# Patient Record
Sex: FEMALE | Race: BLACK OR AFRICAN AMERICAN | NOT HISPANIC OR LATINO | Employment: FULL TIME | URBAN - METROPOLITAN AREA
[De-identification: names, ages, dates, MRNs, and addresses within clinical notes are randomized per-mention and may not be internally consistent; named-entity substitution may affect disease eponyms.]

---

## 2022-10-17 ENCOUNTER — OFFICE VISIT (OUTPATIENT)
Dept: URGENT CARE | Facility: CLINIC | Age: 22
End: 2022-10-17

## 2022-10-17 VITALS — OXYGEN SATURATION: 100 % | RESPIRATION RATE: 14 BRPM | HEART RATE: 79 BPM | TEMPERATURE: 98.1 F

## 2022-10-17 DIAGNOSIS — M25.50 ARTHRALGIA, UNSPECIFIED JOINT: Primary | ICD-10-CM

## 2022-10-17 DIAGNOSIS — J02.9 SORE THROAT: ICD-10-CM

## 2022-10-17 LAB
S PYO AG THROAT QL: NEGATIVE
SARS-COV-2 AG UPPER RESP QL IA: NEGATIVE
VALID CONTROL: NORMAL

## 2022-10-17 RX ORDER — SILDENAFIL 25 MG/1
25 TABLET, FILM COATED ORAL DAILY PRN
COMMUNITY

## 2022-10-17 RX ORDER — AZATHIOPRINE 50 MG/1
50 TABLET ORAL DAILY
COMMUNITY

## 2022-10-17 RX ORDER — FERROUS SULFATE 325(65) MG
325 TABLET ORAL
COMMUNITY

## 2022-10-17 RX ORDER — HYDROXYCHLOROQUINE SULFATE 200 MG/1
200 TABLET, FILM COATED ORAL 2 TIMES DAILY WITH MEALS
COMMUNITY

## 2022-10-17 RX ORDER — LISINOPRIL 2.5 MG/1
2.5 TABLET ORAL DAILY
COMMUNITY

## 2022-10-17 RX ORDER — METHYLPREDNISOLONE 4 MG/1
TABLET ORAL
Qty: 21 TABLET | Refills: 0 | Status: SHIPPED | OUTPATIENT
Start: 2022-10-17

## 2022-10-17 NOTE — LETTER
October 17, 2022     Patient: Mena Guzman   YOB: 2000   Date of Visit: 10/17/2022       To Whom it May Concern:    Mena Guzman was seen in my clinic on 10/17/2022  She may return to work on 10/20/2022  If you have any questions or concerns, please don't hesitate to call           Sincerely,          Jonathan Guy PA-C        CC: No Recipients

## 2022-10-17 NOTE — PROGRESS NOTES
[facility]        NAME: Adrian Mccabe is a 20 y o  female  : 2000    MRN: [id number]  DATE: 2022  TIME: 10:44 AM    Assessment and Plan   Arthralgia, unspecified joint [M25 50]  1  Arthralgia, unspecified joint  methylPREDNISolone 4 MG tablet therapy pack    Poct Covid 19 Rapid Antigen Test   2  Sore throat  POCT rapid strepA    Poct Covid 19 Rapid Antigen Test     Discussed strict return to care precautions as well as red flag symptoms which should prompt immediate ED referral  Pt verbalized understanding and is in agreement with plan  Please follow up with your primary care provider within the next week  Please remember that your visit today was with an urgent care provider and should not replace follow up with your primary care provider for chronic medical issues or annual physicals  Patient Instructions       Follow up with PCP in 3-5 days  Proceed to  ER if symptoms worsen  Chief Complaint     Chief Complaint   Patient presents with   • Sore Throat     Pt presents with sore throat for two days, joint pain          History of Present Illness       Adrian Mccabe is a(n) 20 y o  female presenting with URI symptoms x 2 days  Past medical history: lupus, kidney disease, crohns disease  Has not been able to follow with rheumatologist since getting  because is awaiting referral  States she has previously been given steroid burst for flare ups  Congestion: no  Sore throat: yes  Cough: no  Sputum production: no  Fever: no  Body aches: yes moderate to severe joint pain and swelling  Loss of smell/taste: no  GI symptoms: no  Known sick contacts: no  OTC meds tried: none        Review of Systems   Review of Systems   Constitutional: Positive for diaphoresis (last night only)  Negative for chills, fatigue and fever  HENT: Positive for sore throat  Negative for congestion, ear pain, postnasal drip, rhinorrhea, sinus pain, sneezing and trouble swallowing      Eyes: Negative for pain and redness  Respiratory: Negative for cough, chest tightness, shortness of breath and wheezing  Cardiovascular: Negative for chest pain and leg swelling  Gastrointestinal: Negative for diarrhea, nausea and vomiting  Musculoskeletal: Positive for arthralgias and myalgias  Neurological: Negative for dizziness, weakness and headaches  Current Medications       Current Outpatient Medications:   •  azaTHIOprine (IMURAN) 50 mg tablet, Take 50 mg by mouth daily, Disp: , Rfl:   •  ferrous sulfate 325 (65 Fe) mg tablet, Take 325 mg by mouth daily with breakfast, Disp: , Rfl:   •  hydroxychloroquine (PLAQUENIL) 200 mg tablet, Take 200 mg by mouth 2 (two) times a day with meals, Disp: , Rfl:   •  lisinopril (ZESTRIL) 2 5 mg tablet, Take 2 5 mg by mouth daily, Disp: , Rfl:   •  methylPREDNISolone 4 MG tablet therapy pack, Use as directed on package, Disp: 21 tablet, Rfl: 0  •  sildenafil (VIAGRA) 25 MG tablet, Take 25 mg by mouth daily as needed for erectile dysfunction, Disp: , Rfl:     Current Allergies     Allergies as of 10/17/2022   • (No Known Allergies)            The following portions of the patient's history were reviewed and updated as appropriate: allergies, current medications, past family history, past medical history, past social history, past surgical history and problem list      Past Medical History:   Diagnosis Date   • Crohn's disease ([facility] [address] )    • Kidney disease, chronic, stage I (GFR over 89 ml/min)    • Lupus ([facility] [address] )    • Raynaud disease        Past Surgical History:   Procedure Laterality Date   •  SECTION N/A        History reviewed  No pertinent family history  Medications have been verified  Objective   Pulse 79   Temp 98 1 °F (36 7 °C)   Resp 14   LMP 2022   SpO2 100%        Physical Exam     Physical Exam  Vitals and nursing note reviewed  Constitutional:       General: She is not in acute distress  Appearance: Normal appearance  She is not toxic-appearing  HENT:      Head: Normocephalic and atraumatic  Right Ear: Tympanic membrane, ear canal and external ear normal       Left Ear: Tympanic membrane, ear canal and external ear normal       Nose: No congestion  Mouth/Throat:      Mouth: Mucous membranes are moist       Pharynx: Oropharynx is clear  No oropharyngeal exudate or posterior oropharyngeal erythema  Eyes:      Conjunctiva/sclera: Conjunctivae normal       Pupils: Pupils are equal, round, and reactive to light  Cardiovascular:      Rate and Rhythm: Normal rate and regular rhythm  Heart sounds: Normal heart sounds  Pulmonary:      Effort: Pulmonary effort is normal  No respiratory distress  Breath sounds: Normal breath sounds  No wheezing, rhonchi or rales  Abdominal:      General: Abdomen is flat  Palpations: Abdomen is soft  Musculoskeletal:      Right hand: Swelling present  Left hand: Swelling present  Cervical back: Normal range of motion and neck supple  Comments: Diffuse swelling of DIPs, PIPs bilaterally   Skin:     General: Skin is warm and dry  Capillary Refill: Capillary refill takes less than 2 seconds  Neurological:      Mental Status: She is alert and oriented to person, place, and time     Psychiatric:         Behavior: Behavior normal

## 2022-10-19 ENCOUNTER — TELEPHONE (OUTPATIENT)
Dept: OBGYN CLINIC | Facility: HOSPITAL | Age: 22
End: 2022-10-19

## 2022-10-19 NOTE — TELEPHONE ENCOUNTER
Caller: Delta Jacobs    Doctor: n/a    Reason for call: Patient asked a question about scheduling an appt      Call back#: [phone]

## 2022-10-20 ENCOUNTER — OFFICE VISIT (OUTPATIENT)
Dept: URGENT CARE | Facility: CLINIC | Age: 22
End: 2022-10-20
Payer: OTHER GOVERNMENT

## 2022-10-20 VITALS — BODY MASS INDEX: 25.21 KG/M2 | TEMPERATURE: 98 F | WEIGHT: 137 LBS | HEIGHT: 62 IN | RESPIRATION RATE: 18 BRPM

## 2022-10-20 DIAGNOSIS — S60.429A BLISTER OF FINGER, INITIAL ENCOUNTER: ICD-10-CM

## 2022-10-20 DIAGNOSIS — M25.50 ARTHRALGIA, UNSPECIFIED JOINT: Primary | ICD-10-CM

## 2022-10-20 PROCEDURE — 99213 OFFICE O/P EST LOW 20 MIN: CPT | Performed by: PHYSICIAN ASSISTANT

## 2022-10-20 NOTE — PROGRESS NOTES
[facility]        NAME: Pema Liz is a 20 y o  female  : 2000    MRN: [id number]  DATE: 2022  TIME: 10:54 AM    Assessment and Plan   Arthralgia, unspecified joint [M25 50]  1  Arthralgia, unspecified joint     2  Blister of finger, initial encounter         Patient Instructions     Letter provided to patient and encouraged follow up with rheumatology as soon as possible  Proceed to  ER if symptoms worsen  Chief Complaint     Chief Complaint   Patient presents with   • Joint Pain     Joint pain and finger tip ulcerations from Lupus          History of Present Illness       Patient is a 21 yo female with PMH significant for SLE, Crohns, kidney disease, and Raynauds disease presenting to the urgent care office complaining of ulcers on finger tips and joint swelling X4 days  Patient believes this is likely a flare up of her lupus as previously seen during cold seasons  She states she is unable to drive due to joint swelling  She also reports the ulcers feel like "pins and needles"  She denies any numbness, tingling, additional rashes, itching,fevers, chest pain, or SOB  Has  insurance through [employer] and states the wait times are very long to see a rheumatologist, so asking for note to indicate that she should be seen asap  Review of Systems   Review of Systems   Respiratory: Negative for apnea, cough and shortness of breath  Cardiovascular: Negative for chest pain  Skin: Positive for rash  Neurological: Negative for numbness           Current Medications       Current Outpatient Medications:   •  azaTHIOprine (IMURAN) 50 mg tablet, Take 50 mg by mouth daily, Disp: , Rfl:   •  ferrous sulfate 325 (65 Fe) mg tablet, Take 325 mg by mouth daily with breakfast, Disp: , Rfl:   •  hydroxychloroquine (PLAQUENIL) 200 mg tablet, Take 200 mg by mouth 2 (two) times a day with meals, Disp: , Rfl:   •  lisinopril (ZESTRIL) 2 5 mg tablet, Take 2 5 mg by mouth daily, Disp: , Rfl:   •  methylPREDNISolone 4 MG tablet therapy pack, Use as directed on package, Disp: 21 tablet, Rfl: 0  •  sildenafil (VIAGRA) 25 MG tablet, Take 25 mg by mouth daily as needed for erectile dysfunction, Disp: , Rfl:     Current Allergies     Allergies as of 10/20/2022   • (No Known Allergies)            The following portions of the patient's history were reviewed and updated as appropriate: allergies, current medications, past family history, past medical history, past social history, past surgical history and problem list      Past Medical History:   Diagnosis Date   • Crohn's disease ([facility] [address] )    • Kidney disease, chronic, stage I (GFR over 89 ml/min)    • Lupus ([facility] [address] )    • Raynaud disease        Past Surgical History:   Procedure Laterality Date   •  SECTION N/A        History reviewed  No pertinent family history  Medications have been verified  Objective   Temp 98 °F (36 7 °C)   Resp 18   Ht 5' 2" (1 575 m)   Wt 62 1 kg (137 lb)   LMP 2022   BMI 25 06 kg/m²        Physical Exam     Physical Exam  Constitutional:       General: She is not in acute distress  Appearance: Normal appearance  She is normal weight  She is not ill-appearing  Cardiovascular:      Rate and Rhythm: Normal rate and regular rhythm  Pulses: Normal pulses  Heart sounds: Normal heart sounds  No murmur heard  No friction rub  No gallop  Pulmonary:      Effort: Pulmonary effort is normal       Breath sounds: Normal breath sounds  No wheezing, rhonchi or rales  Musculoskeletal:         General: Swelling present  Skin:     General: Skin is warm  Findings: Lesion present  Comments: Small blisters present on the finger tips   Neurological:      Mental Status: She is alert

## 2022-10-20 NOTE — LETTER
October 20, 2022     Patient: Alexa Mckeon   YOB: 2000   Date of Visit: 10/20/2022       To Whom it May Concern:    Alexa Mckeon was seen in my clinic on 10/20/2022  She is having a flare up of her rheumatologic conditions which is making it extremely difficult for her to perform her normal daily functions  She needs to be seen by a specialist as soon as possible  If you have any questions or concerns, please don't hesitate to call           Sincerely,          CARE NOW PROVIDER        CC: No Recipients

## 2022-10-20 NOTE — LETTER
October 20, 2022     Patient: Ge Nguyen   YOB: 2000   Date of Visit: 10/20/2022       To Whom it May Concern:    Ge Nguyen was seen in my clinic on 10/20/2022  She may return to work on 10/22/2022  If you have any questions or concerns, please don't hesitate to call           Sincerely,          CARE NOW PROVIDER        CC: No Recipients

## 2022-10-28 ENCOUNTER — HOSPITAL ENCOUNTER (OUTPATIENT)
Dept: RADIOLOGY | Facility: HOSPITAL | Age: 22
Discharge: HOME | End: 2022-10-28
Payer: OTHER GOVERNMENT

## 2022-10-28 DIAGNOSIS — N18.1 STAGE 1 CHRONIC KIDNEY DISEASE: ICD-10-CM

## 2022-10-28 PROCEDURE — 76775 US EXAM ABDO BACK WALL LIM: CPT

## 2022-12-09 ENCOUNTER — APPOINTMENT (OUTPATIENT)
Dept: LAB | Facility: CLINIC | Age: 22
End: 2022-12-09

## 2022-12-09 ENCOUNTER — HOSPITAL ENCOUNTER (OUTPATIENT)
Dept: ULTRASOUND IMAGING | Facility: HOSPITAL | Age: 22
Discharge: HOME | End: 2022-12-09

## 2022-12-09 DIAGNOSIS — I10 ESSENTIAL HYPERTENSION, MALIGNANT: ICD-10-CM

## 2022-12-09 DIAGNOSIS — E88.81 METABOLIC SYNDROME: ICD-10-CM

## 2022-12-09 DIAGNOSIS — E78.49 FAMILIAL COMBINED HYPERLIPIDEMIA: ICD-10-CM

## 2022-12-09 DIAGNOSIS — E78.5 HYPERLIPIDEMIA, UNSPECIFIED HYPERLIPIDEMIA TYPE: ICD-10-CM

## 2022-12-09 LAB
ALBUMIN SERPL BCP-MCNC: 4.1 G/DL (ref 3.5–5)
ALP SERPL-CCNC: 73 U/L (ref 34–104)
ALT SERPL W P-5'-P-CCNC: 70 U/L (ref 7–52)
ANION GAP SERPL CALCULATED.3IONS-SCNC: 5 MMOL/L (ref 4–13)
AST SERPL W P-5'-P-CCNC: 32 U/L (ref 13–39)
BILIRUB SERPL-MCNC: 0.36 MG/DL (ref 0.2–1)
BUN SERPL-MCNC: 13 MG/DL (ref 5–25)
CALCIUM SERPL-MCNC: 9.6 MG/DL (ref 8.4–10.2)
CHLORIDE SERPL-SCNC: 102 MMOL/L (ref 96–108)
CHOLEST SERPL-MCNC: 176 MG/DL
CO2 SERPL-SCNC: 30 MMOL/L (ref 21–32)
CREAT SERPL-MCNC: 0.72 MG/DL (ref 0.6–1.3)
GFR SERPL CREATININE-BSD FRML MDRD: 118 ML/MIN/1.73SQ M
GLUCOSE P FAST SERPL-MCNC: 87 MG/DL (ref 65–99)
HDLC SERPL-MCNC: 54 MG/DL
LDLC SERPL CALC-MCNC: 98 MG/DL (ref 0–100)
NONHDLC SERPL-MCNC: 122 MG/DL
POTASSIUM SERPL-SCNC: 3.9 MMOL/L (ref 3.5–5.3)
PROT SERPL-MCNC: 7.5 G/DL (ref 6.4–8.4)
SODIUM SERPL-SCNC: 137 MMOL/L (ref 135–147)
TRIGL SERPL-MCNC: 122 MG/DL

## 2023-01-02 ENCOUNTER — APPOINTMENT (EMERGENCY)
Dept: RADIOLOGY | Facility: HOSPITAL | Age: 23
End: 2023-01-02

## 2023-01-02 ENCOUNTER — HOSPITAL ENCOUNTER (EMERGENCY)
Facility: HOSPITAL | Age: 23
Discharge: HOME | End: 2023-01-02
Attending: EMERGENCY MEDICINE

## 2023-01-02 VITALS
HEART RATE: 100 BPM | OXYGEN SATURATION: 98 % | DIASTOLIC BLOOD PRESSURE: 56 MMHG | BODY MASS INDEX: 26.98 KG/M2 | WEIGHT: 147.49 LBS | SYSTOLIC BLOOD PRESSURE: 111 MMHG | TEMPERATURE: 98.5 F | RESPIRATION RATE: 20 BRPM

## 2023-01-02 DIAGNOSIS — R10.12 LEFT UPPER QUADRANT ABDOMINAL PAIN: Primary | ICD-10-CM

## 2023-01-02 LAB
ALBUMIN SERPL BCP-MCNC: 3.3 G/DL (ref 3.5–5)
ALP SERPL-CCNC: 90 U/L (ref 46–116)
ALT SERPL W P-5'-P-CCNC: 60 U/L (ref 12–78)
ANION GAP SERPL CALCULATED.3IONS-SCNC: 4 MMOL/L (ref 4–13)
AST SERPL W P-5'-P-CCNC: 35 U/L (ref 5–45)
BASOPHILS # BLD AUTO: 0.02 THOUSANDS/ÂΜL (ref 0–0.1)
BASOPHILS NFR BLD AUTO: 0 % (ref 0–1)
BILIRUB SERPL-MCNC: 0.33 MG/DL (ref 0.2–1)
BILIRUB UR QL STRIP: NEGATIVE
BUN SERPL-MCNC: 17 MG/DL (ref 5–25)
CALCIUM ALBUM COR SERPL-MCNC: 9.5 MG/DL (ref 8.3–10.1)
CALCIUM SERPL-MCNC: 8.9 MG/DL (ref 8.3–10.1)
CHLORIDE SERPL-SCNC: 104 MMOL/L (ref 96–108)
CLARITY UR: CLEAR
CO2 SERPL-SCNC: 30 MMOL/L (ref 21–32)
COLOR UR: NORMAL
CREAT SERPL-MCNC: 0.69 MG/DL (ref 0.6–1.3)
EOSINOPHIL # BLD AUTO: 0.05 THOUSAND/ÂΜL (ref 0–0.61)
EOSINOPHIL NFR BLD AUTO: 1 % (ref 0–6)
ERYTHROCYTE [DISTWIDTH] IN BLOOD BY AUTOMATED COUNT: 13.2 % (ref 11.6–15.1)
EXT PREGNANCY TEST URINE: NEGATIVE
EXT. CONTROL: NORMAL
FLUAV RNA RESP QL NAA+PROBE: NEGATIVE
FLUBV RNA RESP QL NAA+PROBE: NEGATIVE
GFR SERPL CREATININE-BSD FRML MDRD: 123 ML/MIN/1.73SQ M
GLUCOSE SERPL-MCNC: 111 MG/DL (ref 65–140)
GLUCOSE UR STRIP-MCNC: NEGATIVE MG/DL
HCT VFR BLD AUTO: 38.2 % (ref 34.8–46.1)
HGB BLD-MCNC: 11.9 G/DL (ref 11.5–15.4)
HGB UR QL STRIP.AUTO: NEGATIVE
IMM GRANULOCYTES # BLD AUTO: 0.01 THOUSAND/UL (ref 0–0.2)
IMM GRANULOCYTES NFR BLD AUTO: 0 % (ref 0–2)
KETONES UR STRIP-MCNC: NEGATIVE MG/DL
LEUKOCYTE ESTERASE UR QL STRIP: NEGATIVE
LIPASE SERPL-CCNC: 138 U/L (ref 73–393)
LYMPHOCYTES # BLD AUTO: 1.61 THOUSANDS/ÂΜL (ref 0.6–4.47)
LYMPHOCYTES NFR BLD AUTO: 25 % (ref 14–44)
MCH RBC QN AUTO: 27.8 PG (ref 26.8–34.3)
MCHC RBC AUTO-ENTMCNC: 31.2 G/DL (ref 31.4–37.4)
MCV RBC AUTO: 89 FL (ref 82–98)
MONOCYTES # BLD AUTO: 0.62 THOUSAND/ÂΜL (ref 0.17–1.22)
MONOCYTES NFR BLD AUTO: 10 % (ref 4–12)
NEUTROPHILS # BLD AUTO: 4.11 THOUSANDS/ÂΜL (ref 1.85–7.62)
NEUTS SEG NFR BLD AUTO: 64 % (ref 43–75)
NITRITE UR QL STRIP: NEGATIVE
NRBC BLD AUTO-RTO: 0 /100 WBCS
PH UR STRIP.AUTO: 6 [PH]
PLATELET # BLD AUTO: 270 THOUSANDS/UL (ref 149–390)
PMV BLD AUTO: 10.8 FL (ref 8.9–12.7)
POTASSIUM SERPL-SCNC: 4.1 MMOL/L (ref 3.5–5.3)
PROT SERPL-MCNC: 7 G/DL (ref 6.4–8.4)
PROT UR STRIP-MCNC: NEGATIVE MG/DL
RBC # BLD AUTO: 4.28 MILLION/UL (ref 3.81–5.12)
RSV RNA RESP QL NAA+PROBE: NEGATIVE
SARS-COV-2 RNA RESP QL NAA+PROBE: NEGATIVE
SODIUM SERPL-SCNC: 138 MMOL/L (ref 135–147)
SP GR UR STRIP.AUTO: >=1.03 (ref 1–1.03)
UROBILINOGEN UR QL STRIP.AUTO: 0.2 E.U./DL
WBC # BLD AUTO: 6.42 THOUSAND/UL (ref 4.31–10.16)

## 2023-01-02 RX ORDER — PANTOPRAZOLE SODIUM 20 MG/1
20 TABLET, DELAYED RELEASE ORAL DAILY
Qty: 30 TABLET | Refills: 0 | Status: SHIPPED | OUTPATIENT
Start: 2023-01-02

## 2023-01-02 RX ORDER — PANTOPRAZOLE SODIUM 40 MG/1
40 TABLET, DELAYED RELEASE ORAL ONCE
Status: COMPLETED | OUTPATIENT
Start: 2023-01-02 | End: 2023-01-02

## 2023-01-02 RX ORDER — OLMESARTAN MEDOXOMIL 20 MG/1
20 TABLET ORAL DAILY
COMMUNITY

## 2023-01-02 RX ADMIN — IOHEXOL 100 ML: 350 INJECTION, SOLUTION INTRAVENOUS at 19:04

## 2023-01-02 RX ADMIN — PANTOPRAZOLE SODIUM 40 MG: 40 TABLET, DELAYED RELEASE ORAL at 19:51

## 2023-01-02 RX ADMIN — SODIUM CHLORIDE 500 ML: 0.9 INJECTION, SOLUTION INTRAVENOUS at 17:02

## 2023-01-02 NOTE — ED PROVIDER NOTES
History  Chief Complaint   Patient presents with   • Abdominal Pain     L sided abd pain for about a week, abd getting increasingly large and bloated  No vomiting diarrhea or constipation no urinary issue per pt     19 yo female with history of Lupus and Crohn's disease and partial SBO presents c/o left sided abdominal pain x one week  Says pain is constant   + nausea and decreased appetite  No vomiting, diarrhea or constipation  Last BM yesterday  No fever  + cough and congestion  She is s/p cholecystetomy and ileocectomy for the SBO  History provided by:  Patient   used: No    Abdominal Pain  Associated symptoms: cough    Associated symptoms: no chest pain, no constipation, no diarrhea, no dysuria, no fever, no nausea, no shortness of breath and no vomiting        Prior to Admission Medications   Prescriptions Last Dose Informant Patient Reported? Taking?    Calcifediol (RAYALDEE PO)   Yes Yes   Sig: Take by mouth in the morning   Cyanocobalamin (VITAMIN B 12 PO)   Yes Yes   Sig: Take by mouth 2 (two) times a day   azaTHIOprine (IMURAN) 50 mg tablet Not Taking  Yes No   Sig: Take 50 mg by mouth daily   Patient not taking: Reported on 1/2/2023   ferrous sulfate 325 (65 Fe) mg tablet   Yes No   Sig: Take 325 mg by mouth daily with breakfast   hydroxychloroquine (PLAQUENIL) 200 mg tablet   Yes No   Sig: Take 200 mg by mouth 2 (two) times a day with meals   lisinopril (ZESTRIL) 2 5 mg tablet Not Taking  Yes No   Sig: Take 2 5 mg by mouth daily   Patient not taking: Reported on 1/2/2023   methylPREDNISolone 4 MG tablet therapy pack Not Taking  No No   Sig: Use as directed on package   Patient not taking: Reported on 1/2/2023   olmesartan (BENICAR) 20 mg tablet   Yes Yes   Sig: Take 20 mg by mouth daily   sildenafil (VIAGRA) 25 MG tablet Not Taking  Yes No   Sig: Take 25 mg by mouth daily as needed for erectile dysfunction   Patient not taking: Reported on 1/2/2023 Facility-Administered Medications: None       Past Medical History:   Diagnosis Date   • Crohn's disease ([facility] [address] )    • Hypertension    • Kidney disease, chronic, stage I (GFR over 89 ml/min)    • [facility]    • Raynaud disease        Past Surgical History:   Procedure Laterality Date   •  SECTION N/A    • COLON SURGERY         History reviewed  No pertinent family history  I have reviewed and agree with the history as documented  E-Cigarette/Vaping   • E-Cigarette Use Never User      E-Cigarette/Vaping Substances   • Nicotine Yes      Social History     Tobacco Use   • Smoking status: Never   • Smokeless tobacco: Never   Vaping Use   • Vaping Use: Never used   Substance Use Topics   • Alcohol use: Yes     Comment: socially   • Drug use: Never       Review of Systems   Constitutional: Negative  Negative for fever  HENT: Positive for congestion  Eyes: Negative  Respiratory: Positive for cough  Negative for shortness of breath  Cardiovascular: Negative  Negative for chest pain  Gastrointestinal: Positive for abdominal pain  Negative for constipation, diarrhea, nausea and vomiting  Genitourinary: Negative  Negative for dysuria and flank pain  Musculoskeletal: Negative  Negative for back pain and myalgias  Skin: Negative  Negative for rash  Neurological: Negative  Negative for dizziness and headaches  Hematological: Does not bruise/bleed easily  Psychiatric/Behavioral: Negative  All other systems reviewed and are negative  Physical Exam  Physical Exam  Vitals and nursing note reviewed  Constitutional:       General: She is not in acute distress  Appearance: She is well-developed  She is not ill-appearing or diaphoretic  HENT:      Head: Normocephalic and atraumatic  Right Ear: External ear normal       Left Ear: External ear normal    Eyes:      General: No scleral icterus       Conjunctiva/sclera: Conjunctivae normal    Cardiovascular:      Rate and Rhythm: Normal rate and regular rhythm  Heart sounds: Normal heart sounds  No murmur heard  Pulmonary:      Effort: Pulmonary effort is normal  No respiratory distress  Breath sounds: Normal breath sounds  Abdominal:      General: Bowel sounds are normal  There is no distension  Palpations: Abdomen is soft  Tenderness: There is abdominal tenderness in the epigastric area and left upper quadrant  There is no right CVA tenderness or left CVA tenderness  Musculoskeletal:         General: No deformity  Normal range of motion  Cervical back: Normal range of motion and neck supple  Right lower leg: No edema  Left lower leg: No edema  Skin:     General: Skin is warm and dry  Coloration: Skin is not pale  Findings: No rash  Neurological:      General: No focal deficit present  Mental Status: She is alert and oriented to person, place, and time  Motor: No weakness     Psychiatric:         Mood and Affect: Mood normal          Behavior: Behavior normal          Vital Signs  ED Triage Vitals [01/02/23 1540]   Temperature Pulse Respirations Blood Pressure SpO2   98 5 °F (36 9 °C) (!) 110 20 111/56 100 %      Temp Source Heart Rate Source Patient Position - Orthostatic VS BP Location FiO2 (%)   Tympanic Monitor Sitting Right arm --      Pain Score       7           Vitals:    01/02/23 1540   BP: 111/56   Pulse: (!) 110   Patient Position - Orthostatic VS: Sitting         Visual Acuity      ED Medications  Medications   iohexol (OMNIPAQUE) 240 MG/ML solution 50 mL (has no administration in time range)   pantoprazole (PROTONIX) EC tablet 40 mg (has no administration in time range)   sodium chloride 0 9 % bolus 500 mL (0 mL Intravenous Stopped 1/2/23 1802)   iohexol (OMNIPAQUE) 350 MG/ML injection (SINGLE-DOSE) 100 mL (100 mL Intravenous Given 1/2/23 1904)       Diagnostic Studies  Results Reviewed     Procedure Component Value Units Date/Time    UA (URINE) with reflex to Scope [[id number]] Collected: 01/02/23 1838    Lab Status: Final result Specimen: Urine, Other Updated: 01/02/23 1849     Color, UA Light Yellow     Clarity, UA Clear     Specific Gravity, UA >=1 030     pH, UA 6 0     Leukocytes, UA Negative     Nitrite, UA Negative     Protein, UA Negative mg/dl      Glucose, UA Negative mg/dl      Ketones, UA Negative mg/dl      Urobilinogen, UA 0 2 E U /dl      Bilirubin, UA Negative     Occult Blood, UA Negative    POCT pregnancy, urine [[id number]]  (Normal) Resulted: 01/02/23 1844    Lab Status: Final result Updated: 01/02/23 1844     EXT Preg Test, Ur Negative     Control Valid    FLU/RSV/COVID - if FLU/RSV clinically relevant [[id number]]  (Normal) Collected: 01/02/23 1710    Lab Status: Final result Specimen: Nares from Nose Updated: 01/02/23 1757     SARS-CoV-2 Negative     INFLUENZA A PCR Negative     INFLUENZA B PCR Negative     RSV PCR Negative    Narrative:      FOR PEDIATRIC PATIENTS - copy/paste COVID Guidelines URL to browser: [email or web address]  [email or web address]    SARS-CoV-2 assay is a Nucleic Acid Amplification assay intended for the  qualitative detection of nucleic acid from SARS-CoV-2 in nasopharyngeal  swabs  Results are for the presumptive identification of SARS-CoV-2 RNA  Positive results are indicative of infection with SARS-CoV-2, the virus  causing COVID-19, but do not rule out bacterial infection or co-infection  with other viruses  Laboratories within the [place] and its  territories are required to report all positive results to the appropriate  public health authorities  Negative results do not preclude SARS-CoV-2  infection and should not be used as the sole basis for treatment or other  patient management decisions  Negative results must be combined with  clinical observations, patient history, and epidemiological information  This test has not been FDA cleared or approved      This test has been authorized by FDA under an Emergency Use Authorization  (EUA)  This test is only authorized for the duration of time the  declaration that circumstances exist justifying the authorization of the  emergency use of an in vitro diagnostic tests for detection of SARS-CoV-2  virus and/or diagnosis of COVID-19 infection under section 564(b)(1) of  the Act, 21 U  S C  757MKA-7(N)(5), unless the authorization is terminated  or revoked sooner  The test has been validated but independent review by FDA  and CLIA is pending  Test performed using [email or web address]: This RT-PCR assay targets N2,  a region unique to SARS-CoV-2  A conserved region in the E-gene was chosen  for pan-Sarbecovirus detection which includes SARS-CoV-2  According to CMS-2020-01-R, this platform meets the definition of high-throughput technology      Lipase [[id number]]  (Normal) Collected: 01/02/23 1656    Lab Status: Final result Specimen: Blood from Arm, Right Updated: 01/02/23 1720     Lipase 138 u/L     Comprehensive metabolic panel [[id number]]  (Abnormal) Collected: 01/02/23 1656    Lab Status: Final result Specimen: Blood from Arm, Right Updated: 01/02/23 1720     Sodium 138 mmol/L      Potassium 4 1 mmol/L      Chloride 104 mmol/L      CO2 30 mmol/L      ANION GAP 4 mmol/L      BUN 17 mg/dL      Creatinine 0 69 mg/dL      Glucose 111 mg/dL      Calcium 8 9 mg/dL      Corrected Calcium 9 5 mg/dL      AST 35 U/L      ALT 60 U/L      Alkaline Phosphatase 90 U/L      Total Protein 7 0 g/dL      Albumin 3 3 g/dL      Total Bilirubin 0 33 mg/dL      eGFR 123 ml/min/1 73sq m     Narrative:      [place] guidelines for Chronic Kidney Disease (CKD):   •  Stage 1 with normal or high GFR (GFR > 90 mL/min/1 73 square meters)  •  Stage 2 Mild CKD (GFR = 60-89 mL/min/1 73 square meters)  •  Stage 3A Moderate CKD (GFR = 45-59 mL/min/1 73 square meters)  •  Stage 3B Moderate CKD (GFR = 30-44 mL/min/1 73 square meters)  •  Stage 4 Severe CKD (GFR = 15-29 mL/min/1 73 square meters)  •  Stage 5 End Stage CKD (GFR <15 mL/min/1 73 square meters)  Note: GFR calculation is accurate only with a steady state creatinine    CBC and differential [[id number]]  (Abnormal) Collected: 01/02/23 1656    Lab Status: Final result Specimen: Blood from Arm, Right Updated: 01/02/23 1704     WBC 6 42 Thousand/uL      RBC 4 28 Million/uL      Hemoglobin 11 9 g/dL      Hematocrit 38 2 %      MCV 89 fL      MCH 27 8 pg      MCHC 31 2 g/dL      RDW 13 2 %      MPV 10 8 fL      Platelets 261 Thousands/uL      nRBC 0 /100 WBCs      Neutrophils Relative 64 %      Immat GRANS % 0 %      Lymphocytes Relative 25 %      Monocytes Relative 10 %      Eosinophils Relative 1 %      Basophils Relative 0 %      Neutrophils Absolute 4 11 Thousands/µL      Immature Grans Absolute 0 01 Thousand/uL      Lymphocytes Absolute 1 61 Thousands/µL      Monocytes Absolute 0 62 Thousand/µL      Eosinophils Absolute 0 05 Thousand/µL      Basophils Absolute 0 02 Thousands/µL                  CT abdomen pelvis with contrast   Final Result by Sylvie Anthony MD (01/02 1926)      No acute abdominopelvic pathology identified  Workstation performed: [id number]                    Procedures  Procedures         ED Course  ED Course as of 01/02/23 1950 Mon Jan 02, 2023   1839 CT tech went to bring pt  To CT scan and found her sitting up in stretcher eating [employer] bagel with cream cheese  Pt  Asked to please stop eating until we get her CT scan done and resulted  Medical Decision Making  Evaluation negative  Will try course of protonix and advised follow up outpt  Left upper quadrant abdominal pain: acute illness or injury  Amount and/or Complexity of Data Reviewed  External Data Reviewed: labs, radiology and notes  Labs: ordered  Radiology: ordered            Disposition  Final diagnoses:   Left upper quadrant abdominal pain     Time reflects when diagnosis was documented in both MDM as applicable and the Disposition within this note     Time User Action Codes Description Comment    4/4/1858  7:35 PM Murray RECINOS Add [R43 44] Left upper quadrant abdominal pain       ED Disposition     ED Disposition   Discharge    Condition   Stable    Date/Time   Mon Jan 2, 2023  7:46 PM    [address] discharge to home/self care  Follow-up Information     Follow up With Specialties Details Why Contact Info    your doctor  Schedule an appointment as soon as possible for a visit             Patient's Medications   Discharge Prescriptions    PANTOPRAZOLE (PROTONIX) 20 MG TABLET    Take 1 tablet (20 mg total) by mouth daily       Start Date: 1/2/2023  End Date: --       Order Dose: 20 mg       Quantity: 30 tablet    Refills: 0       No discharge procedures on file      PDMP Review     None          ED Provider  Electronically Signed by           Jayesh Cisneros MD  08/41/93 9660

## 2023-01-03 NOTE — DISCHARGE INSTRUCTIONS
Your CT scan and blood work are normal   Take the protonix daily as prescribed and you can use tylenol/maalox/heating pad as needed for discomfort  Follow up with your doctor for further evaluation

## 2023-03-10 ENCOUNTER — HOSPITAL ENCOUNTER (EMERGENCY)
Facility: HOSPITAL | Age: 23
Discharge: HOME | End: 2023-03-10
Attending: EMERGENCY MEDICINE

## 2023-03-10 VITALS
SYSTOLIC BLOOD PRESSURE: 140 MMHG | HEART RATE: 107 BPM | RESPIRATION RATE: 20 BRPM | OXYGEN SATURATION: 98 % | TEMPERATURE: 98 F | DIASTOLIC BLOOD PRESSURE: 73 MMHG

## 2023-03-10 DIAGNOSIS — N89.8 VAGINAL DISCHARGE: Primary | ICD-10-CM

## 2023-03-10 RX ORDER — AZITHROMYCIN 250 MG/1
1000 TABLET, FILM COATED ORAL ONCE
Status: COMPLETED | OUTPATIENT
Start: 2023-03-10 | End: 2023-03-10

## 2023-03-10 RX ADMIN — AZITHROMYCIN MONOHYDRATE 1000 MG: 250 TABLET ORAL at 16:31

## 2023-03-10 RX ADMIN — LIDOCAINE HYDROCHLORIDE 500 MG: 10 INJECTION, SOLUTION EPIDURAL; INFILTRATION; INTRACAUDAL; PERINEURAL at 16:32

## 2023-03-10 NOTE — DISCHARGE INSTRUCTIONS
We treated you empirically for gonorrhea/chlamydia  We will call you regarding your results in a few days  Avoid intercourse until both partners have been treated completely and <1 week

## 2023-03-11 LAB
C TRACH DNA SPEC QL NAA+PROBE: NEGATIVE
N GONORRHOEA DNA SPEC QL NAA+PROBE: NEGATIVE
TREPONEMA PALLIDUM IGG+IGM AB [PRESENCE] IN SERUM OR PLASMA BY IMMUNOASSAY: NORMAL

## 2023-03-12 LAB
HIV 1+2 AB+HIV1 P24 AG SERPL QL IA: NORMAL
HIV 2 AB SERPL QL IA: NORMAL
HIV1 AB SERPL QL IA: NORMAL
HIV1 P24 AG SERPL QL IA: NORMAL

## 2023-03-12 NOTE — ED PROVIDER NOTES
History  Chief Complaint   Patient presents with   • Vaginal Discharge     C/o odd vaginal odor in past couple of days  Normal discharge  Admits to new sexual partner recently so wants to be checked     19yoF c/o whitish vag discharge and concern for STI  New sexual partner, uncertain if partner has sxs  No abd pain fever/chills  Asking to avoid speculum exam            Prior to Admission Medications   Prescriptions Last Dose Informant Patient Reported? Taking? Calcifediol (RAYALDEE PO)   Yes No   Sig: Take by mouth in the morning   Cyanocobalamin (VITAMIN B 12 PO)   Yes No   Sig: Take by mouth 2 (two) times a day   azaTHIOprine (IMURAN) 50 mg tablet   Yes No   Sig: Take 50 mg by mouth daily   Patient not taking: Reported on 2023   ferrous sulfate 325 (65 Fe) mg tablet   Yes No   Sig: Take 325 mg by mouth daily with breakfast   hydroxychloroquine (PLAQUENIL) 200 mg tablet   Yes No   Sig: Take 200 mg by mouth 2 (two) times a day with meals   lisinopril (ZESTRIL) 2 5 mg tablet   Yes No   Sig: Take 2 5 mg by mouth daily   Patient not taking: Reported on 2023   methylPREDNISolone 4 MG tablet therapy pack   No No   Sig: Use as directed on package   Patient not taking: Reported on 2023   olmesartan (BENICAR) 20 mg tablet   Yes No   Sig: Take 20 mg by mouth daily   pantoprazole (PROTONIX) 20 mg tablet   No No   Sig: Take 1 tablet (20 mg total) by mouth daily   sildenafil (VIAGRA) 25 MG tablet   Yes No   Sig: Take 25 mg by mouth daily as needed for erectile dysfunction   Patient not taking: Reported on 2023      Facility-Administered Medications: None       Past Medical History:   Diagnosis Date   • Crohn's disease ([address] )    • Hypertension    • Kidney disease, chronic, stage I (GFR over 89 ml/min)    • Lupus (HCC)    • Raynaud disease        Past Surgical History:   Procedure Laterality Date   •  SECTION N/A    • COLON SURGERY         History reviewed  No pertinent family history    I have reviewed and agree with the history as documented  E-Cigarette/Vaping   • E-Cigarette Use Never User      E-Cigarette/Vaping Substances   • Nicotine Yes      Social History     Tobacco Use   • Smoking status: Never   • Smokeless tobacco: Never   Vaping Use   • Vaping Use: Never used   Substance Use Topics   • Alcohol use: Yes     Comment: socially   • Drug use: Never       Review of Systems   Constitutional: Negative for fever  Physical Exam  Physical Exam  Vitals reviewed  Constitutional:       Appearance: She is well-developed  HENT:      Head: Normocephalic and atraumatic  Right Ear: External ear normal       Left Ear: External ear normal       Nose: Nose normal  No rhinorrhea  Mouth/Throat:      Mouth: Mucous membranes are moist    Eyes:      Conjunctiva/sclera: Conjunctivae normal    Cardiovascular:      Rate and Rhythm: Normal rate and regular rhythm  Pulmonary:      Effort: Pulmonary effort is normal       Breath sounds: Normal breath sounds  No wheezing or rales  Abdominal:      Palpations: Abdomen is soft  Tenderness: There is no abdominal tenderness  Genitourinary:     Comments: Pt declines  Musculoskeletal:      Cervical back: Neck supple  Right lower leg: No edema  Left lower leg: No edema  Skin:     General: Skin is warm and dry  Neurological:      Mental Status: She is alert and oriented to person, place, and time     Psychiatric:         Mood and Affect: Mood normal          Vital Signs  ED Triage Vitals [03/10/23 1520]   Temperature Pulse Respirations Blood Pressure SpO2   98 °F (36 7 °C) (!) 107 20 140/73 98 %      Temp Source Heart Rate Source Patient Position - Orthostatic VS BP Location FiO2 (%)   Tympanic Monitor Sitting Right arm --      Pain Score       No Pain           Vitals:    03/10/23 1520   BP: 140/73   Pulse: (!) 107   Patient Position - Orthostatic VS: Sitting         Visual Acuity      ED Medications  Medications   cefTRIAXone (ROCEPHIN) 500 mg in lidocaine (PF) (XYLOCAINE-MPF) 1 % IM only syringe (500 mg Intramuscular Given 3/10/23 1632)   azithromycin (ZITHROMAX) tablet 1,000 mg (1,000 mg Oral Given 3/10/23 1631)       Diagnostic Studies  Results Reviewed     Procedure Component Value Units Date/Time    RPR-Syphilis Screening (Total Syphilis IGG/IGM) [[id number]]  (Normal) Collected: 03/10/23 1618    Lab Status: Final result Specimen: Blood from Arm, Right Updated: 03/11/23 0505     SYPHILIS TOTAL ANTIBODY Non-reactive    Narrative:      Test performed on the [employer] system using multiplex flow immunoassay methodology  [address] [place] [address] amplified DNA by PCR [[id number]]  (Normal) Collected: 03/10/23 1618    Lab Status: Final result Specimen: Urine, Other Updated: 03/11/23 0423     N gonorrhoeae, DNA Probe Negative     Chlamydia trachomatis, DNA Probe Negative    Narrative: This test was performed using the FDA-approved [place] 6800 CT/NG assay ([employer])  This test uses real-time PCR to detect Chlamydia trachomatis (CT) and Neisseria gonorrhoeae (NG)  This instrument and assay have been validated by the  and performing laboratory and verified by the performing laboratory  This test is intended as an aid in the diagnosis of chlamydial and gonococcal disease  This test has not been evaluated in patients younger than 15years of age and is not recommended for evaluation of suspected sexual abuse  This assay is not intended to replace other exams or tests for diagnosis of urogenital infection by causative factors other than Chalmydia trachomatis (CT) and Neisseria gonorrhoeae (NG)  Additional testing is recommended when the results do not correlate with clinical signs and symptoms     Procedural Limitations  This assay has only been validated for use with male and female urine, clinician-instructed self-collected vaginal swab specimens, clinician-collected vaginal swab specimens, endocervical swab specimens collected in antonio® PCR Media and cervical specimens collected in PreservCyt® Solution  Assay performance has not been validated for use with other collection media and/or specimen types  Detection of C  trachomatis and Read Jordan is dependent on the number of organisms present in the specimen  Detection may be affected by specimen collection methods, patient factors, stage of infection, infecting strains, and presence of polymerase/PCR inhibitors  When CT is present at very high concentrations, the detection of NG present at concentrations near the limit of detection may be impacted  The presence of mucus in endocervical specimens may lead to false negative results  The presence of whole blood in urine and cervical specimens collected in PreservCyt Solution may lead to false negative and/or invalid test results  Urine testing is recommended to be performed on first catch urine samples  The effects of other collection variables have not been evaluated at this time  The effects of vaginal discharge, tampon use, douching, and other collection variables have not been evaluated at this time  This assay has not been evaluated with patients currently being treated with antimicrobial agents active against CT or NG, or patients with a history of hysterectomy  HIV 1/2 AG/AB w Reflex [facility] for 2 yr old and above [[id number]] Collected: 03/10/23 1618    Lab Status: In process Specimen: Blood from Arm, Right Updated: 03/10/23 1623                 No orders to display              Procedures  Procedures         ED Course                                             Medical Decision Making  Pt accepts empiric gonorrhea tx  Sent urine GC/chlamydia, serum HIV / syphillis  Pt counseled re: possibility of need for reflex testing in case of positive HIV Ab  Amount and/or Complexity of Data Reviewed  Labs: ordered  Risk  Prescription drug management            Disposition  Final diagnoses:   Vaginal discharge     Time reflects when diagnosis was documented in both MDM as applicable and the Disposition within this note     Time User Action Codes Description Comment    3/10/2023  4:21 PM Alba Victor Robb [N89 8] Vaginal discharge       ED Disposition     ED Disposition   Discharge    Condition   Stable    Date/Time   Fri Mar 10, 2023  4:21 PM    [address] discharge to home/self care  Follow-up Information    None         Discharge Medication List as of 3/10/2023  4:22 PM      CONTINUE these medications which have NOT CHANGED    Details   azaTHIOprine (IMURAN) 50 mg tablet Take 50 mg by mouth daily, Historical Med      Calcifediol (RAYALDEE PO) Take by mouth in the morning, Historical Med      Cyanocobalamin (VITAMIN B 12 PO) Take by mouth 2 (two) times a day, Historical Med      ferrous sulfate 325 (65 Fe) mg tablet Take 325 mg by mouth daily with breakfast, Historical Med      hydroxychloroquine (PLAQUENIL) 200 mg tablet Take 200 mg by mouth 2 (two) times a day with meals, Historical Med      lisinopril (ZESTRIL) 2 5 mg tablet Take 2 5 mg by mouth daily, Historical Med      methylPREDNISolone 4 MG tablet therapy pack Use as directed on package, Normal      olmesartan (BENICAR) 20 mg tablet Take 20 mg by mouth daily, Historical Med      pantoprazole (PROTONIX) 20 mg tablet Take 1 tablet (20 mg total) by mouth daily, Starting Mon 1/2/2023, Normal      sildenafil (VIAGRA) 25 MG tablet Take 25 mg by mouth daily as needed for erectile dysfunction, Historical Med             No discharge procedures on file      PDMP Review     None          ED Provider  Electronically Signed by           Kenyetta Vitale DO  03/12/23 1286

## 2023-04-13 PROBLEM — M32.9 SYSTEMIC LUPUS ERYTHEMATOSUS (HCC): Status: ACTIVE | Noted: 2023-01-11

## 2023-05-03 ENCOUNTER — TELEPHONE (OUTPATIENT)
Dept: OTHER | Facility: OTHER | Age: 23
End: 2023-05-03

## 2023-05-03 ENCOUNTER — CONSULT (OUTPATIENT)
Dept: HEMATOLOGY ONCOLOGY | Facility: CLINIC | Age: 23
End: 2023-05-03

## 2023-05-03 VITALS
HEIGHT: 62 IN | TEMPERATURE: 97.8 F | SYSTOLIC BLOOD PRESSURE: 102 MMHG | DIASTOLIC BLOOD PRESSURE: 68 MMHG | HEART RATE: 69 BPM | OXYGEN SATURATION: 91 % | WEIGHT: 154 LBS | BODY MASS INDEX: 28.34 KG/M2

## 2023-05-03 DIAGNOSIS — M32.9 SYSTEMIC LUPUS ERYTHEMATOSUS, UNSPECIFIED SLE TYPE, UNSPECIFIED ORGAN INVOLVEMENT STATUS (HCC): ICD-10-CM

## 2023-05-03 NOTE — TELEPHONE ENCOUNTER
Patient called and stated she is running behind for her 0800 appointment today  As of 0803, she stated she is 12 minutes away  Please call patient with any questions or concerns

## 2023-05-03 NOTE — PROGRESS NOTES
Oncology Outpatient Consult Note  Adelina Allen 20 y o  female MRN: @ Encounter: [id number]        Date:  5/3/2023        CC:  Systemic lupus erythematosus      HPI:  Adelina Allen is seen for initial consultation 5/3/2023 regarding contraception options with SLE  Patient has a history of HTN, follows with Nephrology  She is currently seeing a Rheumatologist outside of our network  No records available for review during time of visit  She is on Plaquenil and was just started on Benlysta  Patient states her symptoms are well controlled with the medications  She is not on birth control at this time  Blanca Rose has no personal history of blood clots  She had one miscarriage at 6 weeks  She has one child, no complications during or after pregnancy  Patient's mother recently got diagnosed with SLE  Her mother had two miscarriages  No family history of blood clots, blood disorders or cancer  Blanca Rose is a [employer]  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  She does not smoke and drinks socially  No illicit drug use  ROS: As stated in history of present illness otherwise her 14 point review of systems today was negative  ECOG PS: 0      Test Results:    Imaging: No results found      Labs:   Lab Results   Component Value Date    WBC 6 42 01/02/2023    HGB 11 9 01/02/2023    HCT 38 2 01/02/2023    MCV 89 01/02/2023     01/02/2023     Lab Results   Component Value Date    K 4 1 01/02/2023     01/02/2023    CO2 30 01/02/2023    BUN 17 01/02/2023    CREATININE 0 69 01/02/2023    GLUF 87 12/09/2022    CALCIUM 8 9 01/02/2023    CORRECTEDCA 9 5 01/02/2023    AST 35 01/02/2023    ALT 60 01/02/2023    ALKPHOS 90 01/02/2023    EGFR 123 01/02/2023       Active Problems:   Patient Active Problem List   Diagnosis    Systemic lupus erythematosus ([facility] )       Past Medical History:   Past Medical History:   Diagnosis Date    [facility]     Hypertension     Kidney disease, chronic, stage I (GFR over 89 ml/min)     Lupus (HCC)     Raynaud disease        Surgical History:   Past Surgical History:   Procedure Laterality Date     SECTION N/A     COLON SURGERY         Family History:  No family history on file  Cancer-related family history is not on file      Social History:   Social History     Socioeconomic History    Marital status: Legally      Spouse name: Not on file    Number of children: Not on file    Years of education: Not on file    Highest education level: Not on file   Occupational History    Not on file   Tobacco Use    Smoking status: Never    Smokeless tobacco: Never   Vaping Use    Vaping Use: Never used   Substance and Sexual Activity    Alcohol use: Yes     Comment: socially    Drug use: Never    Sexual activity: Not on file   Other Topics Concern    Not on file   Social History Narrative    Not on file     Social Determinants of Health     Financial Resource Strain: Not on file   Food Insecurity: Not on file   Transportation Needs: Not on file   Physical Activity: Not on file   Stress: Not on file   Social Connections: Not on file   Intimate Partner Violence: Not on file   Housing Stability: Not on file       Current Medications:   Current Outpatient Medications   Medication Sig Dispense Refill    Calcifediol (RAYALDEE PO) Take by mouth in the morning      Cyanocobalamin (VITAMIN B 12 PO) Take by mouth 2 (two) times a day      ferrous sulfate 325 (65 Fe) mg tablet Take 325 mg by mouth daily with breakfast      hydroxychloroquine (PLAQUENIL) 200 mg tablet Take 200 mg by mouth 2 (two) times a day with meals      olmesartan (BENICAR) 20 mg tablet Take 20 mg by mouth daily      pantoprazole (PROTONIX) 20 mg tablet Take 1 tablet (20 mg total) by mouth daily 30 tablet 0    azaTHIOprine (IMURAN) 50 mg tablet Take 50 mg by mouth daily (Patient not taking: Reported on 2023)      lisinopril (ZESTRIL) 2 5 mg tablet Take 2 5 mg by mouth daily (Patient not taking: Reported on 1/2/2023)      methylPREDNISolone 4 MG tablet therapy pack Use as directed on package (Patient not taking: Reported on 1/2/2023) 21 tablet 0    sildenafil (VIAGRA) 25 MG tablet Take 25 mg by mouth daily as needed for erectile dysfunction (Patient not taking: Reported on 1/2/2023)       No current facility-administered medications for this visit  Allergies: No Known Allergies      Physical Exam:    Body surface area is 1 71 meters squared  Wt Readings from Last 3 Encounters:   05/03/23 69 9 kg (154 lb)   04/13/23 70 3 kg (155 lb)   01/02/23 66 9 kg (147 lb 7 8 oz)        Temp Readings from Last 3 Encounters:   05/03/23 97 8 °F (36 6 °C) (Temporal)   03/10/23 98 °F (36 7 °C) (Tympanic)   01/02/23 98 5 °F (36 9 °C) (Tympanic)        BP Readings from Last 3 Encounters:   05/03/23 102/68   04/13/23 116/64   03/10/23 140/73         Pulse Readings from Last 3 Encounters:   05/03/23 69   03/10/23 (!) 107   01/02/23 100     @LASTSAO2(3)@    Physical Exam     Constitutional   General appearance: No acute distress, well appearing and well nourished  Eyes   Conjunctiva and lids: No swelling, erythema or discharge  Pupils and irises: Equal, round and reactive to light  Ears, Nose, Mouth, and Throat   External inspection of ears and nose: Normal     Nasal mucosa, septum, and turbinates: Normal without edema or erythema  Oropharynx: Normal with no erythema, edema, exudate or lesions  Pulmonary   Respiratory effort: No increased work of breathing or signs of respiratory distress  Auscultation of lungs: Clear to auscultation  Cardiovascular   Palpation of heart: Normal PMI, no thrills  Auscultation of heart: Normal rate and rhythm, normal S1 and S2, without murmurs  Examination of extremities for edema and/or varicosities: Normal     Carotid pulses: Normal     Abdomen   Abdomen: Non-tender, no masses      Liver and spleen: No hepatomegaly or splenomegaly  Lymphatic   Palpation of lymph nodes in neck: No lymphadenopathy  Musculoskeletal   Gait and station: Normal     Digits and nails: Normal without clubbing or cyanosis  Inspection/palpation of joints, bones, and muscles: Normal     Skin   Skin and subcutaneous tissue: Normal without rashes or lesions  Neurologic   Cranial nerves: Cranial nerves 2-12 intact  Sensation: No sensory loss  Psychiatric   Orientation to person, place, and time: Normal     Mood and affect: Normal           Assessment/ Plan:  Discussed that we have to carefully assess her thromboembolic risk before using hormonal contraceptive methods  With SLE, there are a number of comorbidities associated with increased risk of thromboembolism such as presence of antiphospholipid antibodies, uncontrolled HTN, cardiovascular disease  Recommend we check for antiphospholipid antibodies  Once we have the results, we will be able to provide further recommendations  Patient agreeable, we will see her back in 3 weeks to review results  I spent 50 minutes in chart review, face to face counseling, coordination of care, and documentation

## 2023-05-11 ENCOUNTER — ANNUAL EXAM (OUTPATIENT)
Dept: OBGYN CLINIC | Facility: CLINIC | Age: 23
End: 2023-05-11

## 2023-05-11 ENCOUNTER — APPOINTMENT (OUTPATIENT)
Dept: LAB | Facility: CLINIC | Age: 23
End: 2023-05-11

## 2023-05-11 VITALS
DIASTOLIC BLOOD PRESSURE: 82 MMHG | WEIGHT: 156.8 LBS | HEIGHT: 62 IN | BODY MASS INDEX: 28.85 KG/M2 | SYSTOLIC BLOOD PRESSURE: 120 MMHG

## 2023-05-11 DIAGNOSIS — M32.9 SYSTEMIC LUPUS ERYTHEMATOSUS, UNSPECIFIED SLE TYPE, UNSPECIFIED ORGAN INVOLVEMENT STATUS (HCC): ICD-10-CM

## 2023-05-11 DIAGNOSIS — Z01.419 ENCOUNTER FOR GYNECOLOGICAL EXAMINATION WITHOUT ABNORMAL FINDING: Primary | ICD-10-CM

## 2023-05-11 DIAGNOSIS — Z11.3 SCREENING EXAMINATION FOR STD (SEXUALLY TRANSMITTED DISEASE): ICD-10-CM

## 2023-05-11 RX ORDER — AMLODIPINE BESYLATE 10 MG/1
TABLET ORAL EVERY 24 HOURS
COMMUNITY
Start: 2022-12-29

## 2023-05-11 RX ORDER — ASPIRIN 81 MG/1
TABLET, CHEWABLE ORAL
COMMUNITY
Start: 2023-01-09 | End: 2023-05-20

## 2023-05-11 NOTE — PROGRESS NOTES
Assessment/Plan:    No problem-specific Assessment & Plan notes found for this encounter  Diagnoses and all orders for this visit:    Encounter for gynecological examination without abnormal finding  -     Liquid-based pap, screening    Screening examination for STD (sexually transmitted disease)  -     Chlamydia/GC amplified DNA by PCR    Other orders  -     aspirin 81 mg chewable tablet; 1 tablet Orally every two days for 60 days  -     amLODIPine (NORVASC) 10 mg tablet; every 24 hours  -     patient supplied medication; Benlista injection        Pap and GC/chlamydia screening done  We will call with STD testing results  F/u with hematology as planned; can discuss birth control options after testing  Stressed consistent condom use for pregnancy prevention  If no problems, patient to return in 1 year for routine gyn care  Subjective:      Patient ID: Shirley Lanza is a 20 y o  female  Patient is here for yearly gyn exam   States she is doing well overall  Periods are regular every 26 days, and bleeding lasts for 4 days  She denies heavy bleeding, severe cramping, HA, and mood symptoms  Saw hematology for SLE evaluation on 5/3/23; has blood work ordered  F/u scheduled for 5/26/23  Will discuss birth control options at that time; patient interested in [address] over IUD  She is sexually active and using condoms  Requests STD screening  She denies bowel/bladder changes, pelvic pain, bloating, abdominal pain, n/v, change in appetite, and thyroid disease  Patient is not performing self-breast exam   Denies new masses, skin changes, nipple discharge, and pain/tenderness        The following portions of the patient's history were reviewed and updated as appropriate: allergies, current medications, past family history, past medical history, past social history, past surgical history and problem list     Review of Systems   Constitutional: Negative for appetite change and unexpected weight "change  Cardiovascular:        No masses, skin changes, nipple discharge, and pain/tenderness  Gastrointestinal: Negative for abdominal distention, abdominal pain, constipation, diarrhea, nausea and vomiting  Genitourinary: Negative for difficulty urinating, dysuria, frequency, genital sores, hematuria, menstrual problem, pelvic pain, urgency, vaginal bleeding, vaginal discharge and vaginal pain  Objective:      /82 (BP Location: Left arm, Patient Position: Sitting, Cuff Size: Large)   Ht 5' 2\" (1 575 m)   Wt 71 1 kg (156 lb 12 8 oz)   LMP 04/27/2023   Breastfeeding No   BMI 28 68 kg/m²          Physical Exam  Vitals reviewed  Exam conducted with a chaperone present  Constitutional:       Appearance: Normal appearance  She is well-developed  Neck:      Thyroid: No thyromegaly  Pulmonary:      Effort: Pulmonary effort is normal    Chest:   Breasts:     Breasts are symmetrical       Right: Normal  No swelling, bleeding, inverted nipple, mass, nipple discharge, skin change or tenderness  Left: Normal  No swelling, bleeding, inverted nipple, mass, nipple discharge, skin change or tenderness  Abdominal:      General: Abdomen is flat  There is no distension  Palpations: Abdomen is soft  Tenderness: There is no abdominal tenderness  Genitourinary:     General: Normal vulva  Pubic Area: No rash  Labia:         Right: No rash, tenderness, lesion or injury  Left: No rash, tenderness, lesion or injury  Vagina: Normal  No vaginal discharge, erythema, tenderness or bleeding  Cervix: Normal       Uterus: Normal        Adnexa: Right adnexa normal and left adnexa normal         Right: No mass, tenderness or fullness  Left: No mass, tenderness or fullness  Musculoskeletal:      Cervical back: Neck supple  Lymphadenopathy:      Cervical: No cervical adenopathy        Upper Body:      Right upper body: No supraclavicular or axillary " adenopathy  Left upper body: No supraclavicular or axillary adenopathy  Lower Body: No right inguinal adenopathy  No left inguinal adenopathy  Skin:     General: Skin is warm and dry  Neurological:      Mental Status: She is alert and oriented to person, place, and time  Psychiatric:         Mood and Affect: Mood normal          Behavior: Behavior normal  Behavior is cooperative  Thought Content:  Thought content normal          Judgment: Judgment normal

## 2023-05-12 LAB
B2 GLYCOPROT1 IGA SERPL IA-ACNC: 2.1
B2 GLYCOPROT1 IGG SERPL IA-ACNC: 1.6
B2 GLYCOPROT1 IGM SERPL IA-ACNC: <2.4
CARDIOLIPIN IGA SER IA-ACNC: 2.6
CARDIOLIPIN IGG SER IA-ACNC: 2.3
CARDIOLIPIN IGM SER IA-ACNC: 2

## 2023-05-15 LAB
C TRACH DNA SPEC QL NAA+PROBE: NEGATIVE
N GONORRHOEA DNA SPEC QL NAA+PROBE: NEGATIVE

## 2023-05-18 LAB
LAB AP GYN PRIMARY INTERPRETATION: NORMAL
Lab: NORMAL
PATH INTERP SPEC-IMP: NORMAL

## 2023-05-19 ENCOUNTER — TELEPHONE (OUTPATIENT)
Dept: OBGYN CLINIC | Facility: CLINIC | Age: 23
End: 2023-05-19

## 2023-05-19 DIAGNOSIS — N76.0 BV (BACTERIAL VAGINOSIS): Primary | ICD-10-CM

## 2023-05-19 DIAGNOSIS — B96.89 BV (BACTERIAL VAGINOSIS): Primary | ICD-10-CM

## 2023-05-19 RX ORDER — METRONIDAZOLE 500 MG/1
500 TABLET ORAL EVERY 12 HOURS SCHEDULED
Qty: 14 TABLET | Refills: 0 | Status: SHIPPED | OUTPATIENT
Start: 2023-05-19 | End: 2023-05-26

## 2023-05-19 NOTE — TELEPHONE ENCOUNTER
Spoke with patient regarding Pap results - cytology negative but showed presence of BV  Rx for metronidazole 500 mg BID x 7 days sent to pharmacy  Instructed patient to avoid alcohol while on abx  Call with problems

## 2023-05-26 ENCOUNTER — TELEPHONE (OUTPATIENT)
Dept: GYNECOLOGIC ONCOLOGY | Facility: CLINIC | Age: 23
End: 2023-05-26

## 2023-05-26 NOTE — TELEPHONE ENCOUNTER
Called patient for missed follow up appointment with Luz Santiago  Left hopeline number to call back and reschedule appointment

## 2023-06-03 ENCOUNTER — OFFICE VISIT (OUTPATIENT)
Dept: URGENT CARE | Facility: CLINIC | Age: 23
End: 2023-06-03

## 2023-06-03 VITALS
OXYGEN SATURATION: 98 % | WEIGHT: 155 LBS | HEIGHT: 62 IN | HEART RATE: 82 BPM | BODY MASS INDEX: 28.52 KG/M2 | TEMPERATURE: 98 F | RESPIRATION RATE: 20 BRPM

## 2023-06-03 DIAGNOSIS — N76.0 ACUTE VAGINITIS: Primary | ICD-10-CM

## 2023-06-03 RX ORDER — FLUCONAZOLE 150 MG/1
150 TABLET ORAL ONCE
Qty: 1 TABLET | Refills: 0 | Status: SHIPPED | OUTPATIENT
Start: 2023-06-03 | End: 2023-06-03

## 2023-06-03 NOTE — PROGRESS NOTES
[facility]        NAME: Paul Urrutia is a 24 y o  female  : 2000    MRN: [id number]  DATE: Rosi 3, 2023  TIME: 1:34 PM    Assessment and Plan   Acute vaginitis [N76 0]  1  Acute vaginitis  fluconazole (DIFLUCAN) 150 mg tablet            Patient Instructions   Acute Vaginitis:   -The patients hx and sx are most consistent with an acute vaginitis likely secondary to candidiasis  Will treat with fluconazole 150mg taken as prescribed  Take with food  -Stay very well hydrated   -Warm compress on your lower abdomen for comfort   -Keep the area clean and dry    -Wear cotton underwear clothing that is breathable, avoid tight clothing   -If your sx worsen or persist see your PCP or OBGYN immediately     Follow up with PCP in 3-5 days  Proceed to  ER if symptoms worsen  Chief Complaint     Chief Complaint   Patient presents with   • Vaginal Itching     Was treated for BV 2 weeks ago but did not take the meds correctly still having symptoms of burning  when urinating and itching PRN  History of Present Illness       The patient presents today for possible recurrent vaginitis  She states that two weeks ago she was treated by BV by her Gynecologist and the patient was non-compliant and missed two days of her antibiotic  She states that her sx initially cleared until two days ago  She states that she has burning of the labia and itching with creamy white discharge  She states that this is different than the BV sx and she is concerned for yeast infection  She has no fever, chills, body aches  No nausea, vomiting, diarrhea  No urgency, frequency, dysuria  No flank pain, abdominal pain, pelvic pain  No hematuria  No abnormal vaginal bleeding  No OTC measures attempted  LMP was:  23  Review of Systems   Review of Systems   Constitutional: Negative for activity change, appetite change, chills, fatigue and fever     Respiratory: Negative for cough, chest tightness, shortness of breath and wheezing  Cardiovascular: Negative for chest pain and palpitations  Gastrointestinal: Negative for abdominal distention, abdominal pain, blood in stool, constipation, diarrhea, nausea and vomiting  Genitourinary: Positive for vaginal discharge  Negative for decreased urine volume, difficulty urinating, dysuria, flank pain, frequency, hematuria, menstrual problem, pelvic pain, urgency, vaginal bleeding and vaginal pain  Musculoskeletal: Negative for arthralgias and myalgias  Skin: Negative for rash  Hematological: Negative for adenopathy  Does not bruise/bleed easily           Current Medications       Current Outpatient Medications:   •  amLODIPine (NORVASC) 10 mg tablet, every 24 hours, Disp: , Rfl:   •  Calcifediol (RAYALDEE PO), Take by mouth in the morning, Disp: , Rfl:   •  ferrous sulfate 325 (65 Fe) mg tablet, Take 325 mg by mouth daily with breakfast, Disp: , Rfl:   •  fluconazole (DIFLUCAN) 150 mg tablet, Take 1 tablet (150 mg total) by mouth once for 1 dose, Disp: 1 tablet, Rfl: 0  •  hydroxychloroquine (PLAQUENIL) 200 mg tablet, Take 200 mg by mouth 2 (two) times a day with meals, Disp: , Rfl:   •  olmesartan (BENICAR) 20 mg tablet, Take 20 mg by mouth daily, Disp: , Rfl:   •  pantoprazole (PROTONIX) 20 mg tablet, Take 1 tablet (20 mg total) by mouth daily, Disp: 30 tablet, Rfl: 0  •  patient supplied medication, Benlista injection, Disp: , Rfl:   •  sildenafil (VIAGRA) 25 MG tablet, Take 25 mg by mouth daily as needed for erectile dysfunction, Disp: , Rfl:   •  aspirin 81 mg chewable tablet, 1 tablet Orally every two days for 60 days, Disp: , Rfl:   •  azaTHIOprine (IMURAN) 50 mg tablet, Take 50 mg by mouth daily (Patient not taking: Reported on 1/2/2023), Disp: , Rfl:   •  Cyanocobalamin (VITAMIN B 12 PO), Take by mouth 2 (two) times a day (Patient not taking: Reported on 5/11/2023), Disp: , Rfl:   •  lisinopril (ZESTRIL) 2 5 mg tablet, Take 2 5 mg by mouth daily (Patient not "taking: Reported on 2023), Disp: , Rfl:   •  methylPREDNISolone 4 MG tablet therapy pack, Use as directed on package (Patient not taking: Reported on 2023), Disp: 21 tablet, Rfl: 0    Current Allergies     Allergies as of 2023   • (No Known Allergies)            The following portions of the patient's history were reviewed and updated as appropriate: allergies, current medications, past family history, past medical history, past social history, past surgical history and problem list      Past Medical History:   Diagnosis Date   • Crohn's disease ([facility] [address] )    • Hypertension    • Kidney disease, chronic, stage I (GFR over 89 ml/min)    • Lupus ([facility] [address] )    • Raynaud disease        Past Surgical History:   Procedure Laterality Date   •  SECTION N/A    • COLON SURGERY     • WISDOM TOOTH EXTRACTION         Family History   Problem Relation Age of Onset   • Lupus Mother    • Diabetes Father    • Breast cancer Other          Medications have been verified  Objective   Pulse 82   Temp 98 °F (36 7 °C)   Resp 20   Ht 5' 2\" (1 575 m)   Wt 70 3 kg (155 lb)   LMP 2023   SpO2 98%   BMI 28 35 kg/m²   Patient's last menstrual period was 2023  Physical Exam     Physical Exam  Vitals and nursing note reviewed  Constitutional:       General: She is not in acute distress  Appearance: Normal appearance  She is well-developed  She is not ill-appearing, toxic-appearing or diaphoretic  Cardiovascular:      Rate and Rhythm: Normal rate and regular rhythm  Heart sounds: Normal heart sounds  Pulmonary:      Effort: Pulmonary effort is normal       Breath sounds: Normal breath sounds  Abdominal:      General: Abdomen is flat  Bowel sounds are normal  There is no distension  Palpations: Abdomen is soft  Abdomen is not rigid  Tenderness: There is no abdominal tenderness  There is no right CVA tenderness, left CVA tenderness, guarding or rebound   Negative signs include " Walsh's sign and McBurney's sign  Hernia: No hernia is present  Skin:     General: Skin is warm and dry  Capillary Refill: Capillary refill takes less than 2 seconds     Psychiatric:         Behavior: Behavior normal

## 2023-06-05 ENCOUNTER — OFFICE VISIT (OUTPATIENT)
Dept: FAMILY MEDICINE CLINIC | Facility: CLINIC | Age: 23
End: 2023-06-05
Payer: OTHER GOVERNMENT

## 2023-06-05 ENCOUNTER — TELEPHONE (OUTPATIENT)
Dept: HEMATOLOGY ONCOLOGY | Facility: CLINIC | Age: 23
End: 2023-06-05

## 2023-06-05 VITALS
BODY MASS INDEX: 28.07 KG/M2 | DIASTOLIC BLOOD PRESSURE: 64 MMHG | HEIGHT: 63 IN | TEMPERATURE: 98.4 F | HEART RATE: 83 BPM | OXYGEN SATURATION: 98 % | SYSTOLIC BLOOD PRESSURE: 100 MMHG | WEIGHT: 158.4 LBS

## 2023-06-05 DIAGNOSIS — I73.00 RAYNAUD'S DISEASE WITHOUT GANGRENE: ICD-10-CM

## 2023-06-05 DIAGNOSIS — M32.9 SYSTEMIC LUPUS ERYTHEMATOSUS, UNSPECIFIED SLE TYPE, UNSPECIFIED ORGAN INVOLVEMENT STATUS (HCC): Primary | ICD-10-CM

## 2023-06-05 DIAGNOSIS — K52.9 IBD (INFLAMMATORY BOWEL DISEASE): ICD-10-CM

## 2023-06-05 PROCEDURE — 99213 OFFICE O/P EST LOW 20 MIN: CPT | Performed by: CHIROPRACTOR

## 2023-06-05 RX ORDER — FLUCONAZOLE 150 MG/1
TABLET ORAL
COMMUNITY
Start: 2023-06-03

## 2023-06-05 RX ORDER — EVOLOCUMAB 140 MG/ML
INJECTION, SOLUTION SUBCUTANEOUS
COMMUNITY
Start: 2023-06-02

## 2023-06-05 RX ORDER — OLMESARTAN MEDOXOMIL 5 MG/1
10 TABLET ORAL DAILY
COMMUNITY
Start: 2023-05-22

## 2023-06-05 NOTE — PROGRESS NOTES
Family Medicine Follow-Up Office Visit  Bennie Arevalo 24 y o  female   MRN: [id number] : 2000  ENCOUNTER: 2023 3:34 PM    Assessment and Plan   Systemic lupus erythematosus ([facility] [address] )   Currently, with the exception of the fatigue patient remains relatively asymptomatic and well-controlled on her regimen  Current medication regimen as directed by rheumatology which the patient wishes to continue  Referral placed for local rheumatology  Patient will establish care ASAP and follow-up here as needed  Raynaud's disease   Patient currently medicates with sildenafil  States minimal symptomatology at this time  Continue sildenafil 25 daily as needed, follow-up with rheumatology    IBD (inflammatory bowel disease)   Patient currently has no symptoms of nausea, vomiting, diarrhea or constipation  Denies abdominal pain  Patient states she has previously diagnosed with IBD/Crohn's  Wishes to establish care with GI in the [place] area  Referral for GI placed, patient may course follow-up here as needed as well  Chief Complaint     Chief Complaint   Patient presents with   • Establish Care       History of Present Illness   Bnenie Arevalo is a 24y o -year-old female who presents today to establish care  The patient has an extensive medical history including lupus, inflammatory bowel disease believed to be Crohn's as well as hypertension  The patient states all her current medical issues began following eclampsia with emergency delivery approximately 2 years ago when she delivered her child  She states she has been following with rheumatology,  Nephrology, GI etc  but is moving to [place] and would like to have all her physicians within the [place] area  The patient's only other medical concerns at this time are fatigue, which she associates with her medications    Patient states she currently has no medication refill needs however is requesting referrals for follow-up in all "specialties she has been cared for in  Patient has no other questions or concerns at this time  Review of Systems   Review of Systems   Constitutional: Positive for fatigue  Negative for chills and fever  HENT: Negative for hearing loss  Eyes: Negative for visual disturbance  Respiratory: Negative for shortness of breath  Cardiovascular: Negative for chest pain  Gastrointestinal: Negative for constipation, diarrhea and nausea  Endocrine: Negative for polydipsia  Genitourinary: Negative for decreased urine volume, difficulty urinating, vaginal discharge and vaginal pain  Musculoskeletal: Negative for arthralgias and back pain  Skin: Negative for rash  Neurological: Negative for headaches  Active Problem List     Patient Active Problem List   Diagnosis   • Systemic lupus erythematosus ([facility] [address] )   • Raynaud's disease   • IBD (inflammatory bowel disease)       Past Medical History, Past Surgical History, Family History, and Social History were reviewed and updated today as appropriate  Objective   /64 (BP Location: Right arm, Patient Position: Sitting, Cuff Size: Standard)   Pulse 83   Temp 98 4 °F (36 9 °C) (Tympanic)   Ht 5' 3\" (1 6 m)   Wt 71 8 kg (158 lb 6 4 oz)   LMP 04/27/2023   SpO2 98%   BMI 28 06 kg/m²     Physical Exam  Constitutional:       General: She is not in acute distress  Appearance: Normal appearance  She is not ill-appearing  HENT:      Head: Normocephalic  Nose: No rhinorrhea  Eyes:      General: No scleral icterus  Extraocular Movements: Extraocular movements intact  Cardiovascular:      Rate and Rhythm: Normal rate and regular rhythm  Heart sounds: Normal heart sounds  No murmur heard  Pulmonary:      Effort: Pulmonary effort is normal       Breath sounds: No wheezing or rales  Abdominal:      General: Abdomen is flat  There is no distension  Palpations: Abdomen is soft  Tenderness: There is no abdominal tenderness   " "There is no guarding  Musculoskeletal:      Right lower leg: No edema  Left lower leg: No edema  Skin:     General: Skin is warm and dry  Neurological:      General: No focal deficit present  Mental Status: She is alert  Motor: No weakness        Gait: Gait normal          Pertinent Laboratory/Diagnostic Studies:  Lab Results   Component Value Date    BUN 17 01/02/2023    CALCIUM 8 9 01/02/2023     01/02/2023    CO2 30 01/02/2023    CREATININE 0 69 01/02/2023    K 4 1 01/02/2023     Lab Results   Component Value Date    ALKPHOS 90 01/02/2023    ALT 60 01/02/2023    AST 35 01/02/2023       Lab Results   Component Value Date    HCT 38 2 01/02/2023    HGB 11 9 01/02/2023    MCV 89 01/02/2023     01/02/2023    WBC 6 42 01/02/2023       No results found for: \"TSH\"    No results found for: \"CHOL\"  Lab Results   Component Value Date    TRIG 122 12/09/2022     Lab Results   Component Value Date    HDL 54 12/09/2022     Lab Results   Component Value Date    LDLCALC 98 12/09/2022     No results found for: \"HGBA1C\"    Results for orders placed or performed in visit on 05/11/23   Cardiolipin antibody   Result Value Ref Range    ANTICARDIOLIPIN IGG ANTIBODY 2 3 See comment    ANTICARDIOLIPIN IGA ANTIBODY 2 6 See comment    ANTICARDIOLIPIN IGM ANTIBODY 2 0 See comment   Beta-2 glycoprotein antibodies   Result Value Ref Range    BETA 2 GLYCO 1 IGG 1 6 See comment    BETA 2 GLYCO 1 IGA 2 1 See comment    BETA 2 GLYCO 1 IGM <2 4 See comment       Orders Placed This Encounter   Procedures   • Ambulatory Referral to Rheumatology   • Ambulatory Referral to Nephrology   • Ambulatory Referral to Gastroenterology   • Ambulatory Referral to Ophthalmology         Current Medications     Current Outpatient Medications   Medication Sig Dispense Refill   • amLODIPine (NORVASC) 10 mg tablet every 24 hours     • Calcifediol (RAYALDEE PO) Take by mouth in the morning     • ferrous sulfate 325 (65 Fe) mg tablet " Take 325 mg by mouth daily with breakfast     • hydroxychloroquine (PLAQUENIL) 200 mg tablet Take 200 mg by mouth 2 (two) times a day with meals     • olmesartan (BENICAR) 20 mg tablet Take 20 mg by mouth daily     • olmesartan (BENICAR) 5 mg tablet Take 10 mg by mouth daily     • pantoprazole (PROTONIX) 20 mg tablet Take 1 tablet (20 mg total) by mouth daily 30 tablet 0   • patient supplied medication Benlista injection     • Repatha 140 MG/ML SOSY      • sildenafil (VIAGRA) 25 MG tablet Take 25 mg by mouth daily as needed for erectile dysfunction     • aspirin 81 mg chewable tablet 1 tablet Orally every two days for 60 days     • azaTHIOprine (IMURAN) 50 mg tablet Take 50 mg by mouth daily (Patient not taking: Reported on 6/5/2023)     • Cyanocobalamin (VITAMIN B 12 PO) Take by mouth 2 (two) times a day (Patient not taking: Reported on 6/5/2023)     • fluconazole (DIFLUCAN) 150 mg tablet  (Patient not taking: Reported on 6/5/2023)     • lisinopril (ZESTRIL) 2 5 mg tablet Take 2 5 mg by mouth daily (Patient not taking: Reported on 1/2/2023)     • methylPREDNISolone 4 MG tablet therapy pack Use as directed on package (Patient not taking: Reported on 1/2/2023) 21 tablet 0     No current facility-administered medications for this visit         ALLERGIES:  No Known Allergies    Health Maintenance     Health Maintenance   Topic Date Due   • Hepatitis C Screening  Never done   • Pneumococcal Vaccine: Pediatrics (0 to 5 Years) and At-Risk Patients (6 to 59 Years) (1 - PCV) Never done   • BMI: Followup Plan  Never done   • HPV Vaccine (2 - 3-dose series) 05/03/2021   • COVID-19 Vaccine (3 - Pfizer risk series) 10/22/2021   • Annual Physical  05/11/2024   • Chlamydia Screening  05/11/2024   • Depression Screening  06/05/2024   • BMI: Adult  06/05/2024   • Cervical Cancer Screening  05/11/2026   • DTaP,Tdap,and Td Vaccines (3 - Td or Tdap) 02/23/2031   • HIV Screening  Completed   • Influenza Vaccine  Completed   • HIB Vaccine  Aged Out   • IPV Vaccine  Aged Out   • Hepatitis A Vaccine  Aged Out   • Meningococcal ACWY Vaccine  Aged Out     Immunization History   Administered Date(s) Administered   • Adenovirus types 4 and 7 07/13/2018   • COVID-19 PFIZER VACCINE 0 3 ML IM 09/02/2021, 09/24/2021   • HPV Bivalent 04/05/2021   • INFLUENZA 10/23/2020, 10/25/2021, 11/14/2022   • IPV 08/15/2018   • MMR 04/05/2021   • Meningococcal MCV4P 07/13/2018   • Tdap 07/13/2018, 02/23/2021   • Tuberculin Skin Test 02/08/2023   • Typhoid, ViCPs 08/15/2018   • Varicella 04/05/2021   • influenza, injectable, quadrivalent 12/10/2018, 10/24/2019         Stacy Lopez MD   [address]  6/5/2023  3:34 PM    Parts of this note were dictated using [employer] dictation software and may have sounds-like errors due to variation in pronunciation

## 2023-06-05 NOTE — ASSESSMENT & PLAN NOTE
Currently, with the exception of the fatigue patient remains relatively asymptomatic and well-controlled on her regimen  Current medication regimen as directed by rheumatology which the patient wishes to continue  Referral placed for local rheumatology  Patient will establish care ASAP and follow-up here as needed

## 2023-06-05 NOTE — ASSESSMENT & PLAN NOTE
Patient currently has no symptoms of nausea, vomiting, diarrhea or constipation  Denies abdominal pain  Patient states she has previously diagnosed with IBD/Crohn's  Wishes to establish care with GI in the [place] area  Referral for GI placed, patient may course follow-up here as needed as well

## 2023-06-05 NOTE — ASSESSMENT & PLAN NOTE
Patient currently medicates with sildenafil  States minimal symptomatology at this time        Continue sildenafil 25 daily as needed, follow-up with rheumatology

## 2023-06-05 NOTE — TELEPHONE ENCOUNTER
Appointment Change  Cancel, Reschedule, Change to Virtual      Who are you speaking with? Patient   If it is not the patient, are they listed on an active communication consent form? N/A   Which provider is the appointment scheduled with? CELINA Kelley   When is the appointment scheduled? Please list date and time  5/26/23 @ 4pm   At which location is the appointment scheduled to take place? [facility]   Was the appointment rescheduled or changed from an in person visit to a virtual visit? If so, please list the details of the change  Yes 6/6/23 @ 2pm   What is the reason for the appointment change? Patient was unable to keep appointment    Was [facility] transport scheduled for this visit? no   Does [facility] transport need to be scheduled for the new visit (if applicable) no   Does the patient need an infusion appointment rescheduled? no   Does the patient have an infusion appointment scheduled? If so, when? na   Is the patient undergoing chemotherapy? no   Was the no-show policy reviewed for appointments being changed with less then 24 hours of notice?  yes

## 2023-06-06 ENCOUNTER — TELEPHONE (OUTPATIENT)
Dept: NEPHROLOGY | Facility: CLINIC | Age: 23
End: 2023-06-06

## 2023-06-06 ENCOUNTER — OFFICE VISIT (OUTPATIENT)
Dept: HEMATOLOGY ONCOLOGY | Facility: CLINIC | Age: 23
End: 2023-06-06

## 2023-06-06 VITALS
HEIGHT: 63 IN | RESPIRATION RATE: 16 BRPM | DIASTOLIC BLOOD PRESSURE: 60 MMHG | SYSTOLIC BLOOD PRESSURE: 100 MMHG | BODY MASS INDEX: 29.41 KG/M2 | TEMPERATURE: 97.4 F | HEART RATE: 77 BPM | WEIGHT: 166 LBS

## 2023-06-06 DIAGNOSIS — M32.9 SYSTEMIC LUPUS ERYTHEMATOSUS, UNSPECIFIED SLE TYPE, UNSPECIFIED ORGAN INVOLVEMENT STATUS (HCC): Primary | ICD-10-CM

## 2023-06-06 NOTE — PROGRESS NOTES
HEMATOLOGY / [address] FOLLOW UP NOTE    Primary Care Provider: Brigido Boxer, MD  Referring Provider:    MRN: [id number]  : 2000    Reason for Encounter: Systemic lupus erythematosus         Interval History:  Patient presents in the office for a follow up to review labs  Antiphospholipid antibodies were negative and beta-2 glycoprotein antibodies were negative as well  Patient doing well overall  Patient is not on any birth control medication at this time  HPI: Shavon Rivera was seen for initial consultation 5/3/2023 regarding contraception options with SLE  Patient has a history of HTN, follows with Nephrology  She is currently seeing a Rheumatologist outside of our network  No records available for review during time of visit  She is on Plaquenil and was just started on Benlysta  Patient states her symptoms are well controlled with the medications  She is not on birth control at this time  Puma Valente has no personal history of blood clots  She had one miscarriage at 6 weeks  She has one child, no complications during or after pregnancy  Patient's mother recently got diagnosed with SLE  Her mother had two miscarriages  No family history of blood clots, blood disorders or cancer  REVIEW OF SYSTEMS:  Please note that a 14-point review of systems was performed to include Constitutional, HEENT, Respiratory, CVS, GI, , Musculoskeletal, Integumentary, Neurologic, Rheumatologic, Endocrinologic, Psychiatric, Lymphatic, and Hematologic/Oncologic systems were reviewed and are negative unless otherwise stated in HPI  Positive and negative findings pertinent to this evaluation are incorporated into the history of present illness        ECOG PS: 0    PROBLEM LIST:  Patient Active Problem List   Diagnosis   • Systemic lupus erythematosus ([facility] [address] )   • Raynaud's disease   • IBD (inflammatory bowel disease)       Assessment / Plan:  Discussed that with SLE the choice of contraception should be discussed with the OB/GYN  One should consider progesterone only contraception to decrease the risk of thrombosis as well as flare up  Patient hesitant of IUD and wants an implant  Encouraged to discuss further with her OB/GYN, progesterone only options should be considered  Patient aware to contact the office should she have any additional questions/concerns  We will see her on an as needed basis  I spent 30 minutes on chart review, face to face counseling time, coordination of care and documentation  Past Medical History:   has a past medical history of Crohn's disease ([facility] [address] ), Hypertension, Kidney disease, chronic, stage I (GFR over 89 ml/min), Lupus ([facility] [address] ), and Raynaud disease  PAST SURGICAL HISTORY:   has a past surgical history that includes  section (N/A); Colon surgery; and [place] tooth extraction      CURRENT MEDICATIONS  Current Outpatient Medications   Medication Sig Dispense Refill   • amLODIPine (NORVASC) 10 mg tablet every 24 hours     • Calcifediol (RAYALDEE PO) Take by mouth in the morning     • ferrous sulfate 325 (65 Fe) mg tablet Take 325 mg by mouth daily with breakfast     • hydroxychloroquine (PLAQUENIL) 200 mg tablet Take 200 mg by mouth 2 (two) times a day with meals     • olmesartan (BENICAR) 5 mg tablet Take 10 mg by mouth daily     • pantoprazole (PROTONIX) 20 mg tablet Take 1 tablet (20 mg total) by mouth daily 30 tablet 0   • patient supplied medication Benlista injection     • Repatha 140 MG/ML SOSY      • sildenafil (VIAGRA) 25 MG tablet Take 25 mg by mouth daily as needed for erectile dysfunction     • aspirin 81 mg chewable tablet 1 tablet Orally every two days for 60 days (Patient not taking: Reported on 2023)     • azaTHIOprine (IMURAN) 50 mg tablet Take 50 mg by mouth daily (Patient not taking: Reported on 2023)     • Cyanocobalamin (VITAMIN B 12 PO) Take by mouth 2 (two) times a day (Patient not taking: Reported on 2023)     • fluconazole "(DIFLUCAN) 150 mg tablet  (Patient not taking: Reported on 6/5/2023)     • lisinopril (ZESTRIL) 2 5 mg tablet Take 2 5 mg by mouth daily (Patient not taking: Reported on 1/2/2023)     • methylPREDNISolone 4 MG tablet therapy pack Use as directed on package (Patient not taking: Reported on 1/2/2023) 21 tablet 0   • olmesartan (BENICAR) 20 mg tablet Take 20 mg by mouth daily (Patient not taking: Reported on 6/6/2023)       No current facility-administered medications for this visit  [unfilled]    SOCIAL HISTORY:   reports that she has never smoked  She has never used smokeless tobacco  She reports current alcohol use  She reports that she does not use drugs  FAMILY HISTORY:  family history includes Breast cancer in her other; Diabetes in her father; Lupus in her mother  ALLERGIES:  has No Known Allergies  Physical Exam:  Vital Signs:   Visit Vitals  /60 (BP Location: Left arm, Patient Position: Sitting, Cuff Size: Standard)   Pulse 77   Temp (!) 97 4 °F (36 3 °C) (Temporal)   Resp 16   Ht 5' 3\" (1 6 m)   Wt 75 3 kg (166 lb)   LMP 04/27/2023   BMI 29 41 kg/m²   OB Status Unknown   Smoking Status Never   BSA 1 79 m²     Body mass index is 29 41 kg/m²  Body surface area is 1 79 meters squared  GEN: Alert, awake oriented x3, in no acute distress  HEENT- No pallor, icterus, cyanosis, no oral mucosal lesions,   LAD - no palpable cervical, clavicle, axillary, inguinal LAD  Heart- normal S1 S2, regular rate and rhythm, No murmur, rubs     Lungs- clear breathing sound bilateral    Abdomen- soft, Non tender, bowel sounds present  Extremities- No cyanosis, clubbing, edema  Neuro- No focal neurological deficit    Labs:  Lab Results   Component Value Date    HCT 38 2 01/02/2023    HGB 11 9 01/02/2023    MCV 89 01/02/2023     01/02/2023    WBC 6 42 01/02/2023     Lab Results   Component Value Date    AGAP 4 01/02/2023    ALKPHOS 90 01/02/2023    ALT 60 01/02/2023    AST 35 01/02/2023    BUN 17 " 01/02/2023    CALCIUM 8 9 01/02/2023     01/02/2023    CO2 30 01/02/2023    CREATININE 0 69 01/02/2023    EGFR 123 01/02/2023    GLUC 111 01/02/2023    GLUF 87 12/09/2022    K 4 1 01/02/2023    SODIUM 138 01/02/2023    TBILI 0 33 01/02/2023    TP 7 0 01/02/2023

## 2023-06-09 ENCOUNTER — TELEPHONE (OUTPATIENT)
Dept: FAMILY MEDICINE CLINIC | Facility: CLINIC | Age: 23
End: 2023-06-09

## 2023-06-09 ENCOUNTER — OFFICE VISIT (OUTPATIENT)
Dept: OBGYN CLINIC | Facility: CLINIC | Age: 23
End: 2023-06-09
Payer: OTHER GOVERNMENT

## 2023-06-09 VITALS
DIASTOLIC BLOOD PRESSURE: 70 MMHG | WEIGHT: 156 LBS | BODY MASS INDEX: 27.64 KG/M2 | SYSTOLIC BLOOD PRESSURE: 122 MMHG | HEIGHT: 63 IN

## 2023-06-09 DIAGNOSIS — M32.9 SYSTEMIC LUPUS ERYTHEMATOSUS, UNSPECIFIED SLE TYPE, UNSPECIFIED ORGAN INVOLVEMENT STATUS (HCC): ICD-10-CM

## 2023-06-09 DIAGNOSIS — Z30.017 ENCOUNTER FOR INITIAL PRESCRIPTION OF IMPLANTABLE SUBDERMAL CONTRACEPTIVE: Primary | ICD-10-CM

## 2023-06-09 PROCEDURE — 99213 OFFICE O/P EST LOW 20 MIN: CPT | Performed by: PHYSICIAN ASSISTANT

## 2023-06-09 RX ORDER — BELIMUMAB 200 MG/ML
SOLUTION SUBCUTANEOUS
COMMUNITY
Start: 2023-06-05

## 2023-06-09 NOTE — PROGRESS NOTES
Assessment/Plan:    No problem-specific Assessment & Plan notes found for this encounter  Diagnoses and all orders for this visit:    Encounter for initial prescription of implantable subdermal contraceptive    Systemic lupus erythematosus, unspecified SLE type, unspecified organ involvement status ([facility] [address] )    Other orders  -     Benlysta 200 MG/ML SOAJ        Discussed IUD and Nexplanon with patient  She would like the implant  Implant is effective for up to 3 years  May get a regular period, no period, or irregular bleeding  Can take 6-8 mos for bleeding pattern to regulate  While an effective form of contraception, there is an increased risk for ectopic pregnancy if a patient should conceive with Nexplanon in place  Side effects can include HA, mood swings, acne, etc   Implant carries the risk of migration, infection, and allergic reaction  Stressed consistent condom use for STD prevention  If patient does well with implant, can have new one placed when old one expires; no limit to how many a patient can have  She wishes to proceed  Patient will confirm coverage with insurance and schedule insertion during first 5 days of next period  Call with questions in the interim  Subjective:      Patient ID: Tayler Rey is a 24 y o  female  Patient is here to discuss Nexplanon  Has a history of SLE  Had consult with hematology on 6/6/23  Patient was negative for antiphospholipid antibodies as well as beta-2 glycoprotein antibodies  She was cleared to use progesterone only contraception  Patient is most interested in [address]  Due for period on 6/19/23  No other changes since her last visit  The following portions of the patient's history were reviewed and updated as appropriate: allergies, current medications, past family history, past medical history, past social history, past surgical history and problem list     Review of Systems   Genitourinary: Negative            Objective:      BP "122/70 (BP Location: Left arm, Patient Position: Sitting, Cuff Size: Adult)   Ht 5' 3\" (1 6 m)   Wt 70 8 kg (156 lb)   LMP 05/24/2023 (Exact Date)   BMI 27 63 kg/m²          Physical Exam  Vitals reviewed  Constitutional:       Appearance: Normal appearance  She is well-developed  Neurological:      Mental Status: She is alert and oriented to person, place, and time  Psychiatric:         Mood and Affect: Mood normal          Behavior: Behavior normal  Behavior is cooperative  Thought Content:  Thought content normal          Judgment: Judgment normal          "

## 2023-06-09 NOTE — TELEPHONE ENCOUNTER
Sorry there was a misunderstanding Dr wu will put the referral in Patient was going to another location for service Thank you

## 2023-06-10 ENCOUNTER — HOSPITAL ENCOUNTER (EMERGENCY)
Facility: HOSPITAL | Age: 23
Discharge: HOME | End: 2023-06-10
Attending: EMERGENCY MEDICINE
Payer: OTHER GOVERNMENT

## 2023-06-10 VITALS
SYSTOLIC BLOOD PRESSURE: 122 MMHG | TEMPERATURE: 98.3 F | DIASTOLIC BLOOD PRESSURE: 70 MMHG | RESPIRATION RATE: 16 BRPM | OXYGEN SATURATION: 98 % | HEART RATE: 81 BPM

## 2023-06-10 DIAGNOSIS — A49.3 BACTERIAL INFECTION DUE TO MYCOPLASMA: ICD-10-CM

## 2023-06-10 DIAGNOSIS — N89.8 VAGINAL DISCHARGE: Primary | ICD-10-CM

## 2023-06-10 LAB
BILIRUB UR QL STRIP: NEGATIVE
CLARITY UR: CLEAR
COLOR UR: YELLOW
EXT PREGNANCY TEST URINE: NEGATIVE
EXT. CONTROL: NORMAL
GLUCOSE UR STRIP-MCNC: NEGATIVE MG/DL
HGB UR QL STRIP.AUTO: NEGATIVE
KETONES UR STRIP-MCNC: NEGATIVE MG/DL
LEUKOCYTE ESTERASE UR QL STRIP: NEGATIVE
NITRITE UR QL STRIP: NEGATIVE
PH UR STRIP.AUTO: 6 [PH]
PROT UR STRIP-MCNC: NEGATIVE MG/DL
SP GR UR STRIP.AUTO: >=1.03 (ref 1–1.03)
UROBILINOGEN UR QL STRIP.AUTO: 0.2 E.U./DL

## 2023-06-10 PROCEDURE — 87491 CHLMYD TRACH DNA AMP PROBE: CPT | Performed by: EMERGENCY MEDICINE

## 2023-06-10 PROCEDURE — 87661 TRICHOMONAS VAGINALIS AMPLIF: CPT | Performed by: EMERGENCY MEDICINE

## 2023-06-10 PROCEDURE — 81003 URINALYSIS AUTO W/O SCOPE: CPT | Performed by: EMERGENCY MEDICINE

## 2023-06-10 PROCEDURE — 87070 CULTURE OTHR SPECIMN AEROBIC: CPT | Performed by: EMERGENCY MEDICINE

## 2023-06-10 PROCEDURE — 87591 N.GONORRHOEAE DNA AMP PROB: CPT | Performed by: EMERGENCY MEDICINE

## 2023-06-10 PROCEDURE — 87563 M. GENITALIUM AMP PROBE: CPT | Performed by: EMERGENCY MEDICINE

## 2023-06-10 PROCEDURE — 99283 EMERGENCY DEPT VISIT LOW MDM: CPT

## 2023-06-10 PROCEDURE — 81025 URINE PREGNANCY TEST: CPT | Performed by: EMERGENCY MEDICINE

## 2023-06-11 NOTE — ED PROVIDER NOTES
History  Chief Complaint   Patient presents with   • Vaginal Discharge     States recently dx with bacterial infection of vagina  Finnished abx , still had symptoms  Went to urgent care given antifungal pill  Still has symptoms  Also wants to be checked for std     Patient presents for evaluation of vaginal discharge  Patient initially went to her OB/GYN and was diagnosed with bacterial vaginosis and started on Flagyl for 7 days  However when this had no improvement in symptoms  Went to urgent care and they gave her a dose of Diflucan for possible yeast infection  This was 3 days ago and she has not had any improvement in symptoms  Says she is come here and get checked for STD  History provided by:  Patient   used: No    Vaginal Discharge  Associated symptoms: no abdominal pain, no dysuria, no fever and no vomiting        Prior to Admission Medications   Prescriptions Last Dose Informant Patient Reported? Taking?    Benlysta 200 MG/ML SOAJ   Yes No   Calcifediol (RAYALDEE PO)  Self Yes No   Sig: Take by mouth in the morning   Cyanocobalamin (VITAMIN B 12 PO)  Self Yes No   Sig: Take by mouth 2 (two) times a day   Patient not taking: Reported on 2023   Repatha 140 MG/ML SOSY  Self Yes No   amLODIPine (NORVASC) 10 mg tablet  Self Yes No   Sig: every 24 hours   aspirin 81 mg chewable tablet   Yes No   Si tablet Orally every two days for 60 days   Patient not taking: Reported on 2023   azaTHIOprine (IMURAN) 50 mg tablet  Self Yes No   Sig: Take 50 mg by mouth daily   Patient not taking: Reported on 2023   ferrous sulfate 325 (65 Fe) mg tablet  Self Yes No   Sig: Take 325 mg by mouth daily with breakfast   fluconazole (DIFLUCAN) 150 mg tablet  Self Yes No   Patient not taking: Reported on 2023   hydroxychloroquine (PLAQUENIL) 200 mg tablet  Self Yes No   Sig: Take 200 mg by mouth 2 (two) times a day with meals   lisinopril (ZESTRIL) 2 5 mg tablet  Self Yes No   Sig: Take 2 5 mg by mouth daily   Patient not taking: Reported on 2023   methylPREDNISolone 4 MG tablet therapy pack  Self No No   Sig: Use as directed on package   Patient not taking: Reported on 2023   olmesartan (BENICAR) 20 mg tablet  Self Yes No   Sig: Take 20 mg by mouth daily   Patient not taking: Reported on 2023   olmesartan (BENICAR) 5 mg tablet  Self Yes No   Sig: Take 10 mg by mouth daily   pantoprazole (PROTONIX) 20 mg tablet  Self No No   Sig: Take 1 tablet (20 mg total) by mouth daily   patient supplied medication  Self Yes No   Sig: Benlista injection   Patient not taking: Reported on 2023   sildenafil (VIAGRA) 25 MG tablet  Self Yes No   Sig: Take 25 mg by mouth daily as needed for erectile dysfunction      Facility-Administered Medications: None       Past Medical History:   Diagnosis Date   • Crohn's disease ([facility] [address] )    • Hypertension    • Kidney disease, chronic, stage I (GFR over 89 ml/min)    • Lupus (HCC)    • Raynaud disease        Past Surgical History:   Procedure Laterality Date   •  SECTION N/A    • COLON SURGERY     • WISDOM TOOTH EXTRACTION         Family History   Problem Relation Age of Onset   • Lupus Mother    • Diabetes Father    • Breast cancer Other      I have reviewed and agree with the history as documented  E-Cigarette/Vaping   • E-Cigarette Use Never User      E-Cigarette/Vaping Substances   • Nicotine No    • THC No    • CBD No      Social History     Tobacco Use   • Smoking status: Never   • Smokeless tobacco: Never   Vaping Use   • Vaping Use: Never used   Substance Use Topics   • Alcohol use: Yes     Comment: socially   • Drug use: Never       Review of Systems   Constitutional: Negative for chills and fever  HENT: Negative for ear pain and sore throat  Eyes: Negative for pain and visual disturbance  Respiratory: Negative for cough and shortness of breath  Cardiovascular: Negative for chest pain and palpitations     Gastrointestinal: Negative for abdominal pain and vomiting  Genitourinary: Positive for vaginal discharge  Negative for dysuria and hematuria  Musculoskeletal: Negative for arthralgias and back pain  Skin: Negative for color change and rash  Neurological: Negative for seizures and syncope  All other systems reviewed and are negative  Physical Exam  Physical Exam  Vitals and nursing note reviewed  Exam conducted with a chaperone present Francesco Garrett)  Constitutional:       General: She is not in acute distress  Cardiovascular:      Rate and Rhythm: Normal rate and regular rhythm  Pulmonary:      Effort: Pulmonary effort is normal  No respiratory distress  Abdominal:      General: Abdomen is flat  Bowel sounds are normal  There is no distension  Palpations: Abdomen is soft  Tenderness: There is no abdominal tenderness  There is no guarding or rebound  Genitourinary:     General: Normal vulva  Neurological:      General: No focal deficit present  Mental Status: She is alert and oriented to person, place, and time  Vital Signs  ED Triage Vitals [06/10/23 1815]   Temperature Pulse Respirations Blood Pressure SpO2   98 3 °F (36 8 °C) 81 16 122/70 98 %      Temp Source Heart Rate Source Patient Position - Orthostatic VS BP Location FiO2 (%)   Tympanic Monitor Sitting Right arm --      Pain Score       No Pain           Vitals:    06/10/23 1815   BP: 122/70   Pulse: 81   Patient Position - Orthostatic VS: Sitting         Visual Acuity      ED Medications  Medications - No data to display    Diagnostic Studies  Results Reviewed     Procedure Component Value Units Date/Time    Trichomonas vaginalis/Mycoplasma genitalium PCR [[id number]] Collected: 06/10/23 1954    Lab Status: In process Specimen: Urine, Clean Catch Updated: 06/11/23 0623    Genital Comprehensive Culture [[id number]] Collected: 06/10/23 2000    Lab Status:  In process Specimen: Genital from Cervix Updated: 06/10/23 2004    [address] [place] [address] amplified DNA by PCR [[id number]] Collected: 06/10/23 1935    Lab Status: In process Specimen: Urine, Other Updated: 06/10/23 2000    POCT pregnancy, urine [[id number]]  (Normal) Resulted: 06/10/23 1959    Lab Status: Final result Updated: 06/10/23 1959     EXT Preg Test, Ur Negative     Control Valid    UA (URINE) with reflex to Scope [[id number]] Collected: 06/10/23 1935    Lab Status: Final result Specimen: Urine, Clean Catch Updated: 06/10/23 1957     Color, UA Yellow     Clarity, UA Clear     Specific Gravity, UA >=1 030     pH, UA 6 0     Leukocytes, UA Negative     Nitrite, UA Negative     Protein, UA Negative mg/dl      Glucose, UA Negative mg/dl      Ketones, UA Negative mg/dl      Urobilinogen, UA 0 2 E U /dl      Bilirubin, UA Negative     Occult Blood, UA Negative                 No orders to display              Procedures  Procedures         ED Course                                             Medical Decision Making  Pulse ox 98% on room air indicating adequate oxygenation  STD testing sent and general culture sent as well  Advised we should wait for the results as she has had treatment for both the most common with no improvement so that we can direct therapy correctly  Follow-up with OB/GYN  Vaginal discharge: acute illness or injury  Amount and/or Complexity of Data Reviewed  Labs: ordered  Disposition  Final diagnoses:   Vaginal discharge     Time reflects when diagnosis was documented in both MDM as applicable and the Disposition within this note     Time User Action Codes Description Comment    6/10/2023  7:58 PM Onnie Heading Add [N89 8] Vaginal discharge       ED Disposition     ED Disposition   Discharge    Condition   Stable    Date/Time   Sat José 10, 2023  7:58 PM    [address] discharge to home/self care                 Follow-up Information     Follow up With Specialties Details Why Hannah Matthews MD Family Medicine, Obstetrics and Gynecology, Obstetrics, Gynecology In 1 week or your OB/GYN [address]  [phone]            Discharge Medication List as of 6/10/2023  7:58 PM      CONTINUE these medications which have NOT CHANGED    Details   amLODIPine (NORVASC) 10 mg tablet every 24 hours, Starting Thu 12/29/2022, Historical Med      aspirin 81 mg chewable tablet 1 tablet Orally every two days for 60 days, Historical Med      azaTHIOprine (IMURAN) 50 mg tablet Take 50 mg by mouth daily, Historical Med      Benlysta 200 MG/ML SOAJ Starting Mon 6/5/2023, Historical Med      Calcifediol (RAYALDEE PO) Take by mouth in the morning, Historical Med      Cyanocobalamin (VITAMIN B 12 PO) Take by mouth 2 (two) times a day, Historical Med      ferrous sulfate 325 (65 Fe) mg tablet Take 325 mg by mouth daily with breakfast, Historical Med      fluconazole (DIFLUCAN) 150 mg tablet Starting Sat 6/3/2023, Historical Med      hydroxychloroquine (PLAQUENIL) 200 mg tablet Take 200 mg by mouth 2 (two) times a day with meals, Historical Med      lisinopril (ZESTRIL) 2 5 mg tablet Take 2 5 mg by mouth daily, Historical Med      methylPREDNISolone 4 MG tablet therapy pack Use as directed on package, Normal      !! olmesartan (BENICAR) 20 mg tablet Take 20 mg by mouth daily, Historical Med      !! olmesartan (BENICAR) 5 mg tablet Take 10 mg by mouth daily, Starting Mon 5/22/2023, Historical Med      pantoprazole (PROTONIX) 20 mg tablet Take 1 tablet (20 mg total) by mouth daily, Starting Mon 1/2/2023, Normal      patient supplied medication Benlista injection, Historical Med      Repatha 140 MG/ML SOSY Starting Fri 6/2/2023, Historical Med      sildenafil (VIAGRA) 25 MG tablet Take 25 mg by mouth daily as needed for erectile dysfunction, Historical Med       !! - Potential duplicate medications found  Please discuss with provider  No discharge procedures on file      PDMP Review     None          ED Provider  Electronically Signed by           Yash Lawson DO  06/11/23 3140

## 2023-06-12 ENCOUNTER — TELEPHONE (OUTPATIENT)
Dept: NEPHROLOGY | Facility: CLINIC | Age: 23
End: 2023-06-12

## 2023-06-12 LAB
C TRACH DNA SPEC QL NAA+PROBE: NEGATIVE
N GONORRHOEA DNA SPEC QL NAA+PROBE: NEGATIVE

## 2023-06-14 LAB
BACTERIA GENITAL AEROBE CULT: NORMAL
M GENITALIUM DNA SPEC QL NAA+PROBE: POSITIVE
T VAGINALIS DNA SPEC QL NAA+PROBE: NEGATIVE

## 2023-06-14 RX ORDER — AZITHROMYCIN 250 MG/1
TABLET, FILM COATED ORAL
Qty: 1 TABLET | Refills: 0 | Status: SHIPPED | OUTPATIENT
Start: 2023-06-25 | End: 2023-06-29

## 2023-06-14 RX ORDER — DOXYCYCLINE HYCLATE 100 MG/1
100 CAPSULE ORAL 2 TIMES DAILY
Qty: 14 CAPSULE | Refills: 0 | Status: SHIPPED | OUTPATIENT
Start: 2023-06-14 | End: 2023-06-21

## 2023-06-19 ENCOUNTER — TELEPHONE (OUTPATIENT)
Dept: OBGYN CLINIC | Facility: CLINIC | Age: 23
End: 2023-06-19

## 2023-06-21 ENCOUNTER — PROCEDURE VISIT (OUTPATIENT)
Dept: OBGYN CLINIC | Facility: CLINIC | Age: 23
End: 2023-06-21
Payer: OTHER GOVERNMENT

## 2023-06-21 VITALS
HEIGHT: 63 IN | DIASTOLIC BLOOD PRESSURE: 60 MMHG | BODY MASS INDEX: 28 KG/M2 | SYSTOLIC BLOOD PRESSURE: 152 MMHG | WEIGHT: 158 LBS

## 2023-06-21 DIAGNOSIS — Z30.017 NEXPLANON INSERTION: Primary | ICD-10-CM

## 2023-06-21 LAB — SL AMB POCT URINE HCG: NEGATIVE

## 2023-06-21 PROCEDURE — 11981 INSERTION DRUG DLVR IMPLANT: CPT | Performed by: PHYSICIAN ASSISTANT

## 2023-06-21 PROCEDURE — 81025 URINE PREGNANCY TEST: CPT | Performed by: PHYSICIAN ASSISTANT

## 2023-06-21 NOTE — PROGRESS NOTES
Universal Protocol:  Consent: Verbal consent obtained  Written consent obtained  Risks and benefits: risks, benefits and alternatives were discussed  Consent given by: patient  Patient understanding: patient states understanding of the procedure being performed  Patient consent: the patient's understanding of the procedure matches consent given  Procedure consent: procedure consent matches procedure scheduled  Relevant documents: relevant documents present and verified  Test results: test results available and properly labeled  Site marked: the operative site was marked  Required items: required blood products, implants, devices, and special equipment available  Patient identity confirmed: verbally with patient    Remove and insert drug implant    Date/Time: 6/21/2023 4:00 PM    Performed by: Nikunj Martin PA-C  Authorized by: Nikunj Martin PA-C    Indication:     Indication: Insertion of non-biodegradable drug delivery implant    Pre-procedure:     Prepped with: povidone-iodine      Local anesthetic:  Lidocaine without epinephrine    The site was cleaned and prepped in a sterile fashion: yes    Procedure:     Procedure: Insertion    Left/right:  Left    Preloaded contraceptive capsule trocar was placed subdermally: yes      Visualization of implant was obtained: yes      Contraceptive capsule was inserted and trocar removed: yes      Visualization of notch in stylet and palpation of device: yes      Palpation confirms placement by provider and patient: yes      Site was closed with steri-strips and pressure bandage applied: yes    Comments:      Patient is here for Nexplanon insertion  LMP was 6/17/23  UPT in office today negative  Patient counseled on possible bleeding patterns, risks vs benefits, and potential side effects  All patient questions answered  She wishes to proceed  Consent form signed      Patient placed in supine position on exam table with L arm extended and externally rotated, L hand behind her head  Site for insertion measured with a ruler and marked with a pen  Site cleaned with alcohol swab, and 1 mL of lidocaine without epinephrine injected along insertion path  Site cleaned again with betadine swab  Implant confirmed in trocar  Trocar placed subdermally and advanced until fully inserted  Trigger pulled and Nexplanon placed  Trocar removed from skin and pressure applied  Good hemostasis achieved  Implant confirmed in place by patient and provider  Insertion site covered with a band-aid, and pressure dressing applied  Patient to keep pressure dressing in place for 24 hours and insertion site covered for 3-5 days  Can use ice and/or ibuprofen for pain  Call immediately if signs of infection develop  Use back-up protection for 10-14 days  Stressed consistent condom use for STD prevention  Patient tolerated procedure well; no immediate complications  F/u in 3 months for birth control check  Call with problems in the interim

## 2023-06-21 NOTE — PATIENT INSTRUCTIONS
Keep pressure dressing in place for 24 hours, and insertion site covered for 3-5 days  Can use ice and/or ibuprofen as needed for pain  Call immediately if signs of infection develop  Use back-up protection for 10-14 days  Practice consistent condom use for STD prevention  Call with problems

## 2023-06-26 ENCOUNTER — OFFICE VISIT (OUTPATIENT)
Dept: FAMILY MEDICINE CLINIC | Facility: CLINIC | Age: 23
End: 2023-06-26
Payer: OTHER GOVERNMENT

## 2023-06-26 ENCOUNTER — TELEPHONE (OUTPATIENT)
Dept: PSYCHIATRY | Facility: CLINIC | Age: 23
End: 2023-06-26

## 2023-06-26 VITALS
WEIGHT: 158 LBS | BODY MASS INDEX: 28 KG/M2 | HEART RATE: 81 BPM | OXYGEN SATURATION: 100 % | SYSTOLIC BLOOD PRESSURE: 108 MMHG | DIASTOLIC BLOOD PRESSURE: 70 MMHG | HEIGHT: 63 IN | TEMPERATURE: 98.6 F

## 2023-06-26 DIAGNOSIS — F32.A DEPRESSION, UNSPECIFIED DEPRESSION TYPE: Primary | ICD-10-CM

## 2023-06-26 DIAGNOSIS — Z30.017 ENCOUNTER FOR INITIAL PRESCRIPTION OF IMPLANTABLE SUBDERMAL CONTRACEPTIVE: ICD-10-CM

## 2023-06-26 PROCEDURE — 99213 OFFICE O/P EST LOW 20 MIN: CPT | Performed by: CHIROPRACTOR

## 2023-06-26 NOTE — ASSESSMENT & PLAN NOTE
Patient states she has been treated at the [place] for some time for depression and anxiety which developed during and following her pregnancy  Psychiatric referral placed for transition of care management  Letter for emotional support animal usage also provided  Follow-up at next OV

## 2023-06-26 NOTE — LETTER
RE [address]       To whom it may concern,        This patient is under my care   Based upon a reasonable degree of certainty her depression may be helped by having an emotional support animal         Trinidad Valdez MD

## 2023-06-26 NOTE — PROGRESS NOTES
Family Medicine Follow-Up Office Visit  Dane Henson 24 y o  female   MRN: [id number] : 2000  ENCOUNTER: 2023 8:35 AM    Assessment and Plan   Depression  Patient states she has been treated at the [facility] for some time for depression and anxiety which developed during and following her pregnancy  Psychiatric referral placed for transition of care management  Letter for emotional support animal usage also provided  Follow-up at next OV  Chief Complaint     Chief Complaint   Patient presents with   • emotional support animal        History of Present Illness   Dane Henson is a 24y o -year-old female who presents today for a follow-up office visit  She states overall she is feeling well however would like to establish her psychiatric care for depression anxiety in this area and requests a referral   She also asks if an emotional support animal may be helpful for her  Given her history I do not believe there is any contraindication whatsoever  Lastly, patient needs referral to OB/GYN in order to have her Nexplanon covered  Order was still placed, patient also has pending appointments with GI and rheumatology  She has no other questions or concerns at this time  Review of Systems   Review of Systems   Constitutional: Positive for fatigue  Negative for chills and fever  HENT: Negative for hearing loss  Eyes: Negative for visual disturbance  Respiratory: Negative for shortness of breath  Cardiovascular: Negative for chest pain  Gastrointestinal: Negative for constipation, diarrhea and nausea  Endocrine: Negative for polydipsia  Genitourinary: Negative for decreased urine volume, difficulty urinating, vaginal discharge and vaginal pain  Musculoskeletal: Negative for arthralgias and back pain  Skin: Negative for rash  Neurological: Negative for headaches         Active Problem List     Patient Active Problem List   Diagnosis   • Systemic lupus erythematosus ([facility] [address] ) "  • Raynaud's disease   • IBD (inflammatory bowel disease)   • Depression       Past Medical History, Past Surgical History, Family History, and Social History were reviewed and updated today as appropriate  Objective   /70 (BP Location: Left arm, Patient Position: Sitting, Cuff Size: Standard)   Pulse 81   Temp 98 6 °F (37 °C) (Tympanic)   Ht 5' 3\" (1 6 m)   Wt 71 7 kg (158 lb)   LMP 06/17/2023 (Exact Date)   SpO2 100%   BMI 27 99 kg/m²     Physical Exam  Constitutional:       General: She is not in acute distress  Appearance: Normal appearance  She is not ill-appearing  HENT:      Head: Normocephalic  Nose: No rhinorrhea  Eyes:      General: No scleral icterus  Extraocular Movements: Extraocular movements intact  Cardiovascular:      Rate and Rhythm: Normal rate and regular rhythm  Heart sounds: Normal heart sounds  No murmur heard  Pulmonary:      Effort: Pulmonary effort is normal       Breath sounds: No wheezing or rales  Abdominal:      General: There is no distension  Musculoskeletal:      Right lower leg: No edema  Left lower leg: No edema  Skin:     General: Skin is warm and dry  Neurological:      General: No focal deficit present  Mental Status: She is alert  Motor: No weakness        Gait: Gait normal          Pertinent Laboratory/Diagnostic Studies:  Lab Results   Component Value Date    BUN 17 01/02/2023    CREATININE 0 69 01/02/2023    CALCIUM 8 9 01/02/2023    K 4 1 01/02/2023    CO2 30 01/02/2023     01/02/2023     Lab Results   Component Value Date    ALT 60 01/02/2023    AST 35 01/02/2023    ALKPHOS 90 01/02/2023       Lab Results   Component Value Date    WBC 6 42 01/02/2023    HGB 11 9 01/02/2023    HCT 38 2 01/02/2023    MCV 89 01/02/2023     01/02/2023       No results found for: \"TSH\"    No results found for: \"CHOL\"  Lab Results   Component Value Date    TRIG 122 12/09/2022     Lab Results   Component Value Date    " "HDL 54 12/09/2022     Lab Results   Component Value Date    LDLCALC 98 12/09/2022     No results found for: \"HGBA1C\"    Results for orders placed or performed in visit on 06/21/23   POCT urine HCG   Result Value Ref Range    URINE HCG Negative        Orders Placed This Encounter   Procedures   • Ambulatory Referral to Psychiatry   • Ambulatory Referral to Obstetrics / Gynecology         Current Medications     Current Outpatient Medications   Medication Sig Dispense Refill   • amLODIPine (NORVASC) 10 mg tablet every 24 hours     • azithromycin (Zithromax Z-Lalito) 250 mg tablet Take 2 tablets today then 1 tablet daily x 4 days Do not start before June 25, 2023  1 tablet 0   • Benlysta 200 MG/ML SOAJ      • Calcifediol (RAYALDEE PO) Take by mouth in the morning     • ferrous sulfate 325 (65 Fe) mg tablet Take 325 mg by mouth daily with breakfast     • hydroxychloroquine (PLAQUENIL) 200 mg tablet Take 200 mg by mouth 2 (two) times a day with meals     • sildenafil (VIAGRA) 25 MG tablet Take 25 mg by mouth daily as needed for erectile dysfunction     • aspirin 81 mg chewable tablet 1 tablet Orally every two days for 60 days (Patient not taking: Reported on 6/6/2023)     • azaTHIOprine (IMURAN) 50 mg tablet Take 50 mg by mouth daily (Patient not taking: Reported on 6/5/2023)     • Cyanocobalamin (VITAMIN B 12 PO) Take by mouth 2 (two) times a day (Patient not taking: Reported on 6/5/2023)     • fluconazole (DIFLUCAN) 150 mg tablet  (Patient not taking: Reported on 6/5/2023)     • lisinopril (ZESTRIL) 2 5 mg tablet Take 2 5 mg by mouth daily (Patient not taking: Reported on 1/2/2023)     • methylPREDNISolone 4 MG tablet therapy pack Use as directed on package (Patient not taking: Reported on 1/2/2023) 21 tablet 0   • olmesartan (BENICAR) 20 mg tablet Take 20 mg by mouth daily (Patient not taking: Reported on 6/6/2023)     • olmesartan (BENICAR) 5 mg tablet Take 10 mg by mouth daily (Patient not taking: Reported on " 6/26/2023)     • pantoprazole (PROTONIX) 20 mg tablet Take 1 tablet (20 mg total) by mouth daily (Patient not taking: Reported on 6/26/2023) 30 tablet 0   • patient supplied medication Benlista injection (Patient not taking: Reported on 6/9/2023)     • Repatha 140 MG/ML SOSY        No current facility-administered medications for this visit  ALLERGIES:  No Known Allergies    Health Maintenance     Health Maintenance   Topic Date Due   • Hepatitis C Screening  Never done   • Pneumococcal Vaccine: Pediatrics (0 to 5 Years) and At-Risk Patients (6 to 59 Years) (1 - PCV) Never done   • BMI: Followup Plan  Never done   • HPV Vaccine (2 - 3-dose series) 05/03/2021   • COVID-19 Vaccine (3 - Pfizer risk series) 10/22/2021   • Annual Physical  05/11/2024   • Depression Screening  06/05/2024   • Chlamydia Screening  06/10/2024   • BMI: Adult  06/21/2024   • Cervical Cancer Screening  05/11/2026   • DTaP,Tdap,and Td Vaccines (3 - Td or Tdap) 02/23/2031   • HIV Screening  Completed   • Influenza Vaccine  Completed   • HIB Vaccine  Aged Out   • IPV Vaccine  Aged Out   • Hepatitis A Vaccine  Aged Out   • Meningococcal ACWY Vaccine  Aged Out     Immunization History   Administered Date(s) Administered   • Adenovirus types 4 and 7 07/13/2018   • COVID-19 PFIZER VACCINE 0 3 ML IM 09/02/2021, 09/24/2021   • HPV Bivalent 04/05/2021   • INFLUENZA 10/23/2020, 10/25/2021, 11/14/2022   • IPV 08/15/2018   • MMR 04/05/2021   • Meningococcal MCV4P 07/13/2018   • Tdap 07/13/2018, 02/23/2021   • Tuberculin Skin Test 02/08/2023   • Typhoid, ViCPs 08/15/2018   • Varicella 04/05/2021   • influenza, injectable, quadrivalent 12/10/2018, 10/24/2019         Alka Thapa MD   [address]  6/26/2023  8:35 AM    Parts of this note were dictated using [employer] dictation software and may have sounds-like errors due to variation in pronunciation

## 2023-06-26 NOTE — TELEPHONE ENCOUNTER
Referral letter sent via [facility] [address] [place] [address]  If no response received within 15 days, referral will be closed

## 2023-07-17 ENCOUNTER — LAB (OUTPATIENT)
Dept: LAB | Facility: CLINIC | Age: 23
End: 2023-07-17
Payer: OTHER GOVERNMENT

## 2023-07-17 ENCOUNTER — OFFICE VISIT (OUTPATIENT)
Dept: FAMILY MEDICINE CLINIC | Facility: CLINIC | Age: 23
End: 2023-07-17
Payer: OTHER GOVERNMENT

## 2023-07-17 VITALS
RESPIRATION RATE: 18 BRPM | WEIGHT: 157.4 LBS | BODY MASS INDEX: 27.89 KG/M2 | SYSTOLIC BLOOD PRESSURE: 110 MMHG | TEMPERATURE: 98.2 F | DIASTOLIC BLOOD PRESSURE: 60 MMHG | HEART RATE: 86 BPM | HEIGHT: 63 IN | OXYGEN SATURATION: 99 %

## 2023-07-17 DIAGNOSIS — Z11.3 SCREENING FOR STDS (SEXUALLY TRANSMITTED DISEASES): Primary | ICD-10-CM

## 2023-07-17 DIAGNOSIS — E78.5 DYSLIPIDEMIA: ICD-10-CM

## 2023-07-17 DIAGNOSIS — Z11.3 SCREENING FOR STDS (SEXUALLY TRANSMITTED DISEASES): ICD-10-CM

## 2023-07-17 DIAGNOSIS — I10 ESSENTIAL HYPERTENSION, MALIGNANT: ICD-10-CM

## 2023-07-17 LAB
ALBUMIN SERPL BCP-MCNC: 4.5 G/DL (ref 3.5–5)
ALP SERPL-CCNC: 100 U/L (ref 34–104)
ALT SERPL W P-5'-P-CCNC: 21 U/L (ref 7–52)
ANION GAP SERPL CALCULATED.3IONS-SCNC: 6 MMOL/L
AST SERPL W P-5'-P-CCNC: 21 U/L (ref 13–39)
BILIRUB SERPL-MCNC: 0.28 MG/DL (ref 0.2–1)
BUN SERPL-MCNC: 16 MG/DL (ref 5–25)
CALCIUM SERPL-MCNC: 9.4 MG/DL (ref 8.4–10.2)
CHLORIDE SERPL-SCNC: 106 MMOL/L (ref 96–108)
CO2 SERPL-SCNC: 27 MMOL/L (ref 21–32)
CREAT SERPL-MCNC: 0.84 MG/DL (ref 0.6–1.3)
GFR SERPL CREATININE-BSD FRML MDRD: 97 ML/MIN/1.73SQ M
GLUCOSE P FAST SERPL-MCNC: 96 MG/DL (ref 65–99)
POTASSIUM SERPL-SCNC: 3.9 MMOL/L (ref 3.5–5.3)
PROT SERPL-MCNC: 7.8 G/DL (ref 6.4–8.4)
SODIUM SERPL-SCNC: 139 MMOL/L (ref 135–147)

## 2023-07-17 PROCEDURE — 87591 N.GONORRHOEAE DNA AMP PROB: CPT

## 2023-07-17 PROCEDURE — 86780 TREPONEMA PALLIDUM: CPT

## 2023-07-17 PROCEDURE — 86706 HEP B SURFACE ANTIBODY: CPT

## 2023-07-17 PROCEDURE — 87389 HIV-1 AG W/HIV-1&-2 AB AG IA: CPT

## 2023-07-17 PROCEDURE — 80053 COMPREHEN METABOLIC PANEL: CPT

## 2023-07-17 PROCEDURE — 99213 OFFICE O/P EST LOW 20 MIN: CPT

## 2023-07-17 PROCEDURE — 87491 CHLMYD TRACH DNA AMP PROBE: CPT

## 2023-07-17 PROCEDURE — 86803 HEPATITIS C AB TEST: CPT

## 2023-07-17 PROCEDURE — 36415 COLL VENOUS BLD VENIPUNCTURE: CPT

## 2023-07-17 NOTE — PROGRESS NOTES
Name: Corrine Osborne      : 2000      MRN: [id number]  Encounter Provider: Ashleigh Stephens DO  Encounter Date: 2023   Encounter department: [facility]    Assessment & Plan     1. Screening for STDs (sexually transmitted diseases)  Assessment & Plan:  Patient has previous history of STDs and would like to get tested as she has a new partner. Orders:  -     : HIV 1/2 AB/AG w Reflex [facility] for 2 yr old and above; Future  -     Hepatitis B surface antibody; Future  -     Hepatitis C antibody; Future  -     RPR-Syphilis Screening (Total Syphilis IGG/IGM); Future  -     Chlamydia/GC amplified DNA by PCR; Future  -     HIV 1/2 AG/AB W REFLEX [facility] and [facility] only; Future         Subjective      Corrine Osborne is a 24 y.o. female with a PMH of SLE and depression that presents to the office today for STD testing. She reports she has a new partner and would like to be safe. She denies vaginal discharge, vaginal bleeding, genital sores, pain with urination, urinary frequency, bleeding with urination, and pelvic pain. Her LMP was on . She reports she began spotting with Nexplanon over the past day, but she was aware of the side effects and is not concerned. She uses a panty liner, and typically uses 1 or 2 a day. Review of Systems   Genitourinary: Negative for decreased urine volume, difficulty urinating, dyspareunia, dysuria, enuresis, flank pain, frequency, genital sores, hematuria, menstrual problem, pelvic pain, urgency, vaginal bleeding, vaginal discharge and vaginal pain.        Current Outpatient Medications on File Prior to Visit   Medication Sig   • amLODIPine (NORVASC) 10 mg tablet every 24 hours   • Benlysta 200 MG/ML SOAJ    • Calcifediol (RAYALDEE PO) Take by mouth in the morning   • ferrous sulfate 325 (65 Fe) mg tablet Take 325 mg by mouth daily with breakfast   • hydroxychloroquine (PLAQUENIL) 200 mg tablet Take 200 mg by mouth 2 (two) times a day with meals   • olmesartan (BENICAR) 5 mg tablet Take 10 mg by mouth daily   • sildenafil (VIAGRA) 25 MG tablet Take 25 mg by mouth daily as needed for erectile dysfunction   • aspirin 81 mg chewable tablet 1 tablet Orally every two days for 60 days (Patient not taking: Reported on 6/6/2023)   • azaTHIOprine (IMURAN) 50 mg tablet Take 50 mg by mouth daily (Patient not taking: Reported on 6/5/2023)   • Cyanocobalamin (VITAMIN B 12 PO) Take by mouth 2 (two) times a day (Patient not taking: Reported on 6/5/2023)   • fluconazole (DIFLUCAN) 150 mg tablet  (Patient not taking: Reported on 6/5/2023)   • lisinopril (ZESTRIL) 2.5 mg tablet Take 2.5 mg by mouth daily (Patient not taking: Reported on 1/2/2023)   • methylPREDNISolone 4 MG tablet therapy pack Use as directed on package (Patient not taking: Reported on 1/2/2023)   • olmesartan (BENICAR) 20 mg tablet Take 20 mg by mouth daily (Patient not taking: Reported on 6/6/2023)   • pantoprazole (PROTONIX) 20 mg tablet Take 1 tablet (20 mg total) by mouth daily (Patient not taking: Reported on 6/26/2023)   • patient supplied medication Benlista injection (Patient not taking: Reported on 6/9/2023)   • Repatha 140 MG/ML SOSY        Objective     /60 (BP Location: Right arm, Patient Position: Sitting, Cuff Size: Standard)   Pulse 86   Temp 98.2 °F (36.8 °C) (Tympanic)   Resp 18   Ht 5' 3" (1.6 m)   Wt 71.4 kg (157 lb 6.4 oz)   LMP 06/17/2023 (Exact Date)   SpO2 99%   BMI 27.88 kg/m²     Physical Exam  Constitutional:       Appearance: Normal appearance. HENT:      Head: Normocephalic and atraumatic. Eyes:      Extraocular Movements: Extraocular movements intact. Conjunctiva/sclera: Conjunctivae normal.      Pupils: Pupils are equal, round, and reactive to light. Cardiovascular:      Rate and Rhythm: Normal rate and regular rhythm. Heart sounds: Normal heart sounds.    Pulmonary:      Effort: Pulmonary effort is normal.      Breath sounds: Normal breath sounds. Skin:     General: Skin is warm and dry. Neurological:      General: No focal deficit present. Mental Status: She is alert. Psychiatric:         Mood and Affect: Mood normal.         Behavior: Behavior normal.         Thought Content:  Thought content normal.         Judgment: Judgment normal.     Catina Rousseau, MS3  [facility], DO

## 2023-07-18 LAB
C TRACH DNA SPEC QL NAA+PROBE: NEGATIVE
HBV SURFACE AB SER-ACNC: 24.5 MIU/ML
HCV AB SER QL: NORMAL
HIV 1+2 AB+HIV1 P24 AG SERPL QL IA: NON REACTIVE
HIV 1+2 AB+HIV1 P24 AG SERPL QL IA: NORMAL
HIV 2 AB SERPL QL IA: NORMAL
HIV1 AB SERPL QL IA: NORMAL
HIV1 P24 AG SERPL QL IA: NORMAL
N GONORRHOEA DNA SPEC QL NAA+PROBE: NEGATIVE
TREPONEMA PALLIDUM IGG+IGM AB [PRESENCE] IN SERUM OR PLASMA BY IMMUNOASSAY: NORMAL

## 2023-07-23 ENCOUNTER — OFFICE VISIT (OUTPATIENT)
Dept: URGENT CARE | Facility: CLINIC | Age: 23
End: 2023-07-23

## 2023-07-23 VITALS
DIASTOLIC BLOOD PRESSURE: 67 MMHG | HEART RATE: 106 BPM | RESPIRATION RATE: 20 BRPM | TEMPERATURE: 98.9 F | BODY MASS INDEX: 29.26 KG/M2 | HEIGHT: 62 IN | OXYGEN SATURATION: 99 % | SYSTOLIC BLOOD PRESSURE: 128 MMHG | WEIGHT: 159 LBS

## 2023-07-23 DIAGNOSIS — B37.31 VULVOVAGINAL CANDIDIASIS: Primary | ICD-10-CM

## 2023-07-23 DIAGNOSIS — R39.9 UTI SYMPTOMS: ICD-10-CM

## 2023-07-23 LAB
SL AMB  POCT GLUCOSE, UA: NEGATIVE
SL AMB LEUKOCYTE ESTERASE,UA: ABNORMAL
SL AMB POCT BILIRUBIN,UA: NEGATIVE
SL AMB POCT BLOOD,UA: NEGATIVE
SL AMB POCT CLARITY,UA: ABNORMAL
SL AMB POCT COLOR,UA: YELLOW
SL AMB POCT KETONES,UA: NEGATIVE
SL AMB POCT NITRITE,UA: NEGATIVE
SL AMB POCT PH,UA: 5
SL AMB POCT SPECIFIC GRAVITY,UA: 1.02
SL AMB POCT URINE PROTEIN: 30
SL AMB POCT UROBILINOGEN: 0.2

## 2023-07-23 PROCEDURE — 87086 URINE CULTURE/COLONY COUNT: CPT | Performed by: PHYSICIAN ASSISTANT

## 2023-07-23 RX ORDER — FLUCONAZOLE 150 MG/1
TABLET ORAL
Qty: 2 TABLET | Refills: 0 | Status: SHIPPED | OUTPATIENT
Start: 2023-07-23 | End: 2023-07-26

## 2023-07-23 NOTE — PROGRESS NOTES
[facility]        NAME: Stacia Oro is a 24 y.o. female  : 2000    MRN: [id number]  DATE: 2023  TIME: 12:13 PM    Assessment and Plan   Vulvovaginal candidiasis [B37.31]  1. Vulvovaginal candidiasis  fluconazole (DIFLUCAN) 150 mg tablet      2. UTI symptoms  POCT urine dip    Urine culture        Patient Instructions   Yeast infection  rx diflucan as directed sent via EMR  UA dip +leuks only, will send out culture and call if abnormal  Loose fitting underwear  desitin externally  Cotton underwear    Follow up with PCP in 3-5 days. Proceed to  ER if symptoms worsen. Chief Complaint     Chief Complaint   Patient presents with   • Possible UTI     C/o of possible yeast infection and UTI s/s. History of Present Illness       Ever Donahue is a 78-year-old female who presents to clinic complaining of vaginal discharge x3 days. She describes the discharge as white and cheesy. She also notes vaginal itching. She also has some urinary tract infection symptoms of increased urinary frequency and dysuria. She denies any recent antibiotic use, fever, chills, abdominal pain, back pain, flank pain, or urinary urgency. Review of Systems   Review of Systems   Constitutional: Negative for chills and fever. Gastrointestinal: Negative for abdominal pain. Genitourinary: Positive for dysuria, frequency and vaginal discharge. Negative for flank pain, hematuria, urgency, vaginal bleeding and vaginal pain. Musculoskeletal: Negative for back pain.          Current Medications       Current Outpatient Medications:   •  fluconazole (DIFLUCAN) 150 mg tablet, 1 tab PO x 1 dose, repeat dose in 72 hours, Disp: 2 tablet, Rfl: 0  •  amLODIPine (NORVASC) 10 mg tablet, every 24 hours, Disp: , Rfl:   •  aspirin 81 mg chewable tablet, 1 tablet Orally every two days for 60 days (Patient not taking: Reported on 2023), Disp: , Rfl:   •  azaTHIOprine (IMURAN) 50 mg tablet, Take 50 mg by mouth daily (Patient not taking: Reported on 6/5/2023), Disp: , Rfl:   •  Benlysta 200 MG/ML SOAJ, , Disp: , Rfl:   •  Calcifediol (RAYALDEE PO), Take by mouth in the morning, Disp: , Rfl:   •  Cyanocobalamin (VITAMIN B 12 PO), Take by mouth 2 (two) times a day (Patient not taking: Reported on 6/5/2023), Disp: , Rfl:   •  ferrous sulfate 325 (65 Fe) mg tablet, Take 325 mg by mouth daily with breakfast, Disp: , Rfl:   •  fluconazole (DIFLUCAN) 150 mg tablet, , Disp: , Rfl:   •  hydroxychloroquine (PLAQUENIL) 200 mg tablet, Take 200 mg by mouth 2 (two) times a day with meals, Disp: , Rfl:   •  lisinopril (ZESTRIL) 2.5 mg tablet, Take 2.5 mg by mouth daily (Patient not taking: Reported on 1/2/2023), Disp: , Rfl:   •  methylPREDNISolone 4 MG tablet therapy pack, Use as directed on package (Patient not taking: Reported on 1/2/2023), Disp: 21 tablet, Rfl: 0  •  olmesartan (BENICAR) 20 mg tablet, Take 20 mg by mouth daily (Patient not taking: Reported on 6/6/2023), Disp: , Rfl:   •  olmesartan (BENICAR) 5 mg tablet, Take 10 mg by mouth daily, Disp: , Rfl:   •  pantoprazole (PROTONIX) 20 mg tablet, Take 1 tablet (20 mg total) by mouth daily (Patient not taking: Reported on 6/26/2023), Disp: 30 tablet, Rfl: 0  •  patient supplied medication, Benlista injection (Patient not taking: Reported on 6/9/2023), Disp: , Rfl:   •  Repatha 140 MG/ML SOSY, , Disp: , Rfl:   •  sildenafil (VIAGRA) 25 MG tablet, Take 25 mg by mouth daily as needed for erectile dysfunction, Disp: , Rfl:     Current Allergies     Allergies as of 07/23/2023   • (No Known Allergies)            The following portions of the patient's history were reviewed and updated as appropriate: allergies, current medications, past family history, past medical history, past social history, past surgical history and problem list.     Past Medical History:   Diagnosis Date   • Crohn's disease ([address])    • Hypertension    • Kidney disease, chronic, stage I (GFR over 89 ml/min)    • Lupus ([address])    • Raynaud disease        Past Surgical History:   Procedure Laterality Date   •  SECTION N/A    • COLON SURGERY     • WISDOM TOOTH EXTRACTION         Family History   Problem Relation Age of Onset   • Lupus Mother    • Diabetes Father    • Breast cancer Other          Medications have been verified. Objective   /67   Pulse (!) 106   Temp 98.9 °F (37.2 °C)   Resp 20   Ht 5' 2" (1.575 m)   Wt 72.1 kg (159 lb)   SpO2 99%   BMI 29.08 kg/m²   No LMP recorded. Physical Exam     Physical Exam  Vitals and nursing note reviewed. Constitutional:       General: She is not in acute distress. Appearance: Normal appearance. She is not ill-appearing. Cardiovascular:      Rate and Rhythm: Normal rate and regular rhythm. Heart sounds: Normal heart sounds. Pulmonary:      Effort: Pulmonary effort is normal.      Breath sounds: Normal breath sounds. Abdominal:      General: Bowel sounds are normal. There is no distension. Palpations: Abdomen is soft. Tenderness: There is no abdominal tenderness. There is no right CVA tenderness, left CVA tenderness, guarding or rebound. Neurological:      Mental Status: She is alert and oriented to person, place, and time.    Psychiatric:         Mood and Affect: Mood normal.         Behavior: Behavior normal.

## 2023-07-25 LAB — BACTERIA UR CULT: NORMAL

## 2023-08-07 ENCOUNTER — HOSPITAL ENCOUNTER (EMERGENCY)
Facility: HOSPITAL | Age: 23
Discharge: HOME | End: 2023-08-07
Attending: EMERGENCY MEDICINE
Payer: OTHER GOVERNMENT

## 2023-08-07 VITALS
BODY MASS INDEX: 29.26 KG/M2 | TEMPERATURE: 96.5 F | OXYGEN SATURATION: 97 % | WEIGHT: 160 LBS | SYSTOLIC BLOOD PRESSURE: 124 MMHG | HEART RATE: 77 BPM | DIASTOLIC BLOOD PRESSURE: 68 MMHG | RESPIRATION RATE: 20 BRPM

## 2023-08-07 DIAGNOSIS — R19.7 DIARRHEA, UNSPECIFIED TYPE: Primary | ICD-10-CM

## 2023-08-07 DIAGNOSIS — M79.89 SWELLING OF BOTH HANDS: ICD-10-CM

## 2023-08-07 LAB
ALBUMIN SERPL BCP-MCNC: 4.2 G/DL (ref 3.5–5)
ALP SERPL-CCNC: 79 U/L (ref 34–104)
ALT SERPL W P-5'-P-CCNC: 17 U/L (ref 7–52)
ANION GAP SERPL CALCULATED.3IONS-SCNC: 6 MMOL/L
AST SERPL W P-5'-P-CCNC: 19 U/L (ref 13–39)
BACTERIA UR QL AUTO: ABNORMAL /HPF
BASOPHILS # BLD AUTO: 0.01 THOUSANDS/ÂΜL (ref 0–0.1)
BASOPHILS NFR BLD AUTO: 0 % (ref 0–1)
BILIRUB SERPL-MCNC: 0.39 MG/DL (ref 0.2–1)
BILIRUB UR QL STRIP: NEGATIVE
BUN SERPL-MCNC: 11 MG/DL (ref 5–25)
CALCIUM SERPL-MCNC: 9.7 MG/DL (ref 8.4–10.2)
CAOX CRY URNS QL MICRO: ABNORMAL /HPF
CHLORIDE SERPL-SCNC: 106 MMOL/L (ref 96–108)
CLARITY UR: CLEAR
CO2 SERPL-SCNC: 26 MMOL/L (ref 21–32)
COLOR UR: ABNORMAL
CREAT SERPL-MCNC: 0.7 MG/DL (ref 0.6–1.3)
EOSINOPHIL # BLD AUTO: 0.03 THOUSAND/ÂΜL (ref 0–0.61)
EOSINOPHIL NFR BLD AUTO: 1 % (ref 0–6)
ERYTHROCYTE [DISTWIDTH] IN BLOOD BY AUTOMATED COUNT: 12.8 % (ref 11.6–15.1)
EXT PREGNANCY TEST URINE: NEGATIVE
EXT. CONTROL: NORMAL
GFR SERPL CREATININE-BSD FRML MDRD: 121 ML/MIN/1.73SQ M
GLUCOSE SERPL-MCNC: 81 MG/DL (ref 65–140)
GLUCOSE UR STRIP-MCNC: NEGATIVE MG/DL
HCT VFR BLD AUTO: 39.7 % (ref 34.8–46.1)
HGB BLD-MCNC: 12.9 G/DL (ref 11.5–15.4)
HGB UR QL STRIP.AUTO: ABNORMAL
IMM GRANULOCYTES # BLD AUTO: 0.01 THOUSAND/UL (ref 0–0.2)
IMM GRANULOCYTES NFR BLD AUTO: 0 % (ref 0–2)
KETONES UR STRIP-MCNC: NEGATIVE MG/DL
LEUKOCYTE ESTERASE UR QL STRIP: NEGATIVE
LYMPHOCYTES # BLD AUTO: 1.47 THOUSANDS/ÂΜL (ref 0.6–4.47)
LYMPHOCYTES NFR BLD AUTO: 40 % (ref 14–44)
MCH RBC QN AUTO: 27.3 PG (ref 26.8–34.3)
MCHC RBC AUTO-ENTMCNC: 32.5 G/DL (ref 31.4–37.4)
MCV RBC AUTO: 84 FL (ref 82–98)
MONOCYTES # BLD AUTO: 0.25 THOUSAND/ÂΜL (ref 0.17–1.22)
MONOCYTES NFR BLD AUTO: 7 % (ref 4–12)
NEUTROPHILS # BLD AUTO: 1.94 THOUSANDS/ÂΜL (ref 1.85–7.62)
NEUTS SEG NFR BLD AUTO: 52 % (ref 43–75)
NITRITE UR QL STRIP: NEGATIVE
NON-SQ EPI CELLS URNS QL MICRO: ABNORMAL /HPF
NRBC BLD AUTO-RTO: 0 /100 WBCS
PH UR STRIP.AUTO: 7 [PH]
PLATELET # BLD AUTO: 264 THOUSANDS/UL (ref 149–390)
PMV BLD AUTO: 11.2 FL (ref 8.9–12.7)
POTASSIUM SERPL-SCNC: 3.6 MMOL/L (ref 3.5–5.3)
PROT SERPL-MCNC: 7.4 G/DL (ref 6.4–8.4)
PROT UR STRIP-MCNC: NEGATIVE MG/DL
RBC # BLD AUTO: 4.72 MILLION/UL (ref 3.81–5.12)
RBC #/AREA URNS AUTO: ABNORMAL /HPF
SODIUM SERPL-SCNC: 138 MMOL/L (ref 135–147)
SP GR UR STRIP.AUTO: >=1.03 (ref 1–1.03)
UROBILINOGEN UR QL STRIP.AUTO: 0.2 E.U./DL
WBC # BLD AUTO: 3.71 THOUSAND/UL (ref 4.31–10.16)
WBC #/AREA URNS AUTO: ABNORMAL /HPF

## 2023-08-07 PROCEDURE — 85025 COMPLETE CBC W/AUTO DIFF WBC: CPT | Performed by: STUDENT IN AN ORGANIZED HEALTH CARE EDUCATION/TRAINING PROGRAM

## 2023-08-07 PROCEDURE — 81001 URINALYSIS AUTO W/SCOPE: CPT | Performed by: STUDENT IN AN ORGANIZED HEALTH CARE EDUCATION/TRAINING PROGRAM

## 2023-08-07 PROCEDURE — 99284 EMERGENCY DEPT VISIT MOD MDM: CPT | Performed by: PHYSICIAN ASSISTANT

## 2023-08-07 PROCEDURE — 36415 COLL VENOUS BLD VENIPUNCTURE: CPT | Performed by: STUDENT IN AN ORGANIZED HEALTH CARE EDUCATION/TRAINING PROGRAM

## 2023-08-07 PROCEDURE — 99284 EMERGENCY DEPT VISIT MOD MDM: CPT

## 2023-08-07 PROCEDURE — 80053 COMPREHEN METABOLIC PANEL: CPT | Performed by: STUDENT IN AN ORGANIZED HEALTH CARE EDUCATION/TRAINING PROGRAM

## 2023-08-07 PROCEDURE — 96365 THER/PROPH/DIAG IV INF INIT: CPT

## 2023-08-07 PROCEDURE — 81025 URINE PREGNANCY TEST: CPT | Performed by: STUDENT IN AN ORGANIZED HEALTH CARE EDUCATION/TRAINING PROGRAM

## 2023-08-07 RX ORDER — METHYLPREDNISOLONE 4 MG/1
TABLET ORAL
Qty: 21 TABLET | Refills: 0 | Status: SHIPPED | OUTPATIENT
Start: 2023-08-07

## 2023-08-07 RX ORDER — ONDANSETRON 2 MG/ML
4 INJECTION INTRAMUSCULAR; INTRAVENOUS EVERY 6 HOURS PRN
Status: DISCONTINUED | OUTPATIENT
Start: 2023-08-07 | End: 2023-08-07 | Stop reason: HOSPADM

## 2023-08-07 RX ADMIN — SODIUM CHLORIDE, SODIUM LACTATE, POTASSIUM CHLORIDE, AND CALCIUM CHLORIDE 500 ML: .6; .31; .03; .02 INJECTION, SOLUTION INTRAVENOUS at 12:34

## 2023-08-07 NOTE — ED PROVIDER NOTES
History  Chief Complaint   Patient presents with   • Hand Swelling     States she is having a " flare up of my Lupus or my Crohn's , I cant tell which ". States started 2 days ago with hand swelling, bloating, loose stools, night sweats, hot cold flashes and headache . • Diarrhea     Pt is a 23 yo F with PMH of lupus and Crohns dz, previous SBO, Previous Cdiff infection, , who presents for evaluation of bilateral hand swelling, bloating, loose stools, chills, headache and night sweats. She states this is consistent with her lupus and Crohns dz flares, but she is unsure which autoimmune dz is reacting. She denies pain at this time. She descrives her stools as loose with increased frequency. She denies abdominal pain, recent antibiotic use, sick contacts, suspect food intake. She reports associated  bilateral hand swelling. She has not reached out to her rheumatologist or her GI specialist.      Diarrhea  Associated symptoms: no abdominal pain, no arthralgias, no chills, no fever and no vomiting        Prior to Admission Medications   Prescriptions Last Dose Informant Patient Reported? Taking?    Benlysta 200 MG/ML SOAJ   Yes No   Calcifediol (RAYALDEE PO)  Self Yes No   Sig: Take by mouth in the morning   Cyanocobalamin (VITAMIN B 12 PO)  Self Yes No   Sig: Take by mouth 2 (two) times a day   Patient not taking: Reported on 2023   Repatha 140 MG/ML SOSY  Self Yes No   amLODIPine (NORVASC) 10 mg tablet  Self Yes No   Sig: every 24 hours   aspirin 81 mg chewable tablet   Yes No   Si tablet Orally every two days for 60 days   Patient not taking: Reported on 2023   azaTHIOprine (IMURAN) 50 mg tablet  Self Yes No   Sig: Take 50 mg by mouth daily   Patient not taking: Reported on 2023   ferrous sulfate 325 (65 Fe) mg tablet  Self Yes No   Sig: Take 325 mg by mouth daily with breakfast   fluconazole (DIFLUCAN) 150 mg tablet  Self Yes No   Patient not taking: Reported on 2023   hydroxychloroquine (PLAQUENIL) 200 mg tablet  Self Yes No   Sig: Take 200 mg by mouth 2 (two) times a day with meals   lisinopril (ZESTRIL) 2.5 mg tablet  Self Yes No   Sig: Take 2.5 mg by mouth daily   Patient not taking: Reported on 2023   methylPREDNISolone 4 MG tablet therapy pack  Self No No   Sig: Use as directed on package   Patient not taking: Reported on 2023   olmesartan (BENICAR) 20 mg tablet  Self Yes No   Sig: Take 20 mg by mouth daily   Patient not taking: Reported on 2023   olmesartan (BENICAR) 5 mg tablet  Self Yes No   Sig: Take 10 mg by mouth daily   pantoprazole (PROTONIX) 20 mg tablet  Self No No   Sig: Take 1 tablet (20 mg total) by mouth daily   Patient not taking: Reported on 2023   patient supplied medication  Self Yes No   Sig: Benlista injection   Patient not taking: Reported on 2023   sildenafil (VIAGRA) 25 MG tablet  Self Yes No   Sig: Take 25 mg by mouth daily as needed for erectile dysfunction      Facility-Administered Medications: None       Past Medical History:   Diagnosis Date   • Bowel obstruction (HCC)    • Clostridium difficile infection        • Crohn's disease ([address])    • Hypertension    • Kidney disease, chronic, stage I (GFR over 89 ml/min)    • Lupus ([address])    • Raynaud disease        Past Surgical History:   Procedure Laterality Date   • ABDOMINAL SURGERY      bowel obstruction   •  SECTION N/A    • COLON SURGERY     • WISDOM TOOTH EXTRACTION         Family History   Problem Relation Age of Onset   • Lupus Mother    • Diabetes Father    • Breast cancer Other      I have reviewed and agree with the history as documented.     E-Cigarette/Vaping   • E-Cigarette Use Never User      E-Cigarette/Vaping Substances   • Nicotine No    • THC No    • CBD No      Social History     Tobacco Use   • Smoking status: Never   • Smokeless tobacco: Never   Vaping Use   • Vaping Use: Never used   Substance Use Topics   • Alcohol use: Not Currently     Comment: socially   • Drug use: Never       Review of Systems   Constitutional: Negative for chills and fever. HENT: Negative. Negative for ear pain and sore throat. Eyes: Negative. Negative for pain and visual disturbance. Respiratory: Negative. Negative for cough and shortness of breath. Cardiovascular: Negative for chest pain and palpitations. Gastrointestinal: Positive for diarrhea. Negative for abdominal pain and vomiting. Endocrine: Negative. Genitourinary: Negative for dysuria and hematuria. Musculoskeletal: Negative. Negative for arthralgias and back pain. Bilateral hand swelling   Skin: Negative for color change and rash. Allergic/Immunologic: Negative. Neurological: Negative for seizures and syncope. Psychiatric/Behavioral: Negative. All other systems reviewed and are negative. Physical Exam  Physical Exam  Vitals and nursing note reviewed. Constitutional:       General: She is not in acute distress. Appearance: She is well-developed. She is not ill-appearing, toxic-appearing or diaphoretic. HENT:      Head: Normocephalic and atraumatic. Nose: Nose normal.      Mouth/Throat:      Mouth: Mucous membranes are moist.   Eyes:      General: No scleral icterus. Conjunctiva/sclera: Conjunctivae normal.      Pupils: Pupils are equal, round, and reactive to light. Cardiovascular:      Rate and Rhythm: Normal rate and regular rhythm. Pulses: Normal pulses. Heart sounds: Normal heart sounds. No murmur heard. Pulmonary:      Effort: Pulmonary effort is normal. No respiratory distress. Breath sounds: Normal breath sounds. Abdominal:      General: Abdomen is flat. Bowel sounds are normal.      Palpations: Abdomen is soft. Tenderness: There is no abdominal tenderness. There is no right CVA tenderness or left CVA tenderness. Musculoskeletal:         General: No swelling. Normal range of motion. Cervical back: Normal range of motion and neck supple. Right lower leg: No edema. Left lower leg: No edema. Skin:     General: Skin is warm and dry. Capillary Refill: Capillary refill takes less than 2 seconds. Neurological:      General: No focal deficit present. Mental Status: She is alert and oriented to person, place, and time. Cranial Nerves: No cranial nerve deficit. Sensory: No sensory deficit.    Psychiatric:         Mood and Affect: Mood normal.         Behavior: Behavior normal.         Vital Signs  ED Triage Vitals [08/07/23 1106]   Temperature Pulse Respirations Blood Pressure SpO2   (!) 96.5 °F (35.8 °C) 77 20 124/68 97 %      Temp Source Heart Rate Source Patient Position - Orthostatic VS BP Location FiO2 (%)   Tympanic Monitor Sitting Right arm --      Pain Score       6           Vitals:    08/07/23 1106   BP: 124/68   Pulse: 77   Patient Position - Orthostatic VS: Sitting         Visual Acuity      ED Medications  Medications   lactated ringers bolus 500 mL (0 mL Intravenous Stopped 8/7/23 1316)       Diagnostic Studies  Results Reviewed     Procedure Component Value Units Date/Time    Urine Microscopic [[id number]]  (Abnormal) Collected: 08/07/23 1143    Lab Status: Final result Specimen: Urine, Clean Catch Updated: 08/07/23 1308     RBC, UA None Seen /hpf      WBC, UA None Seen /hpf      Epithelial Cells Occasional /hpf      Bacteria, UA Occasional /hpf      Ca Oxalate Anu, UA Occasional /hpf     Comprehensive metabolic panel [[id number]] Collected: 08/07/23 1158    Lab Status: Final result Specimen: Blood from Arm, Left Updated: 08/07/23 1247     Sodium 138 mmol/L      Potassium 3.6 mmol/L      Chloride 106 mmol/L      CO2 26 mmol/L      ANION GAP 6 mmol/L      BUN 11 mg/dL      Creatinine 0.70 mg/dL      Glucose 81 mg/dL      Calcium 9.7 mg/dL      AST 19 U/L      ALT 17 U/L      Alkaline Phosphatase 79 U/L      Total Protein 7.4 g/dL      Albumin 4.2 g/dL      Total Bilirubin 0.39 mg/dL      eGFR 121 ml/min/1.73sq m Narrative:      National Kidney Disease Foundation guidelines for Chronic Kidney Disease (CKD):   •  Stage 1 with normal or high GFR (GFR > 90 mL/min/1.73 square meters)  •  Stage 2 Mild CKD (GFR = 60-89 mL/min/1.73 square meters)  •  Stage 3A Moderate CKD (GFR = 45-59 mL/min/1.73 square meters)  •  Stage 3B Moderate CKD (GFR = 30-44 mL/min/1.73 square meters)  •  Stage 4 Severe CKD (GFR = 15-29 mL/min/1.73 square meters)  •  Stage 5 End Stage CKD (GFR <15 mL/min/1.73 square meters)  Note: GFR calculation is accurate only with a steady state creatinine    UA w Reflex to Microscopic w Reflex to Culture [[id number]]  (Abnormal) Collected: 08/07/23 1143    Lab Status: Final result Specimen: Urine, Clean Catch Updated: 08/07/23 1228     Color, UA Light Yellow     Clarity, UA Clear     Specific Gravity, UA >=1.030     pH, UA 7.0     Leukocytes, UA Negative     Nitrite, UA Negative     Protein, UA Negative mg/dl      Glucose, UA Negative mg/dl      Ketones, UA Negative mg/dl      Urobilinogen, UA 0.2 E.U./dl      Bilirubin, UA Negative     Occult Blood, UA Trace-Intact    CBC and differential [[id number]]  (Abnormal) Collected: 08/07/23 1158    Lab Status: Final result Specimen: Blood from Arm, Left Updated: 08/07/23 1218     WBC 3.71 Thousand/uL      RBC 4.72 Million/uL      Hemoglobin 12.9 g/dL      Hematocrit 39.7 %      MCV 84 fL      MCH 27.3 pg      MCHC 32.5 g/dL      RDW 12.8 %      MPV 11.2 fL      Platelets 755 Thousands/uL      nRBC 0 /100 WBCs      Neutrophils Relative 52 %      Immat GRANS % 0 %      Lymphocytes Relative 40 %      Monocytes Relative 7 %      Eosinophils Relative 1 %      Basophils Relative 0 %      Neutrophils Absolute 1.94 Thousands/µL      Immature Grans Absolute 0.01 Thousand/uL      Lymphocytes Absolute 1.47 Thousands/µL      Monocytes Absolute 0.25 Thousand/µL      Eosinophils Absolute 0.03 Thousand/µL      Basophils Absolute 0.01 Thousands/µL     POCT pregnancy, urine [[id number]] (Normal) Resulted: 08/07/23 1156    Lab Status: Final result Updated: 08/07/23 1156     EXT Preg Test, Ur Negative     Control Valid                 No orders to display              Procedures  Procedures         ED Course  ED Course as of 08/11/23 0549   [facility] Aug 07, 2023   1157 PREGNANCY TEST URINE: Negative                               SBIRT 20yo+    Flowsheet Row Most Recent Value   Initial Alcohol Screen: US AUDIT-C     1. How often do you have a drink containing alcohol? 0 Filed at: 08/07/2023 1111   Audit-C Score 0 Filed at: 08/07/2023 1111   ARNOL: How many times in the past year have you. .. Used an illegal drug or used a prescription medication for non-medical reasons? Never Filed at: 08/07/2023 1111                    Medical Decision Making  Pt with chronic autoimmune disease  Laboratory evaluation unremarkable  Physical exam unremarkable  Pt will f/u with GI and rheum for definitive treatment   D/c with medrol dose owen and f/u with GI    Diarrhea, unspecified type: acute illness or injury  Swelling of both hands: acute illness or injury  Amount and/or Complexity of Data Reviewed  External Data Reviewed: notes. Labs: ordered. Decision-making details documented in ED Course. Risk  Prescription drug management. Disposition  Final diagnoses:   Diarrhea, unspecified type   Swelling of both hands     Time reflects when diagnosis was documented in both MDM as applicable and the Disposition within this note     Time User Action Codes Description Comment    8/7/2023 12:48 PM Jordin Easleyer Add [R19.7] Diarrhea, unspecified type     8/7/2023 12:48 PM Jordin Easleyer Add [M79.89] Swelling of both hands       ED Disposition     ED Disposition   Discharge    Condition   Stable    Date/Time   Mon Aug 7, 2023 12:47 PM    [address] [place] discharge to home/self care.                Follow-up Information     Follow up With Specialties Details Why Contact Info Additional Information [address] Emergency Department Emergency Medicine Go to  If symptoms worsen [address] [address]  [facility] Emergency Department, [address], [place], [address]    Gastroenterology and Rheumatology  Schedule an appointment as soon as possible for a visit              Discharge Medication List as of 8/7/2023  1:11 PM      START taking these medications    Details   !! methylPREDNISolone 4 MG tablet therapy pack Use as directed on package, Normal       !! - Potential duplicate medications found. Please discuss with provider.       CONTINUE these medications which have NOT CHANGED    Details   amLODIPine (NORVASC) 10 mg tablet every 24 hours, Starting Thu 12/29/2022, Historical Med      aspirin 81 mg chewable tablet 1 tablet Orally every two days for 60 days, Historical Med      azaTHIOprine (IMURAN) 50 mg tablet Take 50 mg by mouth daily, Historical Med      Benlysta 200 MG/ML SOAJ Starting Mon 6/5/2023, Historical Med      Calcifediol (RAYALDEE PO) Take by mouth in the morning, Historical Med      Cyanocobalamin (VITAMIN B 12 PO) Take by mouth 2 (two) times a day, Historical Med      ferrous sulfate 325 (65 Fe) mg tablet Take 325 mg by mouth daily with breakfast, Historical Med      fluconazole (DIFLUCAN) 150 mg tablet Starting Sat 6/3/2023, Historical Med      hydroxychloroquine (PLAQUENIL) 200 mg tablet Take 200 mg by mouth 2 (two) times a day with meals, Historical Med      lisinopril (ZESTRIL) 2.5 mg tablet Take 2.5 mg by mouth daily, Historical Med      !! methylPREDNISolone 4 MG tablet therapy pack Use as directed on package, Normal      !! olmesartan (BENICAR) 20 mg tablet Take 20 mg by mouth daily, Historical Med      !! olmesartan (BENICAR) 5 mg tablet Take 10 mg by mouth daily, Starting Mon 5/22/2023, Historical Med      pantoprazole (PROTONIX) 20 mg tablet Take 1 tablet (20 mg total) by mouth daily, Starting Mon 1/2/2023, Normal      patient supplied medication Benlista injection, Historical Med      Repatha 140 MG/ML SOSY Starting Fri 6/2/2023, Historical Med      sildenafil (VIAGRA) 25 MG tablet Take 25 mg by mouth daily as needed for erectile dysfunction, Historical Med       !! - Potential duplicate medications found. Please discuss with provider. No discharge procedures on file.     PDMP Review     None          ED Provider  Electronically Signed by           Radha Reilly PA-C  08/11/23 0622

## 2023-08-24 ENCOUNTER — CONSULT (OUTPATIENT)
Dept: GASTROENTEROLOGY | Facility: CLINIC | Age: 23
End: 2023-08-24
Payer: OTHER GOVERNMENT

## 2023-08-24 VITALS
SYSTOLIC BLOOD PRESSURE: 122 MMHG | HEART RATE: 95 BPM | DIASTOLIC BLOOD PRESSURE: 76 MMHG | WEIGHT: 160.4 LBS | HEIGHT: 62 IN | BODY MASS INDEX: 29.52 KG/M2

## 2023-08-24 DIAGNOSIS — K52.9 IBD (INFLAMMATORY BOWEL DISEASE): ICD-10-CM

## 2023-08-24 DIAGNOSIS — K50.90 CROHN'S DISEASE WITHOUT COMPLICATION, UNSPECIFIED GASTROINTESTINAL TRACT LOCATION (HCC): ICD-10-CM

## 2023-08-24 DIAGNOSIS — R14.0 ABDOMINAL BLOATING: Primary | ICD-10-CM

## 2023-08-24 DIAGNOSIS — M32.9 SYSTEMIC LUPUS ERYTHEMATOSUS, UNSPECIFIED SLE TYPE, UNSPECIFIED ORGAN INVOLVEMENT STATUS (HCC): ICD-10-CM

## 2023-08-24 DIAGNOSIS — Z90.49 HISTORY OF BOWEL RESECTION: ICD-10-CM

## 2023-08-24 DIAGNOSIS — K21.9 GASTROESOPHAGEAL REFLUX DISEASE WITHOUT ESOPHAGITIS: ICD-10-CM

## 2023-08-24 PROCEDURE — 99204 OFFICE O/P NEW MOD 45 MIN: CPT | Performed by: INTERNAL MEDICINE

## 2023-08-24 RX ORDER — OMEPRAZOLE 20 MG/1
20 CAPSULE, DELAYED RELEASE ORAL DAILY
Qty: 30 CAPSULE | Refills: 3 | Status: SHIPPED | OUTPATIENT
Start: 2023-08-24

## 2023-08-24 RX ORDER — ALIROCUMAB 75 MG/ML
INJECTION, SOLUTION SUBCUTANEOUS
COMMUNITY
Start: 2023-08-01

## 2023-08-24 RX ORDER — SILDENAFIL CITRATE 20 MG/1
20 TABLET ORAL DAILY
COMMUNITY
Start: 2023-07-25

## 2023-08-24 NOTE — PROGRESS NOTES
[facility] - Outpatient Consultation  Nghia Lovell 24 y.o. female MRN: [id number]  Encounter: [id number]          ASSESSMENT AND PLAN:      1. Abdominal bloating  -     omeprazole (PriLOSEC) 20 mg delayed release capsule; Take 1 capsule (20 mg total) by mouth daily    2. Crohn's disease without complication, unspecified gastrointestinal tract location ([address])    3. IBD (inflammatory bowel disease)  -     Ambulatory Referral to Gastroenterology    4. Systemic lupus erythematosus, unspecified SLE type, unspecified organ involvement status ([address])    5. History of bowel resection    6. Gastroesophageal reflux disease without esophagitis  -     omeprazole (PriLOSEC) 20 mg delayed release capsule; Take 1 capsule (20 mg total) by mouth daily    Patient with history of bowel obstruction ileocecal resection pathology suggestive of Crohn's, no Crohn's symptoms since. Colonoscopy last year reportedly unremarkable, blood we will try to get those records. Recent CBC CMP unremarkable. Clinically no evidence of acute abdomen is obstruction. Does have some GERD, bloating, nonspecific finding, diet discussed, trial of PPI. Obtain above record in the meanwhile to determine any evidence of active disease at this time and then go from there. Given patient's multiple comorbid conditions and medication, will hold off any other treatment at this time. Complications of Crohn's reviewed. Colonoscopy was was done on 12/29/2022, at [address] [place] [address] in [address] [place] [address], impressions were read as previous surgery in the cecum normal mucosa in the cecum and terminal ileum biopsies were taken reports noted, shows no significant changes in the terminal ileum and colon.       22 sbo path bx [facility]   22 colon [facility] cd remission, Dr. Xavi Romero  ileocecectomy:   Ileocecum resection with ileum demonstrating focal mild transmural   lymphoplasmacytic inflammation, rare cryptitis, submucosal   fibromuscularization, lymphoid aggregates, neuronal hyperplasia, and   increasingly rare epithelioid giant cells; see comment. Appendix with no diagnostic abnormalities. Negative for dysplasia and malignancy. Cbc cmp ok 8-7  ______________________________________________________________________    HPI:     Patient came in for evaluation of her abdominal bloating, some indigestion, appetite is fair weight stable, bowels are regular once or twice a day. Denies any blood in stools excessive diarrhea constipation pain abdominal distention denies any blood in stools melena hematochezia chest pain shortness of breath. Patient developed bowel obstruction requiring surgery in 2022 in [address] [facility], had ileocecal resection done pathology showed focal mild transmural lymphoid plasmacytic inflammation and rare cryptitis submucosal fibromuscular ideation appendix was normal, she was told that she has Crohn's. Subsequently she moved to this area and follows up with Dr. Joann Perry in [facility], had a colonoscopy done and was told she is in remission, those records not available. Going to [employer] school, was in [employer] now , disabled retired lives with parents and has a 3year-old child. Gives history of lupus, kidney issues. REVIEW OF SYSTEMS:    CONSTITUTIONAL: Denies any fever, chills, rigors, and weight loss. HEENT: No earache or tinnitus. CARDIOVASCULAR: No chest pain or palpitations. RESPIRATORY: Denies any cough, hemoptysis, shortness of breath or dyspnea on exertion. GASTROINTESTINAL: As noted in the History of Present Illness. GENITOURINARY: Denies any hematuria or dysuria. NEUROLOGIC: No dizziness or vertigo. MUSCULOSKELETAL: Denies any joint swellings. SKIN: Denies skin rashes or itching. ENDOCRINE: Denies excessive thirst. Denies intolerance to heat or cold. PSYCHOSOCIAL: Denies depression or anxiety. Denies any recent memory loss.        Historical Information   Past Medical History:   Diagnosis Date   • Bowel obstruction (HCC)    • Clostridium difficile infection        • Crohn's disease ([address])    • Hypertension    • Kidney disease, chronic, stage I (GFR over 89 ml/min)    • Lupus ([address])    • Raynaud disease      Past Surgical History:   Procedure Laterality Date   • ABDOMINAL SURGERY      bowel obstruction   •  SECTION N/A    • COLON SURGERY     • WISDOM TOOTH EXTRACTION       Social History   Social History     Substance and Sexual Activity   Alcohol Use Not Currently    Comment: socially     Social History     Substance and Sexual Activity   Drug Use Never     Social History     Tobacco Use   Smoking Status Never   Smokeless Tobacco Never     Family History   Problem Relation Age of Onset   • Lupus Mother    • Diabetes Father    • Breast cancer Other        Meds/Allergies       Current Outpatient Medications:   •  amLODIPine (NORVASC) 10 mg tablet  •  Benlysta 200 MG/ML SOAJ  •  ferrous sulfate 325 (65 Fe) mg tablet  •  hydroxychloroquine (PLAQUENIL) 200 mg tablet  •  olmesartan (BENICAR) 5 mg tablet  •  omeprazole (PriLOSEC) 20 mg delayed release capsule  •  patient supplied medication  •  Praluent 75 MG/ML SOAJ  •  sildenafil (REVATIO) 20 mg tablet  •  sildenafil (VIAGRA) 25 MG tablet  •  aspirin 81 mg chewable tablet  •  azaTHIOprine (IMURAN) 50 mg tablet  •  Calcifediol (RAYALDEE PO)  •  Cyanocobalamin (VITAMIN B 12 PO)  •  fluconazole (DIFLUCAN) 150 mg tablet  •  lisinopril (ZESTRIL) 2.5 mg tablet  •  methylPREDNISolone 4 MG tablet therapy pack  •  methylPREDNISolone 4 MG tablet therapy pack  •  olmesartan (BENICAR) 20 mg tablet  •  pantoprazole (PROTONIX) 20 mg tablet  •  Repatha 140 MG/ML SOSY    No Known Allergies        Objective     Blood pressure 122/76, pulse 95, height 5' 2" (1.575 m), weight 72.8 kg (160 lb 6.4 oz), not currently breastfeeding. Body mass index is 29.34 kg/m².         PHYSICAL EXAM:      General Appearance:   Alert, cooperative, no distress   HEENT:   Normocephalic, atraumatic, anicteric. Neck:  Supple, symmetrical, trachea midline   Lungs:   Clear to auscultation bilaterally; no rales, rhonchi or wheezing; respirations unlabored    Heart[de-identified]   Regular rate and rhythm; no murmur. Abdomen:   Soft, non-tender, non-distended; normal bowel sounds; no masses, no organomegaly    Genitalia:   Deferred    Rectal:   Deferred    Extremities:  No cyanosis, clubbing or edema    Skin:  No jaundice, rashes, or lesions    Lymph nodes:  No palpable cervical lymphadenopathy        Lab Results:   No visits with results within 1 Day(s) from this visit.    Latest known visit with results is:   Admission on 08/07/2023, Discharged on 08/07/2023   Component Date Value   • WBC 08/07/2023 3.71 (L)    • RBC 08/07/2023 4.72    • Hemoglobin 08/07/2023 12.9    • Hematocrit 08/07/2023 39.7    • MCV 08/07/2023 84    • MCH 08/07/2023 27.3    • MCHC 08/07/2023 32.5    • RDW 08/07/2023 12.8    • MPV 08/07/2023 11.2    • Platelets 13/64/6349 264    • nRBC 08/07/2023 0    • Neutrophils Relative 08/07/2023 52    • Immat GRANS % 08/07/2023 0    • Lymphocytes Relative 08/07/2023 40    • Monocytes Relative 08/07/2023 7    • Eosinophils Relative 08/07/2023 1    • Basophils Relative 08/07/2023 0    • Neutrophils Absolute 08/07/2023 1.94    • Immature Grans Absolute 08/07/2023 0.01    • Lymphocytes Absolute 08/07/2023 1.47    • Monocytes Absolute 08/07/2023 0.25    • Eosinophils Absolute 08/07/2023 0.03    • Basophils Absolute 08/07/2023 0.01    • EXT Preg Test, Ur 08/07/2023 Negative    • Control 08/07/2023 Valid    • Color, UA 08/07/2023 Light Yellow    • Clarity, UA 08/07/2023 Clear    • Specific Gravity, UA 08/07/2023 >=1.030    • pH, UA 08/07/2023 7.0    • Leukocytes, UA 08/07/2023 Negative    • Nitrite, UA 08/07/2023 Negative    • Protein, UA 08/07/2023 Negative    • Glucose, UA 08/07/2023 Negative    • Ketones, UA 08/07/2023 Negative    • Urobilinogen, UA 08/07/2023 0.2    • Bilirubin, UA 08/07/2023 Negative    • Occult Blood, UA 08/07/2023 Trace-Intact (A)    • Sodium 08/07/2023 138    • Potassium 08/07/2023 3.6    • Chloride 08/07/2023 106    • CO2 08/07/2023 26    • ANION GAP 08/07/2023 6    • BUN 08/07/2023 11    • Creatinine 08/07/2023 0.70    • Glucose 08/07/2023 81    • Calcium 08/07/2023 9.7    • AST 08/07/2023 19    • ALT 08/07/2023 17    • Alkaline Phosphatase 08/07/2023 79    • Total Protein 08/07/2023 7.4    • Albumin 08/07/2023 4.2    • Total Bilirubin 08/07/2023 0.39    • eGFR 08/07/2023 121    • RBC, UA 08/07/2023 None Seen    • WBC, UA 08/07/2023 None Seen    • Epithelial Cells 08/07/2023 Occasional    • Bacteria, UA 08/07/2023 Occasional    • Ca Oxalate Anu, UA 08/07/2023 Occasional (A)          Radiology Results:   No results found.

## 2023-09-06 ENCOUNTER — TELEPHONE (OUTPATIENT)
Dept: FAMILY MEDICINE CLINIC | Facility: CLINIC | Age: 23
End: 2023-09-06

## 2023-09-06 DIAGNOSIS — Z79.899 LONG-TERM USE OF PLAQUENIL: Primary | ICD-10-CM

## 2023-09-06 NOTE — TELEPHONE ENCOUNTER
Received a call from [facility] requesting a referral for systemic lupus Patient had an appointment today and it was cancelled due to lack of referral Thank you

## 2023-09-07 ENCOUNTER — TELEPHONE (OUTPATIENT)
Dept: NEPHROLOGY | Facility: CLINIC | Age: 23
End: 2023-09-07

## 2023-09-15 PROBLEM — Z11.3 SCREENING FOR STDS (SEXUALLY TRANSMITTED DISEASES): Status: RESOLVED | Noted: 2023-07-17 | Resolved: 2023-09-15

## 2023-09-17 ENCOUNTER — OFFICE VISIT (OUTPATIENT)
Dept: URGENT CARE | Facility: CLINIC | Age: 23
End: 2023-09-17
Payer: OTHER GOVERNMENT

## 2023-09-17 VITALS
OXYGEN SATURATION: 100 % | TEMPERATURE: 98.1 F | RESPIRATION RATE: 18 BRPM | HEART RATE: 85 BPM | HEIGHT: 62 IN | WEIGHT: 162 LBS | BODY MASS INDEX: 29.81 KG/M2

## 2023-09-17 DIAGNOSIS — N39.0 URINARY TRACT INFECTION WITHOUT HEMATURIA, SITE UNSPECIFIED: Primary | ICD-10-CM

## 2023-09-17 PROCEDURE — 99213 OFFICE O/P EST LOW 20 MIN: CPT | Performed by: PHYSICIAN ASSISTANT

## 2023-09-17 PROCEDURE — 87086 URINE CULTURE/COLONY COUNT: CPT | Performed by: PHYSICIAN ASSISTANT

## 2023-09-17 RX ORDER — NITROFURANTOIN 25; 75 MG/1; MG/1
100 CAPSULE ORAL 2 TIMES DAILY
Qty: 10 CAPSULE | Refills: 0 | Status: SHIPPED | OUTPATIENT
Start: 2023-09-17 | End: 2023-09-22

## 2023-09-17 NOTE — PROGRESS NOTES
[facility]        NAME: Cecilia Hurtado is a 24 y.o. female  : 2000    MRN: [id number]  DATE: 2023  TIME: 11:45 AM    Assessment and Plan   Urinary tract infection without hematuria, site unspecified [N39.0]  1. Urinary tract infection without hematuria, site unspecified  nitrofurantoin (MACROBID) 100 mg capsule        Patient Instructions   Urinary tract infection  UA dip-negative, urine culture and sensitivity sent out and will call if we need to change antibiotic  rx macrobid twice daily x 5 days  Increase fluid intake  Tylenol/ibuprofen as needed for pain  azostat as needed for pain    Follow up with PCP in 3-5 days. Proceed to  ER if symptoms worsen. Chief Complaint     Chief Complaint   Patient presents with   • Possible UTI     UTI frequency, dysuria, back pain  x 3 days         History of Present Illness       Rachel Cr is a 21-year-old female who presents to clinic complaining of dysuria x3 days. She also is complaining of low back pain bilaterally, increased frequency of urination, and urinary urgency. She denies any fever, chills, hematuria, abdominal pain, flank pain, or vaginal symptoms. Review of Systems   Review of Systems   Constitutional: Negative for chills and fever. Gastrointestinal: Negative for abdominal pain. Genitourinary: Positive for dysuria, frequency and urgency. Negative for flank pain, hematuria, vaginal bleeding, vaginal discharge and vaginal pain. Musculoskeletal: Positive for back pain.          Current Medications       Current Outpatient Medications:   •  amLODIPine (NORVASC) 10 mg tablet, every 24 hours, Disp: , Rfl:   •  Benlysta 200 MG/ML SOAJ, , Disp: , Rfl:   •  ferrous sulfate 325 (65 Fe) mg tablet, Take 325 mg by mouth daily with breakfast, Disp: , Rfl:   •  hydroxychloroquine (PLAQUENIL) 200 mg tablet, Take 200 mg by mouth 2 (two) times a day with meals, Disp: , Rfl:   •  nitrofurantoin (MACROBID) 100 mg capsule, Take 1 capsule (100 mg total) by mouth 2 (two) times a day for 5 days, Disp: 10 capsule, Rfl: 0  •  olmesartan (BENICAR) 5 mg tablet, Take 10 mg by mouth daily, Disp: , Rfl:   •  omeprazole (PriLOSEC) 20 mg delayed release capsule, Take 1 capsule (20 mg total) by mouth daily, Disp: 30 capsule, Rfl: 3  •  Praluent 75 MG/ML SOAJ, INJECT 1 ML SUBCUTANEOUS EVERY 2 WEEKS FOR 30 DAYS, Disp: , Rfl:   •  sildenafil (REVATIO) 20 mg tablet, Take 20 mg by mouth daily, Disp: , Rfl:   •  sildenafil (VIAGRA) 25 MG tablet, Take 25 mg by mouth daily as needed for erectile dysfunction, Disp: , Rfl:   •  aspirin 81 mg chewable tablet, 1 tablet Orally every two days for 60 days (Patient not taking: Reported on 6/6/2023), Disp: , Rfl:   •  azaTHIOprine (IMURAN) 50 mg tablet, Take 50 mg by mouth daily (Patient not taking: Reported on 8/24/2023), Disp: , Rfl:   •  Calcifediol (RAYALDEE PO), Take by mouth in the morning (Patient not taking: Reported on 8/24/2023), Disp: , Rfl:   •  Cyanocobalamin (VITAMIN B 12 PO), Take by mouth 2 (two) times a day (Patient not taking: Reported on 6/5/2023), Disp: , Rfl:   •  fluconazole (DIFLUCAN) 150 mg tablet, , Disp: , Rfl:   •  lisinopril (ZESTRIL) 2.5 mg tablet, Take 2.5 mg by mouth daily (Patient not taking: Reported on 1/2/2023), Disp: , Rfl:   •  methylPREDNISolone 4 MG tablet therapy pack, Use as directed on package (Patient not taking: Reported on 1/2/2023), Disp: 21 tablet, Rfl: 0  •  methylPREDNISolone 4 MG tablet therapy pack, Use as directed on package (Patient not taking: Reported on 8/24/2023), Disp: 21 tablet, Rfl: 0  •  olmesartan (BENICAR) 20 mg tablet, Take 20 mg by mouth daily (Patient not taking: Reported on 6/6/2023), Disp: , Rfl:   •  pantoprazole (PROTONIX) 20 mg tablet, Take 1 tablet (20 mg total) by mouth daily (Patient not taking: Reported on 6/26/2023), Disp: 30 tablet, Rfl: 0  •  patient supplied medication, Benlista injection, Disp: , Rfl:   •  Repatha 140 MG/ML SU, , Disp: , Rfl:     Current Allergies     Allergies as of 2023   • (No Known Allergies)            The following portions of the patient's history were reviewed and updated as appropriate: allergies, current medications, past family history, past medical history, past social history, past surgical history and problem list.     Past Medical History:   Diagnosis Date   • Bowel obstruction ([address])    • Clostridium difficile infection        • Crohn's disease ([address])    • Hypertension    • Kidney disease, chronic, stage I (GFR over 89 ml/min)    • Lupus ([address])    • Raynaud disease        Past Surgical History:   Procedure Laterality Date   • ABDOMINAL SURGERY      bowel obstruction   •  SECTION N/A    • COLON SURGERY     • WISDOM TOOTH EXTRACTION         Family History   Problem Relation Age of Onset   • Lupus Mother    • Diabetes Father    • Breast cancer Other          Medications have been verified. Objective   Pulse 85   Temp 98.1 °F (36.7 °C)   Resp 18   Ht 5' 2" (1.575 m)   Wt 73.5 kg (162 lb)   SpO2 100%   BMI 29.63 kg/m²   No LMP recorded. Patient has had an implant. Physical Exam     Physical Exam  Vitals and nursing note reviewed. Constitutional:       General: She is not in acute distress. Appearance: Normal appearance. She is not ill-appearing. Pulmonary:      Effort: Pulmonary effort is normal.   Abdominal:      General: Bowel sounds are normal. There is no distension. Palpations: Abdomen is soft. Tenderness: There is no abdominal tenderness. There is no right CVA tenderness, left CVA tenderness, guarding or rebound. Neurological:      Mental Status: She is alert and oriented to person, place, and time.    Psychiatric:         Mood and Affect: Mood normal.         Behavior: Behavior normal.

## 2023-09-18 LAB — BACTERIA UR CULT: NORMAL

## 2023-10-03 ENCOUNTER — OFFICE VISIT (OUTPATIENT)
Dept: FAMILY MEDICINE CLINIC | Facility: CLINIC | Age: 23
End: 2023-10-03
Payer: OTHER GOVERNMENT

## 2023-10-03 VITALS
TEMPERATURE: 98.5 F | SYSTOLIC BLOOD PRESSURE: 100 MMHG | BODY MASS INDEX: 29.37 KG/M2 | HEART RATE: 77 BPM | HEIGHT: 62 IN | DIASTOLIC BLOOD PRESSURE: 60 MMHG | OXYGEN SATURATION: 98 % | WEIGHT: 159.6 LBS

## 2023-10-03 DIAGNOSIS — M32.9 SYSTEMIC LUPUS ERYTHEMATOSUS, UNSPECIFIED SLE TYPE, UNSPECIFIED ORGAN INVOLVEMENT STATUS (HCC): Primary | ICD-10-CM

## 2023-10-03 PROCEDURE — 99213 OFFICE O/P EST LOW 20 MIN: CPT

## 2023-10-03 RX ORDER — BROMOCRIPTINE MESYLATE 2.5 MG/1
TABLET ORAL
COMMUNITY
Start: 2023-09-13

## 2023-10-03 RX ORDER — LANOLIN ALCOHOL/MO/W.PET/CERES
2000 CREAM (GRAM) TOPICAL DAILY
COMMUNITY
Start: 2023-09-13

## 2023-10-03 NOTE — PROGRESS NOTES
Name: Evens Bone      : 2000      MRN: [id number]  Encounter Provider: Dagmar Eller DO  Encounter Date: 10/3/2023   Encounter department: [facility]    Assessment & Plan     1. Systemic lupus erythematosus, unspecified SLE type, unspecified organ involvement status ([address] )  Assessment & Plan:  History of Lupus on Hydroxychloroquine; requesting new referral for Rheumatology   Asymptomatic at this time  Follow up with new referral for Rheumatology   Reassess at next office visit. Orders:  -     Ambulatory Referral to Rheumatology; Future         BMI Counseling: Body mass index is 29.19 kg/m². The BMI is above normal. Nutrition recommendations include decreasing portion sizes, encouraging healthy choices of fruits and vegetables, decreasing fast food intake, consuming healthier snacks, limiting drinks that contain sugar, moderation in carbohydrate intake, increasing intake of lean protein, reducing intake of saturated and trans fat and reducing intake of cholesterol. Exercise recommendations include moderate physical activity 150 minutes/week and exercising 3-5 times per week. Rationale for BMI follow-up plan is due to patient being overweight or obese. Subjective      68-year-old female presents to the office for a referral to Rheumatology for lupus management. She reports currently taking Hydroxychloroquine and was previously on Benlysta. She is seeking a referral to initiate IV infusion therapy for her lupus. Denies any recent exacerbations. Denies any other symptoms today. Review of Systems   Constitutional:  Negative for activity change and fatigue. HENT:  Negative for congestion. Eyes:  Negative for photophobia, pain, discharge, redness, itching and visual disturbance. Respiratory:  Negative for cough, chest tightness, shortness of breath and wheezing. Cardiovascular:  Negative for chest pain, palpitations and leg swelling. Gastrointestinal:  Negative for abdominal pain, constipation, diarrhea, nausea and vomiting. Endocrine: Negative for polydipsia, polyphagia and polyuria. Genitourinary:  Negative for dysuria. Musculoskeletal:  Negative for back pain and neck pain. Skin:  Negative for rash. Allergic/Immunologic: Negative for immunocompromised state. Neurological:  Negative for headaches. Hematological:  Negative for adenopathy. Does not bruise/bleed easily. Psychiatric/Behavioral:  Negative for dysphoric mood.         Current Outpatient Medications on File Prior to Visit   Medication Sig    amLODIPine (NORVASC) 10 mg tablet every 24 hours    Benlysta 200 MG/ML SOAJ  (Patient not taking: Reported on 10/17/2023)    bromocriptine (PARLODEL) 2.5 mg tablet TAKE 1/2 TABLET IN THE MORNING WITH FOOD WITHIN 2 HOURS OF WAKING ORALLY ONCE A DAY FOR 60 DAYS (Patient not taking: Reported on 10/17/2023)    olmesartan (BENICAR) 5 mg tablet Take 10 mg by mouth daily    omeprazole (PriLOSEC) 20 mg delayed release capsule Take 1 capsule (20 mg total) by mouth daily    patient supplied medication Benlista injection    Praluent 75 MG/ML SOAJ INJECT 1 ML SUBCUTANEOUS EVERY 2 WEEKS FOR 30 DAYS    sildenafil (REVATIO) 20 mg tablet Take 20 mg by mouth daily (Patient not taking: Reported on 10/17/2023)    sildenafil (VIAGRA) 25 MG tablet Take 25 mg by mouth daily as needed for erectile dysfunction    vitamin B-12 (VITAMIN B-12) 1,000 mcg tablet Take 2,000 mcg by mouth daily    aspirin 81 mg chewable tablet 1 tablet Orally every two days for 60 days (Patient not taking: Reported on 6/6/2023)    azaTHIOprine (IMURAN) 50 mg tablet Take 50 mg by mouth daily (Patient not taking: Reported on 8/24/2023)    Calcifediol (RAYALDEE PO) Take by mouth in the morning (Patient not taking: Reported on 8/24/2023)    Cyanocobalamin (VITAMIN B 12 PO) Take by mouth 2 (two) times a day (Patient not taking: Reported on 6/5/2023)    ferrous sulfate 325 (65 Fe) mg tablet Take 325 mg by mouth daily with breakfast (Patient not taking: Reported on 10/3/2023)    fluconazole (DIFLUCAN) 150 mg tablet  (Patient not taking: Reported on 6/5/2023)    hydroxychloroquine (PLAQUENIL) 200 mg tablet Take 200 mg by mouth 2 (two) times a day with meals (Patient not taking: Reported on 10/3/2023)    lisinopril (ZESTRIL) 2.5 mg tablet Take 2.5 mg by mouth daily (Patient not taking: Reported on 1/2/2023)    methylPREDNISolone 4 MG tablet therapy pack Use as directed on package (Patient not taking: Reported on 1/2/2023)    methylPREDNISolone 4 MG tablet therapy pack Use as directed on package (Patient not taking: Reported on 8/24/2023)    olmesartan (BENICAR) 20 mg tablet Take 20 mg by mouth daily (Patient not taking: Reported on 6/6/2023)    pantoprazole (PROTONIX) 20 mg tablet Take 1 tablet (20 mg total) by mouth daily (Patient not taking: Reported on 6/26/2023)    Repatha 140 MG/ML SOSY        Objective     /60 (BP Location: Right arm, Patient Position: Sitting, Cuff Size: Standard)   Pulse 77   Temp 98.5 °F (36.9 °C) (Tympanic)   Ht 5' 2" (1.575 m)   Wt 72.4 kg (159 lb 9.6 oz)   SpO2 98%   BMI 29.19 kg/m²     Physical Exam  Constitutional:       Appearance: Normal appearance. She is not ill-appearing. HENT:      Head: Normocephalic and atraumatic. Right Ear: External ear normal.      Left Ear: External ear normal.      Nose: Nose normal.      Mouth/Throat:      Mouth: Mucous membranes are moist.      Pharynx: Oropharynx is clear. Eyes:      Extraocular Movements: Extraocular movements intact. Conjunctiva/sclera: Conjunctivae normal.      Pupils: Pupils are equal, round, and reactive to light. Cardiovascular:      Rate and Rhythm: Normal rate and regular rhythm. Pulses: Normal pulses. Heart sounds: Normal heart sounds. No murmur heard. Pulmonary:      Effort: Pulmonary effort is normal.      Breath sounds: Normal breath sounds.    Abdominal:      General: Abdomen is flat. Bowel sounds are normal.      Palpations: Abdomen is soft. Tenderness: There is no abdominal tenderness. Musculoskeletal:      Cervical back: Normal range of motion and neck supple. No rigidity or tenderness. Lymphadenopathy:      Cervical: No cervical adenopathy. Skin:     General: Skin is warm and dry. Capillary Refill: Capillary refill takes less than 2 seconds. Findings: No rash. Neurological:      General: No focal deficit present. Mental Status: She is alert. Psychiatric:         Mood and Affect: Mood normal.         Behavior: Behavior normal.         Thought Content:  Thought content normal.         Judgment: Judgment normal.       Yasir Boswell DO

## 2023-10-17 ENCOUNTER — OFFICE VISIT (OUTPATIENT)
Dept: URGENT CARE | Facility: CLINIC | Age: 23
End: 2023-10-17

## 2023-10-17 VITALS
SYSTOLIC BLOOD PRESSURE: 110 MMHG | TEMPERATURE: 97.6 F | WEIGHT: 158 LBS | RESPIRATION RATE: 16 BRPM | HEIGHT: 62 IN | OXYGEN SATURATION: 98 % | BODY MASS INDEX: 29.08 KG/M2 | DIASTOLIC BLOOD PRESSURE: 75 MMHG

## 2023-10-17 DIAGNOSIS — B37.9 YEAST INFECTION: Primary | ICD-10-CM

## 2023-10-17 RX ORDER — FLUCONAZOLE 150 MG/1
150 TABLET ORAL ONCE
Qty: 2 TABLET | Refills: 1 | Status: SHIPPED | OUTPATIENT
Start: 2023-10-17 | End: 2023-10-17

## 2023-10-17 NOTE — PROGRESS NOTES
[facility]        NAME: Evens Bone is a 24 y.o. female  : 2000    MRN: [id number]  DATE: 2023  TIME: 3:41 PM    Assessment and Plan   Yeast infection [B37.9]  1. Yeast infection  fluconazole (DIFLUCAN) 150 mg tablet        Diflucan given     Patient Instructions   There are no Patient Instructions on file for this visit. Follow up with PCP in 3-5 days. Proceed to  ER if symptoms worsen. Chief Complaint     Chief Complaint   Patient presents with    Vaginal Itching     Vaginal itching with "deodorant powder like" discharges on her vagina which started 4-5 days ago. Used monistat OTC, itchiness was relieved but the discharges is still going on. Had a history of using an old box of panty liner prior to the complaint and wasn't able to checked the expiry date. She's on her menstruation today         History of Present Illness       The patient is a 21-year-old female presenting today for a yeast infection. Reports frequent yeast infections. Denies any fevers, chills, abdominal or back pain. Denies any urinary symptoms. Admits to vaginal itching and a clumpy white discharge for the last 4 to 5 days. Tried Monistat over-the-counter which did not help. Review of Systems   Review of Systems   Constitutional:  Negative for activity change, appetite change, chills, fatigue and fever. HENT:  Negative for congestion, ear pain, rhinorrhea, sinus pressure, sinus pain and sore throat. Eyes:  Negative for pain and visual disturbance. Respiratory:  Negative for cough, chest tightness and shortness of breath. Cardiovascular:  Negative for chest pain and palpitations. Gastrointestinal:  Negative for abdominal pain, diarrhea, nausea and vomiting. Genitourinary:  Positive for vaginal discharge (vaginal itching). Negative for dysuria and hematuria. Musculoskeletal:  Negative for arthralgias, back pain and myalgias.    Skin:  Negative for color change, pallor and rash.   Neurological:  Negative for seizures, syncope and headaches. All other systems reviewed and are negative.         Current Medications       Current Outpatient Medications:     amLODIPine (NORVASC) 10 mg tablet, every 24 hours, Disp: , Rfl:     fluconazole (DIFLUCAN) 150 mg tablet, Take 1 tablet (150 mg total) by mouth once for 1 dose Take 1 on day 1 and 1 on day 4, Disp: 2 tablet, Rfl: 1    olmesartan (BENICAR) 5 mg tablet, Take 10 mg by mouth daily, Disp: , Rfl:     omeprazole (PriLOSEC) 20 mg delayed release capsule, Take 1 capsule (20 mg total) by mouth daily, Disp: 30 capsule, Rfl: 3    patient supplied medication, Benlista injection, Disp: , Rfl:     Praluent 75 MG/ML SOAJ, INJECT 1 ML SUBCUTANEOUS EVERY 2 WEEKS FOR 30 DAYS, Disp: , Rfl:     sildenafil (VIAGRA) 25 MG tablet, Take 25 mg by mouth daily as needed for erectile dysfunction, Disp: , Rfl:     vitamin B-12 (VITAMIN B-12) 1,000 mcg tablet, Take 2,000 mcg by mouth daily, Disp: , Rfl:     aspirin 81 mg chewable tablet, 1 tablet Orally every two days for 60 days (Patient not taking: Reported on 6/6/2023), Disp: , Rfl:     azaTHIOprine (IMURAN) 50 mg tablet, Take 50 mg by mouth daily (Patient not taking: Reported on 8/24/2023), Disp: , Rfl:     Benlysta 200 MG/ML SOAJ, , Disp: , Rfl:     bromocriptine (PARLODEL) 2.5 mg tablet, TAKE 1/2 TABLET IN THE MORNING WITH FOOD WITHIN 2 HOURS OF WAKING ORALLY ONCE A DAY FOR 60 DAYS (Patient not taking: Reported on 10/17/2023), Disp: , Rfl:     Calcifediol (RAYALDEE PO), Take by mouth in the morning (Patient not taking: Reported on 8/24/2023), Disp: , Rfl:     Cyanocobalamin (VITAMIN B 12 PO), Take by mouth 2 (two) times a day (Patient not taking: Reported on 6/5/2023), Disp: , Rfl:     ferrous sulfate 325 (65 Fe) mg tablet, Take 325 mg by mouth daily with breakfast (Patient not taking: Reported on 10/3/2023), Disp: , Rfl:     fluconazole (DIFLUCAN) 150 mg tablet, , Disp: , Rfl:     hydroxychloroquine (PLAQUENIL) 200 mg tablet, Take 200 mg by mouth 2 (two) times a day with meals (Patient not taking: Reported on 10/3/2023), Disp: , Rfl:     lisinopril (ZESTRIL) 2.5 mg tablet, Take 2.5 mg by mouth daily (Patient not taking: Reported on 2023), Disp: , Rfl:     methylPREDNISolone 4 MG tablet therapy pack, Use as directed on package (Patient not taking: Reported on 2023), Disp: 21 tablet, Rfl: 0    methylPREDNISolone 4 MG tablet therapy pack, Use as directed on package (Patient not taking: Reported on 2023), Disp: 21 tablet, Rfl: 0    olmesartan (BENICAR) 20 mg tablet, Take 20 mg by mouth daily (Patient not taking: Reported on 2023), Disp: , Rfl:     pantoprazole (PROTONIX) 20 mg tablet, Take 1 tablet (20 mg total) by mouth daily (Patient not taking: Reported on 2023), Disp: 30 tablet, Rfl: 0    Repatha 140 MG/ML SOSY, , Disp: , Rfl:     sildenafil (REVATIO) 20 mg tablet, Take 20 mg by mouth daily (Patient not taking: Reported on 10/17/2023), Disp: , Rfl:     Current Allergies     Allergies as of 10/17/2023    (No Known Allergies)            The following portions of the patient's history were reviewed and updated as appropriate: allergies, current medications, past family history, past medical history, past social history, past surgical history and problem list.     Past Medical History:   Diagnosis Date    Bowel obstruction ([address] )     Clostridium difficile infection         Crohn's disease ([address])     Hypertension     Kidney disease, chronic, stage I (GFR over 89 ml/min)     Lupus (HCC)     Raynaud disease        Past Surgical History:   Procedure Laterality Date    ABDOMINAL SURGERY      bowel obstruction     SECTION N/A     COLON SURGERY      WISDOM TOOTH EXTRACTION         Family History   Problem Relation Age of Onset    Lupus Mother     Diabetes Father     Breast cancer Other          Medications have been verified.         Objective   /75   Temp 97.6 °F (36.4 °C)   Resp 16 Ht 5' 2" (1.575 m)   Wt 71.7 kg (158 lb)   SpO2 98%   BMI 28.90 kg/m²        Physical Exam     Physical Exam  Vitals and nursing note reviewed. Constitutional:       General: She is not in acute distress. Appearance: Normal appearance. She is normal weight. She is not ill-appearing, toxic-appearing or diaphoretic. HENT:      Head: Normocephalic and atraumatic. Cardiovascular:      Rate and Rhythm: Normal rate and regular rhythm. Heart sounds: Normal heart sounds. No murmur heard. No friction rub. No gallop. Pulmonary:      Effort: Pulmonary effort is normal. No respiratory distress. Breath sounds: Normal breath sounds. No stridor. No wheezing, rhonchi or rales. Chest:      Chest wall: No tenderness. Skin:     General: Skin is warm and dry. Capillary Refill: Capillary refill takes less than 2 seconds. Neurological:      Mental Status: She is alert.

## 2023-10-24 ENCOUNTER — APPOINTMENT (OUTPATIENT)
Dept: LAB | Facility: CLINIC | Age: 23
End: 2023-10-24
Payer: OTHER GOVERNMENT

## 2023-11-06 ENCOUNTER — TELEPHONE (OUTPATIENT)
Dept: FAMILY MEDICINE CLINIC | Facility: CLINIC | Age: 23
End: 2023-11-06

## 2023-11-06 NOTE — TELEPHONE ENCOUNTER
Encounter for forms      Patient requires a form to be completed.  Patient is aware of 7-10 day turn around time.    Please refer to the following information:       Type of Form: [employer]      Date of Visit (if applicable):     Doctor: Dr. Alvarado     Expected date:     How patient would like to receive form:faxed     Fax number: [phone]    Patient phone number:       Copy scanned to encounter. Copy provided to patient. Original in (X) team folder to be completed.

## 2023-11-20 NOTE — ASSESSMENT & PLAN NOTE
History of Lupus on Hydroxychloroquine; requesting new referral for Rheumatology   Asymptomatic at this time  Follow up with new referral for Rheumatology   Reassess at next office visit.

## 2023-11-28 ENCOUNTER — TELEPHONE (OUTPATIENT)
Dept: FAMILY MEDICINE CLINIC | Facility: CLINIC | Age: 23
End: 2023-11-28

## 2023-11-28 NOTE — TELEPHONE ENCOUNTER
Caller: Patient      Doctor: Ginger Yoon      Reason for call: Patient call stating that Rheumatology needs a insurance referral for [facility].       Number: [phone]

## 2023-11-29 NOTE — TELEPHONE ENCOUNTER
Yes, the Patient call stating that the Rheumatology states he needs a insurance referral for the medication Saphnelo. I am going to ask Artice Doing about this too.

## 2023-11-29 NOTE — TELEPHONE ENCOUNTER
Hi, I see Dr. Pushpa Covington and Dr. Joselin Simeon placed an order for a Rheumatology referral. Insurance referral paperwork filled out for Rheumatology referral on 11/07 scanned into media. Does she need another form filled out? Thanks.

## 2023-11-30 ENCOUNTER — OFFICE VISIT (OUTPATIENT)
Dept: URGENT CARE | Age: 23
End: 2023-11-30
Payer: OTHER GOVERNMENT

## 2023-11-30 VITALS
HEART RATE: 105 BPM | TEMPERATURE: 97.7 F | RESPIRATION RATE: 18 BRPM | OXYGEN SATURATION: 95 % | SYSTOLIC BLOOD PRESSURE: 127 MMHG | DIASTOLIC BLOOD PRESSURE: 77 MMHG

## 2023-11-30 DIAGNOSIS — J06.9 UPPER RESPIRATORY TRACT INFECTION, UNSPECIFIED TYPE: Primary | ICD-10-CM

## 2023-11-30 DIAGNOSIS — J02.9 SORE THROAT: ICD-10-CM

## 2023-11-30 DIAGNOSIS — R05.1 ACUTE COUGH: ICD-10-CM

## 2023-11-30 LAB
S PYO AG THROAT QL: NEGATIVE
SARS-COV-2 AG UPPER RESP QL IA: NEGATIVE
VALID CONTROL: NORMAL

## 2023-11-30 PROCEDURE — G0463 HOSPITAL OUTPT CLINIC VISIT: HCPCS

## 2023-11-30 PROCEDURE — 99213 OFFICE O/P EST LOW 20 MIN: CPT

## 2023-11-30 PROCEDURE — 87811 SARS-COV-2 COVID19 W/OPTIC: CPT

## 2023-11-30 PROCEDURE — 87880 STREP A ASSAY W/OPTIC: CPT

## 2023-11-30 NOTE — PROGRESS NOTES
[facility]        NAME: Rian Villalta is a 24 y.o. female  : 2000    MRN: [id number]  DATE: 2023  TIME: 10:44 AM    Assessment and Plan   Upper respiratory tract infection, unspecified type [J06.9]  1. Upper respiratory tract infection, unspecified type        2. Acute cough  Poct Covid 19 Rapid Antigen Test      3. Sore throat  POCT rapid strepA        Rapid strep and rapid COVID-negative. Discussed with patient that symptoms are most likely viral in etiology. Discussed symptomatic care with over-the-counter medications. She is to follow-up with her PCP if symptoms persist.    Patient Instructions       Trial over-the-counter cough and cold medication. Ensure adequate fluid intake. May take Tylenol as needed for fever and pain. Follow-up with primary care provider if symptoms persist.  Proceed to  ER if symptoms worsen. Chief Complaint     Chief Complaint   Patient presents with    Cold Like Symptoms    Abdominal Pain    Sore Throat    Generalized Body Aches    Fatigue    Headache     Symptoms started this morning. History of Present Illness       25-year-old female with a history of lupus presenting for evaluation of upper respiratory symptoms. Patient states that this morning she woke up with fatigue, congestion, sore throat, abdominal pain and headaches. She denies any known sick exposures and denies any current treatment. She denies any measured fevers, chills, chest pain or shortness of breath. Abdominal Pain  Associated symptoms include headaches. Pertinent negatives include no diarrhea, fever, nausea or vomiting. Sore Throat   Associated symptoms include abdominal pain, congestion and headaches. Pertinent negatives include no coughing, diarrhea, shortness of breath or vomiting. Generalized Body Aches  Associated symptoms include congestion, headaches, a sore throat, fatigue and abdominal pain.  Pertinent negatives include no fever, chest pain, coughing, shortness of breath, diarrhea, nausea or vomiting. Fatigue  Associated symptoms include abdominal pain, congestion, fatigue, headaches and a sore throat. Pertinent negatives include no chest pain, chills, coughing, fever, nausea or vomiting. Headache      Review of Systems   Review of Systems   Constitutional:  Positive for fatigue. Negative for chills and fever. HENT:  Positive for congestion and sore throat. Respiratory:  Negative for cough and shortness of breath. Cardiovascular:  Negative for chest pain. Gastrointestinal:  Positive for abdominal pain. Negative for diarrhea, nausea and vomiting. Neurological:  Positive for headaches. All other systems reviewed and are negative.         Current Medications       Current Outpatient Medications:     amLODIPine (NORVASC) 10 mg tablet, every 24 hours, Disp: , Rfl:     olmesartan (BENICAR) 5 mg tablet, Take 10 mg by mouth daily, Disp: , Rfl:     omeprazole (PriLOSEC) 20 mg delayed release capsule, Take 1 capsule (20 mg total) by mouth daily, Disp: 30 capsule, Rfl: 3    Praluent 75 MG/ML SOAJ, INJECT 1 ML SUBCUTANEOUS EVERY 2 WEEKS FOR 30 DAYS, Disp: , Rfl:     sildenafil (VIAGRA) 25 MG tablet, Take 25 mg by mouth daily as needed for erectile dysfunction, Disp: , Rfl:     vitamin B-12 (VITAMIN B-12) 1,000 mcg tablet, Take 2,000 mcg by mouth daily, Disp: , Rfl:     aspirin 81 mg chewable tablet, 1 tablet Orally every two days for 60 days (Patient not taking: Reported on 6/6/2023), Disp: , Rfl:     azaTHIOprine (IMURAN) 50 mg tablet, Take 50 mg by mouth daily (Patient not taking: Reported on 8/24/2023), Disp: , Rfl:     Benlysta 200 MG/ML SOAJ, , Disp: , Rfl:     bromocriptine (PARLODEL) 2.5 mg tablet, TAKE 1/2 TABLET IN THE MORNING WITH FOOD WITHIN 2 HOURS OF WAKING ORALLY ONCE A DAY FOR 60 DAYS (Patient not taking: Reported on 10/17/2023), Disp: , Rfl:     Calcifediol (RAYALDEE PO), Take by mouth in the morning (Patient not taking: Reported on 8/24/2023), Disp: , Rfl:     Cyanocobalamin (VITAMIN B 12 PO), Take by mouth 2 (two) times a day (Patient not taking: Reported on 6/5/2023), Disp: , Rfl:     ferrous sulfate 325 (65 Fe) mg tablet, Take 325 mg by mouth daily with breakfast (Patient not taking: Reported on 10/3/2023), Disp: , Rfl:     fluconazole (DIFLUCAN) 150 mg tablet, , Disp: , Rfl:     hydroxychloroquine (PLAQUENIL) 200 mg tablet, Take 200 mg by mouth 2 (two) times a day with meals (Patient not taking: Reported on 10/3/2023), Disp: , Rfl:     lisinopril (ZESTRIL) 2.5 mg tablet, Take 2.5 mg by mouth daily (Patient not taking: Reported on 1/2/2023), Disp: , Rfl:     methylPREDNISolone 4 MG tablet therapy pack, Use as directed on package (Patient not taking: Reported on 1/2/2023), Disp: 21 tablet, Rfl: 0    methylPREDNISolone 4 MG tablet therapy pack, Use as directed on package (Patient not taking: Reported on 8/24/2023), Disp: 21 tablet, Rfl: 0    olmesartan (BENICAR) 20 mg tablet, Take 20 mg by mouth daily (Patient not taking: Reported on 6/6/2023), Disp: , Rfl:     pantoprazole (PROTONIX) 20 mg tablet, Take 1 tablet (20 mg total) by mouth daily (Patient not taking: Reported on 6/26/2023), Disp: 30 tablet, Rfl: 0    patient supplied medication, Benlista injection (Patient not taking: Reported on 11/30/2023), Disp: , Rfl:     Repatha 140 MG/ML SOSY, , Disp: , Rfl:     sildenafil (REVATIO) 20 mg tablet, Take 20 mg by mouth daily (Patient not taking: Reported on 10/17/2023), Disp: , Rfl:     Current Allergies     Allergies as of 11/30/2023    (No Known Allergies)            The following portions of the patient's history were reviewed and updated as appropriate: allergies, current medications, past family history, past medical history, past social history, past surgical history and problem list.     Past Medical History:   Diagnosis Date    Bowel obstruction ([address])     Clostridium difficile infection     2021    Crohn's disease ([address])     Hypertension Kidney disease, chronic, stage I (GFR over 89 ml/min)     Lupus (HCC)     Raynaud disease        Past Surgical History:   Procedure Laterality Date    ABDOMINAL SURGERY      bowel obstruction     SECTION N/A     COLON SURGERY      WISDOM TOOTH EXTRACTION         Family History   Problem Relation Age of Onset    Lupus Mother     Diabetes Father     Breast cancer Other          Medications have been verified. Objective   /77   Pulse 105   Temp 97.7 °F (36.5 °C)   Resp 18   SpO2 95%        Physical Exam     Physical Exam  Vitals and nursing note reviewed. Constitutional:       General: She is not in acute distress. Appearance: Normal appearance. She is normal weight. She is not ill-appearing, toxic-appearing or diaphoretic. HENT:      Head: Normocephalic and atraumatic. Right Ear: Tympanic membrane normal.      Left Ear: Tympanic membrane normal.      Nose: Congestion present. No rhinorrhea. Mouth/Throat:      Mouth: Mucous membranes are moist.      Pharynx: Oropharynx is clear. Posterior oropharyngeal erythema present. No oropharyngeal exudate. Eyes:      General:         Right eye: No discharge. Left eye: No discharge. Cardiovascular:      Rate and Rhythm: Normal rate and regular rhythm. Pulses: Normal pulses. Heart sounds: Normal heart sounds. No murmur heard. No friction rub. No gallop. Pulmonary:      Effort: Pulmonary effort is normal. No respiratory distress. Breath sounds: Normal breath sounds. No stridor. No wheezing, rhonchi or rales. Chest:      Chest wall: No tenderness. Abdominal:      General: Bowel sounds are normal.      Palpations: Abdomen is soft. Tenderness: There is no abdominal tenderness. Skin:     General: Skin is warm and dry. Neurological:      Mental Status: She is alert.    Psychiatric:         Mood and Affect: Mood normal.         Behavior: Behavior normal.

## 2023-11-30 NOTE — PATIENT INSTRUCTIONS
Trial over-the-counter cough and cold medication. Ensure adequate fluid intake. May take Tylenol as needed for fever and pain.   Follow-up with primary care provider if symptoms persist.

## 2023-11-30 NOTE — LETTER
November 30, 2023     Patient: Kiko Biggs   YOB: 2000   Date of Visit: 11/30/2023       To Whom it May Concern:    Kiko Biggs was seen in my clinic on 11/30/2023. She may return to work on 12/1/2023 as long as she is fever free for 24 hours without fever reducing medication . If you have any questions or concerns, please don't hesitate to call.          Sincerely,          CELINA Ford        CC: No Recipients

## 2023-12-05 ENCOUNTER — TRANSCRIBE ORDERS (OUTPATIENT)
Dept: LAB | Facility: CLINIC | Age: 23
End: 2023-12-05

## 2023-12-05 ENCOUNTER — APPOINTMENT (OUTPATIENT)
Dept: LAB | Facility: CLINIC | Age: 23
End: 2023-12-05
Payer: OTHER GOVERNMENT

## 2023-12-05 DIAGNOSIS — E78.5 DYSLIPIDEMIA: ICD-10-CM

## 2023-12-05 DIAGNOSIS — E53.8 VITAMIN B12 DEFICIENCY: ICD-10-CM

## 2023-12-05 DIAGNOSIS — E78.49 LPL-RELATED FAMILIAL COMBINED HYPERLIPIDEMIA: ICD-10-CM

## 2023-12-05 DIAGNOSIS — E55.9 VITAMIN D DEFICIENCY: ICD-10-CM

## 2023-12-05 DIAGNOSIS — I15.9 SECONDARY HYPERTENSION: ICD-10-CM

## 2023-12-05 DIAGNOSIS — Z13.29 ENCOUNTER FOR SCREENING FOR OTHER SUSPECTED ENDOCRINE DISORDER: ICD-10-CM

## 2023-12-05 DIAGNOSIS — I15.9 SECONDARY HYPERTENSION: Primary | ICD-10-CM

## 2023-12-05 LAB
25(OH)D3 SERPL-MCNC: 16.1 NG/ML (ref 30–100)
ALBUMIN SERPL BCP-MCNC: 4.2 G/DL (ref 3.5–5)
ALP SERPL-CCNC: 87 U/L (ref 34–104)
ALT SERPL W P-5'-P-CCNC: 21 U/L (ref 7–52)
ANION GAP SERPL CALCULATED.3IONS-SCNC: 8 MMOL/L
AST SERPL W P-5'-P-CCNC: 21 U/L (ref 13–39)
BACTERIA UR QL AUTO: ABNORMAL /HPF
BASOPHILS # BLD AUTO: 0.01 THOUSANDS/ÂΜL (ref 0–0.1)
BASOPHILS NFR BLD AUTO: 0 % (ref 0–1)
BILIRUB SERPL-MCNC: 0.24 MG/DL (ref 0.2–1)
BILIRUB UR QL STRIP: NEGATIVE
BUN SERPL-MCNC: 16 MG/DL (ref 5–25)
CALCIUM SERPL-MCNC: 9.3 MG/DL (ref 8.4–10.2)
CHLORIDE SERPL-SCNC: 108 MMOL/L (ref 96–108)
CHOLEST SERPL-MCNC: 158 MG/DL
CLARITY UR: CLEAR
CO2 SERPL-SCNC: 27 MMOL/L (ref 21–32)
COLOR UR: YELLOW
CREAT SERPL-MCNC: 0.69 MG/DL (ref 0.6–1.3)
CREAT UR-MCNC: 217.2 MG/DL
EOSINOPHIL # BLD AUTO: 0.07 THOUSAND/ÂΜL (ref 0–0.61)
EOSINOPHIL NFR BLD AUTO: 1 % (ref 0–6)
ERYTHROCYTE [DISTWIDTH] IN BLOOD BY AUTOMATED COUNT: 14.2 % (ref 11.6–15.1)
GFR SERPL CREATININE-BSD FRML MDRD: 122 ML/MIN/1.73SQ M
GLUCOSE SERPL-MCNC: 84 MG/DL (ref 65–140)
GLUCOSE UR STRIP-MCNC: NEGATIVE MG/DL
HCT VFR BLD AUTO: 36.9 % (ref 34.8–46.1)
HDLC SERPL-MCNC: 46 MG/DL
HGB BLD-MCNC: 11.8 G/DL (ref 11.5–15.4)
HGB UR QL STRIP.AUTO: NEGATIVE
IMM GRANULOCYTES # BLD AUTO: 0.01 THOUSAND/UL (ref 0–0.2)
IMM GRANULOCYTES NFR BLD AUTO: 0 % (ref 0–2)
KETONES UR STRIP-MCNC: NEGATIVE MG/DL
LDLC SERPL CALC-MCNC: 64 MG/DL (ref 0–100)
LEUKOCYTE ESTERASE UR QL STRIP: ABNORMAL
LYMPHOCYTES # BLD AUTO: 1.59 THOUSANDS/ÂΜL (ref 0.6–4.47)
LYMPHOCYTES NFR BLD AUTO: 32 % (ref 14–44)
MCH RBC QN AUTO: 27.8 PG (ref 26.8–34.3)
MCHC RBC AUTO-ENTMCNC: 32 G/DL (ref 31.4–37.4)
MCV RBC AUTO: 87 FL (ref 82–98)
MICROALBUMIN UR-MCNC: 12.7 MG/L
MICROALBUMIN/CREAT 24H UR: 6 MG/G CREATININE (ref 0–30)
MONOCYTES # BLD AUTO: 0.39 THOUSAND/ÂΜL (ref 0.17–1.22)
MONOCYTES NFR BLD AUTO: 8 % (ref 4–12)
MUCOUS THREADS UR QL AUTO: ABNORMAL
NEUTROPHILS # BLD AUTO: 2.92 THOUSANDS/ÂΜL (ref 1.85–7.62)
NEUTS SEG NFR BLD AUTO: 59 % (ref 43–75)
NITRITE UR QL STRIP: NEGATIVE
NON-SQ EPI CELLS URNS QL MICRO: ABNORMAL /HPF
NONHDLC SERPL-MCNC: 112 MG/DL
NRBC BLD AUTO-RTO: 0 /100 WBCS
PH UR STRIP.AUTO: 6.5 [PH]
PLATELET # BLD AUTO: 278 THOUSANDS/UL (ref 149–390)
PMV BLD AUTO: 11.9 FL (ref 8.9–12.7)
POTASSIUM SERPL-SCNC: 3.7 MMOL/L (ref 3.5–5.3)
PROT SERPL-MCNC: 7 G/DL (ref 6.4–8.4)
PROT UR STRIP-MCNC: ABNORMAL MG/DL
RBC # BLD AUTO: 4.25 MILLION/UL (ref 3.81–5.12)
RBC #/AREA URNS AUTO: ABNORMAL /HPF
SODIUM SERPL-SCNC: 143 MMOL/L (ref 135–147)
SP GR UR STRIP.AUTO: 1.02 (ref 1–1.03)
T4 FREE SERPL-MCNC: 0.5 NG/DL (ref 0.61–1.12)
TRIGL SERPL-MCNC: 240 MG/DL
TSH SERPL DL<=0.05 MIU/L-ACNC: 0.85 UIU/ML (ref 0.45–4.5)
UROBILINOGEN UR STRIP-ACNC: <2 MG/DL
VIT B12 SERPL-MCNC: 523 PG/ML (ref 180–914)
WBC # BLD AUTO: 4.99 THOUSAND/UL (ref 4.31–10.16)
WBC #/AREA URNS AUTO: ABNORMAL /HPF

## 2023-12-05 PROCEDURE — 84439 ASSAY OF FREE THYROXINE: CPT

## 2023-12-05 PROCEDURE — 80053 COMPREHEN METABOLIC PANEL: CPT

## 2023-12-05 PROCEDURE — 82570 ASSAY OF URINE CREATININE: CPT

## 2023-12-05 PROCEDURE — 36415 COLL VENOUS BLD VENIPUNCTURE: CPT

## 2023-12-05 PROCEDURE — 83695 ASSAY OF LIPOPROTEIN(A): CPT

## 2023-12-05 PROCEDURE — 82306 VITAMIN D 25 HYDROXY: CPT

## 2023-12-05 PROCEDURE — 85025 COMPLETE CBC W/AUTO DIFF WBC: CPT

## 2023-12-05 PROCEDURE — 82043 UR ALBUMIN QUANTITATIVE: CPT

## 2023-12-05 PROCEDURE — 84443 ASSAY THYROID STIM HORMONE: CPT

## 2023-12-05 PROCEDURE — 81001 URINALYSIS AUTO W/SCOPE: CPT

## 2023-12-05 PROCEDURE — 80061 LIPID PANEL: CPT

## 2023-12-05 PROCEDURE — 82607 VITAMIN B-12: CPT

## 2023-12-06 ENCOUNTER — OFFICE VISIT (OUTPATIENT)
Dept: FAMILY MEDICINE CLINIC | Facility: CLINIC | Age: 23
End: 2023-12-06
Payer: OTHER GOVERNMENT

## 2023-12-06 ENCOUNTER — APPOINTMENT (OUTPATIENT)
Dept: LAB | Facility: HOSPITAL | Age: 23
End: 2023-12-06
Payer: OTHER GOVERNMENT

## 2023-12-06 VITALS
HEIGHT: 62 IN | HEART RATE: 92 BPM | SYSTOLIC BLOOD PRESSURE: 119 MMHG | BODY MASS INDEX: 29.55 KG/M2 | WEIGHT: 160.6 LBS | OXYGEN SATURATION: 100 % | RESPIRATION RATE: 18 BRPM | DIASTOLIC BLOOD PRESSURE: 70 MMHG | TEMPERATURE: 98.1 F

## 2023-12-06 DIAGNOSIS — Z72.51 UNPROTECTED SEXUAL INTERCOURSE: ICD-10-CM

## 2023-12-06 DIAGNOSIS — R30.0 DYSURIA: Primary | ICD-10-CM

## 2023-12-06 LAB
HBV SURFACE AG SER QL: NORMAL
HIV 1+2 AB+HIV1 P24 AG SERPL QL IA: NORMAL
HIV 2 AB SERPL QL IA: NORMAL
HIV1 AB SERPL QL IA: NORMAL
HIV1 P24 AG SERPL QL IA: NORMAL
LPA SERPL-SCNC: 68.2 NMOL/L
TREPONEMA PALLIDUM IGG+IGM AB [PRESENCE] IN SERUM OR PLASMA BY IMMUNOASSAY: NORMAL

## 2023-12-06 PROCEDURE — 87340 HEPATITIS B SURFACE AG IA: CPT

## 2023-12-06 PROCEDURE — 86780 TREPONEMA PALLIDUM: CPT

## 2023-12-06 PROCEDURE — 99214 OFFICE O/P EST MOD 30 MIN: CPT

## 2023-12-06 PROCEDURE — 87563 M. GENITALIUM AMP PROBE: CPT

## 2023-12-06 PROCEDURE — 87798 DETECT AGENT NOS DNA AMP: CPT

## 2023-12-06 PROCEDURE — 87491 CHLMYD TRACH DNA AMP PROBE: CPT

## 2023-12-06 PROCEDURE — 87389 HIV-1 AG W/HIV-1&-2 AB AG IA: CPT

## 2023-12-06 PROCEDURE — 81001 URINALYSIS AUTO W/SCOPE: CPT

## 2023-12-06 PROCEDURE — 87591 N.GONORRHOEAE DNA AMP PROB: CPT

## 2023-12-06 PROCEDURE — 36415 COLL VENOUS BLD VENIPUNCTURE: CPT

## 2023-12-06 RX ORDER — HYDROXYZINE HYDROCHLORIDE 10 MG/1
10 TABLET, FILM COATED ORAL 2 TIMES DAILY
COMMUNITY
Start: 2023-11-14

## 2023-12-06 RX ORDER — TRAZODONE HYDROCHLORIDE 50 MG/1
1 TABLET ORAL
COMMUNITY
Start: 2023-08-29

## 2023-12-06 RX ORDER — SERTRALINE HYDROCHLORIDE 100 MG/1
50 TABLET, FILM COATED ORAL DAILY
COMMUNITY
Start: 2023-11-13

## 2023-12-06 NOTE — PROGRESS NOTES
Name: Marisol Morgan      : 2000      MRN: [id number]  Encounter Provider: Kira Hamilton MD  Encounter Date: 2023   Encounter department: [facility]     1. Dysuria  Assessment & Plan:  History of 1 week of untreated dysuria and reports discomfort similar as past UTIs. History of recurrent UTIs and yeast infections. No suprapubic or CVA tenderness. Urine cultures in the past year have not shown any growth. Patient is up to date in her Pap smears and GYN visits. Denied itchiness, discharge, lesions, fever, chills. No strong indication to start antibiotics empirically. - UA with reflex; will order antibiotics if necessary  - STI panel was also ordered  - advised patient to call office if symptoms worsen or changes    Orders:  -     UA (URINE) with reflex to Scope    2. Unprotected sexual intercourse  Assessment & Plan:  -Encouraged and discussed safe sex practices  -STI panel ordered    Orders:  -     RPR-Syphilis Screening (Total Syphilis IGG/IGM); Future  -     HIV 1/2 AB/AG w Reflex [facility] for 2 yr old and above; Future  -     Hepatitis B surface antigen; Future  -     Chlamydia/Gonococcus/Genital Mycoplasma Profile, ROSALBA, Urine           Subjective      24year old female with PMH of SLE presents to the office with complaints of burning sensation with urination and feeling of incomplete bladder emptying for the past week. She states that she has a history of recurrent UTI and yeast infections. Reports not trying any OTC medications for pain or discomfort relief. Denies fever, chills, back pain, suprapubic pain, vaginal pruritus or changes in vaginal discharge. She has been abstinent but reports having unprotected sex in the summer and would like an STD panel done. No other concerns or complaints. Review of Systems   Constitutional:  Negative for chills, fatigue and fever. Respiratory:  Negative for shortness of breath. Cardiovascular:  Negative for chest pain. Gastrointestinal:  Negative for abdominal pain, constipation, diarrhea, nausea and vomiting. Genitourinary:  Positive for difficulty urinating and dysuria. Negative for flank pain, frequency, pelvic pain and vaginal pain. Neurological:  Negative for dizziness.        Current Outpatient Medications on File Prior to Visit   Medication Sig    amLODIPine (NORVASC) 10 mg tablet every 24 hours    hydrOXYzine HCL (ATARAX) 10 mg tablet Take 10 mg by mouth 2 (two) times a day    olmesartan (BENICAR) 5 mg tablet Take 10 mg by mouth daily    omeprazole (PriLOSEC) 20 mg delayed release capsule Take 1 capsule (20 mg total) by mouth daily    Praluent 75 MG/ML SOAJ INJECT 1 ML SUBCUTANEOUS EVERY 2 WEEKS FOR 30 DAYS    sertraline (ZOLOFT) 100 mg tablet Take 50 mg by mouth daily    traZODone (DESYREL) 50 mg tablet Take 1 tablet by mouth daily at bedtime    aspirin 81 mg chewable tablet 1 tablet Orally every two days for 60 days (Patient not taking: Reported on 6/6/2023)    azaTHIOprine (IMURAN) 50 mg tablet Take 50 mg by mouth daily (Patient not taking: Reported on 8/24/2023)    Benlysta 200 MG/ML SOAJ  (Patient not taking: Reported on 10/17/2023)    bromocriptine (PARLODEL) 2.5 mg tablet TAKE 1/2 TABLET IN THE MORNING WITH FOOD WITHIN 2 HOURS OF WAKING ORALLY ONCE A DAY FOR 60 DAYS (Patient not taking: Reported on 10/17/2023)    Calcifediol (RAYALDEE PO) Take by mouth in the morning (Patient not taking: Reported on 8/24/2023)    Cyanocobalamin (VITAMIN B 12 PO) Take by mouth 2 (two) times a day (Patient not taking: Reported on 6/5/2023)    ferrous sulfate 325 (65 Fe) mg tablet Take 325 mg by mouth daily with breakfast (Patient not taking: Reported on 10/3/2023)    fluconazole (DIFLUCAN) 150 mg tablet  (Patient not taking: Reported on 6/5/2023)    hydroxychloroquine (PLAQUENIL) 200 mg tablet Take 200 mg by mouth 2 (two) times a day with meals (Patient not taking: Reported on 10/3/2023)    lisinopril (ZESTRIL) 2.5 mg tablet Take 2.5 mg by mouth daily (Patient not taking: Reported on 1/2/2023)    methylPREDNISolone 4 MG tablet therapy pack Use as directed on package (Patient not taking: Reported on 1/2/2023)    methylPREDNISolone 4 MG tablet therapy pack Use as directed on package (Patient not taking: Reported on 8/24/2023)    olmesartan (BENICAR) 20 mg tablet Take 20 mg by mouth daily (Patient not taking: Reported on 6/6/2023)    pantoprazole (PROTONIX) 20 mg tablet Take 1 tablet (20 mg total) by mouth daily (Patient not taking: Reported on 6/26/2023)    patient supplied medication Benlista injection (Patient not taking: Reported on 11/30/2023)    Repatha 140 MG/ML SOSY     sildenafil (REVATIO) 20 mg tablet Take 20 mg by mouth daily (Patient not taking: Reported on 10/17/2023)    sildenafil (VIAGRA) 25 MG tablet Take 25 mg by mouth daily as needed for erectile dysfunction    vitamin B-12 (VITAMIN B-12) 1,000 mcg tablet Take 2,000 mcg by mouth daily (Patient not taking: Reported on 12/6/2023)       Objective     /70 (BP Location: Right arm, Patient Position: Sitting, Cuff Size: Large)   Pulse 92   Temp 98.1 °F (36.7 °C) (Tympanic)   Resp 18   Ht 5' 2" (1.575 m)   Wt 72.8 kg (160 lb 9.6 oz)   SpO2 100%   BMI 29.37 kg/m²     Physical Exam  Constitutional:       General: She is not in acute distress. Appearance: Normal appearance. She is not ill-appearing. HENT:      Head: Normocephalic and atraumatic. Cardiovascular:      Rate and Rhythm: Normal rate and regular rhythm. Pulses: Normal pulses. Heart sounds: Normal heart sounds. Pulmonary:      Effort: Pulmonary effort is normal. No respiratory distress. Breath sounds: Normal breath sounds. Abdominal:      General: Abdomen is flat. Bowel sounds are normal.      Palpations: Abdomen is soft. Tenderness: There is no abdominal tenderness. There is no right CVA tenderness or left CVA tenderness. Neurological:      Mental Status: She is alert.        Michael Christensen MD

## 2023-12-06 NOTE — ASSESSMENT & PLAN NOTE
History of 1 week of untreated dysuria and reports discomfort similar as past UTIs. History of recurrent UTIs and yeast infections. No suprapubic or CVA tenderness. Urine cultures in the past year have not shown any growth. Patient is up to date in her Pap smears and GYN visits. Denied itchiness, discharge, lesions, fever, chills. No strong indication to start antibiotics empirically.   - UA with reflex; will order antibiotics if necessary  - STI panel was also ordered  - advised patient to call office if symptoms worsen or changes

## 2023-12-07 ENCOUNTER — TELEPHONE (OUTPATIENT)
Dept: PULMONOLOGY | Facility: CLINIC | Age: 23
End: 2023-12-07

## 2023-12-07 LAB
BACTERIA UR QL AUTO: ABNORMAL /HPF
BILIRUB UR QL STRIP: NEGATIVE
CLARITY UR: CLEAR
COLOR UR: YELLOW
GLUCOSE UR STRIP-MCNC: NEGATIVE MG/DL
HGB UR QL STRIP.AUTO: NEGATIVE
KETONES UR STRIP-MCNC: NEGATIVE MG/DL
LEUKOCYTE ESTERASE UR QL STRIP: NEGATIVE
MUCOUS THREADS UR QL AUTO: ABNORMAL
NITRITE UR QL STRIP: NEGATIVE
NON-SQ EPI CELLS URNS QL MICRO: ABNORMAL /HPF
PH UR STRIP.AUTO: 7 [PH]
PROT UR STRIP-MCNC: ABNORMAL MG/DL
RBC #/AREA URNS AUTO: ABNORMAL /HPF
SP GR UR STRIP.AUTO: 1.01 (ref 1–1.03)
UROBILINOGEN UR STRIP-ACNC: <2 MG/DL
WBC #/AREA URNS AUTO: ABNORMAL /HPF

## 2024-01-18 LAB — MISCELLANEOUS LAB TEST RESULT: NORMAL

## 2024-01-24 ENCOUNTER — OFFICE VISIT (OUTPATIENT)
Dept: FAMILY MEDICINE CLINIC | Facility: CLINIC | Age: 24
End: 2024-01-24
Payer: OTHER GOVERNMENT

## 2024-01-24 VITALS
TEMPERATURE: 98.1 F | HEART RATE: 104 BPM | HEIGHT: 62 IN | BODY MASS INDEX: 27.2 KG/M2 | SYSTOLIC BLOOD PRESSURE: 110 MMHG | WEIGHT: 147.8 LBS | OXYGEN SATURATION: 100 % | RESPIRATION RATE: 16 BRPM | DIASTOLIC BLOOD PRESSURE: 78 MMHG

## 2024-01-24 DIAGNOSIS — I10 ESSENTIAL HYPERTENSION: ICD-10-CM

## 2024-01-24 DIAGNOSIS — F32.1 CURRENT MODERATE EPISODE OF MAJOR DEPRESSIVE DISORDER, UNSPECIFIED WHETHER RECURRENT (HCC): ICD-10-CM

## 2024-01-24 DIAGNOSIS — M32.9 SYSTEMIC LUPUS ERYTHEMATOSUS, UNSPECIFIED SLE TYPE, UNSPECIFIED ORGAN INVOLVEMENT STATUS (HCC): ICD-10-CM

## 2024-01-24 DIAGNOSIS — Z23 ENCOUNTER FOR IMMUNIZATION: ICD-10-CM

## 2024-01-24 DIAGNOSIS — K52.9 IBD (INFLAMMATORY BOWEL DISEASE): ICD-10-CM

## 2024-01-24 DIAGNOSIS — E78.5 HYPERLIPIDEMIA, UNSPECIFIED HYPERLIPIDEMIA TYPE: ICD-10-CM

## 2024-01-24 DIAGNOSIS — I73.00 RAYNAUD'S DISEASE WITHOUT GANGRENE: ICD-10-CM

## 2024-01-24 DIAGNOSIS — Z00.00 PHYSICAL EXAM, ANNUAL: Primary | ICD-10-CM

## 2024-01-24 PROBLEM — D50.9 IRON DEFICIENCY ANEMIA: Status: ACTIVE | Noted: 2024-01-24

## 2024-01-24 PROCEDURE — 90651 9VHPV VACCINE 2/3 DOSE IM: CPT

## 2024-01-24 PROCEDURE — 99214 OFFICE O/P EST MOD 30 MIN: CPT | Performed by: FAMILY MEDICINE

## 2024-01-24 PROCEDURE — 90471 IMMUNIZATION ADMIN: CPT

## 2024-01-24 PROCEDURE — 99395 PREV VISIT EST AGE 18-39: CPT | Performed by: FAMILY MEDICINE

## 2024-01-24 RX ORDER — PHENTERMINE HYDROCHLORIDE 37.5 MG/1
37.5 TABLET ORAL DAILY
COMMUNITY
Start: 2024-01-17

## 2024-01-24 RX ORDER — TOPIRAMATE 100 MG/1
100 TABLET, FILM COATED ORAL DAILY
COMMUNITY
Start: 2023-12-15

## 2024-01-24 RX ORDER — CYCLOBENZAPRINE HCL 10 MG
10 TABLET ORAL 3 TIMES DAILY PRN
Qty: 30 TABLET | Refills: 0 | Status: SHIPPED | OUTPATIENT
Start: 2024-01-24

## 2024-01-24 RX ORDER — VENLAFAXINE HYDROCHLORIDE 150 MG/1
150 CAPSULE, EXTENDED RELEASE ORAL DAILY
COMMUNITY
Start: 2024-01-23

## 2024-01-24 NOTE — PATIENT INSTRUCTIONS
Check cholesterol in 3 mo  Hpv #2 and prevnar 20  Flexeril as needed for muscle spasm  F/u 6 mo   No

## 2024-01-24 NOTE — PROGRESS NOTES
Name: Angela Lemus      : 2000      MRN: [id number]  Encounter Provider: Lakeisha Man DO  Encounter Date: 2024   Encounter department: [facility]    Assessment & Plan     1. Physical exam, annual  Comments:  prevnar at next visit  hvp #2 today  labs up to date   continue f/u with gyn  healthy diet and exercise    2. Systemic lupus erythematosus, unspecified SLE type, unspecified organ involvement status (HCC)  Comments:  upcoming appt with rheum  continue with nephro  Orders:  -     cyclobenzaprine (FLEXERIL) 10 mg tablet; Take 1 tablet (10 mg total) by mouth 3 (three) times a day as needed for muscle spasms    3. Encounter for immunization  Comments:  hpv #2 today  prevnar at next appt (do not have today)  Orders:  -     Pneumococcal Conjugate Vaccine 20-valent (Pcv20)  -     HPV VACCINE 9 VALENT IM    4. Hyperlipidemia, unspecified hyperlipidemia type  Comments:  had been on repatha but is off and states she never had high cholesterol  off all meds now--recheck lipids in3  mo  Orders:  -     Lipid panel; Future; Expected date: 2024    5. Essential hypertension  Comments:  at goal on current meds    6. Raynaud's disease without gangrene  Comments:  continue norvasc    7. IBD (inflammatory bowel disease)  Comments:  continue f/u with GI    8. Current moderate episode of major depressive disorder, unspecified whether recurrent (HCC)  Comments:  continue f/u with psych           Subjective      HPI  Pt presents as new pt for physical and establishment.  Preventively, pt due for hpv #2, prevnar 20.  Seeing gyn.  Seeing dental.  Seeing ophtho.  Moving as much as SLE will allow.    From a problem standpoint, pt has hx of SLE.  Currently on plaquenil.  Has upcoming optho appt.  States she is having more muscle aches, fatigue, hand pain recently.  Requests muscle relaxant.  Is seeing rheum in a few weeks to establish.    Hx of bowel obstruction s/p surgery which led to dx  of crohns dz.  Seeing GI.  She has no diarrhea, abd pain, n/v.  She is unsure of this dx.    Pt has hx of anxiety, depression, ptsd.  Has psychiatrist through [facility] and is under treatment.  Soon to get counselor, and psych has just increased meds due to bad dreams and depression.    Pt has hx of raynauds.  Tolerating norvasc.    Hx of HTN and stg 1 ckd.  Seeing nephro.  Tolerating meds.    Using adipex, topamax, bromocriptine with weight loss doc.  No chest pain, shortness of breath. Palps.    Review of Systems   Constitutional:  Positive for fatigue. Negative for chills, fever and unexpected weight change.   HENT:  Negative for congestion, ear pain, hearing loss, postnasal drip, rhinorrhea, sinus pressure, sinus pain, sore throat, trouble swallowing and voice change.    Eyes:  Negative for pain, redness and visual disturbance.   Respiratory:  Negative for cough and shortness of breath.    Cardiovascular:  Negative for chest pain, palpitations and leg swelling.   Gastrointestinal:  Negative for abdominal pain, constipation, diarrhea and nausea.   Endocrine: Negative for cold intolerance, heat intolerance, polydipsia and polyuria.   Genitourinary:  Negative for dysuria, frequency and urgency.   Musculoskeletal:  Positive for arthralgias and myalgias. Negative for joint swelling.   Skin:  Negative for rash.        No suspicious lesions   Neurological:  Negative for weakness, numbness and headaches.   Hematological:  Negative for adenopathy.       Current Outpatient Medications on File Prior to Visit   Medication Sig    amLODIPine (NORVASC) 10 mg tablet Take 10 mg by mouth daily    aspirin 81 mg chewable tablet     bromocriptine (PARLODEL) 2.5 mg tablet     Cyanocobalamin (VITAMIN B 12 PO) Take 500 mcg by mouth 2 (two) times a day    ferrous sulfate 325 (65 Fe) mg tablet Take 325 mg by mouth daily with breakfast    hydroxychloroquine (PLAQUENIL) 200 mg tablet Take 200 mg by mouth 2 (two) times a day with meals     olmesartan (BENICAR) 5 mg tablet Take 10 mg by mouth daily    omeprazole (PriLOSEC) 20 mg delayed release capsule Take 1 capsule (20 mg total) by mouth daily    phentermine (ADIPEX-P) 37.5 MG tablet Take 37.5 mg by mouth in the morning    sildenafil (VIAGRA) 25 MG tablet Take 25 mg by mouth daily as needed for erectile dysfunction    topiramate (TOPAMAX) 100 mg tablet Take 100 mg by mouth daily    traZODone (DESYREL) 50 mg tablet Take 1 tablet by mouth daily at bedtime    venlafaxine (EFFEXOR-XR) 150 mg 24 hr capsule Take 150 mg by mouth daily    hydrOXYzine HCL (ATARAX) 10 mg tablet Take 10 mg by mouth 2 (two) times a day (Patient not taking: Reported on 1/24/2024)    vitamin B-12 (VITAMIN B-12) 1,000 mcg tablet Take 2,000 mcg by mouth daily (Patient not taking: Reported on 12/6/2023)    [DISCONTINUED] azaTHIOprine (IMURAN) 50 mg tablet Take 50 mg by mouth daily (Patient not taking: Reported on 8/24/2023)    [DISCONTINUED] Benlysta 200 MG/ML SOAJ  (Patient not taking: Reported on 10/17/2023)    [DISCONTINUED] Calcifediol (RAYALDEE PO) Take by mouth in the morning (Patient not taking: Reported on 8/24/2023)    [DISCONTINUED] fluconazole (DIFLUCAN) 150 mg tablet  (Patient not taking: Reported on 6/5/2023)    [DISCONTINUED] lisinopril (ZESTRIL) 2.5 mg tablet Take 2.5 mg by mouth daily (Patient not taking: Reported on 1/2/2023)    [DISCONTINUED] methylPREDNISolone 4 MG tablet therapy pack Use as directed on package (Patient not taking: Reported on 1/2/2023)    [DISCONTINUED] methylPREDNISolone 4 MG tablet therapy pack Use as directed on package (Patient not taking: Reported on 8/24/2023)    [DISCONTINUED] olmesartan (BENICAR) 20 mg tablet Take 20 mg by mouth daily (Patient not taking: Reported on 6/6/2023)    [DISCONTINUED] pantoprazole (PROTONIX) 20 mg tablet Take 1 tablet (20 mg total) by mouth daily (Patient not taking: Reported on 6/26/2023)    [DISCONTINUED] patient supplied medication Benlista injection (Patient  "not taking: Reported on 11/30/2023)    [DISCONTINUED] Praluent 75 MG/ML SOAJ INJECT 1 ML SUBCUTANEOUS EVERY 2 WEEKS FOR 30 DAYS (Patient not taking: Reported on 1/24/2024)    [DISCONTINUED] Repatha 140 MG/ML SOSY     [DISCONTINUED] sertraline (ZOLOFT) 100 mg tablet Take 50 mg by mouth daily (Patient not taking: Reported on 1/24/2024)    [DISCONTINUED] sildenafil (REVATIO) 20 mg tablet Take 20 mg by mouth daily (Patient not taking: Reported on 10/17/2023)       Objective     /78   Pulse 104   Temp 98.1 °F (36.7 °C) (Temporal)   Resp 16   Ht 5' 2\" (1.575 m)   Wt 67 kg (147 lb 12.8 oz)   SpO2 100%   BMI 27.03 kg/m²     Physical Exam  Constitutional:       General: She is not in acute distress.     Appearance: Normal appearance. She is well-developed.   HENT:      Head: Normocephalic and atraumatic.      Right Ear: Tympanic membrane, ear canal and external ear normal.      Left Ear: Tympanic membrane, ear canal and external ear normal.      Nose: Nose normal.      Mouth/Throat:      Mouth: Mucous membranes are moist.      Pharynx: Oropharynx is clear. No posterior oropharyngeal erythema.   Eyes:      Extraocular Movements: Extraocular movements intact.      Conjunctiva/sclera: Conjunctivae normal.      Pupils: Pupils are equal, round, and reactive to light.   Neck:      Thyroid: No thyromegaly.      Vascular: No carotid bruit or JVD.   Cardiovascular:      Rate and Rhythm: Normal rate and regular rhythm.      Heart sounds: S1 normal and S2 normal. No murmur heard.     No friction rub. No gallop. No S3 or S4 sounds.   Pulmonary:      Effort: Pulmonary effort is normal.      Breath sounds: Normal breath sounds. No wheezing, rhonchi or rales.   Abdominal:      General: Bowel sounds are normal. There is no distension.      Palpations: Abdomen is soft.      Tenderness: There is no abdominal tenderness.   Musculoskeletal:      Cervical back: Neck supple.   Lymphadenopathy:      Cervical: No cervical adenopathy. "   Neurological:      General: No focal deficit present.      Mental Status: She is alert and oriented to person, place, and time.      Cranial Nerves: No cranial nerve deficit.      Sensory: No sensory deficit.      Deep Tendon Reflexes: Reflexes are normal and symmetric. Reflexes normal.       Lakeisha Man, DO

## 2024-01-25 ENCOUNTER — TELEPHONE (OUTPATIENT)
Dept: ADMINISTRATIVE | Facility: OTHER | Age: 24
End: 2024-01-25

## 2024-01-25 NOTE — TELEPHONE ENCOUNTER
Upon review of the In Basket request and the patient's chart, initial outreach has been made via fax to facility. Please see Contacts section for details.     Thank you  Laney Ellison

## 2024-01-25 NOTE — LETTER
Vaccination Request Form: COVID-19 aka SARS-CoV-2 (Moderna or Pfizer or J & J)      Date Requested: 24  Patient: Angela Lemus  Patient : 2000   Referring Provider: Lakeisha Man DO       The above patient has informed us that they have had their   most recent COVID-19 aka SARS-CoV-2 (Moderna or Pfizer or J & J) administered at your facility. Please   complete this form and attach all corresponding documentation.    Date of Vaccine(s) Given  ______________________________    Lot Number(s) _______________________________________    Manufacture(s) ______________________________________    Dose Amount (s) _____________________________________    Expiration Date(s) ____________________________________    Comments __________________________________________________________  ____________________________________________________________________  ____________________________________________________________________  ____________________________________________________________________    Administering Facility  ________________________________________________    Vaccine Administered By (print name) ___________________________________      Form Completed By (print name) _______________________________________      Signature ___________________________________________________________      These reports are needed for  compliance.    Please fax this completed form and a copy of the Vaccine Document(s) to our office located at [address] [place] [address] as soon as possible to Fax [phone] giorgio Davison: Phone [phone]    We thank you for your assistance in treating our mutual patient.

## 2024-01-25 NOTE — TELEPHONE ENCOUNTER
----- Message from Alicia Childs sent at 1/24/2024  8:44 AM EST -----  01/24/24 8:44 AM    Hello, our patient Angela Lemus has had the COVID vaccine completed/performed. Please assist in updating the patient chart by making an External outreach to [facility] facility located in [place] ([facility]).    Thank you,  Alicia Childs  [facility]

## 2024-01-26 ENCOUNTER — APPOINTMENT (OUTPATIENT)
Dept: LAB | Facility: CLINIC | Age: 24
End: 2024-01-26
Payer: OTHER GOVERNMENT

## 2024-01-26 ENCOUNTER — TRANSCRIBE ORDERS (OUTPATIENT)
Dept: LAB | Facility: CLINIC | Age: 24
End: 2024-01-26

## 2024-01-26 DIAGNOSIS — I10 ESSENTIAL HYPERTENSION, BENIGN: Primary | ICD-10-CM

## 2024-01-26 DIAGNOSIS — E78.49 LPL-RELATED FAMILIAL COMBINED HYPERLIPIDEMIA: ICD-10-CM

## 2024-01-26 DIAGNOSIS — N18.1 CHRONIC KIDNEY DISEASE, STAGE I: ICD-10-CM

## 2024-01-26 DIAGNOSIS — E78.5 DYSLIPIDEMIA: ICD-10-CM

## 2024-01-26 LAB
ALBUMIN SERPL BCP-MCNC: 4.1 G/DL (ref 3.5–5)
ALP SERPL-CCNC: 91 U/L (ref 34–104)
ALT SERPL W P-5'-P-CCNC: 26 U/L (ref 7–52)
ANION GAP SERPL CALCULATED.3IONS-SCNC: 8 MMOL/L
AST SERPL W P-5'-P-CCNC: 19 U/L (ref 13–39)
BACTERIA UR QL AUTO: ABNORMAL /HPF
BASOPHILS # BLD AUTO: 0.02 THOUSANDS/ÂΜL (ref 0–0.1)
BASOPHILS NFR BLD AUTO: 1 % (ref 0–1)
BILIRUB SERPL-MCNC: 0.3 MG/DL (ref 0.2–1)
BILIRUB UR QL STRIP: NEGATIVE
BUN SERPL-MCNC: 17 MG/DL (ref 5–25)
CALCIUM SERPL-MCNC: 9.3 MG/DL (ref 8.4–10.2)
CHLORIDE SERPL-SCNC: 109 MMOL/L (ref 96–108)
CLARITY UR: ABNORMAL
CO2 SERPL-SCNC: 22 MMOL/L (ref 21–32)
COLOR UR: ABNORMAL
CREAT SERPL-MCNC: 0.98 MG/DL (ref 0.6–1.3)
CREAT UR-MCNC: 295.9 MG/DL
CRP SERPL QL: 15.9 MG/L
EOSINOPHIL # BLD AUTO: 0.08 THOUSAND/ÂΜL (ref 0–0.61)
EOSINOPHIL NFR BLD AUTO: 2 % (ref 0–6)
ERYTHROCYTE [DISTWIDTH] IN BLOOD BY AUTOMATED COUNT: 13.1 % (ref 11.6–15.1)
FERRITIN SERPL-MCNC: 181 NG/ML (ref 11–307)
GFR SERPL CREATININE-BSD FRML MDRD: 80 ML/MIN/1.73SQ M
GLUCOSE SERPL-MCNC: 85 MG/DL (ref 65–140)
GLUCOSE UR STRIP-MCNC: NEGATIVE MG/DL
HCT VFR BLD AUTO: 37.5 % (ref 34.8–46.1)
HGB BLD-MCNC: 12 G/DL (ref 11.5–15.4)
HGB UR QL STRIP.AUTO: ABNORMAL
IMM GRANULOCYTES # BLD AUTO: 0.01 THOUSAND/UL (ref 0–0.2)
IMM GRANULOCYTES NFR BLD AUTO: 0 % (ref 0–2)
IRON SATN MFR SERPL: 12 % (ref 15–50)
IRON SERPL-MCNC: 30 UG/DL (ref 50–212)
KETONES UR STRIP-MCNC: NEGATIVE MG/DL
LEUKOCYTE ESTERASE UR QL STRIP: NEGATIVE
LYMPHOCYTES # BLD AUTO: 0.85 THOUSANDS/ÂΜL (ref 0.6–4.47)
LYMPHOCYTES NFR BLD AUTO: 26 % (ref 14–44)
MCH RBC QN AUTO: 27.1 PG (ref 26.8–34.3)
MCHC RBC AUTO-ENTMCNC: 32 G/DL (ref 31.4–37.4)
MCV RBC AUTO: 85 FL (ref 82–98)
MICROALBUMIN UR-MCNC: 55.1 MG/L
MICROALBUMIN/CREAT 24H UR: 19 MG/G CREATININE (ref 0–30)
MONOCYTES # BLD AUTO: 0.31 THOUSAND/ÂΜL (ref 0.17–1.22)
MONOCYTES NFR BLD AUTO: 10 % (ref 4–12)
MUCOUS THREADS UR QL AUTO: ABNORMAL
NEUTROPHILS # BLD AUTO: 2 THOUSANDS/ÂΜL (ref 1.85–7.62)
NEUTS SEG NFR BLD AUTO: 61 % (ref 43–75)
NITRITE UR QL STRIP: NEGATIVE
NON-SQ EPI CELLS URNS QL MICRO: ABNORMAL /HPF
NRBC BLD AUTO-RTO: 0 /100 WBCS
PH UR STRIP.AUTO: 7 [PH]
PLATELET # BLD AUTO: 228 THOUSANDS/UL (ref 149–390)
PMV BLD AUTO: 13.9 FL (ref 8.9–12.7)
POTASSIUM SERPL-SCNC: 3.7 MMOL/L (ref 3.5–5.3)
PROT SERPL-MCNC: 7.2 G/DL (ref 6.4–8.4)
PROT UR STRIP-MCNC: ABNORMAL MG/DL
RBC # BLD AUTO: 4.43 MILLION/UL (ref 3.81–5.12)
RBC #/AREA URNS AUTO: ABNORMAL /HPF
SODIUM SERPL-SCNC: 139 MMOL/L (ref 135–147)
SP GR UR STRIP.AUTO: 1.02 (ref 1–1.03)
TIBC SERPL-MCNC: 247 UG/DL (ref 250–450)
UIBC SERPL-MCNC: 217 UG/DL (ref 155–355)
UROBILINOGEN UR STRIP-ACNC: <2 MG/DL
WBC # BLD AUTO: 3.27 THOUSAND/UL (ref 4.31–10.16)
WBC #/AREA URNS AUTO: ABNORMAL /HPF

## 2024-01-26 PROCEDURE — 83550 IRON BINDING TEST: CPT

## 2024-01-26 PROCEDURE — 36415 COLL VENOUS BLD VENIPUNCTURE: CPT

## 2024-01-26 PROCEDURE — 80053 COMPREHEN METABOLIC PANEL: CPT

## 2024-01-26 PROCEDURE — 86225 DNA ANTIBODY NATIVE: CPT

## 2024-01-26 PROCEDURE — 86235 NUCLEAR ANTIGEN ANTIBODY: CPT

## 2024-01-26 PROCEDURE — 86140 C-REACTIVE PROTEIN: CPT

## 2024-01-26 PROCEDURE — 83540 ASSAY OF IRON: CPT

## 2024-01-26 PROCEDURE — 86038 ANTINUCLEAR ANTIBODIES: CPT

## 2024-01-26 PROCEDURE — 82043 UR ALBUMIN QUANTITATIVE: CPT

## 2024-01-26 PROCEDURE — 85025 COMPLETE CBC W/AUTO DIFF WBC: CPT

## 2024-01-26 PROCEDURE — 82570 ASSAY OF URINE CREATININE: CPT

## 2024-01-26 PROCEDURE — 82728 ASSAY OF FERRITIN: CPT

## 2024-01-26 PROCEDURE — 86039 ANTINUCLEAR ANTIBODIES (ANA): CPT

## 2024-01-26 PROCEDURE — 81001 URINALYSIS AUTO W/SCOPE: CPT

## 2024-01-27 LAB — ANA SER QL IA: POSITIVE

## 2024-01-29 ENCOUNTER — TELEPHONE (OUTPATIENT)
Age: 24
End: 2024-01-29

## 2024-01-29 LAB
ANA HOMOGEN SER QL IF: NORMAL
ANA HOMOGEN TITR SER: NORMAL {TITER}
DSDNA AB SER-ACNC: >10 IU/ML
ENA RNP AB SER IA-ACNC: POSITIVE
ENA SM AB SER IA-ACNC: NEGATIVE
ENA SM+RNP IGG SER-ACNC: NEGATIVE
ENA SS-A AB SER IA-ACNC: NEGATIVE
ENA SS-B AB SER IA-ACNC: NEGATIVE

## 2024-01-29 NOTE — TELEPHONE ENCOUNTER
Dx w/SLE transferring care.      C/O Lupus flare, extreme fatigue, limbs heavy weak, extreme dry mouth, wrist & knee joint pain comes and goes.    01/26/24 News labs - abnormal  Pt stated PCP willing to orders meds if you consult until seen for appt.    Appt 2/23/24 - Please advise if you want to move up.

## 2024-01-30 NOTE — TELEPHONE ENCOUNTER
Upon review of the In Basket request we have found as a result of outreach that patient did not have the requested item(s) completed at location provided.      Any additional questions or concerns should be emailed to the Practice Liaisons via the appropriate education email address, please do not reply via In Basket.    Thank you  Laney Ellison

## 2024-02-02 ENCOUNTER — TELEPHONE (OUTPATIENT)
Age: 24
End: 2024-02-02

## 2024-02-02 NOTE — TELEPHONE ENCOUNTER
Angela requested an insurance referral for her upcoming ophthalmology appointment at the end of February.   The following information was sent to the [facility] auth team.    Patient is requesting an insurance referral for the following specialty: Ophthalmology     Test Name / Order Name:     DX Code: not given    Date Of Service: 2/28/2024    Location/Facility Name/Address/Phone #: [facility], [address] [address]. [phone]    Location / Facility NPI: [id number]    Best Phone # To Reach The Patient:  [phone]    Please fax referral to: [phone]

## 2024-02-09 ENCOUNTER — TELEPHONE (OUTPATIENT)
Dept: RHEUMATOLOGY | Facility: CLINIC | Age: 24
End: 2024-02-09

## 2024-02-23 ENCOUNTER — APPOINTMENT (OUTPATIENT)
Dept: LAB | Facility: CLINIC | Age: 24
End: 2024-02-23
Payer: OTHER GOVERNMENT

## 2024-02-23 ENCOUNTER — TRANSCRIBE ORDERS (OUTPATIENT)
Dept: RADIOLOGY | Facility: HOSPITAL | Age: 24
End: 2024-02-23

## 2024-02-23 ENCOUNTER — OFFICE VISIT (OUTPATIENT)
Dept: RHEUMATOLOGY | Facility: CLINIC | Age: 24
End: 2024-02-23

## 2024-02-23 VITALS
WEIGHT: 153.1 LBS | BODY MASS INDEX: 28.17 KG/M2 | SYSTOLIC BLOOD PRESSURE: 118 MMHG | DIASTOLIC BLOOD PRESSURE: 78 MMHG | HEIGHT: 62 IN

## 2024-02-23 DIAGNOSIS — Z11.59 NEED FOR HEPATITIS B SCREENING TEST: ICD-10-CM

## 2024-02-23 DIAGNOSIS — M25.50 POLYARTHRALGIA: ICD-10-CM

## 2024-02-23 DIAGNOSIS — N18.2 STAGE 2 CHRONIC KIDNEY DISEASE: Primary | ICD-10-CM

## 2024-02-23 DIAGNOSIS — R76.8 POSITIVE DOUBLE STRANDED DNA ANTIBODY TEST: ICD-10-CM

## 2024-02-23 DIAGNOSIS — G89.29 CHRONIC BILATERAL LOW BACK PAIN WITHOUT SCIATICA: ICD-10-CM

## 2024-02-23 DIAGNOSIS — Z79.899 LONG-TERM USE OF PLAQUENIL: ICD-10-CM

## 2024-02-23 DIAGNOSIS — G89.29 OTHER CHRONIC PAIN: ICD-10-CM

## 2024-02-23 DIAGNOSIS — R76.8 ANTI-RNP ANTIBODIES PRESENT: ICD-10-CM

## 2024-02-23 DIAGNOSIS — Z11.1 ENCOUNTER FOR SCREENING FOR RESPIRATORY TUBERCULOSIS: ICD-10-CM

## 2024-02-23 DIAGNOSIS — M32.9 SYSTEMIC LUPUS ERYTHEMATOSUS, UNSPECIFIED SLE TYPE, UNSPECIFIED ORGAN INVOLVEMENT STATUS (HCC): Primary | ICD-10-CM

## 2024-02-23 DIAGNOSIS — M54.50 CHRONIC BILATERAL LOW BACK PAIN WITHOUT SCIATICA: ICD-10-CM

## 2024-02-23 DIAGNOSIS — M35.1 MCTD (MIXED CONNECTIVE TISSUE DISEASE) (HCC): ICD-10-CM

## 2024-02-23 DIAGNOSIS — I73.00 RAYNAUD'S DISEASE WITHOUT GANGRENE: ICD-10-CM

## 2024-02-23 LAB
C3 SERPL-MCNC: 134 MG/DL (ref 87–200)
C4 SERPL-MCNC: 35 MG/DL (ref 19–52)
CRP SERPL QL: 4.3 MG/L
ERYTHROCYTE [SEDIMENTATION RATE] IN BLOOD: 23 MM/HOUR (ref 0–19)
INR PPP: 0.93 (ref 0.84–1.19)
PROTHROMBIN TIME: 13 SECONDS (ref 11.6–14.5)

## 2024-02-23 PROCEDURE — 86225 DNA ANTIBODY NATIVE: CPT

## 2024-02-23 PROCEDURE — 86704 HEP B CORE ANTIBODY TOTAL: CPT | Performed by: PHYSICIAN ASSISTANT

## 2024-02-23 PROCEDURE — 86705 HEP B CORE ANTIBODY IGM: CPT | Performed by: PHYSICIAN ASSISTANT

## 2024-02-23 PROCEDURE — 86235 NUCLEAR ANTIGEN ANTIBODY: CPT

## 2024-02-23 PROCEDURE — 86039 ANTINUCLEAR ANTIBODIES (ANA): CPT

## 2024-02-23 PROCEDURE — 86235 NUCLEAR ANTIGEN ANTIBODY: CPT | Performed by: PHYSICIAN ASSISTANT

## 2024-02-23 PROCEDURE — 85610 PROTHROMBIN TIME: CPT

## 2024-02-23 PROCEDURE — 86430 RHEUMATOID FACTOR TEST QUAL: CPT | Performed by: PHYSICIAN ASSISTANT

## 2024-02-23 PROCEDURE — 85670 THROMBIN TIME PLASMA: CPT | Performed by: PHYSICIAN ASSISTANT

## 2024-02-23 PROCEDURE — 86038 ANTINUCLEAR ANTIBODIES: CPT

## 2024-02-23 PROCEDURE — 86147 CARDIOLIPIN ANTIBODY EA IG: CPT

## 2024-02-23 PROCEDURE — 85732 THROMBOPLASTIN TIME PARTIAL: CPT | Performed by: PHYSICIAN ASSISTANT

## 2024-02-23 PROCEDURE — 86225 DNA ANTIBODY NATIVE: CPT | Performed by: PHYSICIAN ASSISTANT

## 2024-02-23 PROCEDURE — 86480 TB TEST CELL IMMUN MEASURE: CPT | Performed by: PHYSICIAN ASSISTANT

## 2024-02-23 PROCEDURE — 86140 C-REACTIVE PROTEIN: CPT | Performed by: PHYSICIAN ASSISTANT

## 2024-02-23 PROCEDURE — 86200 CCP ANTIBODY: CPT | Performed by: PHYSICIAN ASSISTANT

## 2024-02-23 PROCEDURE — 86803 HEPATITIS C AB TEST: CPT | Performed by: PHYSICIAN ASSISTANT

## 2024-02-23 PROCEDURE — 87340 HEPATITIS B SURFACE AG IA: CPT | Performed by: PHYSICIAN ASSISTANT

## 2024-02-23 PROCEDURE — 86162 COMPLEMENT TOTAL (CH50): CPT

## 2024-02-23 PROCEDURE — 85652 RBC SED RATE AUTOMATED: CPT | Performed by: PHYSICIAN ASSISTANT

## 2024-02-23 PROCEDURE — 86146 BETA-2 GLYCOPROTEIN ANTIBODY: CPT | Performed by: PHYSICIAN ASSISTANT

## 2024-02-23 PROCEDURE — 85705 THROMBOPLASTIN INHIBITION: CPT | Performed by: PHYSICIAN ASSISTANT

## 2024-02-23 PROCEDURE — 86037 ANCA TITER EACH ANTIBODY: CPT

## 2024-02-23 PROCEDURE — 86160 COMPLEMENT ANTIGEN: CPT | Performed by: PHYSICIAN ASSISTANT

## 2024-02-23 PROCEDURE — 36415 COLL VENOUS BLD VENIPUNCTURE: CPT

## 2024-02-23 PROCEDURE — 85613 RUSSELL VIPER VENOM DILUTED: CPT | Performed by: PHYSICIAN ASSISTANT

## 2024-02-23 PROCEDURE — 83520 IMMUNOASSAY QUANT NOS NONAB: CPT

## 2024-02-23 RX ORDER — SIMVASTATIN 20 MG
20 TABLET ORAL EVERY EVENING
COMMUNITY
Start: 2024-02-06

## 2024-02-23 RX ORDER — HYDROXYCHLOROQUINE SULFATE 200 MG/1
300 TABLET, FILM COATED ORAL
Qty: 135 TABLET | Refills: 3 | Status: SHIPPED | OUTPATIENT
Start: 2024-02-23 | End: 2024-05-23

## 2024-02-23 NOTE — PROGRESS NOTES
Assessment and Plan:  Ms. Lemus is a 23-year-old female with past medical history significant for systemic lupus erythematosus, Raynaud's disease, hx bowel obstruction with ileocecal resection pathology (2022) suggestive of Crohn's, RUSLAN, and MDD who presents for rheumatology evaluation of systemic lupus erythematosus.    Angela was initially diagnosed with systemic lupus while hospitalized giving birth to her child in 2021.  She experienced features of facial swelling and severe arthralgias as well as preeclampsia.  She has followed with at least 2 rheumatologists thus far (1 while in the  and another in [place]) and has been on medications including HCQ, azathioprine (discontinued due to elevated LFTs), and Benlysta subcutaneous injections (discontinued due to ineffectiveness).  There were plans to begin Saphnelo, but then she transferred care.  She reports that she was also given a diagnosis of discoid lupus due to rashes that have appeared on her legs.  Most recent labs from January 2024 revealed ARON 1:1280 in a speckled pattern, dsDNA > 10, +RNP ab, CRP 15.9, 2+ protein, large blood, eGFR 80. Since her last visit with rheumatology approximately 6 months ago, she has remained on hydroxychloroquine 400 mg daily, but her symptoms have worsened.  She presently has features of severe fatigue, arthralgias, Raynaud's phenomenon (although well-controlled), and dry eye.    We discussed in detail that I would like her to decrease the dose of hydroxychloroquine to 300 mg once daily since this is presently the appropriate weight-based dose.  She should continue amlodipine and sildenafil for Raynaud's phenomenon, which seems to be well-controlled at the moment.  She will establish care with ophthalmology in this area for Plaquenil toxicity monitoring.  I also discussed her case in detail after the visit with her primary nephrologist, Dr. Mary Martino ([facility]--[phone]).  She is being  scheduled for kidney biopsy urgently due to concern for glomerulonephritis and therefore I will wait to add any additional immunosuppression until we have these results.  She also mentioned that the patient has overlap of MCTD (which I agree with based on the positive RNP antibodies and Raynaud's phenomenon) and severe vasculitis.    Update lupus activity monitoring labs now as well as complete set of serologies which are unavailable in the chart.    Dr. Martino and I will be in touch once kidney biopsy results and determine the plan for medication management.    I also placed referral in the chart for pain management as requested by the patient for chronic low back pain.    Follow-up in 3 months, or sooner as needed    Plan:  Diagnoses and all orders for this visit:    Systemic lupus erythematosus, unspecified SLE type, unspecified organ involvement status ([facility])  -     Ambulatory Referral to Rheumatology  -     Ambulatory Referral to Rheumatology  -     C3 complement  -     C4 complement  -     Sedimentation rate, automated  -     C-reactive protein  -     Chronic Hepatitis Panel  -     Quantiferon TB Gold Plus Assay  -     Cancel: RNP Antibody  -     Anti-DNA antibody, double-stranded  -     Cancel: Anticardiolipin Ab, IgG/M, Qn  -     Beta-2 glycoprotein antibodies  -     Histone Antibody  -     Cyclic citrul peptide antibody, IgG  -     RF Screen w/ Reflex to Titer  -     Nuclear antigen antibody  -     Lupus anticoagulant  -     Centromere Antibody  -     hydroxychloroquine (PLAQUENIL) 200 mg tablet; Take 1.5 tablets (300 mg total) by mouth daily with breakfast  -     Cancel: ANCA Screen With MPO and PR3 With Reflex To ANCA Titer    MCTD (mixed connective tissue disease) ([facility])    Anti-RNP antibodies present  -     C3 complement  -     C4 complement  -     Sedimentation rate, automated  -     C-reactive protein  -     Chronic Hepatitis Panel  -     Quantiferon TB Gold Plus Assay  -     Cancel: RNP Antibody  -      Anti-DNA antibody, double-stranded  -     Cancel: Anticardiolipin Ab, IgG/M, Qn  -     Beta-2 glycoprotein antibodies  -     Histone Antibody  -     Cyclic citrul peptide antibody, IgG  -     RF Screen w/ Reflex to Titer  -     Nuclear antigen antibody  -     Lupus anticoagulant  -     Centromere Antibody    Positive double stranded DNA antibody test  -     C3 complement  -     C4 complement  -     Sedimentation rate, automated  -     C-reactive protein  -     Chronic Hepatitis Panel  -     Quantiferon TB Gold Plus Assay  -     Cancel: RNP Antibody  -     Anti-DNA antibody, double-stranded  -     Cancel: Anticardiolipin Ab, IgG/M, Qn  -     Beta-2 glycoprotein antibodies  -     Histone Antibody  -     Cyclic citrul peptide antibody, IgG  -     RF Screen w/ Reflex to Titer  -     Nuclear antigen antibody  -     Lupus anticoagulant  -     Centromere Antibody    Need for hepatitis B screening test  -     Chronic Hepatitis Panel    Encounter for screening for respiratory tuberculosis  -     Quantiferon TB Gold Plus Assay    Long-term use of Plaquenil  -     hydroxychloroquine (PLAQUENIL) 200 mg tablet; Take 1.5 tablets (300 mg total) by mouth daily with breakfast    Polyarthralgia  -     C3 complement  -     C4 complement  -     Sedimentation rate, automated  -     C-reactive protein  -     Chronic Hepatitis Panel  -     Quantiferon TB Gold Plus Assay  -     Cancel: RNP Antibody  -     Anti-DNA antibody, double-stranded  -     Cancel: Anticardiolipin Ab, IgG/M, Qn  -     Beta-2 glycoprotein antibodies  -     Histone Antibody  -     Cyclic citrul peptide antibody, IgG  -     RF Screen w/ Reflex to Titer  -     Nuclear antigen antibody  -     Lupus anticoagulant  -     Centromere Antibody    Chronic bilateral low back pain without sciatica  -     Ambulatory referral to Spine & Pain Management    Other chronic pain  -     Ambulatory referral to Spine & Pain Management    Raynaud's disease without gangrene    Other  "orders  -     simvastatin (ZOCOR) 20 mg tablet; Take 20 mg by mouth every evening        I have personally reviewed prior notes, recent laboratory results, and pertinent films in PACS.     Activities as tolerated.   Exercise: try to maintain a low impact exercise regimen as much as possible. Walk for 30 minutes a day for at least 3 days a week.   Continue other medications as prescribed by PCP and other specialists.       RTC in 3 mo      Follow-up plan: 3 mo        Chief Complaint  No chief complaint on file.  \"I was diagnosed in \"      HPI  Angela Lemus is a 23 y.o.  female who presents as a Rheumatology consult referred by Estrada Jimenez DO for evaluation of SLE.    Reports that her lupus is presently uncontrolled.  She feels that her symptoms are worse over the last few months.  She states that she is continued only on hydroxychloroquine.  She experiences the symptoms as stated below as well as arthralgias of the hands and feet as well as the neck and low back.  No prolonged morning stiffness, but occasional swelling of the hands.    States that she takes amlodipine and sildenafil for Raynaud's phenomenon.    + Fatigue, night sweats, dry eye (uses re-wetting drops), rashes on the legs (reports diagnosis of discoid lupus), 1 miscarriage (), Raynaud's phenomenon, GERD, oral ulcers, constipation with some blood in the stool (which she attributes to iron supplementation)    Family history of mother with lupus    Denies fevers, unintentional weight loss, dry mouth, inflammatory eye disease, psoriasis, photosensitivity, nose ulcers, swollen glands, pleuritic chest pain, abdominal pain, vomiting, diarrhea, blood in stools, inflammatory bowel disease, blood clots    Review of Systems  Review of Systems  Constitutional: +fatigue, night sweats. Negative for weight change, fevers, chills.  ENT/Mouth: Negative for hearing changes, ear pain, nasal congestion, sinus pain, hoarseness, sore throat, rhinorrhea, " swallowing difficulty.   Eyes: Negative for pain, redness, discharge, vision changes.   Cardiovascular: Negative for chest pain, SOB, palpitations.   Respiratory: Negative for cough, sputum, wheezing, dyspnea.   Gastrointestinal: +constipation, GERD. Negative for nausea, vomiting, diarrhea  Genitourinary: Negative for dysuria, urinary frequency, hematuria.   Musculoskeletal: As per HPI.  Skin: +skin rash, color changes.   Neuro: Negative for weakness, numbness, tingling, loss of consciousness.   Psych: +MDD, PTSD  Heme/Lymph: Negative for easy bruising, bleeding, lymphadenopathy.      Allergies  No Known Allergies    Home Medications    Current Outpatient Medications:     amLODIPine (NORVASC) 10 mg tablet, Take 10 mg by mouth daily, Disp: , Rfl:     Cyanocobalamin (VITAMIN B 12 PO), Take 500 mcg by mouth 2 (two) times a day, Disp: , Rfl:     cyclobenzaprine (FLEXERIL) 10 mg tablet, Take 1 tablet (10 mg total) by mouth 3 (three) times a day as needed for muscle spasms, Disp: 30 tablet, Rfl: 0    ferrous sulfate 325 (65 Fe) mg tablet, Take 325 mg by mouth daily with breakfast, Disp: , Rfl:     hydroxychloroquine (PLAQUENIL) 200 mg tablet, Take 1.5 tablets (300 mg total) by mouth daily with breakfast, Disp: 135 tablet, Rfl: 3    olmesartan (BENICAR) 5 mg tablet, Take 10 mg by mouth daily, Disp: , Rfl:     omeprazole (PriLOSEC) 20 mg delayed release capsule, Take 1 capsule (20 mg total) by mouth daily, Disp: 30 capsule, Rfl: 3    phentermine (ADIPEX-P) 37.5 MG tablet, Take 37.5 mg by mouth in the morning, Disp: , Rfl:     sildenafil (VIAGRA) 25 MG tablet, Take 25 mg by mouth daily as needed for erectile dysfunction, Disp: , Rfl:     simvastatin (ZOCOR) 20 mg tablet, Take 20 mg by mouth every evening, Disp: , Rfl:     topiramate (TOPAMAX) 100 mg tablet, Take 100 mg by mouth daily, Disp: , Rfl:     traZODone (DESYREL) 50 mg tablet, Take 1 tablet by mouth daily at bedtime, Disp: , Rfl:     venlafaxine (EFFEXOR-XR) 150 mg  "24 hr capsule, Take 150 mg by mouth daily, Disp: , Rfl:     aspirin 81 mg chewable tablet, , Disp: , Rfl:     bromocriptine (PARLODEL) 2.5 mg tablet, , Disp: , Rfl:     hydrOXYzine HCL (ATARAX) 10 mg tablet, Take 10 mg by mouth 2 (two) times a day (Patient not taking: Reported on 2024), Disp: , Rfl:     vitamin B-12 (VITAMIN B-12) 1,000 mcg tablet, Take 2,000 mcg by mouth daily (Patient not taking: Reported on 2023), Disp: , Rfl:     Past Medical History  Past Medical History:   Diagnosis Date    Bowel obstruction (HCC)     Clostridium difficile infection         Crohn's disease (HCC)     Depression     Hypertension     Kidney disease, chronic, stage I (GFR over 89 ml/min)     Lupus (HCC)     PTSD (post-traumatic stress disorder)     Raynaud disease        Past Surgical History   Past Surgical History:   Procedure Laterality Date    ABDOMINAL SURGERY      bowel obstruction     SECTION N/A     COLON SURGERY      WISDOM TOOTH EXTRACTION         Family History    Family History   Problem Relation Age of Onset    Lupus Mother     Diabetes Father     COPD Maternal Grandmother     Heart failure Maternal Grandmother     Substance Abuse Maternal Grandmother     Breast cancer Other          Social History  Occupation: nurse  Social History     Substance and Sexual Activity   Alcohol Use Not Currently    Comment: socially     Social History     Substance and Sexual Activity   Drug Use Never     Social History     Tobacco Use   Smoking Status Never   Smokeless Tobacco Never       Objective:  Vitals:    24 1434   BP: 118/78   Weight: 69.4 kg (153 lb 1.6 oz)   Height: 5' 2\" (1.575 m)       Physical Exam  General: Well appearing, well nourished, in no acute distress. Oriented x 3, normal mood and affect.  Ambulating without difficulty.  Skin: + Hyperpigmented areas on the bilateral thighs  Hair: Normal texture and distribution.  Nails: Normal color, no deformities.  HEENT:  Head: Normocephalic, " atraumatic.  Eyes: Conjunctiva clear, sclera non-icteric, EOM intact.  Nose: No external lesions, mucosa non-inflamed.  Mouth: Mucous membranes moist, no mucosal lesions.  Neck: Supple   Musculoskeletal:   No asymmetry or deformities noted of bilateral upper and lower extremities.  No pain or swelling of joints bilaterally to include: shoulder, elbow, wrist, MCP I-V, PIP I-V, knee, ankle, MTP I-V.  Neurologic: Alert and oriented. No focal neurological deficits appreciated.   Psychiatric: Normal mood and affect.       Reviewed labs and imaging.    Imaging:   No results found.     Labs:   Transcribe Orders on 01/26/2024   Component Date Value Ref Range Status    WBC 01/26/2024 3.27 (L)  4.31 - 10.16 Thousand/uL Final    RBC 01/26/2024 4.43  3.81 - 5.12 Million/uL Final    Hemoglobin 01/26/2024 12.0  11.5 - 15.4 g/dL Final    Hematocrit 01/26/2024 37.5  34.8 - 46.1 % Final    MCV 01/26/2024 85  82 - 98 fL Final    MCH 01/26/2024 27.1  26.8 - 34.3 pg Final    MCHC 01/26/2024 32.0  31.4 - 37.4 g/dL Final    RDW 01/26/2024 13.1  11.6 - 15.1 % Final    MPV 01/26/2024 13.9 (H)  8.9 - 12.7 fL Final    Platelets 01/26/2024 228  149 - 390 Thousands/uL Final    nRBC 01/26/2024 0  /100 WBCs Final    Neutrophils Relative 01/26/2024 61  43 - 75 % Final    Immat GRANS % 01/26/2024 0  0 - 2 % Final    Lymphocytes Relative 01/26/2024 26  14 - 44 % Final    Monocytes Relative 01/26/2024 10  4 - 12 % Final    Eosinophils Relative 01/26/2024 2  0 - 6 % Final    Basophils Relative 01/26/2024 1  0 - 1 % Final    Neutrophils Absolute 01/26/2024 2.00  1.85 - 7.62 Thousands/µL Final    Immature Grans Absolute 01/26/2024 0.01  0.00 - 0.20 Thousand/uL Final    Lymphocytes Absolute 01/26/2024 0.85  0.60 - 4.47 Thousands/µL Final    Monocytes Absolute 01/26/2024 0.31  0.17 - 1.22 Thousand/µL Final    Eosinophils Absolute 01/26/2024 0.08  0.00 - 0.61 Thousand/µL Final    Basophils Absolute 01/26/2024 0.02  0.00 - 0.10 Thousands/µL Final    Sodium  01/26/2024 139  135 - 147 mmol/L Final    Potassium 01/26/2024 3.7  3.5 - 5.3 mmol/L Final    Chloride 01/26/2024 109 (H)  96 - 108 mmol/L Final    CO2 01/26/2024 22  21 - 32 mmol/L Final    ANION GAP 01/26/2024 8  mmol/L Final    BUN 01/26/2024 17  5 - 25 mg/dL Final    Creatinine 01/26/2024 0.98  0.60 - 1.30 mg/dL Final    Standardized to IDMS reference method    Glucose 01/26/2024 85  65 - 140 mg/dL Final    If the patient is fasting, the ADA then defines impaired fasting glucose as > 100 mg/dL and diabetes as > or equal to 123 mg/dL.    Calcium 01/26/2024 9.3  8.4 - 10.2 mg/dL Final    AST 01/26/2024 19  13 - 39 U/L Final    ALT 01/26/2024 26  7 - 52 U/L Final    Specimen collection should occur prior to Sulfasalazine administration due to the potential for falsely depressed results.     Alkaline Phosphatase 01/26/2024 91  34 - 104 U/L Final    Total Protein 01/26/2024 7.2  6.4 - 8.4 g/dL Final    Albumin 01/26/2024 4.1  3.5 - 5.0 g/dL Final    Total Bilirubin 01/26/2024 0.30  0.20 - 1.00 mg/dL Final    Use of this assay is not recommended for patients undergoing treatment with eltrombopag due to the potential for falsely elevated results.  N-acetyl-p-benzoquinone imine (metabolite of Acetaminophen) will generate erroneously low results in samples for patients that have taken an overdose of Acetaminophen.    eGFR 01/26/2024 80  ml/min/1.73sq m Final    ARON 01/26/2024 Positive (A)  Negative Final    CRP 01/26/2024 15.9 (H)  <3.0 mg/L Final    Creatinine, Ur 01/26/2024 295.9  Reference range not established. mg/dL Final    Albumin,U,Random 01/26/2024 55.1 (H)  <20.0 mg/L Final    Albumin Creat Ratio 01/26/2024 19  0 - 30 mg/g creatinine Final    Color, UA 01/26/2024 Light Orange   Final    Clarity, UA 01/26/2024 Turbid   Final    Specific Gravity, UA 01/26/2024 1.023  1.003 - 1.030 Final    pH, UA 01/26/2024 7.0  4.5, 5.0, 5.5, 6.0, 6.5, 7.0, 7.5, 8.0 Final    Leukocytes, UA 01/26/2024 Negative  Negative Final     Nitrite, UA 01/26/2024 Negative  Negative Final    Protein, UA 01/26/2024 200 (2+) (A)  Negative mg/dl Final    Glucose, UA 01/26/2024 Negative  Negative mg/dl Final    Ketones, UA 01/26/2024 Negative  Negative mg/dl Final    Urobilinogen, UA 01/26/2024 <2.0  <2.0 mg/dl mg/dl Final    Bilirubin, UA 01/26/2024 Negative  Negative Final    Occult Blood, UA 01/26/2024 Large (A)  Negative Final    Iron Saturation 01/26/2024 12 (L)  15 - 50 % Final    TIBC 01/26/2024 247 (L)  250 - 450 ug/dL Final    Iron 01/26/2024 30 (L)  50 - 212 ug/dL Final    Patients treated with metal-binding drugs (ie. Deferoxamine) may have depressed iron values.    UIBC 01/26/2024 217  155 - 355 ug/dL Final    Ferritin 01/26/2024 181  11 - 307 ng/mL Final    RBC, UA 01/26/2024 Innumerable (A)  None Seen, 1-2 /hpf Final    WBC, UA 01/26/2024 2-4 (A)  None Seen, 1-2 /hpf Final    Epithelial Cells 01/26/2024 Occasional  None Seen, Occasional /hpf Final    Bacteria, UA 01/26/2024 Occasional  None Seen, Occasional /hpf Final    MUCUS THREADS 01/26/2024 Innumerable (A)  None Seen Final    ARON Titer 1 01/26/2024 Titer greater than or equal to 1280   Final    ARON Pattern 1 01/26/2024 Speckled pattern   Final    Anti-dsDNA Quant 01/26/2024 >10 (H)  4 - 10 IU/mL Final    Negative      <=4  IU/mL  Indeterminate 5-9  IU/mL  Positive      >=10 IU/mL    Sjogren's Anti-SS-A 01/26/2024 Negative  Negative Final    Sjogren's Anti-SS-B 01/26/2024 Negative  Negative Final    Duarte Antibodies 01/26/2024 Negative  Negative Final    Duarte/RNP Antibodies 01/26/2024 Negative  Negative Final    RNP Antibodies 01/26/2024 Positive (A)  Negative Final   Appointment on 12/06/2023   Component Date Value Ref Range Status    Syphilis Total Antibody 12/06/2023 Non-reactive  Non-Reactive Final    No serological evidence of infection with T. pallidum.  Early or incubating syphilis infection cannot be excluded.  Consider repeat testing based on clinical suspicion.    HIV-1 p24  Antigen 12/06/2023 Non-Reactive  Non-Reactive Final    HIV-1 Antibody 12/06/2023 Non-Reactive  Non-Reactive Final    HIV-2 Antibody 12/06/2023 Non-Reactive  Non-Reactive Final    HIV Ag-Ab 5th Gen 12/06/2023 Non-Reactive  Non-Reactive Final    A Non-Reactive test result does not preclude the possibility of exposure or infection with HIV-1 and/or HIV-2.  Non-Reactive results can occur if the quantity of marker present is below the detection limits or is not present during the stage of disease in which a sample is collected. Repeat testing should be considered where there is clinical suspicion of infection.       Hepatitis B Surface Ag 12/06/2023 Non-reactive  Non-Reactive Final   Office Visit on 12/06/2023   Component Date Value Ref Range Status    Color, UA 12/06/2023 Yellow   Final    Clarity, UA 12/06/2023 Clear   Final    Specific Gravity, UA 12/06/2023 1.015  1.005 - 1.030 Final    pH, UA 12/06/2023 7.0  4.5, 5.0, 5.5, 6.0, 6.5, 7.0, 7.5, 8.0 Final    Leukocytes, UA 12/06/2023 Negative  Negative Final    Nitrite, UA 12/06/2023 Negative  Negative Final    Protein, UA 12/06/2023 Trace (A)  Negative mg/dl Final    Glucose, UA 12/06/2023 Negative  Negative mg/dl Final    Ketones, UA 12/06/2023 Negative  Negative mg/dl Final    Urobilinogen, UA 12/06/2023 <2.0  <2.0 mg/dl mg/dl Final    Bilirubin, UA 12/06/2023 Negative  Negative Final    Occult Blood, UA 12/06/2023 Negative  Negative Final    Miscellaneous Lab Test Result 12/06/2023 See written report from [facility]   Final    RBC, UA 12/06/2023 None Seen  None Seen, 1-2 /hpf Final    WBC, UA 12/06/2023 None Seen  None Seen, 1-2 /hpf Final    Epithelial Cells 12/06/2023 Occasional  None Seen, Occasional /hpf Final    Bacteria, UA 12/06/2023 Occasional  None Seen, Occasional /hpf Final    MUCUS THREADS 12/06/2023 Occasional (A)  None Seen Final   Appointment on 12/05/2023   Component Date Value Ref Range Status    Vitamin B-12 12/05/2023 523  180 - 914 pg/mL Final     WBC 12/05/2023 4.99  4.31 - 10.16 Thousand/uL Final    RBC 12/05/2023 4.25  3.81 - 5.12 Million/uL Final    Hemoglobin 12/05/2023 11.8  11.5 - 15.4 g/dL Final    Hematocrit 12/05/2023 36.9  34.8 - 46.1 % Final    MCV 12/05/2023 87  82 - 98 fL Final    MCH 12/05/2023 27.8  26.8 - 34.3 pg Final    MCHC 12/05/2023 32.0  31.4 - 37.4 g/dL Final    RDW 12/05/2023 14.2  11.6 - 15.1 % Final    MPV 12/05/2023 11.9  8.9 - 12.7 fL Final    Platelets 12/05/2023 278  149 - 390 Thousands/uL Final    nRBC 12/05/2023 0  /100 WBCs Final    Neutrophils Relative 12/05/2023 59  43 - 75 % Final    Immat GRANS % 12/05/2023 0  0 - 2 % Final    Lymphocytes Relative 12/05/2023 32  14 - 44 % Final    Monocytes Relative 12/05/2023 8  4 - 12 % Final    Eosinophils Relative 12/05/2023 1  0 - 6 % Final    Basophils Relative 12/05/2023 0  0 - 1 % Final    Neutrophils Absolute 12/05/2023 2.92  1.85 - 7.62 Thousands/µL Final    Immature Grans Absolute 12/05/2023 0.01  0.00 - 0.20 Thousand/uL Final    Lymphocytes Absolute 12/05/2023 1.59  0.60 - 4.47 Thousands/µL Final    Monocytes Absolute 12/05/2023 0.39  0.17 - 1.22 Thousand/µL Final    Eosinophils Absolute 12/05/2023 0.07  0.00 - 0.61 Thousand/µL Final    Basophils Absolute 12/05/2023 0.01  0.00 - 0.10 Thousands/µL Final    Cholesterol 12/05/2023 158  See Comment mg/dL Final    Cholesterol:         Pediatric <18 Years        Desirable          <170 mg/dL      Borderline High    170-199 mg/dL      High               >=200 mg/dL        Adult >=18 Years            Desirable         <200 mg/dL      Borderline High   200-239 mg/dL      High              >239 mg/dL      Triglycerides 12/05/2023 240 (H)  See Comment mg/dL Final    Triglyceride:     0-9Y            <75mg/dL     10Y-17Y         <90 mg/dL       >=18Y     Normal          <150 mg/dL     Borderline High 150-199 mg/dL     High            200-499 mg/dL        Very High       >499 mg/dL    Specimen collection should occur prior to Metamizole  administration due to the potential for falsely depressed results.    HDL, Direct 12/05/2023 46 (L)  >=50 mg/dL Final    LDL Calculated 12/05/2023 64  0 - 100 mg/dL Final    LDL Cholesterol:     Optimal           <100 mg/dl     Near Optimal      100-129 mg/dl     Above Optimal       Borderline High 130-159 mg/dl       High            160-189 mg/dl       Very High       >189 mg/dl         This screening LDL is a calculated result.   It does not have the accuracy of the Direct Measured LDL in the monitoring of patients with hyperlipidemia and/or statin therapy.   Direct Measure LDL ([id number]) must be ordered separately in these patients.    Non-HDL-Chol (CHOL-HDL) 12/05/2023 112  mg/dl Final    Sodium 12/05/2023 143  135 - 147 mmol/L Final    Potassium 12/05/2023 3.7  3.5 - 5.3 mmol/L Final    Chloride 12/05/2023 108  96 - 108 mmol/L Final    CO2 12/05/2023 27  21 - 32 mmol/L Final    ANION GAP 12/05/2023 8  mmol/L Final    BUN 12/05/2023 16  5 - 25 mg/dL Final    Creatinine 12/05/2023 0.69  0.60 - 1.30 mg/dL Final    Standardized to IDMS reference method    Glucose 12/05/2023 84  65 - 140 mg/dL Final    If the patient is fasting, the ADA then defines impaired fasting glucose as > 100 mg/dL and diabetes as > or equal to 123 mg/dL.    Calcium 12/05/2023 9.3  8.4 - 10.2 mg/dL Final    AST 12/05/2023 21  13 - 39 U/L Final    ALT 12/05/2023 21  7 - 52 U/L Final    Specimen collection should occur prior to Sulfasalazine administration due to the potential for falsely depressed results.     Alkaline Phosphatase 12/05/2023 87  34 - 104 U/L Final    Total Protein 12/05/2023 7.0  6.4 - 8.4 g/dL Final    Albumin 12/05/2023 4.2  3.5 - 5.0 g/dL Final    Total Bilirubin 12/05/2023 0.24  0.20 - 1.00 mg/dL Final    Use of this assay is not recommended for patients undergoing treatment with eltrombopag due to the potential for falsely elevated results.  N-acetyl-p-benzoquinone imine (metabolite of Acetaminophen) will generate  erroneously low results in samples for patients that have taken an overdose of Acetaminophen.    eGFR 12/05/2023 122  ml/min/1.73sq m Final    Vit D, 25-Hydroxy 12/05/2023 16.1 (L)  30.0 - 100.0 ng/mL Final    Vitamin D guidelines established by Clinical Guidelines Subcommittee  of the Endocrine Society Task Force, 2011    Deficiency <20ng/ml   Insufficiency 20-30ng/ml   Sufficient  ng/ml     Lipoprotein (a) 12/05/2023 68.2  <75.0 nmol/L Final    Note:  Values greater than or equal to 75.0 nmol/L may         indicate an independent risk factor for CHD,         but must be evaluated with caution when applied         to non- populations due to the         influence of genetic factors on Lp(a) across         ethnicities.    TSH 3RD GENERATON 12/05/2023 0.853  0.450 - 4.500 uIU/mL Final    The recommended reference ranges for TSH during pregnancy are as follows:   First trimester 0.100 to 2.500 uIU/mL   Second trimester  0.200 to 3.000 uIU/mL   Third trimester 0.300 to 3.000 uIU/m    Note: Normal ranges may not apply to patients who are transgender, non-binary, or whose legal sex, sex at birth, and gender identity differ.  Adult TSH (3rd generation) reference range follows the recommended guidelines of the [employer], January, 2020.    Free T4 12/05/2023 0.50 (L)  0.61 - 1.12 ng/dL Final    Specimens with biotin concentrations > 10 ng/mL can lead to significant (> 10%) positive bias in result.   Transcribe Orders on 12/05/2023   Component Date Value Ref Range Status    Color, UA 12/05/2023 Yellow   Final    Clarity, UA 12/05/2023 Clear   Final    Specific Gravity, UA 12/05/2023 1.023  1.003 - 1.030 Final    pH, UA 12/05/2023 6.5  4.5, 5.0, 5.5, 6.0, 6.5, 7.0, 7.5, 8.0 Final    Leukocytes, UA 12/05/2023 Trace (A)  Negative Final    Nitrite, UA 12/05/2023 Negative  Negative Final    Protein, UA 12/05/2023 70 (1+) (A)  Negative mg/dl Final    Glucose, UA 12/05/2023 Negative  Negative mg/dl  Final    Ketones, UA 12/05/2023 Negative  Negative mg/dl Final    Urobilinogen, UA 12/05/2023 <2.0  <2.0 mg/dl mg/dl Final    Bilirubin, UA 12/05/2023 Negative  Negative Final    Occult Blood, UA 12/05/2023 Negative  Negative Final    Creatinine, Ur 12/05/2023 217.2  Reference range not established. mg/dL Final    Albumin,U,Random 12/05/2023 12.7  <20.0 mg/L Final    Albumin Creat Ratio 12/05/2023 6  0 - 30 mg/g creatinine Final    RBC, UA 12/05/2023 None Seen  None Seen, 1-2 /hpf Final    WBC, UA 12/05/2023 2-4 (A)  None Seen, 1-2 /hpf Final    Epithelial Cells 12/05/2023 Occasional  None Seen, Occasional /hpf Final    Bacteria, UA 12/05/2023 Occasional  None Seen, Occasional /hpf Final    MUCUS THREADS 12/05/2023 Innumerable (A)  None Seen Final   Office Visit on 11/30/2023   Component Date Value Ref Range Status    POCT SARS-CoV-2 Ag 11/30/2023 Negative  Negative Final    VALID CONTROL 11/30/2023 Valid   Final     RAPID STREP A 11/30/2023 Negative  Negative Final   Transcribe Orders on 10/24/2023   Component Date Value Ref Range Status    Vitamin B-12 10/24/2023 465  180 - 914 pg/mL Final    WBC 10/24/2023 4.47  4.31 - 10.16 Thousand/uL Final    RBC 10/24/2023 4.40  3.81 - 5.12 Million/uL Final    Hemoglobin 10/24/2023 12.4  11.5 - 15.4 g/dL Final    Hematocrit 10/24/2023 37.3  34.8 - 46.1 % Final    MCV 10/24/2023 85  82 - 98 fL Final    MCH 10/24/2023 28.2  26.8 - 34.3 pg Final    MCHC 10/24/2023 33.2  31.4 - 37.4 g/dL Final    RDW 10/24/2023 12.5  11.6 - 15.1 % Final    MPV 10/24/2023 12.4  8.9 - 12.7 fL Final    Platelets 10/24/2023 276  149 - 390 Thousands/uL Final    nRBC 10/24/2023 0  /100 WBCs Final    Neutrophils Relative 10/24/2023 51  43 - 75 % Final    Immat GRANS % 10/24/2023 0  0 - 2 % Final    Lymphocytes Relative 10/24/2023 41  14 - 44 % Final    Monocytes Relative 10/24/2023 6  4 - 12 % Final    Eosinophils Relative 10/24/2023 2  0 - 6 % Final    Basophils Relative 10/24/2023 0  0 - 1 % Final     Neutrophils Absolute 10/24/2023 2.30  1.85 - 7.62 Thousands/µL Final    Immature Grans Absolute 10/24/2023 0.01  0.00 - 0.20 Thousand/uL Final    Lymphocytes Absolute 10/24/2023 1.81  0.60 - 4.47 Thousands/µL Final    Monocytes Absolute 10/24/2023 0.26  0.17 - 1.22 Thousand/µL Final    Eosinophils Absolute 10/24/2023 0.07  0.00 - 0.61 Thousand/µL Final    Basophils Absolute 10/24/2023 0.02  0.00 - 0.10 Thousands/µL Final    Cholesterol 10/24/2023 120  See Comment mg/dL Final    Cholesterol:         Pediatric <18 Years        Desirable          <170 mg/dL      Borderline High    170-199 mg/dL      High               >=200 mg/dL        Adult >=18 Years            Desirable         <200 mg/dL      Borderline High   200-239 mg/dL      High              >239 mg/dL      Triglycerides 10/24/2023 75  See Comment mg/dL Final    Triglyceride:     0-9Y            <75mg/dL     10Y-17Y         <90 mg/dL       >=18Y     Normal          <150 mg/dL     Borderline High 150-199 mg/dL     High            200-499 mg/dL        Very High       >499 mg/dL    Specimen collection should occur prior to Metamizole administration due to the potential for falsely depressed results.    HDL, Direct 10/24/2023 45 (L)  >=50 mg/dL Final    LDL Calculated 10/24/2023 60  0 - 100 mg/dL Final    LDL Cholesterol:     Optimal           <100 mg/dl     Near Optimal      100-129 mg/dl     Above Optimal       Borderline High 130-159 mg/dl       High            160-189 mg/dl       Very High       >189 mg/dl         This screening LDL is a calculated result.   It does not have the accuracy of the Direct Measured LDL in the monitoring of patients with hyperlipidemia and/or statin therapy.   Direct Measure LDL (VWH240) must be ordered separately in these patients.    Non-HDL-Chol (CHOL-HDL) 10/24/2023 75  mg/dl Final    Color, UA 10/24/2023 Yellow   Final    Clarity, UA 10/24/2023 Clear   Final    Specific Gravity, UA 10/24/2023 1.029  1.003 - 1.030 Final    pH,  UA 10/24/2023 6.5  4.5, 5.0, 5.5, 6.0, 6.5, 7.0, 7.5, 8.0 Final    Leukocytes, UA 10/24/2023 Negative  Negative Final    Nitrite, UA 10/24/2023 Negative  Negative Final    Protein, UA 10/24/2023 100 (2+) (A)  Negative mg/dl Final    Glucose, UA 10/24/2023 Negative  Negative mg/dl Final    Ketones, UA 10/24/2023 Negative  Negative mg/dl Final    Urobilinogen, UA 10/24/2023 2.0 (A)  <2.0 mg/dl mg/dl Final    Bilirubin, UA 10/24/2023 Negative  Negative Final    Occult Blood, UA 10/24/2023 Negative  Negative Final    Sodium 10/24/2023 140  135 - 147 mmol/L Final    Potassium 10/24/2023 3.8  3.5 - 5.3 mmol/L Final    Chloride 10/24/2023 108  96 - 108 mmol/L Final    CO2 10/24/2023 24  21 - 32 mmol/L Final    ANION GAP 10/24/2023 8  mmol/L Final    BUN 10/24/2023 17  5 - 25 mg/dL Final    Creatinine 10/24/2023 0.93  0.60 - 1.30 mg/dL Final    Standardized to IDMS reference method    Glucose 10/24/2023 110  65 - 140 mg/dL Final    If the patient is fasting, the ADA then defines impaired fasting glucose as > 100 mg/dL and diabetes as > or equal to 123 mg/dL.    Calcium 10/24/2023 9.4  8.4 - 10.2 mg/dL Final    AST 10/24/2023 34  13 - 39 U/L Final    ALT 10/24/2023 56 (H)  7 - 52 U/L Final    Specimen collection should occur prior to Sulfasalazine administration due to the potential for falsely depressed results.     Alkaline Phosphatase 10/24/2023 96  34 - 104 U/L Final    Total Protein 10/24/2023 7.6  6.4 - 8.4 g/dL Final    Albumin 10/24/2023 4.5  3.5 - 5.0 g/dL Final    Total Bilirubin 10/24/2023 0.21  0.20 - 1.00 mg/dL Final    Use of this assay is not recommended for patients undergoing treatment with eltrombopag due to the potential for falsely elevated results.  N-acetyl-p-benzoquinone imine (metabolite of Acetaminophen) will generate erroneously low results in samples for patients that have taken an overdose of Acetaminophen.    eGFR 10/24/2023 86  ml/min/1.73sq m Final    Creatinine, Ur 10/24/2023 274.7  Reference  range not established. mg/dL Final    Albumin,U,Random 10/24/2023 17.0  <20.0 mg/L Final    Albumin Creat Ratio 10/24/2023 6  0 - 30 mg/g creatinine Final    Vit D, 25-Hydroxy 10/24/2023 22.7 (L)  30.0 - 100.0 ng/mL Final    Vitamin D guidelines established by Clinical Guidelines Subcommittee  of the Endocrine Society Task Force, 2011    Deficiency <20ng/ml   Insufficiency 20-30ng/ml   Sufficient  ng/ml     LDL-P 10/24/2023 678  <1000 nmol/L Final                              Low                   < 1000                            Moderate         1000 - 1299                            Borderline-High  1300 - 1599                            High             1600 - 2000                            Very High             > 2000    Total LDL-Chol 10/24/2023 68  0 - 99 mg/dL Final                              Optimal               <  100                            Above optimal     100 -  129                            Borderline        130 -  159                            High              160 -  189                            Very high             >  189    HDL Cholesterol 10/24/2023 45  >39 mg/dL Final    Cholesterol, Total 10/24/2023 128  100 - 199 mg/dL Final    HDL-P(Total) 10/24/2023 30.7  >=30.5 umol/L Final    LDL Size 10/24/2023 21.6  >20.5 nm Final     ----------------------------------------------------------                   ** INTERPRETATIVE INFORMATION**                   PARTICLE CONCENTRATION AND SIZE                      <--Lower CVD Risk   Higher CVD Risk-->    LDL AND HDL PARTICLES   Percentile in Reference Population    HDL-P (total)        High     75th    50th    25th   Low                         >34.9    34.9    30.5    26.7   <26.7    Small LDL-P          Low      25th    50th    75th   High                         <117     117     527     839    >839    LDL Size   <-Large (Pattern A)->    <-Small (Pattern B)->                      23.0    20.6           20.5      19.0    ----------------------------------------------------------  Small LDL-P and LDL Size are associated with CVD risk, but not after  LDL-P is taken into account.    Small LDL-P 10/24/2023 263  <=527 nmol/L Final    LP-IR Score 10/24/2023 30  <=45 Final    INSULIN RESISTANCE MARKER      <--Insulin Sensitive    Insulin Resistant-->             Percentile in Reference Population  Insulin Resistance Score  LP-IR Score   Low   25th   50th   75th   High                <27   27     45     63     >63  LP-IR Score is inaccurate if patient is non-fasting.  The LP-IR score is a laboratory developed index that has been  associated with insulin resistance and diabetes risk and should be  used as one component of a physician's clinical assessment.    Triglycerides 10/24/2023 73  0 - 149 mg/dL Final    TSH 3RD GENERATON 10/24/2023 1.220  0.450 - 4.500 uIU/mL Final    The recommended reference ranges for TSH during pregnancy are as follows:   First trimester 0.100 to 2.500 uIU/mL   Second trimester  0.200 to 3.000 uIU/mL   Third trimester 0.300 to 3.000 uIU/m    Note: Normal ranges may not apply to patients who are transgender, non-binary, or whose legal sex, sex at birth, and gender identity differ.  Adult TSH (3rd generation) reference range follows the recommended guidelines of the American [employer] Association, January, 2020.    Free T4 10/24/2023 0.58 (L)  0.61 - 1.12 ng/dL Final    Specimens with biotin concentrations > 10 ng/mL can lead to significant (> 10%) positive bias in result.    RBC, UA 10/24/2023 1-2  None Seen, 1-2 /hpf Final    WBC, UA 10/24/2023 1-2  None Seen, 1-2 /hpf Final    Epithelial Cells 10/24/2023 Occasional  None Seen, Occasional /hpf Final    Bacteria, UA 10/24/2023 Occasional  None Seen, Occasional /hpf Final    MUCUS THREADS 10/24/2023 Moderate (A)  None Seen Final   Office Visit on 09/17/2023   Component Date Value Ref Range Status    Urine Culture 09/17/2023 10,000-19,000 cfu/ml   Final    Mixed  Contaminants X2         Terence Pacheco PA-C  Rheumatology

## 2024-02-24 LAB
ANA SER QL IA: POSITIVE
CENTROMERE B AB SER-ACNC: <0.2 AI (ref 0–0.9)
DSDNA AB SER-ACNC: 29 IU/ML (ref 0–9)
ENA RNP AB SER-ACNC: 1.4 AI (ref 0–0.9)
ENA SM AB SER-ACNC: <0.2 AI (ref 0–0.9)
GAMMA INTERFERON BACKGROUND BLD IA-ACNC: 0.05 IU/ML
HBV CORE AB SER QL: NORMAL
HBV CORE IGM SER QL: NORMAL
HBV SURFACE AG SER QL: NORMAL
HCV AB SER QL: NORMAL
M TB IFN-G BLD-IMP: NEGATIVE
M TB IFN-G CD4+ BCKGRND COR BLD-ACNC: -0.01 IU/ML
M TB IFN-G CD4+ BCKGRND COR BLD-ACNC: 0.01 IU/ML
MITOGEN IGNF BCKGRD COR BLD-ACNC: 9.95 IU/ML
RHEUMATOID FACT SER QL LA: NEGATIVE

## 2024-02-25 LAB — HISTONE IGG SER IA-ACNC: 1.2 UNITS (ref 0–0.9)

## 2024-02-26 ENCOUNTER — PREP FOR PROCEDURE (OUTPATIENT)
Dept: INTERVENTIONAL RADIOLOGY/VASCULAR | Facility: CLINIC | Age: 24
End: 2024-02-26

## 2024-02-26 DIAGNOSIS — M32.9 SYSTEMIC LUPUS ERYTHEMATOSUS, UNSPECIFIED SLE TYPE, UNSPECIFIED ORGAN INVOLVEMENT STATUS (HCC): Primary | ICD-10-CM

## 2024-02-26 LAB
ANA HOMOGEN SER QL IF: NORMAL
ANA HOMOGEN TITR SER: NORMAL {TITER}
APTT SCREEN TO CONFIRM RATIO: 1.16 RATIO (ref 0–1.34)
CONFIRM APTT/NORMAL: 37 SEC (ref 0–47.6)
DSDNA AB SER-ACNC: >10 IU/ML
ENA RNP AB SER IA-ACNC: POSITIVE
ENA SM AB SER IA-ACNC: NEGATIVE
ENA SM+RNP IGG SER-ACNC: NEGATIVE
ENA SS-A AB SER IA-ACNC: NEGATIVE
ENA SS-B AB SER IA-ACNC: NEGATIVE
LA PPP-IMP: NORMAL
SCREEN APTT: 33.6 SEC (ref 0–43.5)
SCREEN DRVVT: 36.2 SEC (ref 0–47)
THROMBIN TIME: 16.4 SEC (ref 0–23)

## 2024-02-26 RX ORDER — SODIUM CHLORIDE 9 MG/ML
75 INJECTION, SOLUTION INTRAVENOUS CONTINUOUS
OUTPATIENT
Start: 2024-02-26

## 2024-02-27 ENCOUNTER — TELEPHONE (OUTPATIENT)
Dept: RHEUMATOLOGY | Facility: CLINIC | Age: 24
End: 2024-02-27

## 2024-02-27 ENCOUNTER — HOSPITAL ENCOUNTER (EMERGENCY)
Facility: HOSPITAL | Age: 24
Discharge: HOME | End: 2024-02-27
Attending: EMERGENCY MEDICINE | Admitting: EMERGENCY MEDICINE
Payer: OTHER GOVERNMENT

## 2024-02-27 ENCOUNTER — NURSE TRIAGE (OUTPATIENT)
Age: 24
End: 2024-02-27

## 2024-02-27 ENCOUNTER — TELEPHONE (OUTPATIENT)
Age: 24
End: 2024-02-27

## 2024-02-27 VITALS
HEART RATE: 93 BPM | WEIGHT: 152.4 LBS | SYSTOLIC BLOOD PRESSURE: 175 MMHG | DIASTOLIC BLOOD PRESSURE: 110 MMHG | BODY MASS INDEX: 27.87 KG/M2 | TEMPERATURE: 98.9 F | OXYGEN SATURATION: 93 % | RESPIRATION RATE: 20 BRPM

## 2024-02-27 DIAGNOSIS — M62.838 MUSCLE SPASM: ICD-10-CM

## 2024-02-27 DIAGNOSIS — R53.83 FATIGUE: Primary | ICD-10-CM

## 2024-02-27 DIAGNOSIS — M32.9 HX OF SYSTEMIC LUPUS ERYTHEMATOSUS (SLE) (HCC): ICD-10-CM

## 2024-02-27 LAB
ALBUMIN SERPL BCP-MCNC: 4.3 G/DL (ref 3.5–5)
ALP SERPL-CCNC: 107 U/L (ref 34–104)
ALT SERPL W P-5'-P-CCNC: 40 U/L (ref 7–52)
ANION GAP SERPL CALCULATED.3IONS-SCNC: 6 MMOL/L
AST SERPL W P-5'-P-CCNC: 28 U/L (ref 13–39)
B2 GLYCOPROT1 IGA SERPL IA-ACNC: 2.3
B2 GLYCOPROT1 IGG SERPL IA-ACNC: 1.6
B2 GLYCOPROT1 IGM SERPL IA-ACNC: <2.4
BASOPHILS # BLD AUTO: 0.02 THOUSANDS/ÂΜL (ref 0–0.1)
BASOPHILS NFR BLD AUTO: 1 % (ref 0–1)
BILIRUB SERPL-MCNC: 0.24 MG/DL (ref 0.2–1)
BILIRUB UR QL STRIP: NEGATIVE
BUN SERPL-MCNC: 13 MG/DL (ref 5–25)
CALCIUM SERPL-MCNC: 9.5 MG/DL (ref 8.4–10.2)
CARDIOLIPIN IGA SER IA-ACNC: 3.4
CARDIOLIPIN IGG SER IA-ACNC: 1.6
CARDIOLIPIN IGM SER IA-ACNC: 1.3
CCP AB SER IA-ACNC: 1.9
CH50 SERPL-ACNC: >60 U/ML
CHLORIDE SERPL-SCNC: 103 MMOL/L (ref 96–108)
CLARITY UR: CLEAR
CO2 SERPL-SCNC: 28 MMOL/L (ref 21–32)
COLOR UR: NORMAL
CREAT SERPL-MCNC: 0.7 MG/DL (ref 0.6–1.3)
CRP SERPL QL: 2.5 MG/L
EOSINOPHIL # BLD AUTO: 0.04 THOUSAND/ÂΜL (ref 0–0.61)
EOSINOPHIL NFR BLD AUTO: 1 % (ref 0–6)
ERYTHROCYTE [DISTWIDTH] IN BLOOD BY AUTOMATED COUNT: 13.4 % (ref 11.6–15.1)
ERYTHROCYTE [SEDIMENTATION RATE] IN BLOOD: 17 MM/HOUR (ref 0–19)
EXT PREGNANCY TEST URINE: NEGATIVE
EXT. CONTROL: NORMAL
FLUAV RNA RESP QL NAA+PROBE: NEGATIVE
FLUBV RNA RESP QL NAA+PROBE: NEGATIVE
GFR SERPL CREATININE-BSD FRML MDRD: 121 ML/MIN/1.73SQ M
GLUCOSE SERPL-MCNC: 79 MG/DL (ref 65–140)
GLUCOSE UR STRIP-MCNC: NEGATIVE MG/DL
HCT VFR BLD AUTO: 38.2 % (ref 34.8–46.1)
HGB BLD-MCNC: 12.3 G/DL (ref 11.5–15.4)
HGB UR QL STRIP.AUTO: NEGATIVE
IMM GRANULOCYTES # BLD AUTO: 0 THOUSAND/UL (ref 0–0.2)
IMM GRANULOCYTES NFR BLD AUTO: 0 % (ref 0–2)
KETONES UR STRIP-MCNC: NEGATIVE MG/DL
LEUKOCYTE ESTERASE UR QL STRIP: NEGATIVE
LYMPHOCYTES # BLD AUTO: 1.47 THOUSANDS/ÂΜL (ref 0.6–4.47)
LYMPHOCYTES NFR BLD AUTO: 36 % (ref 14–44)
MAGNESIUM SERPL-MCNC: 1.8 MG/DL (ref 1.9–2.7)
MCH RBC QN AUTO: 27.9 PG (ref 26.8–34.3)
MCHC RBC AUTO-ENTMCNC: 32.2 G/DL (ref 31.4–37.4)
MCV RBC AUTO: 87 FL (ref 82–98)
MONOCYTES # BLD AUTO: 0.32 THOUSAND/ÂΜL (ref 0.17–1.22)
MONOCYTES NFR BLD AUTO: 8 % (ref 4–12)
NEUTROPHILS # BLD AUTO: 2.29 THOUSANDS/ÂΜL (ref 1.85–7.62)
NEUTS SEG NFR BLD AUTO: 54 % (ref 43–75)
NITRITE UR QL STRIP: NEGATIVE
NRBC BLD AUTO-RTO: 0 /100 WBCS
PH UR STRIP.AUTO: 6 [PH]
PHOSPHATE SERPL-MCNC: 3.3 MG/DL (ref 2.7–4.5)
PLATELET # BLD AUTO: 229 THOUSANDS/UL (ref 149–390)
PMV BLD AUTO: 11.2 FL (ref 8.9–12.7)
POTASSIUM SERPL-SCNC: 3.4 MMOL/L (ref 3.5–5.3)
PROT SERPL-MCNC: 7.9 G/DL (ref 6.4–8.4)
PROT UR STRIP-MCNC: NEGATIVE MG/DL
RBC # BLD AUTO: 4.41 MILLION/UL (ref 3.81–5.12)
RSV RNA RESP QL NAA+PROBE: NEGATIVE
SARS-COV-2 RNA RESP QL NAA+PROBE: NEGATIVE
SODIUM SERPL-SCNC: 137 MMOL/L (ref 135–147)
SP GR UR STRIP.AUTO: >=1.03 (ref 1–1.03)
UROBILINOGEN UR QL STRIP.AUTO: 0.2 E.U./DL
WBC # BLD AUTO: 4.14 THOUSAND/UL (ref 4.31–10.16)

## 2024-02-27 PROCEDURE — 83735 ASSAY OF MAGNESIUM: CPT | Performed by: EMERGENCY MEDICINE

## 2024-02-27 PROCEDURE — 85652 RBC SED RATE AUTOMATED: CPT | Performed by: EMERGENCY MEDICINE

## 2024-02-27 PROCEDURE — 36415 COLL VENOUS BLD VENIPUNCTURE: CPT | Performed by: EMERGENCY MEDICINE

## 2024-02-27 PROCEDURE — 81003 URINALYSIS AUTO W/O SCOPE: CPT | Performed by: EMERGENCY MEDICINE

## 2024-02-27 PROCEDURE — 80053 COMPREHEN METABOLIC PANEL: CPT | Performed by: EMERGENCY MEDICINE

## 2024-02-27 PROCEDURE — 99284 EMERGENCY DEPT VISIT MOD MDM: CPT

## 2024-02-27 PROCEDURE — 96375 TX/PRO/DX INJ NEW DRUG ADDON: CPT

## 2024-02-27 PROCEDURE — 86140 C-REACTIVE PROTEIN: CPT | Performed by: EMERGENCY MEDICINE

## 2024-02-27 PROCEDURE — 96361 HYDRATE IV INFUSION ADD-ON: CPT

## 2024-02-27 PROCEDURE — 81025 URINE PREGNANCY TEST: CPT | Performed by: EMERGENCY MEDICINE

## 2024-02-27 PROCEDURE — 84100 ASSAY OF PHOSPHORUS: CPT | Performed by: EMERGENCY MEDICINE

## 2024-02-27 PROCEDURE — 85025 COMPLETE CBC W/AUTO DIFF WBC: CPT | Performed by: EMERGENCY MEDICINE

## 2024-02-27 PROCEDURE — 99284 EMERGENCY DEPT VISIT MOD MDM: CPT | Performed by: EMERGENCY MEDICINE

## 2024-02-27 PROCEDURE — 96374 THER/PROPH/DIAG INJ IV PUSH: CPT

## 2024-02-27 PROCEDURE — 0241U HB NFCT DS VIR RESP RNA 4 TRGT: CPT | Performed by: EMERGENCY MEDICINE

## 2024-02-27 RX ORDER — PREDNISONE 20 MG/1
TABLET ORAL DAILY
Qty: 14 TABLET | Refills: 0 | Status: SHIPPED | OUTPATIENT
Start: 2024-02-27 | End: 2024-03-08

## 2024-02-27 RX ORDER — ONDANSETRON 2 MG/ML
4 INJECTION INTRAMUSCULAR; INTRAVENOUS ONCE
Status: COMPLETED | OUTPATIENT
Start: 2024-02-27 | End: 2024-02-27

## 2024-02-27 RX ORDER — KETOROLAC TROMETHAMINE 30 MG/ML
15 INJECTION, SOLUTION INTRAMUSCULAR; INTRAVENOUS ONCE
Status: COMPLETED | OUTPATIENT
Start: 2024-02-27 | End: 2024-02-27

## 2024-02-27 RX ADMIN — ONDANSETRON 4 MG: 2 INJECTION INTRAMUSCULAR; INTRAVENOUS at 12:30

## 2024-02-27 RX ADMIN — KETOROLAC TROMETHAMINE 15 MG: 30 INJECTION, SOLUTION INTRAMUSCULAR; INTRAVENOUS at 12:30

## 2024-02-27 RX ADMIN — SODIUM CHLORIDE 500 ML: 0.9 INJECTION, SOLUTION INTRAVENOUS at 12:30

## 2024-02-27 NOTE — TELEPHONE ENCOUNTER
Pt of Terence with hx of SLE. LOV 2/23/24.    Pt calling regarding ER visit today. She is experiencing weakness, fatigue, chills, muscle spasms. Began last night. Denies fever. Blood work drawn in ER and showed slight hypokalemia and hyomagnesemia. She was instructed by ER to f/u with our office. Please advise.

## 2024-02-27 NOTE — TELEPHONE ENCOUNTER
Patient was called and was asked to call her nephrologist/internal Dr. For her current condition due to the symptoms likely being related to her Kidney issues. Patient verbalized understanding.

## 2024-02-27 NOTE — TELEPHONE ENCOUNTER
"Reason for Disposition  • Fatigue is a chronic symptom (recurrent or ongoing AND present > 4 weeks)    Answer Assessment - Initial Assessment Questions  1. DESCRIPTION: \"Describe how you are feeling.\"      Fatigue, weakness, muscle spasms, chills and dizziness  2. SEVERITY: \"How bad is it?\"  \"Can you stand and walk?\"    - MILD - Feels weak or tired, but does not interfere with work, school or normal activities    - MODERATE - Able to stand and walk; weakness interferes with work, school, or normal activities    - SEVERE - Unable to stand or walk      Mild  3. ONSET:  \"When did the weakness begin?\"      Last night  4. CAUSE: \"What do you think is causing the weakness?\"      Unsure  5. MEDICINES: \"Have you recently started a new medicine or had a change in the amount of a medicine?\"      Increased Olmesartan from 5- 20 mg recently  6. OTHER SYMPTOMS: \"Do you have any other symptoms?\" (e.g., chest pain, fever, cough, SOB, vomiting, diarrhea, bleeding, other areas of pain)      Denies fever  7. OTHER      ER prescribed Prednisone    Protocols used: Weakness (Generalized) and Fatigue-ADULT-[place]    "

## 2024-02-27 NOTE — ED PROVIDER NOTES
"History  Chief Complaint   Patient presents with    Fatigue     Patient crying , states she has Lupus and kidney failure ( Stage II ). States she had lab work last week. States she feels really bad, her muscles are twitching , dizziness, fatigue, drowsiness. Patient has Raynauds and obtaining pulse ox is difficult      Pt is a 22yo F who presents for fatigue.  Patient reports that starting yesterday she began to feel increasingly fatigued and generally unwell.  She states that she also began with \"muscle twitching\".  She states this is worsened and has precluded her from sleeping.  Patient states she has never had symptoms similar to this previously.  Patient does report history of lupus for which she is currently on hydroxychloroquine.  Patient reports she was previously on a secondary medication for her lupus but recently transferred her rheumatology care and therefore has not been on it.  Patient was seen by rheumatology last week and had lab work performed.  Per chart review, plans to add additional lupus medications after kidney biopsy next month.  Patient reports she has not had any recent lupus flares.  Patient denies any recent sick contacts.  Patient reports chills but denies any fevers.        Prior to Admission Medications   Prescriptions Last Dose Informant Patient Reported? Taking?   Cyanocobalamin (VITAMIN B 12 PO)  Self Yes No   Sig: Take 500 mcg by mouth 2 (two) times a day   amLODIPine (NORVASC) 10 mg tablet  Self Yes No   Sig: Take 10 mg by mouth daily   aspirin 81 mg chewable tablet   Yes No   bromocriptine (PARLODEL) 2.5 mg tablet   Yes No   Patient not taking: Reported on 2/23/2024   cyclobenzaprine (FLEXERIL) 10 mg tablet   No No   Sig: Take 1 tablet (10 mg total) by mouth 3 (three) times a day as needed for muscle spasms   ferrous sulfate 325 (65 Fe) mg tablet  Self Yes No   Sig: Take 325 mg by mouth daily with breakfast   hydrOXYzine HCL (ATARAX) 10 mg tablet   Yes No   Sig: Take 10 mg by " mouth 2 (two) times a day   Patient not taking: Reported on 2024   hydroxychloroquine (PLAQUENIL) 200 mg tablet   No No   Sig: Take 1.5 tablets (300 mg total) by mouth daily with breakfast   olmesartan (BENICAR) 5 mg tablet  Self Yes No   Sig: Take 10 mg by mouth daily   omeprazole (PriLOSEC) 20 mg delayed release capsule   No No   Sig: Take 1 capsule (20 mg total) by mouth daily   phentermine (ADIPEX-P) 37.5 MG tablet   Yes No   Sig: Take 37.5 mg by mouth in the morning   sildenafil (VIAGRA) 25 MG tablet  Self Yes No   Sig: Take 25 mg by mouth daily as needed for erectile dysfunction   simvastatin (ZOCOR) 20 mg tablet   Yes No   Sig: Take 20 mg by mouth every evening   topiramate (TOPAMAX) 100 mg tablet   Yes No   Sig: Take 100 mg by mouth daily   traZODone (DESYREL) 50 mg tablet   Yes No   Sig: Take 1 tablet by mouth daily at bedtime   venlafaxine (EFFEXOR-XR) 150 mg 24 hr capsule   Yes No   Sig: Take 150 mg by mouth daily   vitamin B-12 (VITAMIN B-12) 1,000 mcg tablet   Yes No   Sig: Take 2,000 mcg by mouth daily   Patient not taking: Reported on 2023      Facility-Administered Medications: None       Past Medical History:   Diagnosis Date    Bowel obstruction (HCC)     Clostridium difficile infection         Crohn's disease (HCC)     Depression     Hypertension     Kidney disease, chronic, stage I (GFR over 89 ml/min)     Lupus (HCC)     PTSD (post-traumatic stress disorder)     Raynaud disease        Past Surgical History:   Procedure Laterality Date    ABDOMINAL SURGERY      bowel obstruction     SECTION N/A     COLON SURGERY      WISDOM TOOTH EXTRACTION         Family History   Problem Relation Age of Onset    Lupus Mother     Diabetes Father     COPD Maternal Grandmother     Heart failure Maternal Grandmother     Substance Abuse Maternal Grandmother     Breast cancer Other      I have reviewed and agree with the history as documented.    E-Cigarette/Vaping    E-Cigarette Use Never User       E-Cigarette/Vaping Substances    Nicotine No     THC No     CBD No     Flavoring No     Other No     Unknown No      Social History     Tobacco Use    Smoking status: Never    Smokeless tobacco: Never   Vaping Use    Vaping status: Never Used   Substance Use Topics    Alcohol use: Not Currently     Comment: socially    Drug use: Never       Review of Systems  ROS negative aside from as stated in HPI    Physical Exam  Physical Exam  Vitals reviewed.   Constitutional:       Appearance: She is well-developed. She is not toxic-appearing or diaphoretic.   HENT:      Head: Normocephalic and atraumatic.      Right Ear: External ear normal.      Left Ear: External ear normal.      Nose: Nose normal.      Mouth/Throat:      Pharynx: Oropharynx is clear.   Eyes:      Extraocular Movements: Extraocular movements intact.      Pupils: Pupils are equal, round, and reactive to light.   Cardiovascular:      Rate and Rhythm: Normal rate and regular rhythm.      Heart sounds: Normal heart sounds.   Pulmonary:      Effort: Pulmonary effort is normal. No respiratory distress.      Breath sounds: Normal breath sounds. No stridor. No wheezing or rales.   Abdominal:      General: There is no distension.      Palpations: Abdomen is soft.      Tenderness: There is no abdominal tenderness.   Musculoskeletal:         General: Normal range of motion.      Cervical back: Normal range of motion and neck supple.      Right lower leg: No edema.      Left lower leg: No edema.   Skin:     General: Skin is warm and dry.      Capillary Refill: Capillary refill takes less than 2 seconds.      Coloration: Skin is not pale.      Findings: No erythema or rash.   Neurological:      General: No focal deficit present.      Mental Status: She is alert and oriented to person, place, and time.   Psychiatric:         Speech: Speech normal.         Behavior: Behavior is cooperative.         Vital Signs  ED Triage Vitals [02/27/24 1138]   Temperature Pulse  Respirations Blood Pressure SpO2   98.9 °F (37.2 °C) 93 20 (!) 175/110 93 %      Temp Source Heart Rate Source Patient Position - Orthostatic VS BP Location FiO2 (%)   Tympanic Monitor Sitting Left arm --      Pain Score       5           Vitals:    02/27/24 1138   BP: (!) 175/110   Pulse: 93   Patient Position - Orthostatic VS: Sitting         Visual Acuity      ED Medications  Medications   ketorolac (TORADOL) injection 15 mg (15 mg Intravenous Given 2/27/24 1230)   ondansetron (ZOFRAN) injection 4 mg (4 mg Intravenous Given 2/27/24 1230)   sodium chloride 0.9 % bolus 500 mL (0 mL Intravenous Stopped 2/27/24 1417)       Diagnostic Studies  Results Reviewed       Procedure Component Value Units Date/Time    FLU/RSV/COVID - if FLU/RSV clinically relevant [[id number]]  (Normal) Collected: 02/27/24 1227    Lab Status: Final result Specimen: Nares from Nose Updated: 02/27/24 1310     SARS-CoV-2 Negative     INFLUENZA A PCR Negative     INFLUENZA B PCR Negative     RSV PCR Negative    Narrative:      FOR PEDIATRIC PATIENTS - copy/paste COVID Guidelines URL to browser: [email or web address]    SARS-CoV-2 assay is a Nucleic Acid Amplification assay intended for the  qualitative detection of nucleic acid from SARS-CoV-2 in nasopharyngeal  swabs. Results are for the presumptive identification of SARS-CoV-2 RNA.    Positive results are indicative of infection with SARS-CoV-2, the virus  causing COVID-19, but do not rule out bacterial infection or co-infection  with other viruses. Laboratories within the [place] and its  territories are required to report all positive results to the appropriate  public health authorities. Negative results do not preclude SARS-CoV-2  infection and should not be used as the sole basis for treatment or other  patient management decisions. Negative results must be combined with  clinical observations, patient history, and epidemiological  information.  This test has not been FDA cleared or approved.    This test has been authorized by FDA under an Emergency Use Authorization  (EUA). This test is only authorized for the duration of time the  declaration that circumstances exist justifying the authorization of the  emergency use of an in vitro diagnostic tests for detection of SARS-CoV-2  virus and/or diagnosis of COVID-19 infection under section 564(b)(1) of  the Act, 21 U.S.C. 360bbb-3(b)(1), unless the authorization is terminated  or revoked sooner. The test has been validated but independent review by FDA  and CLIA is pending.    Test performed using [employer] GeneXpert: This RT-PCR assay targets N2,  a region unique to SARS-CoV-2. A conserved region in the E-gene was chosen  for pan-Sarbecovirus detection which includes SARS-CoV-2.    According to CMS-2020-01-R, this platform meets the definition of high-throughput technology.    Comprehensive metabolic panel [[id number]]  (Abnormal) Collected: 02/27/24 1227    Lab Status: Final result Specimen: Blood from Arm, Left Updated: 02/27/24 1248     Sodium 137 mmol/L      Potassium 3.4 mmol/L      Chloride 103 mmol/L      CO2 28 mmol/L      ANION GAP 6 mmol/L      BUN 13 mg/dL      Creatinine 0.70 mg/dL      Glucose 79 mg/dL      Calcium 9.5 mg/dL      AST 28 U/L      ALT 40 U/L      Alkaline Phosphatase 107 U/L      Total Protein 7.9 g/dL      Albumin 4.3 g/dL      Total Bilirubin 0.24 mg/dL      eGFR 121 ml/min/1.73sq m     Narrative:      National Kidney Disease Foundation guidelines for Chronic Kidney Disease (CKD):     Stage 1 with normal or high GFR (GFR > 90 mL/min/1.73 square meters)    Stage 2 Mild CKD (GFR = 60-89 mL/min/1.73 square meters)    Stage 3A Moderate CKD (GFR = 45-59 mL/min/1.73 square meters)    Stage 3B Moderate CKD (GFR = 30-44 mL/min/1.73 square meters)    Stage 4 Severe CKD (GFR = 15-29 mL/min/1.73 square meters)    Stage 5 End Stage CKD (GFR <15 mL/min/1.73 square meters)  Note:  GFR calculation is accurate only with a steady state creatinine    Magnesium [[id number]]  (Abnormal) Collected: 02/27/24 1227    Lab Status: Final result Specimen: Blood from Arm, Left Updated: 02/27/24 1248     Magnesium 1.8 mg/dL     Phosphorus [[id number]]  (Normal) Collected: 02/27/24 1227    Lab Status: Final result Specimen: Blood from Arm, Left Updated: 02/27/24 1248     Phosphorus 3.3 mg/dL     C-reactive protein [[id number]]  (Normal) Collected: 02/27/24 1227    Lab Status: Final result Specimen: Blood from Arm, Left Updated: 02/27/24 1248     CRP 2.5 mg/L     Sedimentation rate, automated [[id number]]  (Normal) Collected: 02/27/24 1227    Lab Status: Final result Specimen: Blood from Arm, Left Updated: 02/27/24 1239     Sed Rate 17 mm/hour     UA (URINE) with reflex to Scope [[id number]] Collected: 02/27/24 1227    Lab Status: Final result Specimen: Urine, Clean Catch Updated: 02/27/24 1235     Color, UA Light Yellow     Clarity, UA Clear     Specific Gravity, UA >=1.030     pH, UA 6.0     Leukocytes, UA Negative     Nitrite, UA Negative     Protein, UA Negative mg/dl      Glucose, UA Negative mg/dl      Ketones, UA Negative mg/dl      Urobilinogen, UA 0.2 E.U./dl      Bilirubin, UA Negative     Occult Blood, UA Negative    CBC and differential [[id number]]  (Abnormal) Collected: 02/27/24 1227    Lab Status: Final result Specimen: Blood from Arm, Left Updated: 02/27/24 1233     WBC 4.14 Thousand/uL      RBC 4.41 Million/uL      Hemoglobin 12.3 g/dL      Hematocrit 38.2 %      MCV 87 fL      MCH 27.9 pg      MCHC 32.2 g/dL      RDW 13.4 %      MPV 11.2 fL      Platelets 229 Thousands/uL      nRBC 0 /100 WBCs      Neutrophils Relative 54 %      Immat GRANS % 0 %      Lymphocytes Relative 36 %      Monocytes Relative 8 %      Eosinophils Relative 1 %      Basophils Relative 1 %      Neutrophils Absolute 2.29 Thousands/µL      Immature Grans Absolute 0.00 Thousand/uL      Lymphocytes Absolute 1.47 Thousands/µL       Monocytes Absolute 0.32 Thousand/µL      Eosinophils Absolute 0.04 Thousand/µL      Basophils Absolute 0.02 Thousands/µL     POCT pregnancy, urine [[id number]]  (Normal) Resulted: 02/27/24 1229    Lab Status: Final result Updated: 02/27/24 1229     EXT Preg Test, Ur Negative     Control Valid                   No orders to display              Procedures  Procedures         ED Course  ED Course as of 02/27/24 1639   Tue Feb 27, 2024   1230 PREGNANCY TEST URINE: Negative  Negative.    1235 UA (URINE) with reflex to Scope  Reviewed and without actionable derangement.    1235 Leukocytes, UA: Negative   1235 Nitrite, UA: Negative   1235 Ketones, UA: Negative   1235 POCT URINE PROTEIN: Negative   1235 Blood, UA: Negative   1235 WBC(!): 4.14  Improved from most recent prior.    1235 CBC and differential(!)  Reviewed and without actionable derangement.    1246 Sed Rate: 17  WNL. Improved from most recent prior.    1253 C-REACTIVE PROTEIN: 2.5  WNL. Improved from most recent prior.    1253 Comprehensive metabolic panel(!)  Reviewed and without actionable derangement.    1253 MAGNESIUM(!): 1.8  Mildly low. Unlikely to be contributing to symptoms.    1253 Phosphorus: 3.3  WNL   1315 FLU/RSV/COVID - if FLU/RSV clinically relevant  Negative.    1348 Rheum contacted via TT. Stated can do course of steroids based on pt's complaint of feeling generally unwell. Recommend she call their office for further outpt recs.                               SBIRT 22yo+      Flowsheet Row Most Recent Value   Initial Alcohol Screen: US AUDIT-C     1. How often do you have a drink containing alcohol? 0 Filed at: 02/27/2024 1149   2. How many drinks containing alcohol do you have on a typical day you are drinking?  0 Filed at: 02/27/2024 1149   3a. Male UNDER 65: How often do you have five or more drinks on one occasion? 0 Filed at: 02/27/2024 1149   3b. FEMALE Any Age, or MALE 65+: How often do you have 4 or more drinks on one occassion? 0 Filed  at: 02/27/2024 1149   Audit-C Score 0 Filed at: 02/27/2024 1149   ARNOL: How many times in the past year have you...    Used an illegal drug or used a prescription medication for non-medical reasons? Never Filed at: 02/27/2024 1149                      Medical Decision Making  Pt is a 22yo F who presents with fatigue.     Differential diagnosis to include but not limited to dehydration, electrolyte abnormality, lupus flare, pregnancy, viral syndrome.  Will plan for labs.  Will treat symptomatically and reassess.  See ED course for results and details.    Plan to discharge pt with f/u to PCP and rheum. Discussed returning the ED with new or worsening of symptoms. Discussed use of over the counter medications as stated on the bottle as needed for symptoms. Discussed taking new medication as prescribed and to completion. Pt expressed understanding of discharge instructions, return precautions, and medication instructions and is stable for discharge at this time. All questions were answered and pt was discharged without incident.       Amount and/or Complexity of Data Reviewed  Labs: ordered. Decision-making details documented in ED Course.    Risk  Prescription drug management.             Disposition  Final diagnoses:   Fatigue   Muscle spasm   Hx of systemic lupus erythematosus (SLE) ([facility])     Time reflects when diagnosis was documented in both MDM as applicable and the Disposition within this note       Time User Action Codes Description Comment    2/27/2024  2:09 PM Aysha Amezcua [R53.83] Fatigue     2/27/2024  2:09 PM Aysha Amezcua [M62.838] Muscle spasm     2/27/2024  2:09 PM Aysha Amezcua [M32.9] Hx of systemic lupus erythematosus (SLE) ([facility])           ED Disposition       ED Disposition   Discharge    Condition   Stable    Date/Time   Tue Feb 27, 2024 1409    Comment   Angela Lemus discharge to home/self care.                   Follow-up Information       Follow up With Specialties  Details Why Contact Info    Lakeisha Man DO Family Medicine Call  As needed [address] [facility] [facility].  [address]  [place] [address]  [phone]      Terence Pacheco PA-C Physician Assistant, Rheumatology Call on 2/28/2024  [address] [facility] [address] [facility]  [address]  [place] [address]  [phone]              Discharge Medication List as of 2/27/2024  2:12 PM        START taking these medications    Details   predniSONE 20 mg tablet Multiple Dosages:Starting Tue 2/27/2024, Until Tue 2/27/2024, THEN Starting Wed 2/28/2024, Until Fri 3/1/2024, THEN Starting Sat 3/2/2024, Until Mon 3/4/2024, THEN Starting Tue 3/5/2024, Until Thu 3/7/2024Take 3 tablets (60 mg total) by mouth daily  for 1 day, THEN 2 tablets (40 mg total) daily for 3 days, THEN 1 tablet (20 mg total) daily for 3 days, THEN 0.5 tablets (10 mg total) daily for 3 days., Normal           CONTINUE these medications which have NOT CHANGED    Details   amLODIPine (NORVASC) 10 mg tablet Take 10 mg by mouth daily, Starting Thu 12/29/2022, Historical Med      aspirin 81 mg chewable tablet Historical Med      bromocriptine (PARLODEL) 2.5 mg tablet Historical Med      !! Cyanocobalamin (VITAMIN B 12 PO) Take 500 mcg by mouth 2 (two) times a day, Historical Med      cyclobenzaprine (FLEXERIL) 10 mg tablet Take 1 tablet (10 mg total) by mouth 3 (three) times a day as needed for muscle spasms, Starting Wed 1/24/2024, Normal      ferrous sulfate 325 (65 Fe) mg tablet Take 325 mg by mouth daily with breakfast, Historical Med      hydroxychloroquine (PLAQUENIL) 200 mg tablet Take 1.5 tablets (300 mg total) by mouth daily with breakfast, Starting Fri 2/23/2024, Until Thu 5/23/2024, Normal      hydrOXYzine HCL (ATARAX) 10 mg tablet Take 10 mg by mouth 2 (two) times a day, Starting Tue 11/14/2023, Historical Med      olmesartan (BENICAR) 5 mg tablet Take 10 mg by mouth daily, Starting Mon 5/22/2023, Historical Med      omeprazole (PriLOSEC) 20 mg delayed release capsule Take 1 capsule (20 mg  total) by mouth daily, Starting Thu 8/24/2023, Normal      phentermine (ADIPEX-P) 37.5 MG tablet Take 37.5 mg by mouth in the morning, Starting Wed 1/17/2024, Historical Med      sildenafil (VIAGRA) 25 MG tablet Take 25 mg by mouth daily as needed for erectile dysfunction, Historical Med      simvastatin (ZOCOR) 20 mg tablet Take 20 mg by mouth every evening, Starting Tue 2/6/2024, Historical Med      topiramate (TOPAMAX) 100 mg tablet Take 100 mg by mouth daily, Starting Fri 12/15/2023, Historical Med      traZODone (DESYREL) 50 mg tablet Take 1 tablet by mouth daily at bedtime, Starting Tue 8/29/2023, Historical Med      venlafaxine (EFFEXOR-XR) 150 mg 24 hr capsule Take 150 mg by mouth daily, Starting Tue 1/23/2024, Historical Med      !! vitamin B-12 (VITAMIN B-12) 1,000 mcg tablet Take 2,000 mcg by mouth daily, Starting Wed 9/13/2023, Historical Med       !! - Potential duplicate medications found. Please discuss with provider.          No discharge procedures on file.    PDMP Review       None            ED Provider  Electronically Signed by             Aysha Amezcua MD  02/27/24 1655

## 2024-02-27 NOTE — TELEPHONE ENCOUNTER
Pt of Terence with hx of SLE. LOV 2/23/24.    Pt calling regarding ER visit today. She is experiencing weakness, fatigue, chills, muscle spasms. Began last night. Denies fever. Blood work drawn in ER and showed slight hypokalemia and hyomagnesemia. Prescribed Prednisone taper. She was instructed by ER to f/u with our office. Please advise.

## 2024-02-27 NOTE — H&P (VIEW-ONLY)
"History  Chief Complaint   Patient presents with    Fatigue     Patient crying , states she has Lupus and kidney failure ( Stage II ). States she had lab work last week. States she feels really bad, her muscles are twitching , dizziness, fatigue, drowsiness. Patient has Raynauds and obtaining pulse ox is difficult      Pt is a 24yo F who presents for fatigue.  Patient reports that starting yesterday she began to feel increasingly fatigued and generally unwell.  She states that she also began with \"muscle twitching\".  She states this is worsened and has precluded her from sleeping.  Patient states she has never had symptoms similar to this previously.  Patient does report history of lupus for which she is currently on hydroxychloroquine.  Patient reports she was previously on a secondary medication for her lupus but recently transferred her rheumatology care and therefore has not been on it.  Patient was seen by rheumatology last week and had lab work performed.  Per chart review, plans to add additional lupus medications after kidney biopsy next month.  Patient reports she has not had any recent lupus flares.  Patient denies any recent sick contacts.  Patient reports chills but denies any fevers.        Prior to Admission Medications   Prescriptions Last Dose Informant Patient Reported? Taking?   Cyanocobalamin (VITAMIN B 12 PO)  Self Yes No   Sig: Take 500 mcg by mouth 2 (two) times a day   amLODIPine (NORVASC) 10 mg tablet  Self Yes No   Sig: Take 10 mg by mouth daily   aspirin 81 mg chewable tablet   Yes No   bromocriptine (PARLODEL) 2.5 mg tablet   Yes No   Patient not taking: Reported on 2/23/2024   cyclobenzaprine (FLEXERIL) 10 mg tablet   No No   Sig: Take 1 tablet (10 mg total) by mouth 3 (three) times a day as needed for muscle spasms   ferrous sulfate 325 (65 Fe) mg tablet  Self Yes No   Sig: Take 325 mg by mouth daily with breakfast   hydrOXYzine HCL (ATARAX) 10 mg tablet   Yes No   Sig: Take 10 mg by " mouth 2 (two) times a day   Patient not taking: Reported on 2024   hydroxychloroquine (PLAQUENIL) 200 mg tablet   No No   Sig: Take 1.5 tablets (300 mg total) by mouth daily with breakfast   olmesartan (BENICAR) 5 mg tablet  Self Yes No   Sig: Take 10 mg by mouth daily   omeprazole (PriLOSEC) 20 mg delayed release capsule   No No   Sig: Take 1 capsule (20 mg total) by mouth daily   phentermine (ADIPEX-P) 37.5 MG tablet   Yes No   Sig: Take 37.5 mg by mouth in the morning   sildenafil (VIAGRA) 25 MG tablet  Self Yes No   Sig: Take 25 mg by mouth daily as needed for erectile dysfunction   simvastatin (ZOCOR) 20 mg tablet   Yes No   Sig: Take 20 mg by mouth every evening   topiramate (TOPAMAX) 100 mg tablet   Yes No   Sig: Take 100 mg by mouth daily   traZODone (DESYREL) 50 mg tablet   Yes No   Sig: Take 1 tablet by mouth daily at bedtime   venlafaxine (EFFEXOR-XR) 150 mg 24 hr capsule   Yes No   Sig: Take 150 mg by mouth daily   vitamin B-12 (VITAMIN B-12) 1,000 mcg tablet   Yes No   Sig: Take 2,000 mcg by mouth daily   Patient not taking: Reported on 2023      Facility-Administered Medications: None       Past Medical History:   Diagnosis Date    Bowel obstruction (HCC)     Clostridium difficile infection         Crohn's disease (HCC)     Depression     Hypertension     Kidney disease, chronic, stage I (GFR over 89 ml/min)     Lupus (HCC)     PTSD (post-traumatic stress disorder)     Raynaud disease        Past Surgical History:   Procedure Laterality Date    ABDOMINAL SURGERY      bowel obstruction     SECTION N/A     COLON SURGERY      WISDOM TOOTH EXTRACTION         Family History   Problem Relation Age of Onset    Lupus Mother     Diabetes Father     COPD Maternal Grandmother     Heart failure Maternal Grandmother     Substance Abuse Maternal Grandmother     Breast cancer Other      I have reviewed and agree with the history as documented.    E-Cigarette/Vaping    E-Cigarette Use Never User       E-Cigarette/Vaping Substances    Nicotine No     THC No     CBD No     Flavoring No     Other No     Unknown No      Social History     Tobacco Use    Smoking status: Never    Smokeless tobacco: Never   Vaping Use    Vaping status: Never Used   Substance Use Topics    Alcohol use: Not Currently     Comment: socially    Drug use: Never       Review of Systems  ROS negative aside from as stated in HPI    Physical Exam  Physical Exam  Vitals reviewed.   Constitutional:       Appearance: She is well-developed. She is not toxic-appearing or diaphoretic.   HENT:      Head: Normocephalic and atraumatic.      Right Ear: External ear normal.      Left Ear: External ear normal.      Nose: Nose normal.      Mouth/Throat:      Pharynx: Oropharynx is clear.   Eyes:      Extraocular Movements: Extraocular movements intact.      Pupils: Pupils are equal, round, and reactive to light.   Cardiovascular:      Rate and Rhythm: Normal rate and regular rhythm.      Heart sounds: Normal heart sounds.   Pulmonary:      Effort: Pulmonary effort is normal. No respiratory distress.      Breath sounds: Normal breath sounds. No stridor. No wheezing or rales.   Abdominal:      General: There is no distension.      Palpations: Abdomen is soft.      Tenderness: There is no abdominal tenderness.   Musculoskeletal:         General: Normal range of motion.      Cervical back: Normal range of motion and neck supple.      Right lower leg: No edema.      Left lower leg: No edema.   Skin:     General: Skin is warm and dry.      Capillary Refill: Capillary refill takes less than 2 seconds.      Coloration: Skin is not pale.      Findings: No erythema or rash.   Neurological:      General: No focal deficit present.      Mental Status: She is alert and oriented to person, place, and time.   Psychiatric:         Speech: Speech normal.         Behavior: Behavior is cooperative.         Vital Signs  ED Triage Vitals [02/27/24 1138]   Temperature Pulse  Respirations Blood Pressure SpO2   98.9 °F (37.2 °C) 93 20 (!) 175/110 93 %      Temp Source Heart Rate Source Patient Position - Orthostatic VS BP Location FiO2 (%)   Tympanic Monitor Sitting Left arm --      Pain Score       5           Vitals:    02/27/24 1138   BP: (!) 175/110   Pulse: 93   Patient Position - Orthostatic VS: Sitting         Visual Acuity      ED Medications  Medications   ketorolac (TORADOL) injection 15 mg (15 mg Intravenous Given 2/27/24 1230)   ondansetron (ZOFRAN) injection 4 mg (4 mg Intravenous Given 2/27/24 1230)   sodium chloride 0.9 % bolus 500 mL (0 mL Intravenous Stopped 2/27/24 1417)       Diagnostic Studies  Results Reviewed       Procedure Component Value Units Date/Time    FLU/RSV/COVID - if FLU/RSV clinically relevant [[id number]]  (Normal) Collected: 02/27/24 1227    Lab Status: Final result Specimen: Nares from Nose Updated: 02/27/24 1310     SARS-CoV-2 Negative     INFLUENZA A PCR Negative     INFLUENZA B PCR Negative     RSV PCR Negative    Narrative:      FOR PEDIATRIC PATIENTS - copy/paste COVID Guidelines URL to browser: [email or web address]    SARS-CoV-2 assay is a Nucleic Acid Amplification assay intended for the  qualitative detection of nucleic acid from SARS-CoV-2 in nasopharyngeal  swabs. Results are for the presumptive identification of SARS-CoV-2 RNA.    Positive results are indicative of infection with SARS-CoV-2, the virus  causing COVID-19, but do not rule out bacterial infection or co-infection  with other viruses. Laboratories within the [place] and its  territories are required to report all positive results to the appropriate  public health authorities. Negative results do not preclude SARS-CoV-2  infection and should not be used as the sole basis for treatment or other  patient management decisions. Negative results must be combined with  clinical observations, patient history, and epidemiological  information.  This test has not been FDA cleared or approved.    This test has been authorized by FDA under an Emergency Use Authorization  (EUA). This test is only authorized for the duration of time the  declaration that circumstances exist justifying the authorization of the  emergency use of an in vitro diagnostic tests for detection of SARS-CoV-2  virus and/or diagnosis of COVID-19 infection under section 564(b)(1) of  the Act, 21 U.S.C. 360bbb-3(b)(1), unless the authorization is terminated  or revoked sooner. The test has been validated but independent review by FDA  and CLIA is pending.    Test performed using [employer] GeneXpert: This RT-PCR assay targets N2,  a region unique to SARS-CoV-2. A conserved region in the E-gene was chosen  for pan-Sarbecovirus detection which includes SARS-CoV-2.    According to CMS-2020-01-R, this platform meets the definition of high-throughput technology.    Comprehensive metabolic panel [[id number]]  (Abnormal) Collected: 02/27/24 1227    Lab Status: Final result Specimen: Blood from Arm, Left Updated: 02/27/24 1248     Sodium 137 mmol/L      Potassium 3.4 mmol/L      Chloride 103 mmol/L      CO2 28 mmol/L      ANION GAP 6 mmol/L      BUN 13 mg/dL      Creatinine 0.70 mg/dL      Glucose 79 mg/dL      Calcium 9.5 mg/dL      AST 28 U/L      ALT 40 U/L      Alkaline Phosphatase 107 U/L      Total Protein 7.9 g/dL      Albumin 4.3 g/dL      Total Bilirubin 0.24 mg/dL      eGFR 121 ml/min/1.73sq m     Narrative:      National Kidney Disease Foundation guidelines for Chronic Kidney Disease (CKD):     Stage 1 with normal or high GFR (GFR > 90 mL/min/1.73 square meters)    Stage 2 Mild CKD (GFR = 60-89 mL/min/1.73 square meters)    Stage 3A Moderate CKD (GFR = 45-59 mL/min/1.73 square meters)    Stage 3B Moderate CKD (GFR = 30-44 mL/min/1.73 square meters)    Stage 4 Severe CKD (GFR = 15-29 mL/min/1.73 square meters)    Stage 5 End Stage CKD (GFR <15 mL/min/1.73 square meters)  Note:  GFR calculation is accurate only with a steady state creatinine    Magnesium [[id number]]  (Abnormal) Collected: 02/27/24 1227    Lab Status: Final result Specimen: Blood from Arm, Left Updated: 02/27/24 1248     Magnesium 1.8 mg/dL     Phosphorus [[id number]]  (Normal) Collected: 02/27/24 1227    Lab Status: Final result Specimen: Blood from Arm, Left Updated: 02/27/24 1248     Phosphorus 3.3 mg/dL     C-reactive protein [[id number]]  (Normal) Collected: 02/27/24 1227    Lab Status: Final result Specimen: Blood from Arm, Left Updated: 02/27/24 1248     CRP 2.5 mg/L     Sedimentation rate, automated [[id number]]  (Normal) Collected: 02/27/24 1227    Lab Status: Final result Specimen: Blood from Arm, Left Updated: 02/27/24 1239     Sed Rate 17 mm/hour     UA (URINE) with reflex to Scope [[id number]] Collected: 02/27/24 1227    Lab Status: Final result Specimen: Urine, Clean Catch Updated: 02/27/24 1235     Color, UA Light Yellow     Clarity, UA Clear     Specific Gravity, UA >=1.030     pH, UA 6.0     Leukocytes, UA Negative     Nitrite, UA Negative     Protein, UA Negative mg/dl      Glucose, UA Negative mg/dl      Ketones, UA Negative mg/dl      Urobilinogen, UA 0.2 E.U./dl      Bilirubin, UA Negative     Occult Blood, UA Negative    CBC and differential [[id number]]  (Abnormal) Collected: 02/27/24 1227    Lab Status: Final result Specimen: Blood from Arm, Left Updated: 02/27/24 1233     WBC 4.14 Thousand/uL      RBC 4.41 Million/uL      Hemoglobin 12.3 g/dL      Hematocrit 38.2 %      MCV 87 fL      MCH 27.9 pg      MCHC 32.2 g/dL      RDW 13.4 %      MPV 11.2 fL      Platelets 229 Thousands/uL      nRBC 0 /100 WBCs      Neutrophils Relative 54 %      Immat GRANS % 0 %      Lymphocytes Relative 36 %      Monocytes Relative 8 %      Eosinophils Relative 1 %      Basophils Relative 1 %      Neutrophils Absolute 2.29 Thousands/µL      Immature Grans Absolute 0.00 Thousand/uL      Lymphocytes Absolute 1.47 Thousands/µL       Monocytes Absolute 0.32 Thousand/µL      Eosinophils Absolute 0.04 Thousand/µL      Basophils Absolute 0.02 Thousands/µL     POCT pregnancy, urine [[id number]]  (Normal) Resulted: 02/27/24 1229    Lab Status: Final result Updated: 02/27/24 1229     EXT Preg Test, Ur Negative     Control Valid                   No orders to display              Procedures  Procedures         ED Course  ED Course as of 02/27/24 1639   Tue Feb 27, 2024   1230 PREGNANCY TEST URINE: Negative  Negative.    1235 UA (URINE) with reflex to Scope  Reviewed and without actionable derangement.    1235 Leukocytes, UA: Negative   1235 Nitrite, UA: Negative   1235 Ketones, UA: Negative   1235 POCT URINE PROTEIN: Negative   1235 Blood, UA: Negative   1235 WBC(!): 4.14  Improved from most recent prior.    1235 CBC and differential(!)  Reviewed and without actionable derangement.    1246 Sed Rate: 17  WNL. Improved from most recent prior.    1253 C-REACTIVE PROTEIN: 2.5  WNL. Improved from most recent prior.    1253 Comprehensive metabolic panel(!)  Reviewed and without actionable derangement.    1253 MAGNESIUM(!): 1.8  Mildly low. Unlikely to be contributing to symptoms.    1253 Phosphorus: 3.3  WNL   1315 FLU/RSV/COVID - if FLU/RSV clinically relevant  Negative.    1348 Rheum contacted via TT. Stated can do course of steroids based on pt's complaint of feeling generally unwell. Recommend she call their office for further outpt recs.                               SBIRT 22yo+      Flowsheet Row Most Recent Value   Initial Alcohol Screen: US AUDIT-C     1. How often do you have a drink containing alcohol? 0 Filed at: 02/27/2024 1149   2. How many drinks containing alcohol do you have on a typical day you are drinking?  0 Filed at: 02/27/2024 1149   3a. Male UNDER 65: How often do you have five or more drinks on one occasion? 0 Filed at: 02/27/2024 1149   3b. FEMALE Any Age, or MALE 65+: How often do you have 4 or more drinks on one occassion? 0 Filed  at: 02/27/2024 1149   Audit-C Score 0 Filed at: 02/27/2024 1149   ARNOL: How many times in the past year have you...    Used an illegal drug or used a prescription medication for non-medical reasons? Never Filed at: 02/27/2024 1149                      Medical Decision Making  Pt is a 22yo F who presents with fatigue.     Differential diagnosis to include but not limited to dehydration, electrolyte abnormality, lupus flare, pregnancy, viral syndrome.  Will plan for labs.  Will treat symptomatically and reassess.  See ED course for results and details.    Plan to discharge pt with f/u to PCP and rheum. Discussed returning the ED with new or worsening of symptoms. Discussed use of over the counter medications as stated on the bottle as needed for symptoms. Discussed taking new medication as prescribed and to completion. Pt expressed understanding of discharge instructions, return precautions, and medication instructions and is stable for discharge at this time. All questions were answered and pt was discharged without incident.       Amount and/or Complexity of Data Reviewed  Labs: ordered. Decision-making details documented in ED Course.    Risk  Prescription drug management.             Disposition  Final diagnoses:   Fatigue   Muscle spasm   Hx of systemic lupus erythematosus (SLE) ([facility])     Time reflects when diagnosis was documented in both MDM as applicable and the Disposition within this note       Time User Action Codes Description Comment    2/27/2024  2:09 PM Aysha Amezcua [R53.83] Fatigue     2/27/2024  2:09 PM Aysha Amezcua [M62.838] Muscle spasm     2/27/2024  2:09 PM Aysha Amezcua [M32.9] Hx of systemic lupus erythematosus (SLE) ([facility])           ED Disposition       ED Disposition   Discharge    Condition   Stable    Date/Time   Tue Feb 27, 2024 1409    Comment   Angela Lemus discharge to home/self care.                   Follow-up Information       Follow up With Specialties  Details Why Contact Info    Lakeisha Man DO Family Medicine Call  As needed [address]  [address]  [place] [address]  [phone]      Terence Pacheco PA-C Physician Assistant, Rheumatology Call on 2/28/2024  [address]  [address]  [place] [address]  [phone]              Discharge Medication List as of 2/27/2024  2:12 PM        START taking these medications    Details   predniSONE 20 mg tablet Multiple Dosages:Starting Tue 2/27/2024, Until Tue 2/27/2024, THEN Starting Wed 2/28/2024, Until Fri 3/1/2024, THEN Starting Sat 3/2/2024, Until Mon 3/4/2024, THEN Starting Tue 3/5/2024, Until Thu 3/7/2024Take 3 tablets (60 mg total) by mouth daily  for 1 day, THEN 2 tablets (40 mg total) daily for 3 days, THEN 1 tablet (20 mg total) daily for 3 days, THEN 0.5 tablets (10 mg total) daily for 3 days., Normal           CONTINUE these medications which have NOT CHANGED    Details   amLODIPine (NORVASC) 10 mg tablet Take 10 mg by mouth daily, Starting Thu 12/29/2022, Historical Med      aspirin 81 mg chewable tablet Historical Med      bromocriptine (PARLODEL) 2.5 mg tablet Historical Med      !! Cyanocobalamin (VITAMIN B 12 PO) Take 500 mcg by mouth 2 (two) times a day, Historical Med      cyclobenzaprine (FLEXERIL) 10 mg tablet Take 1 tablet (10 mg total) by mouth 3 (three) times a day as needed for muscle spasms, Starting Wed 1/24/2024, Normal      ferrous sulfate 325 (65 Fe) mg tablet Take 325 mg by mouth daily with breakfast, Historical Med      hydroxychloroquine (PLAQUENIL) 200 mg tablet Take 1.5 tablets (300 mg total) by mouth daily with breakfast, Starting Fri 2/23/2024, Until Thu 5/23/2024, Normal      hydrOXYzine HCL (ATARAX) 10 mg tablet Take 10 mg by mouth 2 (two) times a day, Starting Tue 11/14/2023, Historical Med      olmesartan (BENICAR) 5 mg tablet Take 10 mg by mouth daily, Starting Mon 5/22/2023, Historical Med      omeprazole (PriLOSEC) 20 mg delayed release capsule Take 1 capsule (20 mg  total) by mouth daily, Starting Thu 8/24/2023, Normal      phentermine (ADIPEX-P) 37.5 MG tablet Take 37.5 mg by mouth in the morning, Starting Wed 1/17/2024, Historical Med      sildenafil (VIAGRA) 25 MG tablet Take 25 mg by mouth daily as needed for erectile dysfunction, Historical Med      simvastatin (ZOCOR) 20 mg tablet Take 20 mg by mouth every evening, Starting Tue 2/6/2024, Historical Med      topiramate (TOPAMAX) 100 mg tablet Take 100 mg by mouth daily, Starting Fri 12/15/2023, Historical Med      traZODone (DESYREL) 50 mg tablet Take 1 tablet by mouth daily at bedtime, Starting Tue 8/29/2023, Historical Med      venlafaxine (EFFEXOR-XR) 150 mg 24 hr capsule Take 150 mg by mouth daily, Starting Tue 1/23/2024, Historical Med      !! vitamin B-12 (VITAMIN B-12) 1,000 mcg tablet Take 2,000 mcg by mouth daily, Starting Wed 9/13/2023, Historical Med       !! - Potential duplicate medications found. Please discuss with provider.          No discharge procedures on file.    PDMP Review       None            ED Provider  Electronically Signed by             Aysha Amezcua MD  02/27/24 9199

## 2024-02-27 NOTE — TELEPHONE ENCOUNTER
----- Message from Angela Lemus sent at 2/27/2024  2:44 PM EST -----  Regarding: Discharged from ER  Contact: [phone]  Goodafternoon I just wanted to let you know that I was just discharged from the ER.    I am experiencing muscle tremors , and chills, weakness, and dizzy spells

## 2024-02-27 NOTE — DISCHARGE INSTRUCTIONS
Follow-up with primary care and rheumatology for further care. Contact info provided below if needed.  Use over the counter medications as stated on the bottle as needed for pain control.  Take your new medications as prescribed and to completion unless told otherwise by rheumatology.  Return to the ED with new or worsening symptoms.

## 2024-02-28 LAB
C-ANCA TITR SER IF: NORMAL TITER
MYELOPEROXIDASE AB SER IA-ACNC: <0.2 UNITS (ref 0–0.9)
P-ANCA ATYPICAL TITR SER IF: NORMAL TITER
P-ANCA TITR SER IF: NORMAL TITER
PROTEINASE3 AB SER IA-ACNC: <0.2 UNITS (ref 0–0.9)

## 2024-03-01 NOTE — PROGRESS NOTES
Left messages with pt, but did not receive call back. Spoke with pt's mother Ermelinda Duarte, she was willing to pass on the instructions to the patient. Confirmed that the pt was still taking aspirin daily, educated that today would need to be her last dose before her appointment. Confirmed NPO status, arrival time of 0945, and need for a  pre and post procedure. Pt mother verbalized understanding, answered questions as offered.

## 2024-03-06 ENCOUNTER — HOSPITAL ENCOUNTER (OUTPATIENT)
Dept: RADIOLOGY | Facility: HOSPITAL | Age: 24
Discharge: HOME | End: 2024-03-06
Attending: RADIOLOGY
Payer: OTHER GOVERNMENT

## 2024-03-06 VITALS
HEART RATE: 79 BPM | SYSTOLIC BLOOD PRESSURE: 126 MMHG | DIASTOLIC BLOOD PRESSURE: 74 MMHG | RESPIRATION RATE: 16 BRPM | HEIGHT: 62 IN | WEIGHT: 146.39 LBS | TEMPERATURE: 98.7 F | OXYGEN SATURATION: 100 % | BODY MASS INDEX: 26.94 KG/M2

## 2024-03-06 DIAGNOSIS — M32.9 SYSTEMIC LUPUS ERYTHEMATOSUS, UNSPECIFIED SLE TYPE, UNSPECIFIED ORGAN INVOLVEMENT STATUS (HCC): ICD-10-CM

## 2024-03-06 LAB
EXT PREGNANCY TEST URINE: NEGATIVE
EXT. CONTROL: NORMAL

## 2024-03-06 PROCEDURE — 77012 CT SCAN FOR NEEDLE BIOPSY: CPT

## 2024-03-06 PROCEDURE — 81025 URINE PREGNANCY TEST: CPT | Performed by: RADIOLOGY

## 2024-03-06 PROCEDURE — 88346 IMFLUOR 1ST 1ANTB STAIN PX: CPT

## 2024-03-06 PROCEDURE — 50200 RENAL BIOPSY PERQ: CPT

## 2024-03-06 PROCEDURE — 88348 ELECTRON MICROSCOPY DX: CPT

## 2024-03-06 PROCEDURE — 88313 SPECIAL STAINS GROUP 2: CPT

## 2024-03-06 PROCEDURE — 88305 TISSUE EXAM BY PATHOLOGIST: CPT

## 2024-03-06 PROCEDURE — 88350 IMFLUOR EA ADDL 1ANTB STN PX: CPT

## 2024-03-06 RX ORDER — ACETAMINOPHEN 325 MG/1
650 TABLET ORAL ONCE
Status: DISCONTINUED | OUTPATIENT
Start: 2024-03-06 | End: 2024-03-07 | Stop reason: HOSPADM

## 2024-03-06 RX ORDER — MIDAZOLAM HYDROCHLORIDE 2 MG/2ML
INJECTION, SOLUTION INTRAMUSCULAR; INTRAVENOUS AS NEEDED
Status: COMPLETED | OUTPATIENT
Start: 2024-03-06 | End: 2024-03-06

## 2024-03-06 RX ORDER — SODIUM CHLORIDE 9 MG/ML
75 INJECTION, SOLUTION INTRAVENOUS CONTINUOUS
Status: DISCONTINUED | OUTPATIENT
Start: 2024-03-06 | End: 2024-03-07 | Stop reason: HOSPADM

## 2024-03-06 RX ORDER — FENTANYL CITRATE 50 UG/ML
INJECTION, SOLUTION INTRAMUSCULAR; INTRAVENOUS AS NEEDED
Status: COMPLETED | OUTPATIENT
Start: 2024-03-06 | End: 2024-03-06

## 2024-03-06 RX ORDER — LIDOCAINE WITH 8.4% SOD BICARB 0.9%(10ML)
SYRINGE (ML) INJECTION AS NEEDED
Status: COMPLETED | OUTPATIENT
Start: 2024-03-06 | End: 2024-03-06

## 2024-03-06 RX ADMIN — FENTANYL CITRATE 25 MCG: 50 INJECTION, SOLUTION INTRAMUSCULAR; INTRAVENOUS at 11:36

## 2024-03-06 RX ADMIN — Medication 5 ML: at 11:36

## 2024-03-06 RX ADMIN — SODIUM CHLORIDE 75 ML/HR: 0.9 INJECTION, SOLUTION INTRAVENOUS at 10:22

## 2024-03-06 RX ADMIN — FENTANYL CITRATE 50 MCG: 50 INJECTION, SOLUTION INTRAMUSCULAR; INTRAVENOUS at 11:30

## 2024-03-06 RX ADMIN — MIDAZOLAM 0.5 MG: 1 INJECTION INTRAMUSCULAR; INTRAVENOUS at 12:06

## 2024-03-06 RX ADMIN — MIDAZOLAM 1 MG: 1 INJECTION INTRAMUSCULAR; INTRAVENOUS at 11:30

## 2024-03-06 RX ADMIN — MIDAZOLAM 0.5 MG: 1 INJECTION INTRAMUSCULAR; INTRAVENOUS at 11:50

## 2024-03-06 RX ADMIN — FENTANYL CITRATE 50 MCG: 50 INJECTION, SOLUTION INTRAMUSCULAR; INTRAVENOUS at 11:50

## 2024-03-06 RX ADMIN — FENTANYL CITRATE 50 MCG: 50 INJECTION, SOLUTION INTRAMUSCULAR; INTRAVENOUS at 11:22

## 2024-03-06 RX ADMIN — FENTANYL CITRATE 25 MCG: 50 INJECTION, SOLUTION INTRAMUSCULAR; INTRAVENOUS at 11:39

## 2024-03-06 RX ADMIN — MIDAZOLAM 1 MG: 1 INJECTION INTRAMUSCULAR; INTRAVENOUS at 11:22

## 2024-03-06 RX ADMIN — MIDAZOLAM 0.5 MG: 1 INJECTION INTRAMUSCULAR; INTRAVENOUS at 11:39

## 2024-03-06 RX ADMIN — MIDAZOLAM 0.5 MG: 1 INJECTION INTRAMUSCULAR; INTRAVENOUS at 11:36

## 2024-03-06 NOTE — PERIOPERATIVE NURSING NOTE
Patient minimal assist up to bathroom , to void successfully. IV access removed. Bandaid in place, clean, dry and intact. No further questions regarding discharge instructions. Patient discharged off unit via wheelchair with all belongings.

## 2024-03-06 NOTE — BRIEF OP NOTE (RAD/CATH)
INTERVENTIONAL RADIOLOGY PROCEDURE NOTE    Date: 3/6/2024    Procedure:   Procedure Summary       Date: 03/06/24 Room / Location: [facility] CAT Scan    Anesthesia Start:  Anesthesia Stop:     Procedure: IR BIOPSY KIDNEY RANDOM Diagnosis:       Systemic lupus erythematosus, unspecified SLE type, unspecified organ involvement status (HCC)      (CKD, lupus)    Scheduled Providers:  Responsible Provider:     Anesthesia Type: Not recorded ASA Status: Not recorded            Preoperative diagnosis:   1. Systemic lupus erythematosus, unspecified SLE type, unspecified organ involvement status (HCC)         Postoperative diagnosis: Same.    Surgeon: Regan Morel MD     Assistant: None. No qualified resident was available.    Blood loss: Minimal    Specimens: 3, 18 G cores     Findings: Left non-target renal biopsy. D-stat used. Small post biopsy perinephric hematoma which was not enlarging on 5 minute repeat scan.    Complications: None immediate.    Anesthesia: conscious sedation

## 2024-03-06 NOTE — DISCHARGE INSTRUCTIONS
Percutaneous Kidney Biopsy   WHAT YOU NEED TO KNOW:   A percutaneous kidney biopsy is a procedure to remove a small sample of kidney tissue. It may also be done to check for kidney disease or cancer.     DISCHARGE INSTRUCTIONS:   Follow up with your healthcare provider as directed:  Write down your questions so you remember to ask them during your visits.   Wound care:  The Band-Aid may be removed in 24 hours.                                                                                                For more information:   National Kid[employer] and U[employer] Information Clearinghouse  [address] [place] [address]  Phone: [phone]  Web Address: http://kidney.niddk.nih.gov/   Care after your procedure:    1. Limit your activities for 36 hours after your biopsy.    2. No driving day of biopsy.    3. Return to your normal diet. Flat sips of flat soda helps with mild nausea.    4. Remove band-aid or dressing 24 hours after procedure.       Contact Interventional Radiology at [phone]    (CALLES PATIENTS: Contact Interventional Radiology at [phone]) ([facility]IG[facility] PATIENTS: Contact Interventional Radiology at [phone]) if:    1. Difficulty breathing, nausea or vomiting.    2  You feel weak or dizzy.    3. Chills or fever above 101 degrees F.     You have persistent nausea or vomiting    4. You have severe pain in your abdomen or where You feel weak or dizzy.your           procedure was done.    5  You have blood in your urine. You urinate small amounts or not at all.    4. Develop any redness, swelling, heat, unusual drainage, heavy bruising or bleeding     from biopsy site.         Procedural Sedation   WHAT YOU NEED TO KNOW:   Procedural sedation is medicine used during procedures to help you feel relaxed and calm. You will remember little to none of the procedure. After sedation you may feel tired, weak, or unsteady on your feet. You may also have trouble concentrating or short-term  memory loss. These symptoms should go away in 24 hours or less.   DISCHARGE INSTRUCTIONS:     Call 911 or have someone else call for any of the following:   You have sudden trouble breathing.     You cannot be woken.      Contact Interventional Radiology at [phone]   (STEVAN PATIENTS: Contact Interventional Radiology at [phone]) ([facility]IG[facility] PATIENTS: Contact Interventional Radiology at [phone]) if any of the following occur:     You have a severe headache or dizziness.     Your heart is beating faster than usual.    You have a fever or chills.     Your skin is itchy, swollen, or you have a rash.     You have nausea or are vomiting for more than 8 hours after the procedure.      You have questions or concerns about your condition or care.  Self-care:   Have someone stay with you for 24 hours. This person can drive you to errands and help you do things around the house. This person can also watch for problems.      Rest and do quiet activities for 24 hours. Do not exercise, ride a bike, or play sports. Stand up slowly to prevent dizziness and falls. Take short walks around the house with another person. Slowly return to your usual activities the next day.      Do not drive or use dangerous machines or tools for 24 hours. You may injure yourself or others. Examples include a lawnmower, saw, or drill. Do not return to work for 24 hours if you use dangerous machines or tools for work.      Do not make important decisions for 24 hours. For example, do not sign important papers or invest money.      Drink liquids as directed. Liquids help flush the sedation medicine out of your body. Ask how much liquid to drink each day and which liquids are best for you.      Eat small, frequent meals to prevent nausea and vomiting. Start with clear liquids such as juice or broth. If you do not vomit after clear liquids, you can eat your usual foods.      Do not drink alcohol or take medicines that make you drowsy. This  includes medicines that help you sleep and anxiety medicines. Ask your healthcare provider if it is safe for you to take pain medicine.  Follow up with your healthcare provider as directed: Write down your questions so you remember to ask them during your visits.

## 2024-03-06 NOTE — INTERVAL H&P NOTE
Update: (This section must be completed if the H&P was completed greater than 24 hrs to procedure or admission)    H&P reviewed. After examining the patient, I find no changed to the H&P since it had been written.    Discussed the risks of a renal biopsy including bleeding which if significant may require additional imaging and a procedure to stop the bleeding. Also discussed the risk of infection and injury to the kidney. She understands and wishes to proceed.    Patient re-evaluated. Accept as history and physical.    Regan Morel MD/March 6, 2024/11:16 AM

## 2024-03-06 NOTE — SEDATION DOCUMENTATION
Procedure ended. IR kidney biopsy completed by Dr. Morel. Flowable destat and bandaid to site. IR post procedural bedrest starting at 1220 x 4 hours. Patient transferred to PACU

## 2024-03-07 ENCOUNTER — TELEPHONE (OUTPATIENT)
Age: 24
End: 2024-03-07

## 2024-03-07 NOTE — TELEPHONE ENCOUNTER
Pt was calling because she didn't realize her appt was for 1:00 and she will call back to reschedule.

## 2024-03-08 ENCOUNTER — TRANSCRIBE ORDERS (OUTPATIENT)
Dept: ADMINISTRATIVE | Facility: HOSPITAL | Age: 24
End: 2024-03-08

## 2024-03-08 ENCOUNTER — APPOINTMENT (OUTPATIENT)
Dept: LAB | Facility: HOSPITAL | Age: 24
End: 2024-03-08

## 2024-03-08 DIAGNOSIS — M32.9 SYSTEMIC LUPUS ERYTHEMATOSUS, UNSPECIFIED SLE TYPE, UNSPECIFIED ORGAN INVOLVEMENT STATUS (HCC): Primary | ICD-10-CM

## 2024-03-08 DIAGNOSIS — M32.9 SYSTEMIC LUPUS ERYTHEMATOSUS, UNSPECIFIED SLE TYPE, UNSPECIFIED ORGAN INVOLVEMENT STATUS (HCC): ICD-10-CM

## 2024-03-08 DIAGNOSIS — N18.2 CHRONIC KIDNEY DISEASE, STAGE II (MILD): ICD-10-CM

## 2024-03-14 ENCOUNTER — TRANSCRIBE ORDERS (OUTPATIENT)
Dept: LAB | Facility: CLINIC | Age: 24
End: 2024-03-14

## 2024-03-14 ENCOUNTER — APPOINTMENT (OUTPATIENT)
Dept: LAB | Facility: CLINIC | Age: 24
End: 2024-03-14
Payer: OTHER GOVERNMENT

## 2024-03-14 DIAGNOSIS — I77.6 VASCULITIS (HCC): ICD-10-CM

## 2024-03-14 DIAGNOSIS — D68.61 ANTIPHOSPHOLIPID SYNDROME (HCC): ICD-10-CM

## 2024-03-14 DIAGNOSIS — N18.2 CHRONIC KIDNEY DISEASE, STAGE II (MILD): Primary | ICD-10-CM

## 2024-03-14 DIAGNOSIS — M32.14 LUPUS GLOMERULONEPHRITIS (HCC): ICD-10-CM

## 2024-03-14 PROCEDURE — 85303 CLOT INHIBIT PROT C ACTIVITY: CPT

## 2024-03-14 PROCEDURE — 85613 RUSSELL VIPER VENOM DILUTED: CPT

## 2024-03-14 PROCEDURE — 36415 COLL VENOUS BLD VENIPUNCTURE: CPT

## 2024-03-14 PROCEDURE — 85306 CLOT INHIBIT PROT S FREE: CPT

## 2024-03-14 PROCEDURE — 85305 CLOT INHIBIT PROT S TOTAL: CPT

## 2024-03-15 ENCOUNTER — APPOINTMENT (OUTPATIENT)
Dept: LAB | Facility: CLINIC | Age: 24
End: 2024-03-15
Payer: OTHER GOVERNMENT

## 2024-03-15 LAB
PROT S ACT/NOR PPP: 82 % (ref 61–136)
PROT S PPP-ACNC: 84 % (ref 60–150)

## 2024-03-15 PROCEDURE — 36415 COLL VENOUS BLD VENIPUNCTURE: CPT | Performed by: INTERNAL MEDICINE

## 2024-03-15 PROCEDURE — 84156 ASSAY OF PROTEIN URINE: CPT | Performed by: INTERNAL MEDICINE

## 2024-03-16 LAB
LA PPP-IMP: NORMAL
SCREEN APTT: 38.8 SEC (ref 0–43.5)
SCREEN DRVVT: 36.3 SEC (ref 0–47)

## 2024-03-17 LAB
PROT C AG ACT/NOR PPP IA: 142 % OF NORMAL (ref 60–150)
PROT S ACT/NOR PPP: 68 % (ref 68–108)

## 2024-03-19 LAB — SCAN RESULT: NORMAL

## 2024-03-22 ENCOUNTER — HOSPITAL ENCOUNTER (EMERGENCY)
Facility: HOSPITAL | Age: 24
Discharge: HOME | End: 2024-03-22
Attending: EMERGENCY MEDICINE
Payer: OTHER GOVERNMENT

## 2024-03-22 VITALS
HEART RATE: 88 BPM | TEMPERATURE: 98 F | RESPIRATION RATE: 20 BRPM | DIASTOLIC BLOOD PRESSURE: 81 MMHG | SYSTOLIC BLOOD PRESSURE: 134 MMHG

## 2024-03-22 DIAGNOSIS — R11.0 NAUSEA: ICD-10-CM

## 2024-03-22 DIAGNOSIS — N93.8 DYSFUNCTIONAL UTERINE BLEEDING: Primary | ICD-10-CM

## 2024-03-22 DIAGNOSIS — N39.0 UTI (URINARY TRACT INFECTION): ICD-10-CM

## 2024-03-22 DIAGNOSIS — M54.9 MUSCULOSKELETAL BACK PAIN: ICD-10-CM

## 2024-03-22 LAB
ALBUMIN SERPL BCP-MCNC: 4.1 G/DL (ref 3.5–5)
ALP SERPL-CCNC: 83 U/L (ref 34–104)
ALT SERPL W P-5'-P-CCNC: 23 U/L (ref 7–52)
ANION GAP SERPL CALCULATED.3IONS-SCNC: 4 MMOL/L (ref 4–13)
AST SERPL W P-5'-P-CCNC: 29 U/L (ref 13–39)
BACTERIA UR QL AUTO: ABNORMAL /HPF
BASOPHILS # BLD AUTO: 0.02 THOUSANDS/ÂΜL (ref 0–0.1)
BASOPHILS NFR BLD AUTO: 1 % (ref 0–1)
BILIRUB SERPL-MCNC: 0.31 MG/DL (ref 0.2–1)
BILIRUB UR QL STRIP: ABNORMAL
BUN SERPL-MCNC: 14 MG/DL (ref 5–25)
CALCIUM SERPL-MCNC: 9.4 MG/DL (ref 8.4–10.2)
CHLORIDE SERPL-SCNC: 107 MMOL/L (ref 96–108)
CLARITY UR: ABNORMAL
CO2 SERPL-SCNC: 27 MMOL/L (ref 21–32)
COLOR UR: YELLOW
CREAT SERPL-MCNC: 0.84 MG/DL (ref 0.6–1.3)
EOSINOPHIL # BLD AUTO: 0.16 THOUSAND/ÂΜL (ref 0–0.61)
EOSINOPHIL NFR BLD AUTO: 4 % (ref 0–6)
ERYTHROCYTE [DISTWIDTH] IN BLOOD BY AUTOMATED COUNT: 13.5 % (ref 11.6–15.1)
EXT PREGNANCY TEST URINE: NEGATIVE
EXT. CONTROL: NORMAL
GFR SERPL CREATININE-BSD FRML MDRD: 97 ML/MIN/1.73SQ M
GLUCOSE SERPL-MCNC: 88 MG/DL (ref 65–140)
GLUCOSE UR STRIP-MCNC: NEGATIVE MG/DL
HCT VFR BLD AUTO: 36.8 % (ref 34.8–46.1)
HGB BLD-MCNC: 11.8 G/DL (ref 11.5–15.4)
HGB UR QL STRIP.AUTO: ABNORMAL
IMM GRANULOCYTES # BLD AUTO: 0.01 THOUSAND/UL (ref 0–0.2)
IMM GRANULOCYTES NFR BLD AUTO: 0 % (ref 0–2)
KETONES UR STRIP-MCNC: NEGATIVE MG/DL
LEUKOCYTE ESTERASE UR QL STRIP: ABNORMAL
LYMPHOCYTES # BLD AUTO: 1.66 THOUSANDS/ÂΜL (ref 0.6–4.47)
LYMPHOCYTES NFR BLD AUTO: 38 % (ref 14–44)
MAGNESIUM SERPL-MCNC: 2 MG/DL (ref 1.9–2.7)
MCH RBC QN AUTO: 27.5 PG (ref 26.8–34.3)
MCHC RBC AUTO-ENTMCNC: 32.1 G/DL (ref 31.4–37.4)
MCV RBC AUTO: 86 FL (ref 82–98)
MONOCYTES # BLD AUTO: 0.25 THOUSAND/ÂΜL (ref 0.17–1.22)
MONOCYTES NFR BLD AUTO: 6 % (ref 4–12)
MUCOUS THREADS UR QL AUTO: ABNORMAL
NEUTROPHILS # BLD AUTO: 2.24 THOUSANDS/ÂΜL (ref 1.85–7.62)
NEUTS SEG NFR BLD AUTO: 51 % (ref 43–75)
NITRITE UR QL STRIP: NEGATIVE
NON-SQ EPI CELLS URNS QL MICRO: ABNORMAL /HPF
NRBC BLD AUTO-RTO: 0 /100 WBCS
PH UR STRIP.AUTO: 6.5 [PH]
PLATELET # BLD AUTO: 223 THOUSANDS/UL (ref 149–390)
PMV BLD AUTO: 11.8 FL (ref 8.9–12.7)
POTASSIUM SERPL-SCNC: 4.4 MMOL/L (ref 3.5–5.3)
PROT SERPL-MCNC: 7.1 G/DL (ref 6.4–8.4)
PROT UR STRIP-MCNC: ABNORMAL MG/DL
RBC # BLD AUTO: 4.29 MILLION/UL (ref 3.81–5.12)
RBC #/AREA URNS AUTO: ABNORMAL /HPF
SODIUM SERPL-SCNC: 138 MMOL/L (ref 135–147)
SP GR UR STRIP.AUTO: >=1.03 (ref 1–1.03)
UROBILINOGEN UR QL STRIP.AUTO: 1 E.U./DL
WBC # BLD AUTO: 4.34 THOUSAND/UL (ref 4.31–10.16)
WBC #/AREA URNS AUTO: ABNORMAL /HPF

## 2024-03-22 PROCEDURE — 81025 URINE PREGNANCY TEST: CPT | Performed by: EMERGENCY MEDICINE

## 2024-03-22 PROCEDURE — 85025 COMPLETE CBC W/AUTO DIFF WBC: CPT | Performed by: EMERGENCY MEDICINE

## 2024-03-22 PROCEDURE — 36415 COLL VENOUS BLD VENIPUNCTURE: CPT | Performed by: EMERGENCY MEDICINE

## 2024-03-22 PROCEDURE — 81001 URINALYSIS AUTO W/SCOPE: CPT | Performed by: EMERGENCY MEDICINE

## 2024-03-22 PROCEDURE — 99284 EMERGENCY DEPT VISIT MOD MDM: CPT | Performed by: EMERGENCY MEDICINE

## 2024-03-22 PROCEDURE — 99283 EMERGENCY DEPT VISIT LOW MDM: CPT

## 2024-03-22 PROCEDURE — 87086 URINE CULTURE/COLONY COUNT: CPT | Performed by: EMERGENCY MEDICINE

## 2024-03-22 PROCEDURE — 96372 THER/PROPH/DIAG INJ SC/IM: CPT

## 2024-03-22 PROCEDURE — 83735 ASSAY OF MAGNESIUM: CPT | Performed by: EMERGENCY MEDICINE

## 2024-03-22 PROCEDURE — 80053 COMPREHEN METABOLIC PANEL: CPT | Performed by: EMERGENCY MEDICINE

## 2024-03-22 RX ORDER — CEPHALEXIN 500 MG/1
500 CAPSULE ORAL ONCE
Status: COMPLETED | OUTPATIENT
Start: 2024-03-22 | End: 2024-03-22

## 2024-03-22 RX ORDER — ONDANSETRON 4 MG/1
4 TABLET, ORALLY DISINTEGRATING ORAL ONCE
Status: COMPLETED | OUTPATIENT
Start: 2024-03-22 | End: 2024-03-22

## 2024-03-22 RX ORDER — LIDOCAINE 50 MG/G
1 PATCH TOPICAL DAILY
Qty: 30 PATCH | Refills: 0 | Status: SHIPPED | OUTPATIENT
Start: 2024-03-22

## 2024-03-22 RX ORDER — ONDANSETRON 4 MG/1
4 TABLET, ORALLY DISINTEGRATING ORAL EVERY 6 HOURS PRN
Qty: 12 TABLET | Refills: 0 | Status: SHIPPED | OUTPATIENT
Start: 2024-03-22

## 2024-03-22 RX ORDER — CYCLOBENZAPRINE HCL 5 MG
5 TABLET ORAL 3 TIMES DAILY PRN
Qty: 12 TABLET | Refills: 0 | Status: SHIPPED | OUTPATIENT
Start: 2024-03-22

## 2024-03-22 RX ORDER — LIDOCAINE 50 MG/G
2 PATCH TOPICAL ONCE
Status: DISCONTINUED | OUTPATIENT
Start: 2024-03-22 | End: 2024-03-22 | Stop reason: HOSPADM

## 2024-03-22 RX ORDER — CEPHALEXIN 500 MG/1
500 CAPSULE ORAL EVERY 12 HOURS SCHEDULED
Qty: 10 CAPSULE | Refills: 0 | Status: SHIPPED | OUTPATIENT
Start: 2024-03-22 | End: 2024-03-27

## 2024-03-22 RX ORDER — KETOROLAC TROMETHAMINE 30 MG/ML
30 INJECTION, SOLUTION INTRAMUSCULAR; INTRAVENOUS ONCE
Status: COMPLETED | OUTPATIENT
Start: 2024-03-22 | End: 2024-03-22

## 2024-03-22 RX ADMIN — KETOROLAC TROMETHAMINE 30 MG: 30 INJECTION, SOLUTION INTRAMUSCULAR; INTRAVENOUS at 17:23

## 2024-03-22 RX ADMIN — CEPHALEXIN 500 MG: 500 CAPSULE ORAL at 17:53

## 2024-03-22 RX ADMIN — ONDANSETRON 4 MG: 4 TABLET, ORALLY DISINTEGRATING ORAL at 17:23

## 2024-03-22 RX ADMIN — LIDOCAINE 2 PATCH: 50 PATCH TOPICAL at 17:53

## 2024-03-22 NOTE — ED PROVIDER NOTES
History  Chief Complaint   Patient presents with    Back Pain     Back pain, relates it as chronic from her lupus. Also had kidney biopsy on March 6. Pain worse since this    Vaginal Bleeding     C/o vaginal bleeding for a couple of days. And passing small clots. Denies chance she is pregnant. . Has nexplanon implant on     Patient is a 23-year-old female that presents emergency department for evaluation of her chronic back pain and vaginal bleeding.  Patient states that she had a renal biopsy a couple of weeks ago, after which her back pain seem to worsen.  Patient also concerned because she has vaginal bleeding with clots.  She also c/o intermittent nausea, without vomiting. Patient has a Nexplanon that was placed last year and states that she has been amenorrheic until the past few weeks.  Patient with an episode of spotting a couple weeks ago, with spontaneous resolution and now with heavier menses.  She denies abdominal pain, vomiting, fevers or chills.      History provided by:  Patient   used: No    Back Pain  Associated symptoms: no abdominal pain, no dysuria and no fever    Vaginal Bleeding  Associated symptoms: back pain    Associated symptoms: no abdominal pain, no dysuria, no fever and no nausea        Prior to Admission Medications   Prescriptions Last Dose Informant Patient Reported? Taking?   Cyanocobalamin (VITAMIN B 12 PO)  Self Yes No   Sig: Take 500 mcg by mouth 2 (two) times a day   aspirin 81 mg chewable tablet   Yes No   bromocriptine (PARLODEL) 2.5 mg tablet   Yes No   cyclobenzaprine (FLEXERIL) 10 mg tablet   No No   Sig: Take 1 tablet (10 mg total) by mouth 3 (three) times a day as needed for muscle spasms   ferrous sulfate 325 (65 Fe) mg tablet  Self Yes No   Sig: Take 325 mg by mouth daily with breakfast   hydrOXYzine HCL (ATARAX) 10 mg tablet   Yes No   Sig: Take 10 mg by mouth 2 (two) times a day   Patient not taking: Reported on 1/24/2024   hydroxychloroquine  (PLAQUENIL) 200 mg tablet   No No   Sig: Take 1.5 tablets (300 mg total) by mouth daily with breakfast   olmesartan (BENICAR) 5 mg tablet  Self Yes No   Sig: Take 10 mg by mouth daily   omeprazole (PriLOSEC) 20 mg delayed release capsule   No No   Sig: Take 1 capsule (20 mg total) by mouth daily   phentermine (ADIPEX-P) 37.5 MG tablet   Yes No   Sig: Take 37.5 mg by mouth in the morning   sildenafil (VIAGRA) 25 MG tablet  Self Yes No   Sig: Take 25 mg by mouth daily as needed for erectile dysfunction   simvastatin (ZOCOR) 20 mg tablet   Yes No   Sig: Take 20 mg by mouth every evening   topiramate (TOPAMAX) 100 mg tablet   Yes No   Sig: Take 100 mg by mouth daily   traZODone (DESYREL) 50 mg tablet   Yes No   Sig: Take 1 tablet by mouth daily at bedtime   venlafaxine (EFFEXOR-XR) 150 mg 24 hr capsule   Yes No   Sig: Take 150 mg by mouth daily   vitamin B-12 (VITAMIN B-12) 1,000 mcg tablet   Yes No   Sig: Take 2,000 mcg by mouth daily   Patient not taking: Reported on 2023      Facility-Administered Medications: None       Past Medical History:   Diagnosis Date    Bowel obstruction (HCC)     Clostridium difficile infection         Crohn's disease (HCC)     Depression     Hypertension     Kidney disease, chronic, stage I (GFR over 89 ml/min)     Lupus (HCC)     PTSD (post-traumatic stress disorder)     Raynaud disease        Past Surgical History:   Procedure Laterality Date    ABDOMINAL SURGERY      bowel obstruction     SECTION N/A     COLON SURGERY      IR BIOPSY KIDNEY RANDOM  3/6/2024    WISDOM TOOTH EXTRACTION         Family History   Problem Relation Age of Onset    Lupus Mother     Diabetes Father     COPD Maternal Grandmother     Heart failure Maternal Grandmother     Substance Abuse Maternal Grandmother     Breast cancer Other      I have reviewed and agree with the history as documented.    E-Cigarette/Vaping    E-Cigarette Use Never User      E-Cigarette/Vaping Substances    Nicotine No      THC No     CBD No     Flavoring No     Other No     Unknown No      Social History     Tobacco Use    Smoking status: Never    Smokeless tobacco: Never   Vaping Use    Vaping status: Never Used   Substance Use Topics    Alcohol use: Not Currently     Comment: socially    Drug use: Never       Review of Systems   Constitutional:  Negative for chills and fever.   Respiratory:  Negative for cough, chest tightness and shortness of breath.    Gastrointestinal:  Negative for abdominal pain, diarrhea, nausea and vomiting.   Genitourinary:  Positive for vaginal bleeding. Negative for dysuria, frequency, hematuria and urgency.   Musculoskeletal:  Positive for back pain. Negative for gait problem, neck pain and neck stiffness.   All other systems reviewed and are negative.      Physical Exam  Physical Exam  Vitals and nursing note reviewed.   Constitutional:       General: She is not in acute distress.     Appearance: She is well-developed. She is not diaphoretic.   HENT:      Head: Normocephalic and atraumatic.   Eyes:      Conjunctiva/sclera: Conjunctivae normal.      Pupils: Pupils are equal, round, and reactive to light.   Cardiovascular:      Rate and Rhythm: Normal rate and regular rhythm.      Heart sounds: Normal heart sounds. No murmur heard.  Pulmonary:      Effort: Pulmonary effort is normal. No respiratory distress.      Breath sounds: Normal breath sounds.   Abdominal:      General: Bowel sounds are normal. There is no distension.      Palpations: Abdomen is soft.      Tenderness: There is no abdominal tenderness.   Genitourinary:     Comments: Patient deferred  Musculoskeletal:         General: No deformity. Normal range of motion.      Cervical back: Normal range of motion and neck supple. Tenderness present. No bony tenderness or crepitus.      Thoracic back: Tenderness present. No bony tenderness.      Lumbar back: Tenderness present. No bony tenderness.      Comments: +paraspinal tenderness noted to thoracic  and lumbar aspects of back. No midline tenderness   Skin:     General: Skin is warm and dry.      Capillary Refill: Capillary refill takes less than 2 seconds.      Coloration: Skin is not pale.      Findings: No rash.   Neurological:      General: No focal deficit present.      Mental Status: She is alert and oriented to person, place, and time.      Cranial Nerves: No cranial nerve deficit.   Psychiatric:         Behavior: Behavior normal.         Vital Signs  ED Triage Vitals [03/22/24 1548]   Temperature Pulse Respirations Blood Pressure SpO2   98 °F (36.7 °C) 88 20 134/81 --      Temp Source Heart Rate Source Patient Position - Orthostatic VS BP Location FiO2 (%)   Temporal Monitor Sitting Right arm --      Pain Score       9           Vitals:    03/22/24 1548   BP: 134/81   Pulse: 88   Patient Position - Orthostatic VS: Sitting         Visual Acuity      ED Medications  Medications   lidocaine (LIDODERM) 5 % patch 2 patch (2 patches Topical Medication Applied 3/22/24 1753)   ondansetron (ZOFRAN-ODT) dispersible tablet 4 mg (4 mg Oral Given 3/22/24 1723)   ketorolac (TORADOL) injection 30 mg (30 mg Intramuscular Given 3/22/24 1723)   cephalexin (KEFLEX) capsule 500 mg (500 mg Oral Given 3/22/24 1753)       Diagnostic Studies  Results Reviewed       Procedure Component Value Units Date/Time    Urine culture [[id number]]     Lab Status: No result Specimen: Urine     Urine Microscopic [[id number]]  (Abnormal) Collected: 03/22/24 1643    Lab Status: Final result Specimen: Urine, Clean Catch Updated: 03/22/24 1725     RBC, UA Innumerable /hpf      WBC, UA 0-1 /hpf      Epithelial Cells Occasional /hpf      Bacteria, UA Occasional /hpf      MUCUS THREADS Occasional    Comprehensive metabolic panel [[id number]] Collected: 03/22/24 1643    Lab Status: Final result Specimen: Blood from Arm, Left Updated: 03/22/24 1708     Sodium 138 mmol/L      Potassium 4.4 mmol/L      Chloride 107 mmol/L      CO2 27 mmol/L      ANION GAP  4 mmol/L      BUN 14 mg/dL      Creatinine 0.84 mg/dL      Glucose 88 mg/dL      Calcium 9.4 mg/dL      AST 29 U/L      ALT 23 U/L      Alkaline Phosphatase 83 U/L      Total Protein 7.1 g/dL      Albumin 4.1 g/dL      Total Bilirubin 0.31 mg/dL      eGFR 97 ml/min/1.73sq m     Narrative:      National Kidney Disease Foundation guidelines for Chronic Kidney Disease (CKD):     Stage 1 with normal or high GFR (GFR > 90 mL/min/1.73 square meters)    Stage 2 Mild CKD (GFR = 60-89 mL/min/1.73 square meters)    Stage 3A Moderate CKD (GFR = 45-59 mL/min/1.73 square meters)    Stage 3B Moderate CKD (GFR = 30-44 mL/min/1.73 square meters)    Stage 4 Severe CKD (GFR = 15-29 mL/min/1.73 square meters)    Stage 5 End Stage CKD (GFR <15 mL/min/1.73 square meters)  Note: GFR calculation is accurate only with a steady state creatinine    Magnesium [[id number]]  (Normal) Collected: 03/22/24 1643    Lab Status: Final result Specimen: Blood from Arm, Left Updated: 03/22/24 1708     Magnesium 2.0 mg/dL     UA w Reflex to Microscopic w Reflex to Culture [[id number]]  (Abnormal) Collected: 03/22/24 1643    Lab Status: Final result Specimen: Urine, Clean Catch Updated: 03/22/24 1651     Color, UA Yellow     Clarity, UA Slightly Cloudy     Specific Gravity, UA >=1.030     pH, UA 6.5     Leukocytes, UA Trace     Nitrite, UA Negative     Protein, UA 30 (1+) mg/dl      Glucose, UA Negative mg/dl      Ketones, UA Negative mg/dl      Urobilinogen, UA 1.0 E.U./dl      Bilirubin, UA Small     Occult Blood, UA Moderate    CBC and differential [[id number]] Collected: 03/22/24 1643    Lab Status: Final result Specimen: Blood from Arm, Left Updated: 03/22/24 1649     WBC 4.34 Thousand/uL      RBC 4.29 Million/uL      Hemoglobin 11.8 g/dL      Hematocrit 36.8 %      MCV 86 fL      MCH 27.5 pg      MCHC 32.1 g/dL      RDW 13.5 %      MPV 11.8 fL      Platelets 223 Thousands/uL      nRBC 0 /100 WBCs      Neutrophils Relative 51 %      Immature Grans % 0 %       Lymphocytes Relative 38 %      Monocytes Relative 6 %      Eosinophils Relative 4 %      Basophils Relative 1 %      Neutrophils Absolute 2.24 Thousands/µL      Absolute Immature Grans 0.01 Thousand/uL      Absolute Lymphocytes 1.66 Thousands/µL      Absolute Monocytes 0.25 Thousand/µL      Eosinophils Absolute 0.16 Thousand/µL      Basophils Absolute 0.02 Thousands/µL     POCT pregnancy, urine [[id number]]  (Normal) Resulted: 03/22/24 1648    Lab Status: Final result Updated: 03/22/24 1648     EXT Preg Test, Ur Negative     Control Valid                   No orders to display              Procedures  Procedures         ED Course                                             Medical Decision Making  Amount and/or Complexity of Data Reviewed  Labs: ordered.    Risk  Prescription drug management.             Disposition  Final diagnoses:   Dysfunctional uterine bleeding   Musculoskeletal back pain   UTI (urinary tract infection)   Nausea     Time reflects when diagnosis was documented in both MDM as applicable and the Disposition within this note       Time User Action Codes Description Comment    3/22/2024  5:36 PM Oyesanmi, Olubusola O Add [N93.8] Dysfunctional uterine bleeding     3/22/2024  5:37 PM Oyesanmi, Olubusola O Add [M54.9] Musculoskeletal back pain     3/22/2024  5:37 PM Oyesanmi, Olubusola O Add [N39.0] UTI (urinary tract infection)     3/22/2024  5:47 PM Oyesanmi, Olubusola O Add [R11.0] Nausea           ED Disposition       ED Disposition   Discharge    Condition   Stable    Date/Time   Fri Mar 22, 2024  5:36 PM    Comment   Angela Lemus discharge to home/self care.                   Follow-up Information       Follow up With Specialties Details Why Contact Info    Lakeisha Man,  Family Medicine Schedule an appointment as soon as possible for a visit in 1 day for follow up [address].  [address]  [facility] [address] [phone]              Discharge Medication List as of 3/22/2024  5:47  PM        START taking these medications    Details   cephalexin (KEFLEX) 500 mg capsule Take 1 capsule (500 mg total) by mouth every 12 (twelve) hours for 5 days, Starting Fri 3/22/2024, Until Wed 3/27/2024, Normal      !! cyclobenzaprine (FLEXERIL) 5 mg tablet Take 1 tablet (5 mg total) by mouth 3 (three) times a day as needed for muscle spasms, Starting Fri 3/22/2024, Normal      lidocaine (Lidoderm) 5 % Apply 1 patch topically over 12 hours daily Remove & Discard patch within 12 hours or as directed by MD, Starting Fri 3/22/2024, Normal      ondansetron (Zofran ODT) 4 mg disintegrating tablet Take 1 tablet (4 mg total) by mouth every 6 (six) hours as needed for nausea or vomiting, Starting Fri 3/22/2024, Normal       !! - Potential duplicate medications found. Please discuss with provider.        CONTINUE these medications which have NOT CHANGED    Details   aspirin 81 mg chewable tablet Historical Med      bromocriptine (PARLODEL) 2.5 mg tablet Historical Med      !! Cyanocobalamin (VITAMIN B 12 PO) Take 500 mcg by mouth 2 (two) times a day, Historical Med      !! cyclobenzaprine (FLEXERIL) 10 mg tablet Take 1 tablet (10 mg total) by mouth 3 (three) times a day as needed for muscle spasms, Starting Wed 1/24/2024, Normal      ferrous sulfate 325 (65 Fe) mg tablet Take 325 mg by mouth daily with breakfast, Historical Med      hydroxychloroquine (PLAQUENIL) 200 mg tablet Take 1.5 tablets (300 mg total) by mouth daily with breakfast, Starting Fri 2/23/2024, Until Thu 5/23/2024, Normal      hydrOXYzine HCL (ATARAX) 10 mg tablet Take 10 mg by mouth 2 (two) times a day, Starting Tue 11/14/2023, Historical Med      olmesartan (BENICAR) 5 mg tablet Take 10 mg by mouth daily, Starting Mon 5/22/2023, Historical Med      omeprazole (PriLOSEC) 20 mg delayed release capsule Take 1 capsule (20 mg total) by mouth daily, Starting Thu 8/24/2023, Normal      phentermine (ADIPEX-P) 37.5 MG tablet Take 37.5 mg by mouth in the  morning, Starting Wed 1/17/2024, Historical Med      sildenafil (VIAGRA) 25 MG tablet Take 25 mg by mouth daily as needed for erectile dysfunction, Historical Med      simvastatin (ZOCOR) 20 mg tablet Take 20 mg by mouth every evening, Starting Tue 2/6/2024, Historical Med      topiramate (TOPAMAX) 100 mg tablet Take 100 mg by mouth daily, Starting Fri 12/15/2023, Historical Med      traZODone (DESYREL) 50 mg tablet Take 1 tablet by mouth daily at bedtime, Starting Tue 8/29/2023, Historical Med      venlafaxine (EFFEXOR-XR) 150 mg 24 hr capsule Take 150 mg by mouth daily, Starting Tue 1/23/2024, Historical Med      !! vitamin B-12 (VITAMIN B-12) 1,000 mcg tablet Take 2,000 mcg by mouth daily, Starting Wed 9/13/2023, Historical Med       !! - Potential duplicate medications found. Please discuss with provider.          No discharge procedures on file.    PDMP Review       None            ED Provider  Electronically Signed by             Frankie Rhoades DO  03/22/24 2215

## 2024-03-22 NOTE — DISCHARGE INSTRUCTIONS
Return to the ER for further concerns or worsening symptoms  Follow up with your primary care physician in 1-2 days  Take medication as prescribed

## 2024-03-23 LAB — BACTERIA UR CULT: NORMAL

## 2024-03-25 LAB — MISCELLANEOUS LAB TEST RESULT: NORMAL

## 2024-04-05 ENCOUNTER — OFFICE VISIT (OUTPATIENT)
Dept: RHEUMATOLOGY | Facility: CLINIC | Age: 24
End: 2024-04-05
Payer: OTHER GOVERNMENT

## 2024-04-05 ENCOUNTER — OFFICE VISIT (OUTPATIENT)
Dept: GASTROENTEROLOGY | Facility: CLINIC | Age: 24
End: 2024-04-05

## 2024-04-05 ENCOUNTER — PREP FOR PROCEDURE (OUTPATIENT)
Dept: GASTROENTEROLOGY | Facility: CLINIC | Age: 24
End: 2024-04-05

## 2024-04-05 VITALS
SYSTOLIC BLOOD PRESSURE: 118 MMHG | HEIGHT: 62 IN | BODY MASS INDEX: 26.83 KG/M2 | DIASTOLIC BLOOD PRESSURE: 78 MMHG | WEIGHT: 145.8 LBS

## 2024-04-05 VITALS
HEIGHT: 62 IN | DIASTOLIC BLOOD PRESSURE: 69 MMHG | SYSTOLIC BLOOD PRESSURE: 99 MMHG | WEIGHT: 144 LBS | BODY MASS INDEX: 26.5 KG/M2 | HEART RATE: 106 BPM

## 2024-04-05 DIAGNOSIS — M35.1 MCTD (MIXED CONNECTIVE TISSUE DISEASE) (HCC): ICD-10-CM

## 2024-04-05 DIAGNOSIS — Z90.49 HISTORY OF BOWEL RESECTION: ICD-10-CM

## 2024-04-05 DIAGNOSIS — M25.531 RIGHT WRIST PAIN: ICD-10-CM

## 2024-04-05 DIAGNOSIS — M32.9 SYSTEMIC LUPUS ERYTHEMATOSUS, UNSPECIFIED SLE TYPE, UNSPECIFIED ORGAN INVOLVEMENT STATUS (HCC): Primary | ICD-10-CM

## 2024-04-05 DIAGNOSIS — M25.50 POLYARTHRALGIA: ICD-10-CM

## 2024-04-05 DIAGNOSIS — R76.8 POSITIVE DOUBLE STRANDED DNA ANTIBODY TEST: ICD-10-CM

## 2024-04-05 DIAGNOSIS — Z79.899 LONG-TERM USE OF PLAQUENIL: ICD-10-CM

## 2024-04-05 DIAGNOSIS — R76.8 ANTI-RNP ANTIBODIES PRESENT: ICD-10-CM

## 2024-04-05 DIAGNOSIS — K50.90 CROHN'S DISEASE WITHOUT COMPLICATION, UNSPECIFIED GASTROINTESTINAL TRACT LOCATION (HCC): ICD-10-CM

## 2024-04-05 DIAGNOSIS — R14.0 ABDOMINAL BLOATING: Primary | ICD-10-CM

## 2024-04-05 DIAGNOSIS — I73.00 RAYNAUD'S DISEASE WITHOUT GANGRENE: ICD-10-CM

## 2024-04-05 DIAGNOSIS — K21.9 GASTROESOPHAGEAL REFLUX DISEASE WITHOUT ESOPHAGITIS: ICD-10-CM

## 2024-04-05 PROCEDURE — 99214 OFFICE O/P EST MOD 30 MIN: CPT | Performed by: PHYSICIAN ASSISTANT

## 2024-04-05 RX ORDER — OMEPRAZOLE 40 MG/1
40 CAPSULE, DELAYED RELEASE ORAL DAILY
Qty: 30 CAPSULE | Refills: 2 | Status: SHIPPED | OUTPATIENT
Start: 2024-04-05

## 2024-04-05 RX ORDER — ICOSAPENT ETHYL 1000 MG/1
CAPSULE ORAL
COMMUNITY
Start: 2024-02-26

## 2024-04-05 NOTE — PROGRESS NOTES
[facility] - Outpatient Follow-up Note  Angela Lemus 23 y.o. female MRN: [id number]  Encounter: [id number]          ASSESSMENT AND PLAN:      1. Abdominal bloating  Patient with abdominal bloating patient with history of possible Crohn's on pathology when she had bowel obstruction but, had colonoscopy there after which did not show any activity, is having some looser stools, although she is only going about once a day previously has been taking a stool softener due to constipation so this is a change for her    2. Crohn's disease without complication, unspecified gastrointestinal tract location (HCC)  Will order fecal calprotectin, discussed performing MR enterography to evaluate for any small bowel disease, when reviewing the records it looks like her previous gastroenterologist had suggested the same but she does not believe that she had that done, will order enterography to evaluate for any small bowel activity    3. History of bowel resection  Details as below    4. Gastroesophageal reflux disease without esophagitis  GERD is not controlled will increase omeprazole to 40 mg daily and plan for upper endoscopy to evaluate for any reflux related complications    ______________________________________________________________________    SUBJECTIVE:     22 y/o female who presents for follow up to abdominal bloating,indigestion,.Denies any blood in stools excessive diarrhea constipation pain abdominal distention denies any blood in stools melena hematochezia chest pain shortness of breath.  Patient developed bowel obstruction requiring surgery in 2021-22 in [facility], had ileocecal resection done pathology showed focal mild transmural lymphoid plasmacytic inflammation and rare cryptitis submucosal fibromuscular ideation appendix was normal, she was told that she has Crohn's.  Subsequently she moved to this area and follows up with Dr. Kaba in [place], had a colonoscopy  as below. Started on Omeprazole at last OV.  Patient was taking 20 mg of omeprazole and still having breakthrough reflux symptoms.  She does take baby aspirin daily but otherwise no NSAID.  Does have history of lupus and did undergo kidney biopsy and report was reviewed.  She reports that kidney function has been normal and she reports that she is taking hydroxychloroquine for her lupus but she is not on her other medication which is called Saphnelo for her lupus because of insurance issues but she is going to follow with a new rheumatologist.  Patient does note some looser stools lately and she did have blood work including a CRP at the end of February which was normal, recent CBC and CMP were within normal limits.  She otherwise was having some back pain and some vaginal bleeding and some nausea symptoms.  Recently went to ER and also blood work was obtained.                Colonoscopy was was done on 12/29/2022, at [facility] in [facility], impressions were read as previous surgery in the cecum normal mucosa in the cecum and terminal ileum biopsies were taken reports noted, shows no significant changes in the terminal ileum and colon.        22 [facility] path b[facility]   [facility]  ileocecectomy:   Ileocecum resection with ileum demonstrating focal mild transmural   lymphoplasmacytic inflammation, rare cryptitis, submucosal   fibromuscularization, lymphoid aggregates, neuronal hyperplasia, and   increasingly rare epithelioid giant cells; see comment.   Appendix with no diagnostic abnormalities.   Negative for dysplasia and malignancy.       REVIEW OF SYSTEMS IS OTHERWISE NEGATIVE.      Historical Information   Past Medical History:   Diagnosis Date    Bowel obstruction (HCC)     Clostridium difficile infection     2021    Crohn's disease (HCC)     Depression     Hypertension     Kidney disease, chronic, stage I (GFR over 89 ml/min)     Lupus (HCC)   "   PTSD (post-traumatic stress disorder)     Raynaud disease      Past Surgical History:   Procedure Laterality Date    ABDOMINAL SURGERY      bowel obstruction     SECTION N/A     COLON SURGERY      IR BIOPSY KIDNEY RANDOM  3/6/2024    WISDOM TOOTH EXTRACTION       Social History   Social History     Substance and Sexual Activity   Alcohol Use Not Currently    Comment: socially     Social History     Substance and Sexual Activity   Drug Use Never     Social History     Tobacco Use   Smoking Status Never   Smokeless Tobacco Never     Family History   Problem Relation Age of Onset    Lupus Mother     Diabetes Father     COPD Maternal Grandmother     Heart failure Maternal Grandmother     Substance Abuse Maternal Grandmother     Breast cancer Other        Meds/Allergies       Current Outpatient Medications:     aspirin 81 mg chewable tablet    bromocriptine (PARLODEL) 2.5 mg tablet    Cyanocobalamin (VITAMIN B 12 PO)    cyclobenzaprine (FLEXERIL) 5 mg tablet    ferrous sulfate 325 (65 Fe) mg tablet    hydroxychloroquine (PLAQUENIL) 200 mg tablet    hydrOXYzine HCL (ATARAX) 10 mg tablet    lidocaine (Lidoderm) 5 %    olmesartan (BENICAR) 5 mg tablet    omeprazole (PriLOSEC) 20 mg delayed release capsule    ondansetron (Zofran ODT) 4 mg disintegrating tablet    phentermine (ADIPEX-P) 37.5 MG tablet    sildenafil (VIAGRA) 25 MG tablet    simvastatin (ZOCOR) 20 mg tablet    traZODone (DESYREL) 50 mg tablet    venlafaxine (EFFEXOR-XR) 150 mg 24 hr capsule    vitamin B-12 (VITAMIN B-12) 1,000 mcg tablet    cyclobenzaprine (FLEXERIL) 10 mg tablet    topiramate (TOPAMAX) 100 mg tablet    No Known Allergies        Objective     Height 5' 2\" (1.575 m), weight 65.3 kg (144 lb), not currently breastfeeding. Body mass index is 26.34 kg/m².      PHYSICAL EXAM:      General Appearance:   Alert, cooperative, no distress   HEENT:   Normocephalic, atraumatic, anicteric.     Neck:  Supple, symmetrical, trachea midline   Lungs:   " Clear to auscultation bilaterally; no rales, rhonchi or wheezing; respirations unlabored    Heart::   Regular rate and rhythm; no murmur, rub, or gallop.   Abdomen:   Soft, non-tender, non-distended; normal bowel sounds; no masses, no organomegaly    Genitalia:   Deferred    Rectal:   Deferred    Extremities:  No cyanosis, clubbing or edema    Pulses:  2+ and symmetric    Skin:  No jaundice, rashes, or lesions    Lymph nodes:  No palpable cervical lymphadenopathy        Lab Results:   No visits with results within 1 Day(s) from this visit.   Latest known visit with results is:   Admission on 03/22/2024, Discharged on 03/22/2024   Component Date Value    WBC 03/22/2024 4.34     RBC 03/22/2024 4.29     Hemoglobin 03/22/2024 11.8     Hematocrit 03/22/2024 36.8     MCV 03/22/2024 86     MCH 03/22/2024 27.5     MCHC 03/22/2024 32.1     RDW 03/22/2024 13.5     MPV 03/22/2024 11.8     Platelets 03/22/2024 223     nRBC 03/22/2024 0     Neutrophils Relative 03/22/2024 51     Immature Grans % 03/22/2024 0     Lymphocytes Relative 03/22/2024 38     Monocytes Relative 03/22/2024 6     Eosinophils Relative 03/22/2024 4     Basophils Relative 03/22/2024 1     Neutrophils Absolute 03/22/2024 2.24     Absolute Immature Grans 03/22/2024 0.01     Absolute Lymphocytes 03/22/2024 1.66     Absolute Monocytes 03/22/2024 0.25     Eosinophils Absolute 03/22/2024 0.16     Basophils Absolute 03/22/2024 0.02     Sodium 03/22/2024 138     Potassium 03/22/2024 4.4     Chloride 03/22/2024 107     CO2 03/22/2024 27     ANION GAP 03/22/2024 4     BUN 03/22/2024 14     Creatinine 03/22/2024 0.84     Glucose 03/22/2024 88     Calcium 03/22/2024 9.4     AST 03/22/2024 29     ALT 03/22/2024 23     Alkaline Phosphatase 03/22/2024 83     Total Protein 03/22/2024 7.1     Albumin 03/22/2024 4.1     Total Bilirubin 03/22/2024 0.31     eGFR 03/22/2024 97     EXT Preg Test, Ur 03/22/2024 Negative     Control 03/22/2024 Valid     Color, UA 03/22/2024 Yellow      Clarity, UA 03/22/2024 Slightly Cloudy     Specific Gravity, UA 03/22/2024 >=1.030     pH, UA 03/22/2024 6.5     Leukocytes, UA 03/22/2024 Trace (A)     Nitrite, UA 03/22/2024 Negative     Protein, UA 03/22/2024 30 (1+) (A)     Glucose, UA 03/22/2024 Negative     Ketones, UA 03/22/2024 Negative     Urobilinogen, UA 03/22/2024 1.0     Bilirubin, UA 03/22/2024 Small (A)     Occult Blood, UA 03/22/2024 Moderate (A)     Magnesium 03/22/2024 2.0     RBC, UA 03/22/2024 Innumerable (A)     WBC, UA 03/22/2024 0-1     Epithelial Cells 03/22/2024 Occasional     Bacteria, UA 03/22/2024 Occasional     MUCUS THREADS 03/22/2024 Occasional (A)     Urine Culture 03/22/2024 60,000-69,000 cfu/ml          Radiology Results:   No results found.

## 2024-04-05 NOTE — PROGRESS NOTES
Assessment and Plan:   Ms. Lemus is a 23-year-old female with past medical history significant for systemic lupus erythematosus, Raynaud's disease, hx bowel obstruction with ileocecal resection pathology (2022) suggestive of Crohn's, RUSLAN, and MDD who presents for rheumatology f/u of MCTD with primary manifestations of systemic lupus erythematosus and LN.     Angela was initially diagnosed with systemic lupus while hospitalized giving birth to her child in 2021.  She experienced features of facial swelling and severe arthralgias as well as preeclampsia.  She has followed with at least 2 rheumatologists thus far (1 while in the  and another in [place]) and has been on medications including HCQ, azathioprine (discontinued due to elevated LFTs), and Benlysta subcutaneous injections (discontinued due to ineffectiveness).  There were plans to begin Saphnelo, but then she transferred care.  She reports that she was also given a diagnosis of discoid lupus due to rashes that have appeared on her legs.  Most recent labs from January 2024 revealed ARON 1:1280 in a speckled pattern, dsDNA > 10, +RNP ab, CRP 15.9, 2+ protein, large blood, eGFR 80. Since her last visit with rheumatology approximately 6 months ago, she has remained on hydroxychloroquine 400 mg daily, but her symptoms have worsened.  She presently has features of severe fatigue, arthralgias, Raynaud's phenomenon (although well-controlled), and dry eye.    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 Tubular atrophy and interstitial fibrosis, mild to moderate, with foci of endocrine-type tubular atrophy. Arteriosclerosis, mild.  Recent SLE activity labs reveal rising dsDNA and she continues with fatigue, arthralgias. Recently started on Rhapatha and received  dose increase of ARB. Will d/w nephrology re: additional agent. She is female of childbearing age, therefore would avoid MTX if possible. Trial topical voltaren for R wrist pain and do XR.    F/u 3 mo    Plan:  Diagnoses and all orders for this visit:    Systemic lupus erythematosus, unspecified SLE type, unspecified organ involvement status (HCC)    MCTD (mixed connective tissue disease) (HCC)    Anti-RNP antibodies present    Long-term use of Plaquenil    Right wrist pain  -     XR wrist 3+ vw right  -     Diclofenac Sodium (VOLTAREN) 1 %; Apply 2 g topically 4 (four) times a day    Positive double stranded DNA antibody test    Raynaud's disease without gangrene    Polyarthralgia        I have personally reviewed prior notes, recent laboratory results, and pertinent films in PACS.     Activities as tolerated.   Exercise: try to maintain a low impact exercise regimen as much as possible. Walk for 30 minutes a day for at least 3 days a week.   Continue other medications as prescribed by PCP and other specialists.       RTC in 3 mo    Rheumatic Disease Summary:  MCTD / SLE with RP (+dsDNA, histone, RNP)  ?discoid lupus  -Initial visit 2/23/24: presents for eval of SLE. Dx'd with SLE while hospitalized giving birth to her child in 2021.  She experienced features of facial swelling and severe arthralgias as well as preeclampsia.  She has followed with at least 2 rheumatologists thus far (1 while in the  and another in [place]) and has been on medications including HCQ, AZA (discontinued due to elevated LFTs), and Benlysta subcutaneous injections (discontinued due to ineffectiveness after < 2 mo).  There were plans to begin Saphnelo, but then she transferred care.  She reports that she was also given a diagnosis of discoid lupus due to rashes that have appeared on her legs.  Most recent labs from January 2024 revealed ARON 1:1280 in a speckled pattern, dsDNA > 10, +RNP ab, CRP 15.9, 2+ protein, large blood, eGFR 80.  Since her last visit with rheumatology approximately 6 months ago, she has remained on  mg daily, but her symptoms have worsened.  She presently has features of severe fatigue, arthralgias, Raynaud's phenomenon (although well-controlled), and dry eye. Decrease HCQ to 300 QD due to wt based dosing. Await renal bx to determine further tx.  -Labs 2/2024: Dsdna 29, histone 1.2, rnp 1.4  -Visit 4/5/24: Cont HCQ, will d/w nephrology re: Benlysta infusions vs MMF vs Rituximab  2. Lupus nephritis  -Follows with Dr. 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 Tubular atrophy and interstitial fibrosis, mild to moderate, with foci of endocrine-type tubular atrophy. Arteriosclerosis, mild.    3. Other comorbidities:  hx bowel obstruction with ileocecal resection pathology (2022) suggestive of Crohn's, RUSLAN, and MDD         HPI  Ms. Lemus is a 24yo female who presents for f/u MCTD/SLE.    Cont with worsening fatigue and jt pain, specifically R wrist, very tender. Some stiffness in the am, unsure if swelling.    The following portions of the patient's history were reviewed and updated as appropriate: allergies, current medications, past family history, past medical history, past social history, past surgical history and problem list.      Review of Systems  Constitutional: +fatigue, night sweats. Negative for weight change, fevers, chills.  ENT/Mouth: Negative for hearing changes, ear pain, nasal congestion, sinus pain, hoarseness, sore throat, rhinorrhea, swallowing difficulty.   Eyes: Negative for pain, redness, discharge, vision changes.   Cardiovascular: Negative for chest pain, SOB, palpitations.   Respiratory: Negative for cough, sputum, wheezing, dyspnea.   Gastrointestinal: +constipation, GERD. Negative for nausea,  vomiting, diarrhea  Genitourinary: Negative for dysuria, urinary frequency, hematuria.   Musculoskeletal: As per HPI.  Skin: +skin rash, color changes.   Neuro: Negative for weakness, numbness, tingling, loss of consciousness.   Psych: +MDD, PTSD  Heme/Lymph: Negative for easy bruising, bleeding, lymphadenopathy.     Past Medical History:   Diagnosis Date    Bowel obstruction (HCC)     Clostridium difficile infection         Crohn's disease (HCC)     Depression     Hypertension     Kidney disease, chronic, stage I (GFR over 89 ml/min)     Lupus (HCC)     PTSD (post-traumatic stress disorder)     Raynaud disease        Past Surgical History:   Procedure Laterality Date    ABDOMINAL SURGERY      bowel obstruction     SECTION N/A     COLON SURGERY      IR BIOPSY KIDNEY RANDOM  3/6/2024    WISDOM TOOTH EXTRACTION         Social History     Socioeconomic History    Marital status:      Spouse name: Not on file    Number of children: Not on file    Years of education: Not on file    Highest education level: Not on file   Occupational History    Not on file   Tobacco Use    Smoking status: Never    Smokeless tobacco: Never   Vaping Use    Vaping status: Never Used   Substance and Sexual Activity    Alcohol use: Not Currently     Comment: socially    Drug use: Never    Sexual activity: Yes     Partners: Male     Birth control/protection: None   Other Topics Concern    Not on file   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     Social Determinants of Health     Financial Resource Strain: Not on file   Food Insecurity: Not on file   Transportation Needs: Not on file   Physical Activity: Not on file   Stress: Not on file   Social Connections: Not on file   Intimate Partner Violence: Not on file   Housing Stability: Not on file       Family History   Problem Relation Age of Onset    Lupus Mother     Diabetes Father     COPD Maternal Grandmother     Heart failure Maternal Grandmother     Substance Abuse  Maternal Grandmother     Breast cancer Other        No Known Allergies      Current Outpatient Medications:     Diclofenac Sodium (VOLTAREN) 1 %, Apply 2 g topically 4 (four) times a day, Disp: 150 g, Rfl: 6    aspirin 81 mg chewable tablet, , Disp: , Rfl:     bromocriptine (PARLODEL) 2.5 mg tablet, , Disp: , Rfl:     Cyanocobalamin (VITAMIN B 12 PO), Take 500 mcg by mouth 2 (two) times a day, Disp: , Rfl:     cyclobenzaprine (FLEXERIL) 10 mg tablet, Take 1 tablet (10 mg total) by mouth 3 (three) times a day as needed for muscle spasms (Patient not taking: Reported on 4/5/2024), Disp: 30 tablet, Rfl: 0    cyclobenzaprine (FLEXERIL) 5 mg tablet, Take 1 tablet (5 mg total) by mouth 3 (three) times a day as needed for muscle spasms, Disp: 12 tablet, Rfl: 0    ferrous sulfate 325 (65 Fe) mg tablet, Take 325 mg by mouth daily with breakfast, Disp: , Rfl:     hydroxychloroquine (PLAQUENIL) 200 mg tablet, Take 1.5 tablets (300 mg total) by mouth daily with breakfast, Disp: 135 tablet, Rfl: 3    hydrOXYzine HCL (ATARAX) 10 mg tablet, Take 10 mg by mouth 2 (two) times a day, Disp: , Rfl:     Icosapent Ethyl 1 g CAPS, 2 CAPSULES WITH MEALS ORALLY TWICE A DAY 60 DAYS, Disp: , Rfl:     lidocaine (Lidoderm) 5 %, Apply 1 patch topically over 12 hours daily Remove & Discard patch within 12 hours or as directed by MD, Disp: 30 patch, Rfl: 0    olmesartan (BENICAR) 5 mg tablet, Take 10 mg by mouth daily, Disp: , Rfl:     omeprazole (PriLOSEC) 20 mg delayed release capsule, Take 1 capsule (20 mg total) by mouth daily, Disp: 30 capsule, Rfl: 3    omeprazole (PriLOSEC) 40 MG capsule, Take 1 capsule (40 mg total) by mouth daily, Disp: 30 capsule, Rfl: 2    ondansetron (Zofran ODT) 4 mg disintegrating tablet, Take 1 tablet (4 mg total) by mouth every 6 (six) hours as needed for nausea or vomiting, Disp: 12 tablet, Rfl: 0    phentermine (ADIPEX-P) 37.5 MG tablet, Take 37.5 mg by mouth in the morning, Disp: , Rfl:     sildenafil (VIAGRA)  "25 MG tablet, Take 25 mg by mouth daily as needed for erectile dysfunction, Disp: , Rfl:     simvastatin (ZOCOR) 20 mg tablet, Take 20 mg by mouth every evening, Disp: , Rfl:     topiramate (TOPAMAX) 100 mg tablet, Take 100 mg by mouth daily (Patient not taking: Reported on 4/5/2024), Disp: , Rfl:     traZODone (DESYREL) 50 mg tablet, Take 1 tablet by mouth daily at bedtime, Disp: , Rfl:     venlafaxine (EFFEXOR-XR) 150 mg 24 hr capsule, Take 150 mg by mouth daily, Disp: , Rfl:     vitamin B-12 (VITAMIN B-12) 1,000 mcg tablet, Take 2,000 mcg by mouth daily, Disp: , Rfl:       Objective:    Vitals:    04/05/24 1516   BP: 118/78   Weight: 66.1 kg (145 lb 12.8 oz)   Height: 5' 2\" (1.575 m)       Physical Exam  General: Well appearing, well nourished, in no acute distress. Oriented x 3, normal mood and affect.  Ambulating without difficulty.  Skin: Good turgor, no rash, unusual bruising or prominent lesions.  Hair: Normal texture and distribution.  Nails: Normal color, no deformities.  HEENT:  Head: Normocephalic, atraumatic.  Eyes: Conjunctiva clear, sclera non-icteric, EOM intact.  Nose: No external lesions, mucosa non-inflamed.  Mouth: Mucous membranes moist, no mucosal lesions.  Neck: Supple  Musculoskeletal:   Diffuse tenderness of right wrist, no swelling/heat/redness  No tenderness to palpation or swelling of joints bilaterally to include: shoulder, elbow, left wrist, MCP I-V, PIP I-V, knee, ankle, MTP I-V.  Neurologic: Alert and oriented. No focal neurological deficits appreciated.   Psychiatric: Normal mood and affect.       Terence Pacheco PA-C  Rheumatology  "

## 2024-04-08 ENCOUNTER — APPOINTMENT (OUTPATIENT)
Dept: LAB | Facility: HOSPITAL | Age: 24
End: 2024-04-08
Payer: OTHER GOVERNMENT

## 2024-04-08 ENCOUNTER — TELEPHONE (OUTPATIENT)
Age: 24
End: 2024-04-08

## 2024-04-08 ENCOUNTER — TELEPHONE (OUTPATIENT)
Dept: RHEUMATOLOGY | Facility: CLINIC | Age: 24
End: 2024-04-08

## 2024-04-08 DIAGNOSIS — M32.9 SYSTEMIC LUPUS ERYTHEMATOSUS, UNSPECIFIED SLE TYPE, UNSPECIFIED ORGAN INVOLVEMENT STATUS (HCC): ICD-10-CM

## 2024-04-08 DIAGNOSIS — E55.9 VITAMIN D DEFICIENCY: ICD-10-CM

## 2024-04-08 DIAGNOSIS — E53.8 VITAMIN B12 DEFICIENCY (NON ANEMIC): ICD-10-CM

## 2024-04-08 DIAGNOSIS — E79.0 HYPERURICEMIA: ICD-10-CM

## 2024-04-08 DIAGNOSIS — I10 BENIGN ESSENTIAL HYPERTENSION: ICD-10-CM

## 2024-04-08 DIAGNOSIS — N18.1 CHRONIC KIDNEY DISEASE, STAGE I: ICD-10-CM

## 2024-04-08 DIAGNOSIS — E78.49 LPL-RELATED FAMILIAL COMBINED HYPERLIPIDEMIA: ICD-10-CM

## 2024-04-08 DIAGNOSIS — E78.5 DYSLIPIDEMIA: ICD-10-CM

## 2024-04-08 LAB
25(OH)D3 SERPL-MCNC: 24 NG/ML (ref 30–100)
ALBUMIN SERPL BCP-MCNC: 4.3 G/DL (ref 3.5–5)
ALP SERPL-CCNC: 93 U/L (ref 34–104)
ALT SERPL W P-5'-P-CCNC: 38 U/L (ref 7–52)
AMORPH PHOS CRY URNS QL MICRO: ABNORMAL /HPF
ANION GAP SERPL CALCULATED.3IONS-SCNC: 6 MMOL/L (ref 4–13)
AST SERPL W P-5'-P-CCNC: 25 U/L (ref 13–39)
BACTERIA UR QL AUTO: ABNORMAL /HPF
BASOPHILS # BLD AUTO: 0.03 THOUSANDS/ÂΜL (ref 0–0.1)
BASOPHILS NFR BLD AUTO: 1 % (ref 0–1)
BILIRUB SERPL-MCNC: 0.24 MG/DL (ref 0.2–1)
BILIRUB UR QL STRIP: NEGATIVE
BUN SERPL-MCNC: 18 MG/DL (ref 5–25)
C3 SERPL-MCNC: 138 MG/DL (ref 87–200)
C4 SERPL-MCNC: 40 MG/DL (ref 19–52)
CALCIUM SERPL-MCNC: 9.4 MG/DL (ref 8.4–10.2)
CHLORIDE SERPL-SCNC: 108 MMOL/L (ref 96–108)
CLARITY UR: CLEAR
CO2 SERPL-SCNC: 24 MMOL/L (ref 21–32)
COLOR UR: ABNORMAL
CREAT SERPL-MCNC: 0.9 MG/DL (ref 0.6–1.3)
CREAT UR-MCNC: 174.1 MG/DL
CRP SERPL QL: 2.3 MG/L
EOSINOPHIL # BLD AUTO: 0.18 THOUSAND/ÂΜL (ref 0–0.61)
EOSINOPHIL NFR BLD AUTO: 4 % (ref 0–6)
ERYTHROCYTE [DISTWIDTH] IN BLOOD BY AUTOMATED COUNT: 13.5 % (ref 11.6–15.1)
GFR SERPL CREATININE-BSD FRML MDRD: 89 ML/MIN/1.73SQ M
GLUCOSE SERPL-MCNC: 80 MG/DL (ref 65–140)
GLUCOSE UR STRIP-MCNC: NEGATIVE MG/DL
HCT VFR BLD AUTO: 40.6 % (ref 34.8–46.1)
HGB BLD-MCNC: 13.5 G/DL (ref 11.5–15.4)
HGB UR QL STRIP.AUTO: NEGATIVE
IMM GRANULOCYTES # BLD AUTO: 0.01 THOUSAND/UL (ref 0–0.2)
IMM GRANULOCYTES NFR BLD AUTO: 0 % (ref 0–2)
KETONES UR STRIP-MCNC: NEGATIVE MG/DL
LEUKOCYTE ESTERASE UR QL STRIP: NEGATIVE
LYMPHOCYTES # BLD AUTO: 1.55 THOUSANDS/ÂΜL (ref 0.6–4.47)
LYMPHOCYTES NFR BLD AUTO: 32 % (ref 14–44)
MCH RBC QN AUTO: 28.8 PG (ref 26.8–34.3)
MCHC RBC AUTO-ENTMCNC: 33.3 G/DL (ref 31.4–37.4)
MCV RBC AUTO: 87 FL (ref 82–98)
MICROALBUMIN UR-MCNC: 13.3 MG/L
MICROALBUMIN/CREAT 24H UR: 8 MG/G CREATININE (ref 0–30)
MONOCYTES # BLD AUTO: 0.28 THOUSAND/ÂΜL (ref 0.17–1.22)
MONOCYTES NFR BLD AUTO: 6 % (ref 4–12)
MUCOUS THREADS UR QL AUTO: ABNORMAL
NEUTROPHILS # BLD AUTO: 2.85 THOUSANDS/ÂΜL (ref 1.85–7.62)
NEUTS SEG NFR BLD AUTO: 57 % (ref 43–75)
NITRITE UR QL STRIP: NEGATIVE
NON-SQ EPI CELLS URNS QL MICRO: ABNORMAL /HPF
NRBC BLD AUTO-RTO: 0 /100 WBCS
PH UR STRIP.AUTO: 7 [PH]
PLATELET # BLD AUTO: 346 THOUSANDS/UL (ref 149–390)
PMV BLD AUTO: 11.5 FL (ref 8.9–12.7)
POTASSIUM SERPL-SCNC: 4 MMOL/L (ref 3.5–5.3)
PROT SERPL-MCNC: 7.7 G/DL (ref 6.4–8.4)
PROT UR STRIP-MCNC: ABNORMAL MG/DL
RBC # BLD AUTO: 4.68 MILLION/UL (ref 3.81–5.12)
RBC #/AREA URNS AUTO: ABNORMAL /HPF
SODIUM SERPL-SCNC: 138 MMOL/L (ref 135–147)
SP GR UR STRIP.AUTO: >=1.03 (ref 1–1.03)
URATE SERPL-MCNC: 4 MG/DL (ref 2–7.5)
UROBILINOGEN UR QL STRIP.AUTO: 0.2 E.U./DL
VIT B12 SERPL-MCNC: 936 PG/ML (ref 180–914)
WBC # BLD AUTO: 4.9 THOUSAND/UL (ref 4.31–10.16)
WBC #/AREA URNS AUTO: ABNORMAL /HPF

## 2024-04-08 PROCEDURE — 83695 ASSAY OF LIPOPROTEIN(A): CPT

## 2024-04-08 PROCEDURE — 82306 VITAMIN D 25 HYDROXY: CPT

## 2024-04-08 PROCEDURE — 81001 URINALYSIS AUTO W/SCOPE: CPT

## 2024-04-08 PROCEDURE — 86037 ANCA TITER EACH ANTIBODY: CPT

## 2024-04-08 PROCEDURE — 82043 UR ALBUMIN QUANTITATIVE: CPT

## 2024-04-08 PROCEDURE — 80053 COMPREHEN METABOLIC PANEL: CPT

## 2024-04-08 PROCEDURE — 83520 IMMUNOASSAY QUANT NOS NONAB: CPT

## 2024-04-08 PROCEDURE — 82607 VITAMIN B-12: CPT

## 2024-04-08 PROCEDURE — 36415 COLL VENOUS BLD VENIPUNCTURE: CPT

## 2024-04-08 PROCEDURE — 85025 COMPLETE CBC W/AUTO DIFF WBC: CPT

## 2024-04-08 PROCEDURE — 86160 COMPLEMENT ANTIGEN: CPT

## 2024-04-08 PROCEDURE — 86140 C-REACTIVE PROTEIN: CPT

## 2024-04-08 PROCEDURE — 82570 ASSAY OF URINE CREATININE: CPT

## 2024-04-08 PROCEDURE — 84550 ASSAY OF BLOOD/URIC ACID: CPT

## 2024-04-08 NOTE — TELEPHONE ENCOUNTER
Rheumatology infusion prior Authorization Request      Medication/Disease Information:   Diagnosis: Lupus nephritis (class II-class III), systemic lupus erythematosus  Medication:   Benlysta 10 mg/kg every 2 weeks for 3 doses, followed by 10 mg/kg every 4 weeks thereafter.   [facility]  Failed or Intolerant to or Contraindicated: Hydroxychloroquine, azathioprine, Benlysta subcutaneous injections, prednisone  Screening  Renal function:  ok  Hepatic function: ok  Hepatitis B:  neg  Hepatitis C: neg  Tuberculosis: neg     Terence Pacheco PA-C  NPI: [id number]  Lic: [id number]    Supervising Physician: Leonela Cannon MD  NPI: [id number]  KATTY: [id number]

## 2024-04-10 LAB — LPA SERPL-SCNC: 109.7 NMOL/L

## 2024-04-10 NOTE — TELEPHONE ENCOUNTER
Completed benefit investigation form. Required patient and provider signature. Call and LM to pt to return call on how we can send the form (via [employer] or mail) for her to sign. Will email the form to provider for her to sign and fax back to me.

## 2024-04-10 NOTE — TELEPHONE ENCOUNTER
Spoke with Demi RODRIGUEZ from [facility] to verify if auth is required for Kelsi . She directed me to the online portal and guided me to review code. Per the online portal no prior auth required for outpatient facilities or providers office only home infusions. Confirmed with Demi that hospital outpatient is included with the no auth.

## 2024-04-12 ENCOUNTER — TELEPHONE (OUTPATIENT)
Dept: FAMILY MEDICINE CLINIC | Facility: CLINIC | Age: 24
End: 2024-04-12

## 2024-04-12 ENCOUNTER — APPOINTMENT (OUTPATIENT)
Dept: LAB | Facility: CLINIC | Age: 24
End: 2024-04-12

## 2024-04-12 DIAGNOSIS — K50.90 CROHN'S DISEASE WITHOUT COMPLICATION, UNSPECIFIED GASTROINTESTINAL TRACT LOCATION (HCC): ICD-10-CM

## 2024-04-12 DIAGNOSIS — E78.5 HYPERLIPIDEMIA, UNSPECIFIED HYPERLIPIDEMIA TYPE: ICD-10-CM

## 2024-04-12 NOTE — TELEPHONE ENCOUNTER
Patient came in. Patient will need a form filled out for school stating she has the neccessary vaccines, a physical, and PPD testing done. Patient will be faxing this form over to the office.     She states she will also need a quantiferon gold test ordered as well. Patient notified that office staff will reach out in regards to testing as an order will need to be placed.    Patient is requesting form to be sent via [employer] once complete.

## 2024-04-13 ENCOUNTER — APPOINTMENT (OUTPATIENT)
Dept: LAB | Facility: CLINIC | Age: 24
End: 2024-04-13
Payer: OTHER GOVERNMENT

## 2024-04-13 LAB
CREAT 24H UR-MRATE: 1 G/24HR (ref 0.6–1.8)
PROT 24H UR-MCNC: 77.25 MG/24 HRS
SPECIMEN VOL UR: 750 ML
SPECIMEN VOL UR: 750 ML

## 2024-04-13 PROCEDURE — 83993 ASSAY FOR CALPROTECTIN FECAL: CPT

## 2024-04-16 ENCOUNTER — TELEPHONE (OUTPATIENT)
Dept: PAIN MEDICINE | Facility: CLINIC | Age: 24
End: 2024-04-16

## 2024-04-17 ENCOUNTER — PATIENT MESSAGE (OUTPATIENT)
Dept: RHEUMATOLOGY | Facility: CLINIC | Age: 24
End: 2024-04-17

## 2024-04-17 ENCOUNTER — APPOINTMENT (OUTPATIENT)
Dept: LAB | Facility: HOSPITAL | Age: 24
End: 2024-04-17
Payer: OTHER GOVERNMENT

## 2024-04-17 DIAGNOSIS — Z11.1 SCREENING-PULMONARY TB: ICD-10-CM

## 2024-04-17 PROCEDURE — 86480 TB TEST CELL IMMUN MEASURE: CPT

## 2024-04-17 PROCEDURE — 36415 COLL VENOUS BLD VENIPUNCTURE: CPT

## 2024-04-18 LAB
CALPROTECTIN STL-MCNT: 28 UG/G (ref 0–120)
GAMMA INTERFERON BACKGROUND BLD IA-ACNC: 0.04 IU/ML
M TB IFN-G BLD-IMP: NEGATIVE
M TB IFN-G CD4+ BCKGRND COR BLD-ACNC: 0.01 IU/ML
M TB IFN-G CD4+ BCKGRND COR BLD-ACNC: 0.01 IU/ML
MITOGEN IGNF BCKGRD COR BLD-ACNC: 9.96 IU/ML

## 2024-04-19 ENCOUNTER — TELEPHONE (OUTPATIENT)
Age: 24
End: 2024-04-19

## 2024-04-19 NOTE — TELEPHONE ENCOUNTER
PT call in about a form she send in her my chat and needs to be filled out by May 7, 2024. PT submitted the form twice and has not been fill out. If you have any question about the from PT inform you can reach to her contact number. Thank you

## 2024-05-01 ENCOUNTER — TELEPHONE (OUTPATIENT)
Age: 24
End: 2024-05-01

## 2024-05-03 ENCOUNTER — TELEPHONE (OUTPATIENT)
Dept: ADMINISTRATIVE | Facility: HOSPITAL | Age: 24
End: 2024-05-03

## 2024-05-03 NOTE — TELEPHONE ENCOUNTER
----- Message from April Orellana sent at 4/30/2024  1:30 PM EDT -----  Regarding: FW: insurance referral  Contact: [phone]    ----- Message -----  From: Angela Lemus  Sent: 4/30/2024   9:27 AM EDT  To: Primary [facility]  Subject: insurance referral                               I need an update on if this was sent to [facility] ? My appointment is next week

## 2024-05-07 ENCOUNTER — HOSPITAL ENCOUNTER (OUTPATIENT)
Dept: RADIOLOGY | Facility: HOSPITAL | Age: 24
Discharge: HOME | End: 2024-05-07
Payer: OTHER GOVERNMENT

## 2024-05-07 DIAGNOSIS — K50.90 CROHN'S DISEASE WITHOUT COMPLICATION, UNSPECIFIED GASTROINTESTINAL TRACT LOCATION (HCC): ICD-10-CM

## 2024-05-07 PROCEDURE — 72197 MRI PELVIS W/O & W/DYE: CPT

## 2024-05-07 PROCEDURE — A9585 GADOBUTROL INJECTION: HCPCS | Performed by: PHYSICIAN ASSISTANT

## 2024-05-07 PROCEDURE — 74183 MRI ABD W/O CNTR FLWD CNTR: CPT

## 2024-05-07 RX ORDER — GADOBUTROL 604.72 MG/ML
7 INJECTION INTRAVENOUS
Status: COMPLETED | OUTPATIENT
Start: 2024-05-07 | End: 2024-05-07

## 2024-05-07 RX ADMIN — GADOBUTROL 7 ML: 604.72 INJECTION INTRAVENOUS at 11:16

## 2024-05-07 RX ADMIN — GLUCAGON HYDROCHLORIDE 1 MG: KIT at 11:08

## 2024-05-08 ENCOUNTER — OFFICE VISIT (OUTPATIENT)
Dept: OBGYN CLINIC | Facility: CLINIC | Age: 24
End: 2024-05-08
Payer: OTHER GOVERNMENT

## 2024-05-08 VITALS
WEIGHT: 143.2 LBS | DIASTOLIC BLOOD PRESSURE: 80 MMHG | HEIGHT: 62 IN | BODY MASS INDEX: 26.35 KG/M2 | SYSTOLIC BLOOD PRESSURE: 120 MMHG

## 2024-05-08 DIAGNOSIS — Z20.2 POSSIBLE EXPOSURE TO STD: Primary | ICD-10-CM

## 2024-05-08 PROBLEM — Z72.51 UNPROTECTED SEXUAL INTERCOURSE: Status: RESOLVED | Noted: 2023-12-06 | Resolved: 2024-05-08

## 2024-05-08 PROCEDURE — 99213 OFFICE O/P EST LOW 20 MIN: CPT | Performed by: PHYSICIAN ASSISTANT

## 2024-05-08 RX ORDER — OLMESARTAN MEDOXOMIL 20 MG/1
TABLET ORAL
COMMUNITY
Start: 2024-04-23

## 2024-05-08 RX ORDER — EVOLOCUMAB 140 MG/ML
INJECTION, SOLUTION SUBCUTANEOUS
COMMUNITY
Start: 2024-04-29

## 2024-05-08 RX ORDER — SIMVASTATIN 10 MG
TABLET ORAL
COMMUNITY
Start: 2024-04-10

## 2024-05-08 NOTE — PROGRESS NOTES
Assessment/Plan:    No problem-specific Assessment & Plan notes found for this encounter.       Diagnoses and all orders for this visit:    Possible exposure to STD  -     HSV 1 and 2 IgM Abs, Indirect; Future    Other orders  -     Repatha SureClick 140 MG/ML SOAJ; INJECT 1 ML SUBCUTANEOUS EVERY 2 WEEKS 60 DAYS  -     olmesartan (BENICAR) 20 mg tablet; TAKE 1 TABLET BY MOUTH EVERY DAY FOR 60 DAYS  -     simvastatin (ZOCOR) 10 mg tablet        Discussed HSV screening with patient.  Counseled that she could have been exposed to HSV 1 as a child and mounted an immune response, but will never have a cold sore or genital outbreak.  Genital swab is not indicated in the setting of patient being asymptomatic.  Will repeat blood titers; orders entered.  Reassured patient that this is a common result, and most people are exposed to HSV.  F/u in 2-3 weeks for yearly gyn exam.  Call with problems in the interim.    Subjective:      Patient ID: Angela Lemus is a 23 y.o. female.    Patient is here to discuss possible exposure to HSV.  States she had an STD panel done through [facility].  Her HSV 1 titer came back equivocal at 1.08.  Patient has never had a cold sore or a genital outbreak.  Gonorrhea, chlamydia, HCV, HBV, HIV, and syphilis screening were all negative.  She denies any urinary symptoms, vaginal discharge, odor, itching, burning, pelvic pain, abdominal pain, n/v, and fever/chills.  Her boyfriend also tested positive for HSV 1 but has never had a cold sore or a genital outbreak.        The following portions of the patient's history were reviewed and updated as appropriate: allergies, current medications, past family history, past medical history, past social history, past surgical history, and problem list.    Review of Systems   Constitutional:  Negative for chills and fever.   Gastrointestinal:  Negative for abdominal distention, abdominal pain, nausea and vomiting.   Genitourinary:  Negative for difficulty  "urinating, dysuria, frequency, genital sores, hematuria, menstrual problem, pelvic pain, urgency, vaginal bleeding, vaginal discharge and vaginal pain.         Objective:      /80 (BP Location: Left arm, Patient Position: Sitting, Cuff Size: Adult)   Ht 5' 2\" (1.575 m)   Wt 65 kg (143 lb 3.2 oz)   BMI 26.19 kg/m²          Physical Exam  Vitals reviewed.   Constitutional:       Appearance: Normal appearance. She is well-developed.   Neurological:      Mental Status: She is alert and oriented to person, place, and time.   Psychiatric:         Mood and Affect: Mood normal.         Behavior: Behavior normal. Behavior is cooperative.         Thought Content: Thought content normal.         Judgment: Judgment normal.           "

## 2024-05-22 ENCOUNTER — ANESTHESIA EVENT (OUTPATIENT)
Dept: ANESTHESIOLOGY | Facility: HOSPITAL | Age: 24
End: 2024-05-22

## 2024-05-22 ENCOUNTER — ANESTHESIA (OUTPATIENT)
Dept: ANESTHESIOLOGY | Facility: HOSPITAL | Age: 24
End: 2024-05-22

## 2024-05-30 ENCOUNTER — PATIENT MESSAGE (OUTPATIENT)
Dept: FAMILY MEDICINE CLINIC | Facility: CLINIC | Age: 24
End: 2024-05-30

## 2024-05-30 DIAGNOSIS — R79.89 ABNORMAL TSH: Primary | ICD-10-CM

## 2024-05-30 NOTE — PATIENT COMMUNICATION
I never referred her to [facility], so I don't know why she's going and don't know what labs to get.  Please clarify

## 2024-06-03 ENCOUNTER — APPOINTMENT (OUTPATIENT)
Dept: LAB | Facility: CLINIC | Age: 24
End: 2024-06-03
Payer: OTHER GOVERNMENT

## 2024-06-03 DIAGNOSIS — Z20.2 POSSIBLE EXPOSURE TO STD: ICD-10-CM

## 2024-06-03 PROCEDURE — 86694 HERPES SIMPLEX NES ANTBDY: CPT

## 2024-06-06 ENCOUNTER — APPOINTMENT (EMERGENCY)
Dept: RADIOLOGY | Facility: HOSPITAL | Age: 24
End: 2024-06-06
Payer: OTHER GOVERNMENT

## 2024-06-06 ENCOUNTER — HOSPITAL ENCOUNTER (EMERGENCY)
Facility: HOSPITAL | Age: 24
Discharge: HOME | End: 2024-06-06
Attending: EMERGENCY MEDICINE
Payer: OTHER GOVERNMENT

## 2024-06-06 VITALS
BODY MASS INDEX: 26.29 KG/M2 | HEART RATE: 98 BPM | OXYGEN SATURATION: 99 % | SYSTOLIC BLOOD PRESSURE: 128 MMHG | RESPIRATION RATE: 16 BRPM | WEIGHT: 143.74 LBS | DIASTOLIC BLOOD PRESSURE: 77 MMHG | TEMPERATURE: 97.8 F

## 2024-06-06 DIAGNOSIS — M32.9 SLE EXACERBATION (HCC): Primary | ICD-10-CM

## 2024-06-06 LAB
ALBUMIN SERPL BCP-MCNC: 4.1 G/DL (ref 3.5–5)
ALP SERPL-CCNC: 73 U/L (ref 34–104)
ALT SERPL W P-5'-P-CCNC: 38 U/L (ref 7–52)
ANION GAP SERPL CALCULATED.3IONS-SCNC: 4 MMOL/L (ref 4–13)
AST SERPL W P-5'-P-CCNC: 21 U/L (ref 13–39)
ATRIAL RATE: 83 BPM
BACTERIA UR QL AUTO: ABNORMAL /HPF
BASOPHILS # BLD AUTO: 0.01 THOUSANDS/ÂΜL (ref 0–0.1)
BASOPHILS NFR BLD AUTO: 0 % (ref 0–1)
BILIRUB SERPL-MCNC: 0.24 MG/DL (ref 0.2–1)
BILIRUB UR QL STRIP: NEGATIVE
BUN SERPL-MCNC: 21 MG/DL (ref 5–25)
CALCIUM SERPL-MCNC: 9.5 MG/DL (ref 8.4–10.2)
CHLORIDE SERPL-SCNC: 108 MMOL/L (ref 96–108)
CLARITY UR: CLEAR
CO2 SERPL-SCNC: 26 MMOL/L (ref 21–32)
COLOR UR: ABNORMAL
CREAT SERPL-MCNC: 0.74 MG/DL (ref 0.6–1.3)
EOSINOPHIL # BLD AUTO: 0.04 THOUSAND/ÂΜL (ref 0–0.61)
EOSINOPHIL NFR BLD AUTO: 1 % (ref 0–6)
ERYTHROCYTE [DISTWIDTH] IN BLOOD BY AUTOMATED COUNT: 13.2 % (ref 11.6–15.1)
EXT PREGNANCY TEST URINE: NEGATIVE
EXT. CONTROL: NORMAL
GFR SERPL CREATININE-BSD FRML MDRD: 112 ML/MIN/1.73SQ M
GLUCOSE SERPL-MCNC: 87 MG/DL (ref 65–140)
GLUCOSE UR STRIP-MCNC: NEGATIVE MG/DL
HCT VFR BLD AUTO: 36.6 % (ref 34.8–46.1)
HGB BLD-MCNC: 11.5 G/DL (ref 11.5–15.4)
HGB UR QL STRIP.AUTO: NEGATIVE
IMM GRANULOCYTES # BLD AUTO: 0.02 THOUSAND/UL (ref 0–0.2)
IMM GRANULOCYTES NFR BLD AUTO: 0 % (ref 0–2)
KETONES UR STRIP-MCNC: NEGATIVE MG/DL
LEUKOCYTE ESTERASE UR QL STRIP: NEGATIVE
LYMPHOCYTES # BLD AUTO: 1.55 THOUSANDS/ÂΜL (ref 0.6–4.47)
LYMPHOCYTES NFR BLD AUTO: 30 % (ref 14–44)
MCH RBC QN AUTO: 27.6 PG (ref 26.8–34.3)
MCHC RBC AUTO-ENTMCNC: 31.4 G/DL (ref 31.4–37.4)
MCV RBC AUTO: 88 FL (ref 82–98)
MONOCYTES # BLD AUTO: 0.33 THOUSAND/ÂΜL (ref 0.17–1.22)
MONOCYTES NFR BLD AUTO: 7 % (ref 4–12)
MUCOUS THREADS UR QL AUTO: ABNORMAL
NEUTROPHILS # BLD AUTO: 3.16 THOUSANDS/ÂΜL (ref 1.85–7.62)
NEUTS SEG NFR BLD AUTO: 62 % (ref 43–75)
NITRITE UR QL STRIP: NEGATIVE
NON-SQ EPI CELLS URNS QL MICRO: ABNORMAL /HPF
NRBC BLD AUTO-RTO: 0 /100 WBCS
P AXIS: 53 DEGREES
PH UR STRIP.AUTO: 6 [PH]
PLATELET # BLD AUTO: 251 THOUSANDS/UL (ref 149–390)
PMV BLD AUTO: 10.9 FL (ref 8.9–12.7)
POTASSIUM SERPL-SCNC: 3.7 MMOL/L (ref 3.5–5.3)
PR INTERVAL: 150 MS
PROT SERPL-MCNC: 7.4 G/DL (ref 6.4–8.4)
PROT UR STRIP-MCNC: ABNORMAL MG/DL
QRS AXIS: 27 DEGREES
QRSD INTERVAL: 84 MS
QT INTERVAL: 346 MS
QTC INTERVAL: 406 MS
RBC # BLD AUTO: 4.17 MILLION/UL (ref 3.81–5.12)
RBC #/AREA URNS AUTO: ABNORMAL /HPF
SODIUM SERPL-SCNC: 138 MMOL/L (ref 135–147)
SP GR UR STRIP.AUTO: 1.03 (ref 1–1.03)
T WAVE AXIS: 41 DEGREES
UROBILINOGEN UR STRIP-ACNC: <2 MG/DL
VENTRICULAR RATE: 83 BPM
WBC # BLD AUTO: 5.11 THOUSAND/UL (ref 4.31–10.16)
WBC #/AREA URNS AUTO: ABNORMAL /HPF

## 2024-06-06 PROCEDURE — 99285 EMERGENCY DEPT VISIT HI MDM: CPT | Performed by: EMERGENCY MEDICINE

## 2024-06-06 PROCEDURE — 99284 EMERGENCY DEPT VISIT MOD MDM: CPT

## 2024-06-06 PROCEDURE — 80053 COMPREHEN METABOLIC PANEL: CPT | Performed by: EMERGENCY MEDICINE

## 2024-06-06 PROCEDURE — 96375 TX/PRO/DX INJ NEW DRUG ADDON: CPT

## 2024-06-06 PROCEDURE — 81001 URINALYSIS AUTO W/SCOPE: CPT | Performed by: EMERGENCY MEDICINE

## 2024-06-06 PROCEDURE — 85025 COMPLETE CBC W/AUTO DIFF WBC: CPT | Performed by: EMERGENCY MEDICINE

## 2024-06-06 PROCEDURE — 71045 X-RAY EXAM CHEST 1 VIEW: CPT

## 2024-06-06 PROCEDURE — 96365 THER/PROPH/DIAG IV INF INIT: CPT

## 2024-06-06 PROCEDURE — 93010 ELECTROCARDIOGRAM REPORT: CPT | Performed by: INTERNAL MEDICINE

## 2024-06-06 PROCEDURE — 93005 ELECTROCARDIOGRAM TRACING: CPT

## 2024-06-06 PROCEDURE — 96366 THER/PROPH/DIAG IV INF ADDON: CPT

## 2024-06-06 PROCEDURE — 36415 COLL VENOUS BLD VENIPUNCTURE: CPT | Performed by: EMERGENCY MEDICINE

## 2024-06-06 PROCEDURE — 81025 URINE PREGNANCY TEST: CPT | Performed by: EMERGENCY MEDICINE

## 2024-06-06 RX ORDER — KETOROLAC TROMETHAMINE 30 MG/ML
10 INJECTION, SOLUTION INTRAMUSCULAR; INTRAVENOUS ONCE
Status: COMPLETED | OUTPATIENT
Start: 2024-06-06 | End: 2024-06-06

## 2024-06-06 RX ORDER — ONDANSETRON 2 MG/ML
4 INJECTION INTRAMUSCULAR; INTRAVENOUS ONCE
Status: COMPLETED | OUTPATIENT
Start: 2024-06-06 | End: 2024-06-06

## 2024-06-06 RX ADMIN — SODIUM CHLORIDE, SODIUM LACTATE, POTASSIUM CHLORIDE, AND CALCIUM CHLORIDE 500 ML: .6; .31; .03; .02 INJECTION, SOLUTION INTRAVENOUS at 11:30

## 2024-06-06 RX ADMIN — ONDANSETRON 4 MG: 2 INJECTION INTRAMUSCULAR; INTRAVENOUS at 11:32

## 2024-06-06 RX ADMIN — KETOROLAC TROMETHAMINE 9.9 MG: 30 INJECTION, SOLUTION INTRAMUSCULAR; INTRAVENOUS at 11:30

## 2024-06-06 NOTE — DISCHARGE INSTRUCTIONS
Diagnosis; lupus exacerbation     - activity as tolerated     - please return to  the er for any  fevers- temp > 100.4/ any new/ worsening/concerning symptoms to you

## 2024-06-06 NOTE — ED PROCEDURE NOTE
PROCEDURE  ECG 12 Lead Documentation Only    Date/Time: 6/6/2024 11:55 AM    Performed by: Enrique Singer MD  Authorized by: Enrique Singer MD    Indications / Diagnosis:  Dizziness-sob  ECG reviewed by me, the ED Provider: yes    Patient location:  ED and bedside  Previous ECG:     Previous ECG:  Unavailable    Comparison to cardiac monitor: No    Interpretation:     Interpretation: non-specific    Rate:     ECG rate:  83    ECG rate assessment: normal      ECG rate assessment comment:  Sinus arrthymia  Rhythm:     Rhythm: sinus rhythm    Ectopy:     Ectopy: none    QRS:     QRS axis:  Normal    QRS intervals:  Normal  Conduction:     Conduction: normal    ST segments:     ST segments:  Normal  T waves:     T waves: flattening      Flattening:  V1  Q waves:     Q waves:  V1  Other findings:     Other findings: U wave    Comments:      No ecg signs of ischemia/ injury / r heart strain / analilia/pericarditis        Enrique Singer MD  06/06/24 1153

## 2024-06-10 NOTE — ED PROVIDER NOTES
History  Chief Complaint   Patient presents with    Medical Problem     Pt states she feels she is having a lupus flare- dizziness, feeling hot overnight, fatigued, night sweats, joint pain.      24 yr female with sle on hydroxychloroquine -- to get new biologic soon -- c/o 2nd day of  what pt refers to as sle flare-- with fatigue/chill/s body aches/ jt pains- nausea/ and lightheadedness- today with mild sob- but no fevers/uri/cough/cp- no hx of vte- - pos nausea- no v/d- no abd pain - no rash no gu/gyn comps- no ill contacts       History provided by:  Patient  Medical Problem  Associated symptoms: fatigue and nausea    Associated symptoms: no abdominal pain, no diarrhea, no fever, no headaches, no myalgias and no vomiting        Prior to Admission Medications   Prescriptions Last Dose Informant Patient Reported? Taking?   Cyanocobalamin (VITAMIN B 12 PO)  Self Yes No   Sig: Take 500 mcg by mouth 2 (two) times a day   Diclofenac Sodium (VOLTAREN) 1 %   No No   Sig: Apply 2 g topically 4 (four) times a day   Patient not taking: Reported on 2024   Icosapent Ethyl 1 g CAPS  Self Yes No   Si CAPSULES WITH MEALS ORALLY TWICE A DAY 60 DAYS   Repatha SureClick 140 MG/ML SOAJ   Yes No   Sig: INJECT 1 ML SUBCUTANEOUS EVERY 2 WEEKS 60 DAYS   aspirin 81 mg chewable tablet  Self Yes No   bromocriptine (PARLODEL) 2.5 mg tablet  Self Yes No   cyclobenzaprine (FLEXERIL) 10 mg tablet  Self No No   Sig: Take 1 tablet (10 mg total) by mouth 3 (three) times a day as needed for muscle spasms   Patient not taking: Reported on 2024   cyclobenzaprine (FLEXERIL) 5 mg tablet  Self No No   Sig: Take 1 tablet (5 mg total) by mouth 3 (three) times a day as needed for muscle spasms   Patient not taking: Reported on 2024   ferrous sulfate 325 (65 Fe) mg tablet  Self Yes No   Sig: Take 325 mg by mouth daily with breakfast   hydrOXYzine HCL (ATARAX) 10 mg tablet  Self Yes No   Sig: Take 10 mg by mouth 2 (two) times a day    hydroxychloroquine (PLAQUENIL) 200 mg tablet  Self No No   Sig: Take 1.5 tablets (300 mg total) by mouth daily with breakfast   lidocaine (Lidoderm) 5 %  Self No No   Sig: Apply 1 patch topically over 12 hours daily Remove & Discard patch within 12 hours or as directed by MD   olmesartan (BENICAR) 20 mg tablet   Yes No   Sig: TAKE 1 TABLET BY MOUTH EVERY DAY FOR 60 DAYS   olmesartan (BENICAR) 5 mg tablet  Self Yes No   Sig: Take 10 mg by mouth daily   Patient not taking: Reported on 5/8/2024   omeprazole (PriLOSEC) 20 mg delayed release capsule  Self No No   Sig: Take 1 capsule (20 mg total) by mouth daily   omeprazole (PriLOSEC) 40 MG capsule   No No   Sig: Take 1 capsule (40 mg total) by mouth daily   ondansetron (Zofran ODT) 4 mg disintegrating tablet  Self No No   Sig: Take 1 tablet (4 mg total) by mouth every 6 (six) hours as needed for nausea or vomiting   phentermine (ADIPEX-P) 37.5 MG tablet  Self Yes No   Sig: Take 37.5 mg by mouth in the morning   sildenafil (VIAGRA) 25 MG tablet  Self Yes No   Sig: Take 25 mg by mouth daily as needed for erectile dysfunction   simvastatin (ZOCOR) 10 mg tablet   Yes No   simvastatin (ZOCOR) 20 mg tablet  Self Yes No   Sig: Take 20 mg by mouth every evening   topiramate (TOPAMAX) 100 mg tablet  Self Yes No   Sig: Take 100 mg by mouth daily   traZODone (DESYREL) 50 mg tablet  Self Yes No   Sig: Take 1 tablet by mouth daily at bedtime   venlafaxine (EFFEXOR-XR) 150 mg 24 hr capsule  Self Yes No   Sig: Take 150 mg by mouth daily   vitamin B-12 (VITAMIN B-12) 1,000 mcg tablet  Self Yes No   Sig: Take 2,000 mcg by mouth daily      Facility-Administered Medications: None       Past Medical History:   Diagnosis Date    Bowel obstruction (HCC)     Clostridium difficile infection     2021    Crohn's disease (HCC)     Depression     Hypertension     Kidney disease, chronic, stage I (GFR over 89 ml/min)     Lupus (HCC)     PTSD (post-traumatic stress disorder)     Raynaud disease         Past Surgical History:   Procedure Laterality Date    ABDOMINAL SURGERY      bowel obstruction     SECTION N/A     COLON SURGERY      IR BIOPSY KIDNEY RANDOM  3/6/2024    WISDOM TOOTH EXTRACTION         Family History   Problem Relation Age of Onset    Lupus Mother     Diabetes Father     COPD Maternal Grandmother     Heart failure Maternal Grandmother     Substance Abuse Maternal Grandmother     Breast cancer Other      I have reviewed and agree with the history as documented.    E-Cigarette/Vaping    E-Cigarette Use Never User      E-Cigarette/Vaping Substances    Nicotine No     THC No     CBD No     Flavoring No     Other No     Unknown No      Social History     Tobacco Use    Smoking status: Never    Smokeless tobacco: Never   Vaping Use    Vaping status: Never Used   Substance Use Topics    Alcohol use: Not Currently     Comment: socially    Drug use: Never       Review of Systems   Constitutional:  Positive for activity change, appetite change, chills and fatigue. Negative for diaphoresis, fever and unexpected weight change.   HENT: Negative.     Eyes: Negative.    Respiratory: Negative.     Cardiovascular: Negative.    Gastrointestinal:  Positive for nausea. Negative for abdominal distention, abdominal pain, anal bleeding, blood in stool, constipation, diarrhea, rectal pain and vomiting.   Endocrine: Negative.    Genitourinary: Negative.    Musculoskeletal:  Positive for arthralgias. Negative for back pain, gait problem, joint swelling, myalgias, neck pain and neck stiffness.   Skin: Negative.    Allergic/Immunologic: Negative.    Neurological:  Positive for light-headedness. Negative for dizziness, tremors, seizures, syncope, facial asymmetry, speech difficulty, weakness, numbness and headaches.   Hematological: Negative.    Psychiatric/Behavioral: Negative.         Physical Exam  Physical Exam  Vitals and nursing note reviewed.   Constitutional:       General: She is not in acute distress.      Appearance: Normal appearance. She is not ill-appearing, toxic-appearing or diaphoretic.      Comments: Avss--  pulse ox 98 % on ra- interpretation is normal- no intervention - in nad    HENT:      Head: Normocephalic and atraumatic.      Right Ear: Tympanic membrane, ear canal and external ear normal. There is no impacted cerumen.      Left Ear: Tympanic membrane, ear canal and external ear normal. There is no impacted cerumen.      Nose: Nose normal. No congestion or rhinorrhea.      Mouth/Throat:      Mouth: Mucous membranes are moist.      Pharynx: Oropharynx is clear. No oropharyngeal exudate or posterior oropharyngeal erythema.   Eyes:      General: No scleral icterus.        Right eye: No discharge.         Left eye: No discharge.      Extraocular Movements: Extraocular movements intact.      Conjunctiva/sclera: Conjunctivae normal.      Pupils: Pupils are equal, round, and reactive to light.      Comments: Mm pink   Neck:      Vascular: No carotid bruit.      Comments: No pmt c/t/l/s spine - no meningeal signs   Cardiovascular:      Rate and Rhythm: Normal rate and regular rhythm.      Pulses: Normal pulses.      Heart sounds: Normal heart sounds. No murmur heard.     No friction rub. No gallop.   Pulmonary:      Effort: Pulmonary effort is normal. No respiratory distress.      Breath sounds: Normal breath sounds. No stridor. No wheezing, rhonchi or rales.   Chest:      Chest wall: No tenderness.   Abdominal:      General: Bowel sounds are normal. There is no distension.      Palpations: Abdomen is soft. There is no mass.      Tenderness: There is no abdominal tenderness. There is no right CVA tenderness, left CVA tenderness, guarding or rebound.      Hernia: No hernia is present.      Comments: Soft nt/nd- no hsm - no cva tenderness- no ascites- no peritoneal signs    Musculoskeletal:         General: Tenderness present. No swelling, deformity or signs of injury. Normal range of motion.      Cervical  back: Normal range of motion and neck supple. No rigidity or tenderness.      Right lower leg: No edema.      Left lower leg: No edema.      Comments: Bue small jt tenderess to palpation but no overlying edema/ erythema- - equal bilateral radial/dp pulses- no ble edema/calf tenderness/asym/ erythema   Lymphadenopathy:      Cervical: No cervical adenopathy.   Skin:     General: Skin is warm.      Capillary Refill: Capillary refill takes less than 2 seconds.      Coloration: Skin is not jaundiced or pale.      Findings: No bruising, erythema, lesion or rash.   Neurological:      General: No focal deficit present.      Mental Status: She is alert and oriented to person, place, and time. Mental status is at baseline.      Cranial Nerves: No cranial nerve deficit.      Sensory: No sensory deficit.      Motor: No weakness.      Coordination: Coordination normal.      Gait: Gait normal.      Comments: Normal non focal neuro exam    Psychiatric:         Mood and Affect: Mood normal.         Behavior: Behavior normal.         Thought Content: Thought content normal.         Judgment: Judgment normal.         Vital Signs  ED Triage Vitals [06/06/24 0959]   Temperature Pulse Respirations Blood Pressure SpO2   97.8 °F (36.6 °C) 99 16 138/78 99 %      Temp Source Heart Rate Source Patient Position - Orthostatic VS BP Location FiO2 (%)   Oral Monitor Sitting Right arm --      Pain Score       No Pain           Vitals:    06/06/24 0959 06/06/24 1030   BP: 138/78 128/77   Pulse: 99 98   Patient Position - Orthostatic VS: Sitting          Visual Acuity      ED Medications  Medications   lactated ringers bolus 500 mL (0 mL Intravenous Stopped 6/6/24 1301)   ondansetron (ZOFRAN) injection 4 mg (4 mg Intravenous Given 6/6/24 1132)   ketorolac (TORADOL) injection 9.9 mg (9.9 mg Intravenous Given 6/6/24 1130)       Diagnostic Studies  Results Reviewed       Procedure Component Value Units Date/Time    Comprehensive metabolic panel  [[id number]] Collected: 06/06/24 1135    Lab Status: Final result Specimen: Blood from Arm, Right Updated: 06/06/24 1205     Sodium 138 mmol/L      Potassium 3.7 mmol/L      Chloride 108 mmol/L      CO2 26 mmol/L      ANION GAP 4 mmol/L      BUN 21 mg/dL      Creatinine 0.74 mg/dL      Glucose 87 mg/dL      Calcium 9.5 mg/dL      AST 21 U/L      ALT 38 U/L      Alkaline Phosphatase 73 U/L      Total Protein 7.4 g/dL      Albumin 4.1 g/dL      Total Bilirubin 0.24 mg/dL      eGFR 112 ml/min/1.73sq m     Narrative:      [employer] guidelines for Chronic Kidney Disease (CKD):     Stage 1 with normal or high GFR (GFR > 90 mL/min/1.73 square meters)    Stage 2 Mild CKD (GFR = 60-89 mL/min/1.73 square meters)    Stage 3A Moderate CKD (GFR = 45-59 mL/min/1.73 square meters)    Stage 3B Moderate CKD (GFR = 30-44 mL/min/1.73 square meters)    Stage 4 Severe CKD (GFR = 15-29 mL/min/1.73 square meters)    Stage 5 End Stage CKD (GFR <15 mL/min/1.73 square meters)  Note: GFR calculation is accurate only with a steady state creatinine    CBC and differential [[id number]] Collected: 06/06/24 1135    Lab Status: Final result Specimen: Blood from Arm, Right Updated: 06/06/24 1153     WBC 5.11 Thousand/uL      RBC 4.17 Million/uL      Hemoglobin 11.5 g/dL      Hematocrit 36.6 %      MCV 88 fL      MCH 27.6 pg      MCHC 31.4 g/dL      RDW 13.2 %      MPV 10.9 fL      Platelets 251 Thousands/uL      nRBC 0 /100 WBCs      Segmented % 62 %      Immature Grans % 0 %      Lymphocytes % 30 %      Monocytes % 7 %      Eosinophils Relative 1 %      Basophils Relative 0 %      Absolute Neutrophils 3.16 Thousands/µL      Absolute Immature Grans 0.02 Thousand/uL      Absolute Lymphocytes 1.55 Thousands/µL      Absolute Monocytes 0.33 Thousand/µL      Eosinophils Absolute 0.04 Thousand/µL      Basophils Absolute 0.01 Thousands/µL     Urine Microscopic [[id number]]  (Abnormal) Collected: 06/06/24 1135    Lab Status: Final  result Specimen: Urine, Clean Catch Updated: 06/06/24 1145     RBC, UA 1-2 /hpf      WBC, UA 1-2 /hpf      Epithelial Cells Occasional /hpf      Bacteria, UA None Seen /hpf      MUCUS THREADS Occasional    UA (URINE) with reflex to Scope [[id number]]  (Abnormal) Collected: 06/06/24 1135    Lab Status: Final result Specimen: Urine, Clean Catch Updated: 06/06/24 1145     Color, UA Light Yellow     Clarity, UA Clear     Specific Gravity, UA 1.026     pH, UA 6.0     Leukocytes, UA Negative     Nitrite, UA Negative     Protein, UA Trace mg/dl      Glucose, UA Negative mg/dl      Ketones, UA Negative mg/dl      Urobilinogen, UA <2.0 mg/dl      Bilirubin, UA Negative     Occult Blood, UA Negative    POCT pregnancy, urine [[id number]]  (Normal) Resulted: 06/06/24 1134    Lab Status: Final result Updated: 06/06/24 1134     EXT Preg Test, Ur Negative     Control Valid                   XR chest 1 view portable   Final Result by Daniel Zheng MD (06/07 0919)      No acute cardiopulmonary disease.            Workstation performed: [id number]                    Procedures  Procedures         ED Course  ED Course as of 06/10/24 1212   Thu Jun 06, 2024   1158 Cxr portable- no old cxr for comparison - normal mediastinum/cardiac silhouette- no free/sq air- no infiltrate- ptx- pulm edema- pleural effusions   1305 -er md note- pt- re-eval- feels much improved- will d/c                                              Medical Decision Making  Pt with normal exam -- with no signs of any infection on exam -- pt with mild sob- er md doubt any vte pt with pt is on bc-- will check labs/ ecg/cxr/ uine and tx symptoms and re-eval     Problems Addressed:  SLE exacerbation (HCC): acute illness or injury     Details: See chart and above     Amount and/or Complexity of Data Reviewed  External Data Reviewed: labs, radiology, ECG and notes.     Details: All reviewed by er md   Labs: ordered. Decision-making details documented in ED Course.      Details: All reviewed and compared  by er md   Radiology: ordered and independent interpretation performed. Decision-making details documented in ED Course.     Details: All reviewed and compared by er md   ECG/medicine tests: ordered and independent interpretation performed. Decision-making details documented in ED Course.     Details: All reviewed and compared by er md   Discussion of management or test interpretation with external provider(s): Moderate amount of er md thought complexity and workup     Risk  OTC drugs.  Prescription drug management.  Decision regarding hospitalization.             Disposition  Final diagnoses:   SLE exacerbation (HCC)     Time reflects when diagnosis was documented in both MDM as applicable and the Disposition within this note       Time User Action Codes Description Comment    6/6/2024  1:06 PM Enrique Singer [M32.9] SLE exacerbation (HCC)           ED Disposition       ED Disposition   Discharge    Condition   Stable    Date/Time   u Jun 6, 2024  1:06 PM    Comment   Angela Lemus discharge to home/self care.                   Follow-up Information    None         Discharge Medication List as of 6/6/2024  1:07 PM        CONTINUE these medications which have NOT CHANGED    Details   aspirin 81 mg chewable tablet Historical Med      bromocriptine (PARLODEL) 2.5 mg tablet Historical Med      !! Cyanocobalamin (VITAMIN B 12 PO) Take 500 mcg by mouth 2 (two) times a day, Historical Med      !! cyclobenzaprine (FLEXERIL) 10 mg tablet Take 1 tablet (10 mg total) by mouth 3 (three) times a day as needed for muscle spasms, Starting Wed 1/24/2024, Normal      !! cyclobenzaprine (FLEXERIL) 5 mg tablet Take 1 tablet (5 mg total) by mouth 3 (three) times a day as needed for muscle spasms, Starting Fri 3/22/2024, Normal      Diclofenac Sodium (VOLTAREN) 1 % Apply 2 g topically 4 (four) times a day, Starting Fri 4/5/2024, Until Sun 5/5/2024, Normal      ferrous sulfate 325 (65  Fe) mg tablet Take 325 mg by mouth daily with breakfast, Historical Med      hydroxychloroquine (PLAQUENIL) 200 mg tablet Take 1.5 tablets (300 mg total) by mouth daily with breakfast, Starting Fri 2/23/2024, Until Thu 5/23/2024, Normal      hydrOXYzine HCL (ATARAX) 10 mg tablet Take 10 mg by mouth 2 (two) times a day, Starting Tue 11/14/2023, Historical Med      Icosapent Ethyl 1 g CAPS 2 CAPSULES WITH MEALS ORALLY TWICE A DAY 60 DAYS, Historical Med      lidocaine (Lidoderm) 5 % Apply 1 patch topically over 12 hours daily Remove & Discard patch within 12 hours or as directed by MD, Starting Fri 3/22/2024, Normal      !! olmesartan (BENICAR) 20 mg tablet TAKE 1 TABLET BY MOUTH EVERY DAY FOR 60 DAYS, Historical Med      !! olmesartan (BENICAR) 5 mg tablet Take 10 mg by mouth daily, Starting Mon 5/22/2023, Historical Med      !! omeprazole (PriLOSEC) 20 mg delayed release capsule Take 1 capsule (20 mg total) by mouth daily, Starting Thu 8/24/2023, Normal      !! omeprazole (PriLOSEC) 40 MG capsule Take 1 capsule (40 mg total) by mouth daily, Starting Fri 4/5/2024, Normal      ondansetron (Zofran ODT) 4 mg disintegrating tablet Take 1 tablet (4 mg total) by mouth every 6 (six) hours as needed for nausea or vomiting, Starting Fri 3/22/2024, Normal      phentermine (ADIPEX-P) 37.5 MG tablet Take 37.5 mg by mouth in the morning, Starting Wed 1/17/2024, Historical Med      Repatha SureClick 140 MG/ML SOAJ INJECT 1 ML SUBCUTANEOUS EVERY 2 WEEKS 60 DAYS, Historical Med      sildenafil (VIAGRA) 25 MG tablet Take 25 mg by mouth daily as needed for erectile dysfunction, Historical Med      !! simvastatin (ZOCOR) 10 mg tablet Historical Med      !! simvastatin (ZOCOR) 20 mg tablet Take 20 mg by mouth every evening, Starting Tue 2/6/2024, Historical Med      topiramate (TOPAMAX) 100 mg tablet Take 100 mg by mouth daily, Starting Fri 12/15/2023, Historical Med      traZODone (DESYREL) 50 mg tablet Take 1 tablet by mouth daily at  bedtime, Starting Tue 8/29/2023, Historical Med      venlafaxine (EFFEXOR-XR) 150 mg 24 hr capsule Take 150 mg by mouth daily, Starting Tue 1/23/2024, Historical Med      !! vitamin B-12 (VITAMIN B-12) 1,000 mcg tablet Take 2,000 mcg by mouth daily, Starting Wed 9/13/2023, Historical Med       !! - Potential duplicate medications found. Please discuss with provider.          No discharge procedures on file.    PDMP Review       None            ED Provider  Electronically Signed by             Enrique Singer MD  06/10/24 3291

## 2024-06-11 LAB — MISCELLANEOUS LAB TEST RESULT: NORMAL

## 2024-06-14 ENCOUNTER — OFFICE VISIT (OUTPATIENT)
Dept: OBGYN CLINIC | Facility: CLINIC | Age: 24
End: 2024-06-14
Payer: OTHER GOVERNMENT

## 2024-06-14 ENCOUNTER — NURSE TRIAGE (OUTPATIENT)
Age: 24
End: 2024-06-14

## 2024-06-14 VITALS
SYSTOLIC BLOOD PRESSURE: 112 MMHG | HEIGHT: 62 IN | BODY MASS INDEX: 26.35 KG/M2 | WEIGHT: 143.2 LBS | DIASTOLIC BLOOD PRESSURE: 62 MMHG

## 2024-06-14 DIAGNOSIS — N76.0 RECURRENT VAGINITIS: ICD-10-CM

## 2024-06-14 DIAGNOSIS — N89.8 VAGINAL ITCHING: Primary | ICD-10-CM

## 2024-06-14 DIAGNOSIS — Z11.3 SCREENING FOR STDS (SEXUALLY TRANSMITTED DISEASES): ICD-10-CM

## 2024-06-14 DIAGNOSIS — N89.8 VAGINAL DISCHARGE: ICD-10-CM

## 2024-06-14 PROBLEM — F34.1 DYSTHYMIC DISORDER: Status: ACTIVE | Noted: 2024-06-14

## 2024-06-14 PROBLEM — N18.1 STAGE 1 CHRONIC KIDNEY DISEASE: Status: ACTIVE | Noted: 2024-06-14

## 2024-06-14 PROBLEM — H52.221 REGULAR ASTIGMATISM, RIGHT EYE: Status: ACTIVE | Noted: 2024-06-14

## 2024-06-14 PROBLEM — F32.A DEPRESSION, UNSPECIFIED: Status: ACTIVE | Noted: 2024-06-14

## 2024-06-14 PROBLEM — I12.9 RENAL HYPERTENSION: Status: ACTIVE | Noted: 2024-06-14

## 2024-06-14 PROBLEM — E55.9 VITAMIN D DEFICIENCY: Status: ACTIVE | Noted: 2024-06-14

## 2024-06-14 PROBLEM — I73.00 SECONDARY RAYNAUD'S PHENOMENON: Status: ACTIVE | Noted: 2024-06-14

## 2024-06-14 PROBLEM — F43.10 POST-TRAUMATIC STRESS DISORDER, UNSPECIFIED: Status: ACTIVE | Noted: 2024-06-14

## 2024-06-14 PROBLEM — F43.20 ADJUSTMENT DISORDER, UNSPECIFIED: Status: ACTIVE | Noted: 2024-06-14

## 2024-06-14 PROBLEM — K56.600 PARTIAL SMALL BOWEL OBSTRUCTION (HCC): Status: ACTIVE | Noted: 2022-06-23

## 2024-06-14 PROCEDURE — 87660 TRICHOMONAS VAGIN DIR PROBE: CPT | Performed by: PHYSICIAN ASSISTANT

## 2024-06-14 PROCEDURE — 87591 N.GONORRHOEAE DNA AMP PROB: CPT | Performed by: PHYSICIAN ASSISTANT

## 2024-06-14 PROCEDURE — 87480 CANDIDA DNA DIR PROBE: CPT | Performed by: PHYSICIAN ASSISTANT

## 2024-06-14 PROCEDURE — 87510 GARDNER VAG DNA DIR PROBE: CPT | Performed by: PHYSICIAN ASSISTANT

## 2024-06-14 PROCEDURE — 87491 CHLMYD TRACH DNA AMP PROBE: CPT | Performed by: PHYSICIAN ASSISTANT

## 2024-06-14 PROCEDURE — 87109 MYCOPLASMA: CPT | Performed by: PHYSICIAN ASSISTANT

## 2024-06-14 PROCEDURE — 99213 OFFICE O/P EST LOW 20 MIN: CPT | Performed by: PHYSICIAN ASSISTANT

## 2024-06-14 RX ORDER — METRONIDAZOLE 500 MG/1
500 TABLET ORAL EVERY 12 HOURS SCHEDULED
Qty: 14 TABLET | Refills: 0 | Status: SHIPPED | OUTPATIENT
Start: 2024-06-14 | End: 2024-06-21

## 2024-06-14 NOTE — TELEPHONE ENCOUNTER
"Spoke with patient who reports recurrent yeast infections she has been treating with OTC monistat.  She reports she gets them about every other month and this time used monistat 1 and 3 day without relief.  She reports white, cottage cheese-like discharge, itching, burning and irritation.  Denies abd pain.  LMP 5/27/24.  Appointment made for today.  No further questions or concerns at this time.    Reason for Disposition   Symptoms of a yeast infection' (i.e., itchy, white discharge, not bad smelling) and not improved > 3 days following Care Advice    Answer Assessment - Initial Assessment Questions  1. DISCHARGE: \"Describe the discharge.\" (e.g., white, yellow, green, gray, foamy, cottage cheese-like)      White cottage cheese like  2. ODOR: \"Is there a bad odor?\"      denies  3. ONSET: \"When did the discharge begin?\"      3 days ago  4. RASH: \"Is there a rash in that area?\" If Yes, ask: \"Describe it.\" (e.g., redness, blisters, sores, bumps)      no  5. ABDOMINAL PAIN: \"Are you having any abdominal pain?\" If Yes, ask: \"What does it feel like? \" (e.g., crampy, dull, intermittent, constant)       no  7. CAUSE: \"What do you think is causing the discharge?\" \"Have you had the same problem before? What happened then?\"      Yeast infection  8. OTHER SYMPTOMS: \"Do you have any other symptoms?\" (e.g., fever, itching, vaginal bleeding, pain with urination, injury to genital area, vaginal foreign body)      Burning, itching and irritation  9. PREGNANCY: \"Is there any chance you are pregnant?\" \"When was your last menstrual period?\"      5/27/24    Protocols used: Vaginal Discharge-ADULT-[place]    "

## 2024-06-14 NOTE — TELEPHONE ENCOUNTER
"Patient call:  Pt stated provider: REYES Pacheco    Actionable item: Appointment rescheduled. Routing to provider for review and medication management in interim.    What is the reason for the call/chief complaint?    Reports ongoing superior medial back pain (7.5/10) and right wrist pain (6/10) and swelling over the past two months.  States that her wrist pain has recently increased to an 8/10 and has had difficulty with insomnia + hot/chills overnight.  Lastly, reports recurrent yeast infections which she has been visiting urgent cares for every 1-2 months.     Patient is not currently taking any OTC medications and only what has been prescribed. No recent changes.     Also states that she did not obtain xray as previously advised. Provided central scheduling phone number to call and set up.     Dispo: Appointment rescheduled to soonest available with REYES Pacheco. Suggested cool/warm compress for comfort. Routing to provider for review. Tylenol as needed (with caution) as she was previously directed by another provider. Suggested she call whomever is managing Nexplanon for potential etiology of recurrent yeast infxs.    Informed to call [facility] with worsening symptoms.  Agrees with plan.   All questions answered.     Reason for Disposition   MODERATE pain (e.g., interferes with normal activities) and present > 3 days    Answer Assessment - Initial Assessment Questions  1. ONSET: \"When did the pain start?\"      2 months ago  2. LOCATION: \"Where is the pain located?\"      RIGHT Wrist  3. PAIN: \"How bad is the pain?\" (Scale 1-10; or mild, moderate, severe)    - MILD (1-3): doesn't interfere with normal activities    - MODERATE (4-7): interferes with normal activities (e.g., work or school) or awakens from sleep    - SEVERE (8-10): excruciating pain, unable to use hand at all      Back- 7.5/10     4. WORK OR EXERCISE: \"Has there been any recent work or exercise that involved this part of the body?\"      Random " "worsening   5. CAUSE: \"What do you think is causing the pain?\"      Flare up  6. AGGRAVATING FACTORS: \"What makes the pain worse?\" (e.g., using computer)      Moving  7. OTHER SYMPTOMS: \"Do you have any other symptoms?\" (e.g., neck pain, swelling, rash, numbness, fever)      Chronic swelling to right wrist  8. PREGNANCY: \"Is there any chance you are pregnant?\" \"When was your last menstrual period?\"      no    Protocols used: Hand and Wrist Pain-ADULT-[place]    "

## 2024-06-14 NOTE — PROGRESS NOTES
Assessment/Plan:      Diagnoses and all orders for this visit:    Vaginal itching  -     metroNIDAZOLE (FLAGYL) 500 mg tablet; Take 1 tablet (500 mg total) by mouth every 12 (twelve) hours for 7 days No alcohol with medication.  -     VAGINOSIS DNA PROBE  -     Chlamydia/GC amplified DNA by PCR  -     Mycoplasma / ureaplasma culture    Vaginal discharge  -     metroNIDAZOLE (FLAGYL) 500 mg tablet; Take 1 tablet (500 mg total) by mouth every 12 (twelve) hours for 7 days No alcohol with medication.  -     VAGINOSIS DNA PROBE  -     Chlamydia/GC amplified DNA by PCR  -     Mycoplasma / ureaplasma culture    Recurrent vaginitis  -     metroNIDAZOLE (FLAGYL) 500 mg tablet; Take 1 tablet (500 mg total) by mouth every 12 (twelve) hours for 7 days No alcohol with medication.  -     VAGINOSIS DNA PROBE  -     Chlamydia/GC amplified DNA by PCR  -     Mycoplasma / ureaplasma culture    Screening for STDs (sexually transmitted diseases)  -     VAGINOSIS DNA PROBE  -     Chlamydia/GC amplified DNA by PCR  -     Mycoplasma / ureaplasma culture     Pleasant 24-year-old female presenting today for concerns of recurrent vaginal symptoms she reports vaginal yeast infections.  She states they have been off and on every 2 months or so for the past year.  She states that she either goes to urgent care or self treats and generally symptoms improved with Monistat or fluconazole.  States self treated in [place] with Monistat and fluconazole while in [place] with resolution of symptoms but recurred 2 days ago for which Monistat made symptoms worse.  New sexual partner  History of mycoplasma treated last year.  Patient under treatment for IBD, lupus . history of small bowel obstruction, kidney disease, Raynaud's, hypertension, PTSD/depression, iron deficiency anemia.    On examination today.  Large amount of creamy discharge noted at vaginal introitus and moderate amount of smooth, thick white/yellow discharge within the vaginal vault with  "generalized erythema.    PLAN:  GC/CT  Ureaplasma/mycoplasma  Vaginal culture    BV suspected considering patient continue self treating Monistat with recurrent symptoms and most recent Monistat use made symptoms worse.  Patient to start oral Flagyl.  Vulvovaginal hygiene reviewed as well as avoiding any intercourse at this time.  Will plan to call patient with results on Monday and determine if any further treatment is needed whether acute versus suppressive therapy.  Patient agreeable to plan.  RTO annual exam    Chief Complaint   Patient presents with    Vaginitis     Pt c/o recurring yeast infections        Subjective:     Patient ID: Angela Lemus is a 24 y.o. female.    23y/o F here today for \"frequent yeast infections\" for the past year.  States last sxs was 2 weeks ago - was in [place]. States got fluconazole and monistat OTC and sxs went away.    She reports sxs returned 2 days ago with cottage cheese white discharge, itching inside vagina.  No odor. Denies abnormal vaginal bleeding or pelvic pain.   She reports burning  when urine touching the genitals, but denies urinary frequency, urgency, hematuria, pelvic or flank pain.   She used monistat yesterday and feels like it worsened sxs.     States she has gone to Urgent Care at times for management of sxs.  States recurrent sxs was told by UC possibly from medications she takes.  Also told could be from the nexplanon, which was inserted last year and symptoms seem to  after that.     She is sexually active.   She does have newer sexual partner.  History of mycoplasma treated from ED last year.        Review of Systems   Constitutional: Negative.    Gastrointestinal: Negative.  Negative for abdominal distention and abdominal pain.   Genitourinary:  Positive for vaginal discharge and vaginal pain. Negative for dyspareunia, dysuria, flank pain, frequency, menstrual problem, pelvic pain, urgency and vaginal bleeding.         The following portions of " the patient's history were reviewed and updated as appropriate: allergies, current medications, past family history, past medical history, past social history, past surgical history, and problem list.      Objective:     Physical Exam  Vitals reviewed.   Constitutional:       Appearance: Normal appearance.   Abdominal:      Tenderness: There is no abdominal tenderness.   Genitourinary:     General: Normal vulva.      Labia:         Right: No rash, tenderness or lesion.         Left: No rash, tenderness or lesion.       Vagina: Vaginal discharge (thick creamy white/yellow discharge.), erythema and tenderness present. No bleeding or lesions.      Cervix: No cervical motion tenderness, discharge, friability, lesion, erythema or cervical bleeding.      Uterus: Not tender.    Lymphadenopathy:      Lower Body: No right inguinal adenopathy. No left inguinal adenopathy.   Neurological:      Mental Status: She is alert and oriented to person, place, and time.   Psychiatric:         Mood and Affect: Mood normal.         Behavior: Behavior normal. Behavior is cooperative.

## 2024-06-15 LAB
CANDIDA RRNA VAG QL PROBE: NOT DETECTED
G VAGINALIS RRNA GENITAL QL PROBE: DETECTED
T VAGINALIS RRNA GENITAL QL PROBE: NOT DETECTED

## 2024-06-17 LAB
C TRACH DNA SPEC QL NAA+PROBE: NEGATIVE
N GONORRHOEA DNA SPEC QL NAA+PROBE: NEGATIVE

## 2024-06-21 ENCOUNTER — HOSPITAL ENCOUNTER (EMERGENCY)
Facility: HOSPITAL | Age: 24
Discharge: HOME | End: 2024-06-21
Attending: EMERGENCY MEDICINE
Payer: OTHER GOVERNMENT

## 2024-06-21 VITALS
HEART RATE: 95 BPM | OXYGEN SATURATION: 97 % | TEMPERATURE: 98.1 F | DIASTOLIC BLOOD PRESSURE: 70 MMHG | SYSTOLIC BLOOD PRESSURE: 123 MMHG | RESPIRATION RATE: 16 BRPM | WEIGHT: 145 LBS | BODY MASS INDEX: 26.52 KG/M2

## 2024-06-21 DIAGNOSIS — N34.1 NONGONOCOCCAL URETHRITIS DUE TO UREAPLASMA UREALYTICUM: Primary | ICD-10-CM

## 2024-06-21 DIAGNOSIS — A49.3 NONGONOCOCCAL URETHRITIS DUE TO UREAPLASMA UREALYTICUM: Primary | ICD-10-CM

## 2024-06-21 LAB
M HOMINIS SPEC QL CULT: NEGATIVE
U UREALYTICUM SPEC QL CULT: POSITIVE

## 2024-06-21 PROCEDURE — 99284 EMERGENCY DEPT VISIT MOD MDM: CPT | Performed by: EMERGENCY MEDICINE

## 2024-06-21 PROCEDURE — 99283 EMERGENCY DEPT VISIT LOW MDM: CPT

## 2024-06-21 RX ORDER — DOXYCYCLINE HYCLATE 100 MG/1
100 CAPSULE ORAL ONCE
Status: COMPLETED | OUTPATIENT
Start: 2024-06-21 | End: 2024-06-21

## 2024-06-21 RX ORDER — DOXYCYCLINE HYCLATE 100 MG/1
100 CAPSULE ORAL 2 TIMES DAILY
Qty: 14 CAPSULE | Refills: 0 | Status: SHIPPED | OUTPATIENT
Start: 2024-06-21 | End: 2024-06-28

## 2024-06-21 RX ADMIN — DOXYCYCLINE 100 MG: 100 CAPSULE ORAL at 19:56

## 2024-06-21 NOTE — DISCHARGE INSTRUCTIONS
Return to the ER for further concerns or worsening symptoms  Follow up with your primary care physician and GYN in 1-2 days  Take medication as prescribed  Refrain from unprotected sexual activity until you are cleared by a physician

## 2024-06-21 NOTE — ED NOTES
Pt encouraged to urinate but reports she is unable to at this time. Pt provided with a gown and instructed to change. Awaiting exam by provider. Will continue to monitor.      Barbara Obrien RN  06/21/24 1019

## 2024-06-21 NOTE — ED NOTES
Pt seen, assessed and d/c by provider. Pt appeared to be in no acute distress upon discharge. Pt able to ambulate well without assistance upon exiting.      Ninoska Anderson RN  06/21/24 1958

## 2024-06-21 NOTE — ED NOTES
Pt seen, assessed and d/c by provider. Pt appeared to be in no acute distress upon discharge. Pt able to ambulate well without assistance upon exiting.      Ninoska Anderson RN  06/21/24 1957

## 2024-06-23 NOTE — ED PROVIDER NOTES
"History  Chief Complaint   Patient presents with    Vaginal Itching     Pt reports vaginal itching with yellow discharge X 2 months, used OTC treatment on and off. Reports seen by OB 1 week ago was diagnosed with BV and placed on flagyl. Pt reports STD testing was sent out reports \"I noticed something came back positive tonight and I got worried and came to the ED. I also think I have a yeast infection again the itching is worse today with more yellow discharge.\"      Pt is a 25y/o female that presents with c/o persistent vaginal discharge and an abnormal outpt test.  She states that she has had recurrent vaginal itching and discharge for many months.  She is currently being evaluated by OB/GYN as an outpatient.  Patient was diagnosed with BV, is currently on a course of Flagyl, and noticed that her outpatient test was positive for Ureaplasma.  She states that pruritus is persistent despite compliance with medications.      History provided by:  Patient   used: No    Vaginal Itching  Associated symptoms: no abdominal pain, no cough, no diarrhea, no fever, no nausea, no shortness of breath and no vomiting        Prior to Admission Medications   Prescriptions Last Dose Informant Patient Reported? Taking?   Cyanocobalamin (VITAMIN B 12 PO)  Self Yes No   Sig: Take 500 mcg by mouth 2 (two) times a day   Icosapent Ethyl 1 g CAPS  Self Yes No   Si CAPSULES WITH MEALS ORALLY TWICE A DAY 60 DAYS   Repatha SureClick 140 MG/ML SOAJ   Yes No   Sig: INJECT 1 ML SUBCUTANEOUS EVERY 2 WEEKS 60 DAYS   bromocriptine (PARLODEL) 2.5 mg tablet  Self Yes No   ferrous sulfate 325 (65 Fe) mg tablet  Self Yes No   Sig: Take 325 mg by mouth daily with breakfast   hydroxychloroquine (PLAQUENIL) 200 mg tablet  Self No No   Sig: Take 1.5 tablets (300 mg total) by mouth daily with breakfast   lidocaine (Lidoderm) 5 %  Self No No   Sig: Apply 1 patch topically over 12 hours daily Remove & Discard patch within 12 hours or " as directed by MD   metroNIDAZOLE (FLAGYL) 500 mg tablet   No No   Sig: Take 1 tablet (500 mg total) by mouth every 12 (twelve) hours for 7 days No alcohol with medication.   olmesartan (BENICAR) 20 mg tablet   Yes No   Sig: TAKE 1 TABLET BY MOUTH EVERY DAY FOR 60 DAYS   omeprazole (PriLOSEC) 20 mg delayed release capsule  Self No No   Sig: Take 1 capsule (20 mg total) by mouth daily   ondansetron (Zofran ODT) 4 mg disintegrating tablet  Self No No   Sig: Take 1 tablet (4 mg total) by mouth every 6 (six) hours as needed for nausea or vomiting   phentermine (ADIPEX-P) 37.5 MG tablet  Self Yes No   Sig: Take 37.5 mg by mouth in the morning   sildenafil (VIAGRA) 25 MG tablet  Self Yes No   Sig: Take 25 mg by mouth daily as needed for erectile dysfunction   simvastatin (ZOCOR) 10 mg tablet   Yes No   simvastatin (ZOCOR) 20 mg tablet  Self Yes No   Sig: Take 20 mg by mouth every evening   topiramate (TOPAMAX) 100 mg tablet  Self Yes No   Sig: Take 100 mg by mouth daily   traZODone (DESYREL) 50 mg tablet  Self Yes No   Sig: Take 1 tablet by mouth daily at bedtime   venlafaxine (EFFEXOR-XR) 150 mg 24 hr capsule  Self Yes No   Sig: Take 150 mg by mouth daily   vitamin B-12 (VITAMIN B-12) 1,000 mcg tablet  Self Yes No   Sig: Take 2,000 mcg by mouth daily      Facility-Administered Medications: None       Past Medical History:   Diagnosis Date    Bowel obstruction (HCC)     Clostridium difficile infection         Crohn's disease (HCC)     Depression     Hypertension     Kidney disease, chronic, stage I (GFR over 89 ml/min)     Lupus (HCC)     PTSD (post-traumatic stress disorder)     Raynaud disease        Past Surgical History:   Procedure Laterality Date    ABDOMINAL SURGERY      bowel obstruction     SECTION N/A     COLON SURGERY      IR BIOPSY KIDNEY RANDOM  3/6/2024    WISDOM TOOTH EXTRACTION         Family History   Problem Relation Age of Onset    Lupus Mother     Diabetes Father     COPD Maternal  Grandmother     Heart failure Maternal Grandmother     Substance Abuse Maternal Grandmother     Breast cancer Other      I have reviewed and agree with the history as documented.    E-Cigarette/Vaping    E-Cigarette Use Never User      E-Cigarette/Vaping Substances    Nicotine No     THC No     CBD No     Flavoring No     Other No     Unknown No      Social History     Tobacco Use    Smoking status: Never    Smokeless tobacco: Never   Vaping Use    Vaping status: Never Used   Substance Use Topics    Alcohol use: Yes     Comment: socially    Drug use: Never       Review of Systems   Constitutional:  Negative for chills and fever.   Respiratory:  Negative for cough, chest tightness and shortness of breath.    Gastrointestinal:  Negative for abdominal pain, diarrhea, nausea and vomiting.   Genitourinary:  Negative for dysuria, frequency, hematuria and urgency.   Musculoskeletal:  Negative for back pain, neck pain and neck stiffness.   All other systems reviewed and are negative.      Physical Exam  Physical Exam  Vitals and nursing note reviewed.   Constitutional:       General: She is not in acute distress.     Appearance: She is well-developed. She is not diaphoretic.   HENT:      Head: Normocephalic and atraumatic.   Eyes:      Conjunctiva/sclera: Conjunctivae normal.      Pupils: Pupils are equal, round, and reactive to light.   Cardiovascular:      Rate and Rhythm: Normal rate and regular rhythm.      Heart sounds: Normal heart sounds. No murmur heard.  Pulmonary:      Effort: Pulmonary effort is normal. No respiratory distress.      Breath sounds: Normal breath sounds.   Abdominal:      General: Bowel sounds are normal. There is no distension.      Palpations: Abdomen is soft.      Tenderness: There is no abdominal tenderness.   Musculoskeletal:         General: No deformity. Normal range of motion.      Cervical back: Normal range of motion and neck supple.   Skin:     General: Skin is warm and dry.       Capillary Refill: Capillary refill takes less than 2 seconds.      Coloration: Skin is not pale.      Findings: No rash.   Neurological:      General: No focal deficit present.      Mental Status: She is alert and oriented to person, place, and time.      Cranial Nerves: No cranial nerve deficit.   Psychiatric:         Behavior: Behavior normal.         Vital Signs  ED Triage Vitals   Temperature Pulse Respirations Blood Pressure SpO2   06/21/24 1914 06/21/24 1914 06/21/24 1914 06/21/24 1914 06/21/24 1914   98.1 °F (36.7 °C) 95 16 123/70 97 %      Temp Source Heart Rate Source Patient Position - Orthostatic VS BP Location FiO2 (%)   06/21/24 1914 06/21/24 1914 -- -- --   Temporal Monitor         Pain Score       06/21/24 1926       No Pain           Vitals:    06/21/24 1914   BP: 123/70   Pulse: 95         Visual Acuity      ED Medications  Medications   doxycycline hyclate (VIBRAMYCIN) capsule 100 mg (100 mg Oral Given 6/21/24 1956)       Diagnostic Studies  Results Reviewed       None                   No orders to display              Procedures  Procedures         ED Course                                             Medical Decision Making  24-year-old female positive for Ureaplasma.  Given patient's persistent symptoms, will start on a course of doxycycline.  She will call her OB/GYN on Monday for soonest follow-up appointment.    Risk  Prescription drug management.             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                   Follow-up Information       Follow up With Specialties Details Why Contact Info    Lakeisha Man DO Family Medicine Schedule an appointment as soon as possible for a visit in 2 days for follow up [address].  [address]  [facility] [id number] [phone]              Discharge Medication List as of 6/21/2024  7:50 PM        START taking these medications    Details   doxycycline hyclate (VIBRAMYCIN) 100 mg capsule Take 1 capsule (100 mg total) by mouth 2 (two) times a day for 7 days, Starting Fri 6/21/2024, Until Fri 6/28/2024, Normal           CONTINUE these medications which have NOT CHANGED    Details   bromocriptine (PARLODEL) 2.5 mg tablet Historical Med      !! Cyanocobalamin (VITAMIN B 12 PO) Take 500 mcg by mouth 2 (two) times a day, Historical Med      ferrous sulfate 325 (65 Fe) mg tablet Take 325 mg by mouth daily with breakfast, Historical Med      hydroxychloroquine (PLAQUENIL) 200 mg tablet Take 1.5 tablets (300 mg total) by mouth daily with breakfast, Starting Fri 2/23/2024, Until Fri 6/14/2024, Normal      Icosapent Ethyl 1 g CAPS 2 CAPSULES WITH MEALS ORALLY TWICE A DAY 60 DAYS, Historical Med      lidocaine (Lidoderm) 5 % Apply 1 patch topically over 12 hours daily Remove & Discard patch within 12 hours or as directed by MD, Starting Fri 3/22/2024, Normal      metroNIDAZOLE (FLAGYL) 500 mg tablet Take 1 tablet (500 mg total) by mouth every 12 (twelve) hours for 7 days No alcohol with medication., Starting Fri 6/14/2024, Until Fri 6/21/2024, Normal      olmesartan (BENICAR) 20 mg tablet TAKE 1 TABLET BY MOUTH EVERY DAY FOR 60 DAYS, Historical Med      omeprazole (PriLOSEC) 20 mg delayed release capsule Take 1 capsule (20 mg total) by mouth daily, Starting u 8/24/2023, Normal      ondansetron (Zofran ODT) 4 mg disintegrating tablet Take 1 tablet (4 mg total) by mouth every 6 (six) hours as needed for nausea or vomiting, Starting Fri 3/22/2024, Normal      phentermine  (ADIPEX-P) 37.5 MG tablet Take 37.5 mg by mouth in the morning, Starting Wed 1/17/2024, Historical Med      Repatha SureClick 140 MG/ML SOAJ INJECT 1 ML SUBCUTANEOUS EVERY 2 WEEKS 60 DAYS, Historical Med      sildenafil (VIAGRA) 25 MG tablet Take 25 mg by mouth daily as needed for erectile dysfunction, Historical Med      !! simvastatin (ZOCOR) 10 mg tablet Historical Med      !! simvastatin (ZOCOR) 20 mg tablet Take 20 mg by mouth every evening, Starting Tue 2/6/2024, Historical Med      topiramate (TOPAMAX) 100 mg tablet Take 100 mg by mouth daily, Starting Fri 12/15/2023, Historical Med      traZODone (DESYREL) 50 mg tablet Take 1 tablet by mouth daily at bedtime, Starting Tue 8/29/2023, Historical Med      venlafaxine (EFFEXOR-XR) 150 mg 24 hr capsule Take 150 mg by mouth daily, Starting Tue 1/23/2024, Historical Med      !! vitamin B-12 (VITAMIN B-12) 1,000 mcg tablet Take 2,000 mcg by mouth daily, Starting Wed 9/13/2023, Historical Med       !! - Potential duplicate medications found. Please discuss with provider.          No discharge procedures on file.    PDMP Review       None            ED Provider  Electronically Signed by             Frankie Rhoades DO  06/23/24 0031

## 2024-06-26 ENCOUNTER — HOSPITAL ENCOUNTER (OUTPATIENT)
Dept: RADIOLOGY | Facility: HOSPITAL | Age: 24
Discharge: HOME | End: 2024-06-26
Payer: OTHER GOVERNMENT

## 2024-06-26 DIAGNOSIS — B37.9 ANTIBIOTIC-INDUCED YEAST INFECTION: Primary | ICD-10-CM

## 2024-06-26 DIAGNOSIS — T36.95XA ANTIBIOTIC-INDUCED YEAST INFECTION: Primary | ICD-10-CM

## 2024-06-26 PROCEDURE — 73110 X-RAY EXAM OF WRIST: CPT

## 2024-06-26 RX ORDER — FLUCONAZOLE 150 MG/1
150 TABLET ORAL
Qty: 2 TABLET | Refills: 0 | Status: SHIPPED | OUTPATIENT
Start: 2024-06-26 | End: 2024-06-30

## 2024-07-01 ENCOUNTER — PATIENT MESSAGE (OUTPATIENT)
Dept: FAMILY MEDICINE CLINIC | Facility: CLINIC | Age: 24
End: 2024-07-01

## 2024-07-08 ENCOUNTER — PROCEDURE VISIT (OUTPATIENT)
Dept: OBGYN CLINIC | Facility: CLINIC | Age: 24
End: 2024-07-08
Payer: OTHER GOVERNMENT

## 2024-07-08 VITALS
SYSTOLIC BLOOD PRESSURE: 126 MMHG | DIASTOLIC BLOOD PRESSURE: 68 MMHG | BODY MASS INDEX: 27.23 KG/M2 | HEIGHT: 62 IN | WEIGHT: 148 LBS

## 2024-07-08 DIAGNOSIS — Z30.46 NEXPLANON REMOVAL: Primary | ICD-10-CM

## 2024-07-08 DIAGNOSIS — Z30.41 ENCOUNTER FOR SURVEILLANCE OF CONTRACEPTIVE PILLS: ICD-10-CM

## 2024-07-08 PROCEDURE — 99214 OFFICE O/P EST MOD 30 MIN: CPT | Performed by: OBSTETRICS & GYNECOLOGY

## 2024-07-08 PROCEDURE — 11982 REMOVE DRUG IMPLANT DEVICE: CPT | Performed by: OBSTETRICS & GYNECOLOGY

## 2024-07-08 RX ORDER — ACETAMINOPHEN AND CODEINE PHOSPHATE 120; 12 MG/5ML; MG/5ML
1 SOLUTION ORAL DAILY
Qty: 84 TABLET | Refills: 3 | Status: SHIPPED | OUTPATIENT
Start: 2024-07-08

## 2024-07-08 NOTE — PROGRESS NOTES
"Universal Protocol:  Consent: Verbal consent obtained. Written consent obtained.  Risks and benefits: risks, benefits and alternatives were discussed  Consent given by: patient  Time out: Immediately prior to procedure a \"time out\" was called to verify the correct patient, procedure, equipment, support staff and site/side marked as required.  Timeout called at: 7/8/2024 6:18 PM.  Patient understanding: patient states understanding of the procedure being performed  Patient consent: the patient's understanding of the procedure matches consent given  Procedure consent: procedure consent matches procedure scheduled  Relevant documents: relevant documents present and verified  Test results: test results available and properly labeled  Site marked: the operative site was marked  Radiology Images displayed and confirmed. If images not available, report reviewed: imaging studies not available  Required items: required blood products, implants, devices, and special equipment available  Patient identity confirmed: verbally with patient  Remove and insert drug implant    Date/Time: 7/8/2024 6:30 PM    Performed by: Imelda Corona MD  Authorized by: Imelda Corona MD    Indication:     Indication: Presence of non-biodegradable drug delivery implant    Pre-procedure:     Pre-procedure timeout performed: yes      Prepped with: chlorhexidine gluconate      Local anesthetic:  Lidocaine with epinephrine    The site was cleaned and prepped in a sterile fashion: yes    Procedure:     Procedure:  Removal    Small stab incision was made in arm: yes      Left/right:  Left    Preloaded contraceptive capsule trocar was placed subdermally: no      Visualization of implant was obtained: yes      Contraceptive capsule was inserted and trocar removed: no      Visualization of notch in stylet and palpation of device: no      Palpation confirms placement by provider and patient: yes      Site was closed with steri-strips and pressure bandage " applied: yes

## 2024-07-14 ENCOUNTER — HOSPITAL ENCOUNTER (EMERGENCY)
Facility: HOSPITAL | Age: 24
Discharge: HOME | End: 2024-07-14
Attending: EMERGENCY MEDICINE | Admitting: EMERGENCY MEDICINE
Payer: OTHER GOVERNMENT

## 2024-07-14 VITALS
DIASTOLIC BLOOD PRESSURE: 64 MMHG | RESPIRATION RATE: 18 BRPM | HEART RATE: 84 BPM | TEMPERATURE: 98 F | SYSTOLIC BLOOD PRESSURE: 117 MMHG | OXYGEN SATURATION: 100 %

## 2024-07-14 DIAGNOSIS — N76.0 VAGINITIS: Primary | ICD-10-CM

## 2024-07-14 DIAGNOSIS — N39.0 GROUP B STREPTOCOCCAL UTI: ICD-10-CM

## 2024-07-14 DIAGNOSIS — B95.1 GROUP B STREPTOCOCCAL UTI: ICD-10-CM

## 2024-07-14 LAB
BACTERIA UR QL AUTO: ABNORMAL /HPF
BILIRUB UR QL STRIP: NEGATIVE
CLARITY UR: ABNORMAL
COLOR UR: YELLOW
EXT PREGNANCY TEST URINE: NEGATIVE
EXT. CONTROL: NORMAL
GLUCOSE UR STRIP-MCNC: NEGATIVE MG/DL
HGB UR QL STRIP.AUTO: ABNORMAL
KETONES UR STRIP-MCNC: NEGATIVE MG/DL
LEUKOCYTE ESTERASE UR QL STRIP: ABNORMAL
MUCOUS THREADS UR QL AUTO: ABNORMAL
NITRITE UR QL STRIP: NEGATIVE
NON-SQ EPI CELLS URNS QL MICRO: ABNORMAL /HPF
PH UR STRIP.AUTO: 6.5 [PH]
PROT UR STRIP-MCNC: ABNORMAL MG/DL
RBC #/AREA URNS AUTO: ABNORMAL /HPF
SP GR UR STRIP.AUTO: >=1.03 (ref 1–1.03)
UROBILINOGEN UR STRIP-ACNC: <2 MG/DL
WBC #/AREA URNS AUTO: ABNORMAL /HPF

## 2024-07-14 PROCEDURE — 87109 MYCOPLASMA: CPT | Performed by: PHYSICIAN ASSISTANT

## 2024-07-14 PROCEDURE — 99283 EMERGENCY DEPT VISIT LOW MDM: CPT

## 2024-07-14 PROCEDURE — 81001 URINALYSIS AUTO W/SCOPE: CPT | Performed by: PHYSICIAN ASSISTANT

## 2024-07-14 PROCEDURE — 87086 URINE CULTURE/COLONY COUNT: CPT | Performed by: PHYSICIAN ASSISTANT

## 2024-07-14 PROCEDURE — 99284 EMERGENCY DEPT VISIT MOD MDM: CPT | Performed by: PHYSICIAN ASSISTANT

## 2024-07-14 PROCEDURE — 87070 CULTURE OTHR SPECIMN AEROBIC: CPT | Performed by: PHYSICIAN ASSISTANT

## 2024-07-14 PROCEDURE — 87591 N.GONORRHOEAE DNA AMP PROB: CPT | Performed by: PHYSICIAN ASSISTANT

## 2024-07-14 PROCEDURE — 87491 CHLMYD TRACH DNA AMP PROBE: CPT | Performed by: PHYSICIAN ASSISTANT

## 2024-07-14 PROCEDURE — 81025 URINE PREGNANCY TEST: CPT | Performed by: PHYSICIAN ASSISTANT

## 2024-07-14 PROCEDURE — 87147 CULTURE TYPE IMMUNOLOGIC: CPT | Performed by: PHYSICIAN ASSISTANT

## 2024-07-14 RX ORDER — FLUCONAZOLE 150 MG/1
150 TABLET ORAL ONCE
Qty: 2 TABLET | Refills: 0 | Status: SHIPPED | OUTPATIENT
Start: 2024-07-14 | End: 2024-07-14

## 2024-07-14 NOTE — ED PROVIDER NOTES
History  Chief Complaint   Patient presents with    Vaginal Itching     Patient states she is sure she has yeast, itching, spotting and is concerned one of her previous infections is coming back.     24-year-old female presenting today with vaginal discharge and vaginal itching over the past few days.  States that she has white clumpy discharge and long history of yeast infections.  Was recently treated with Flagyl as well as Doxy as she was positive for both bacterial vaginosis and Ureaplasma.  States that she has had the same partner however partner was not treated for Ureaplasma as he has not had symptoms.  She also recently came off of Nexplanon and was started on p.o. birth control.  Has had some vaginal spotting.  Patient states that symptoms most consistent with her yeast infections however would like to be rechecked.  No other discoloration to vaginal discharge or malodor.  Denies nausea, vomiting, abdominal pain, flank pain, other changes in urination etc.        Prior to Admission Medications   Prescriptions Last Dose Informant Patient Reported? Taking?   Cyanocobalamin (VITAMIN B 12 PO)  Self Yes No   Sig: Take 500 mcg by mouth 2 (two) times a day   Icosapent Ethyl 1 g CAPS  Self Yes No   Si CAPSULES WITH MEALS ORALLY TWICE A DAY 60 DAYS   Repatha SureClick 140 MG/ML SOAJ   Yes No   Sig: INJECT 1 ML SUBCUTANEOUS EVERY 2 WEEKS 60 DAYS   bromocriptine (PARLODEL) 2.5 mg tablet  Self Yes No   ferrous sulfate 325 (65 Fe) mg tablet  Self Yes No   Sig: Take 325 mg by mouth daily with breakfast   hydroxychloroquine (PLAQUENIL) 200 mg tablet  Self No No   Sig: Take 1.5 tablets (300 mg total) by mouth daily with breakfast   lidocaine (Lidoderm) 5 %  Self No No   Sig: Apply 1 patch topically over 12 hours daily Remove & Discard patch within 12 hours or as directed by MD   norethindrone (Ortho Micronor) 0.35 MG tablet   No No   Sig: Take 1 tablet (0.35 mg total) by mouth daily   omeprazole (PriLOSEC) 20 mg  delayed release capsule  Self No No   Sig: Take 1 capsule (20 mg total) by mouth daily   sildenafil (VIAGRA) 25 MG tablet  Self Yes No   Sig: Take 25 mg by mouth daily as needed for erectile dysfunction   simvastatin (ZOCOR) 10 mg tablet   Yes No   topiramate (TOPAMAX) 100 mg tablet  Self Yes No   Sig: Take 100 mg by mouth daily   traZODone (DESYREL) 50 mg tablet  Self Yes No   Sig: Take 1 tablet by mouth daily at bedtime   venlafaxine (EFFEXOR-XR) 150 mg 24 hr capsule  Self Yes No   Sig: Take 150 mg by mouth daily   vitamin B-12 (VITAMIN B-12) 1,000 mcg tablet  Self Yes No   Sig: Take 2,000 mcg by mouth daily      Facility-Administered Medications: None       Past Medical History:   Diagnosis Date    Bowel obstruction (HCC)     Clostridium difficile infection         Crohn's disease (HCC)     Depression     Hypertension     Kidney disease, chronic, stage I (GFR over 89 ml/min)     Lupus (HCC)     PTSD (post-traumatic stress disorder)     Raynaud disease        Past Surgical History:   Procedure Laterality Date    ABDOMINAL SURGERY      bowel obstruction     SECTION N/A     COLON SURGERY      IR BIOPSY KIDNEY RANDOM  3/6/2024    WISDOM TOOTH EXTRACTION         Family History   Problem Relation Age of Onset    Lupus Mother     Diabetes Father     COPD Maternal Grandmother     Heart failure Maternal Grandmother     Substance Abuse Maternal Grandmother     Breast cancer Other      I have reviewed and agree with the history as documented.    E-Cigarette/Vaping    E-Cigarette Use Never User      E-Cigarette/Vaping Substances    Nicotine No     THC No     CBD No     Flavoring No     Other No     Unknown No      Social History     Tobacco Use    Smoking status: Never    Smokeless tobacco: Never   Vaping Use    Vaping status: Never Used   Substance Use Topics    Alcohol use: Yes     Comment: socially    Drug use: Never       Review of Systems   Constitutional: Negative.  Negative for chills, fatigue and fever.    HENT: Negative.  Negative for congestion, postnasal drip, rhinorrhea and sore throat.    Eyes: Negative.    Respiratory: Negative.  Negative for cough, shortness of breath and wheezing.    Cardiovascular: Negative.    Gastrointestinal: Negative.  Negative for abdominal pain, diarrhea, nausea and vomiting.   Endocrine: Negative.    Genitourinary:  Positive for vaginal bleeding and vaginal discharge. Negative for decreased urine volume, difficulty urinating, dyspareunia, dysuria, enuresis, flank pain, frequency, genital sores, hematuria, menstrual problem, pelvic pain, urgency and vaginal pain.   Musculoskeletal: Negative.    Skin: Negative.    Neurological: Negative.    Hematological: Negative.    Psychiatric/Behavioral: Negative.     All other systems reviewed and are negative.      Physical Exam  Physical Exam  Vitals and nursing note reviewed.   Constitutional:       Appearance: Normal appearance.   HENT:      Head: Normocephalic and atraumatic.      Right Ear: External ear normal.      Left Ear: External ear normal.      Nose: Nose normal.   Eyes:      Conjunctiva/sclera: Conjunctivae normal.   Cardiovascular:      Rate and Rhythm: Normal rate.   Pulmonary:      Effort: Pulmonary effort is normal.   Abdominal:      General: There is no distension.   Genitourinary:      Musculoskeletal:         General: No deformity. Normal range of motion.      Cervical back: Normal range of motion.   Skin:     General: Skin is dry.      Findings: No rash.   Neurological:      General: No focal deficit present.      Mental Status: She is alert and oriented to person, place, and time. Mental status is at baseline.   Psychiatric:         Mood and Affect: Mood normal.         Behavior: Behavior normal.         Thought Content: Thought content normal.         Judgment: Judgment normal.         Vital Signs  ED Triage Vitals [07/14/24 0934]   Temperature Pulse Respirations Blood Pressure SpO2   98 °F (36.7 °C) -- 18 117/64 --       Temp Source Heart Rate Source Patient Position - Orthostatic VS BP Location FiO2 (%)   Oral -- Sitting Right arm --      Pain Score       --           Vitals:    07/14/24 0934   BP: 117/64   Patient Position - Orthostatic VS: Sitting         Visual Acuity      ED Medications  Medications - No data to display    Diagnostic Studies  Results Reviewed       None                   No orders to display              Procedures  Procedures         ED Course  ED Course as of 07/14/24 1218   Sun Jul 14, 2024   1025 Waiting until all orders are in process until discharge    1052 Calling lab                                               Medical Decision Making  Thorough conversation with patient regarding her symptoms, she would like to be retested for multiple things.  Symptoms most consistent with yeast infection, will treat.  Given education regarding Ureaplasma as well as discussed with patient that partner may need to be treated as well, patient relays that this was recommended to her and her partner however he did not get treated as he was not symptomatic. Education given.     Patient is informed to return to the emergency department for worsening of symptoms and was given proper education regarding their diagnosis and symptoms. Otherwise the patient is informed to follow up with their primary care doctor or OBGYN for re-evaluation. The patient verbalizes understanding and agrees with above assessment and plan. All questions were answered.        Amount and/or Complexity of Data Reviewed  Labs: ordered.    Risk  Prescription drug management.                 Disposition  Final diagnoses:   None     ED Disposition       None          Follow-up Information    None         Patient's Medications   Discharge Prescriptions    No medications on file       No discharge procedures on file.    PDMP Review       None            ED Provider  Electronically Signed by             Ann Shabazz PA-C  07/14/24 1219     laboratory.  This test is intended as an aid in the diagnosis of chlamydial and gonococcal disease. This test has not been evaluated in patients younger than 14 years of age and is not recommended for evaluation of suspected sexual abuse. This assay is not intended to replace other exams or tests for diagnosis of urogenital infection by causative factors other than Chalmydia trachomatis (CT) and Neisseria gonorrhoeae (NG). Additional testing is recommended when the results do not correlate with clinical signs and symptoms.   Procedural Limitations  This assay has only been validated for use with male and female urine, clinician-instructed self-collected vaginal swab specimens, clinician-collected vaginal swab specimens, endocervical swab specimens collected in karri® PCR Media and cervical specimens collected in PreservCyt® Solution. Assay performance has not been validated for use with other collection media and/or specimen types.   Detection of C. trachomatis and N. gonorrhoeaea is dependent on the number of organisms present in the specimen. Detection may be affected by specimen collection methods, patient factors, stage of infection, infecting strains, and presence of polymerase/PCR inhibitors.   When CT is present at very high concentrations, the detection of NG present at concentrations near the limit of detection may be impacted.  The presence of mucus in endocervical specimens may lead to false negative results.  The presence of whole blood in urine and cervical specimens collected in PreservCyt Solution may lead to false negative and/or invalid test results.   Urine testing is recommended to be performed on first catch urine samples. The effects of other collection variables have not been evaluated at this time. The effects of vaginal discharge, tampon use, douching, and other collection variables have not been evaluated at this time.   This assay has not been evaluated with patients currently being treated  with antimicrobial agents active against CT or NG, or patients with a history of hysterectomy.     Urine Microscopic [[id number]]  (Abnormal) Collected: 07/14/24 1011    Lab Status: Final result Specimen: Urine, Clean Catch Updated: 07/14/24 1035     RBC, UA 0-1 /hpf      WBC, UA 2-4 /hpf      Epithelial Cells Moderate /hpf      Bacteria, UA Occasional /hpf      MUCUS THREADS Occasional    UA w Reflex to Microscopic w Reflex to Culture [[id number]]  (Abnormal) Collected: 07/14/24 1011    Lab Status: Final result Specimen: Urine, Clean Catch Updated: 07/14/24 1022     Color, UA Yellow     Clarity, UA Cloudy     Specific Gravity, UA >=1.030     pH, UA 6.5     Leukocytes, UA Moderate     Nitrite, UA Negative     Protein, UA 30 (1+) mg/dl      Glucose, UA Negative mg/dl      Ketones, UA Negative mg/dl      Urobilinogen, UA <2.0 mg/dl      Bilirubin, UA Negative     Occult Blood, UA Small    POCT pregnancy, urine [[id number]]  (Normal) Resulted: 07/14/24 1000    Lab Status: Final result Updated: 07/14/24 1000     EXT Preg Test, Ur Negative     Control Valid                   No orders to display              Procedures  Procedures         ED Course  ED Course as of 07/23/24 0830   Sun Jul 14, 2024   1025 Waiting until all orders are in process until discharge    1052 Calling lab                                               Medical Decision Making  Thorough conversation with patient regarding her symptoms, she would like to be retested for multiple things.  Symptoms most consistent with yeast infection, will treat.  Given education regarding Ureaplasma as well as discussed with patient that partner may need to be treated as well, patient relays that this was recommended to her and her partner however he did not get treated as he was not symptomatic. Education given.     Patient is informed to return to the emergency department for worsening of symptoms and was given proper education regarding their diagnosis and symptoms.  Otherwise the patient is informed to follow up with their primary care doctor or OBGYN for re-evaluation. The patient verbalizes understanding and agrees with above assessment and plan. All questions were answered.        Amount and/or Complexity of Data Reviewed  Labs: ordered.    Risk  Prescription drug management.                 Disposition  Final diagnoses:   Vaginitis   Group B streptococcal UTI     Time reflects when diagnosis was documented in both MDM as applicable and the Disposition within this note       Time User Action Codes Description Comment    7/14/2024 10:54 AM Ann Shabazz Add [N76.0] Vaginitis     7/15/2024  2:00 PM Marcy Cerda Add [N39.0,  B95.1] Group B streptococcal UTI           ED Disposition       ED Disposition   Discharge    Condition   Stable    Date/Time   Sun Jul 14, 2024 10:54 AM    Comment   Angela Lemus discharge to home/self care.                   Follow-up Information       Follow up With Specialties Details Why Contact Info Additional Information    [facility] Emergency Department Emergency Medicine Go to  If symptoms worsen, otherwise please follow up with your family doctor [address] [place] [id number]  [phone] Formerly [facility], [facility] Emergency Department, [address], [place], [address]            Discharge Medication List as of 7/14/2024 10:57 AM        START taking these medications    Details   fluconazole (DIFLUCAN) 150 mg tablet Take 1 tablet (150 mg total) by mouth once for 1 dose And for a repeat dose in 72 hours, Starting Sun 7/14/2024, Normal           CONTINUE these medications which have NOT CHANGED    Details   bromocriptine (PARLODEL) 2.5 mg tablet Historical Med      !! Cyanocobalamin (VITAMIN B 12 PO) Take 500 mcg by mouth 2 (two) times a day, Historical Med      ferrous sulfate 325 (65 Fe) mg tablet Take 325 mg by mouth daily with breakfast, Historical Med      hydroxychloroquine  (PLAQUENIL) 200 mg tablet Take 1.5 tablets (300 mg total) by mouth daily with breakfast, Starting Fri 2/23/2024, Until Fri 6/14/2024, Normal      Icosapent Ethyl 1 g CAPS 2 CAPSULES WITH MEALS ORALLY TWICE A DAY 60 DAYS, Historical Med      lidocaine (Lidoderm) 5 % Apply 1 patch topically over 12 hours daily Remove & Discard patch within 12 hours or as directed by MD, Starting Fri 3/22/2024, Normal      norethindrone (Ortho Micronor) 0.35 MG tablet Take 1 tablet (0.35 mg total) by mouth daily, Starting Mon 7/8/2024, Normal      omeprazole (PriLOSEC) 20 mg delayed release capsule Take 1 capsule (20 mg total) by mouth daily, Starting Thu 8/24/2023, Normal      Repatha SureClick 140 MG/ML SOAJ INJECT 1 ML SUBCUTANEOUS EVERY 2 WEEKS 60 DAYS, Historical Med      sildenafil (VIAGRA) 25 MG tablet Take 25 mg by mouth daily as needed for erectile dysfunction, Historical Med      simvastatin (ZOCOR) 10 mg tablet Historical Med      topiramate (TOPAMAX) 100 mg tablet Take 100 mg by mouth daily, Starting Fri 12/15/2023, Historical Med      traZODone (DESYREL) 50 mg tablet Take 1 tablet by mouth daily at bedtime, Starting Tue 8/29/2023, Historical Med      venlafaxine (EFFEXOR-XR) 150 mg 24 hr capsule Take 150 mg by mouth daily, Starting Tue 1/23/2024, Historical Med      !! vitamin B-12 (VITAMIN B-12) 1,000 mcg tablet Take 2,000 mcg by mouth daily, Starting Wed 9/13/2023, Historical Med       !! - Potential duplicate medications found. Please discuss with provider.          No discharge procedures on file.    PDMP Review       None            ED Provider  Electronically Signed by             Ann Shabazz PA-C  07/14/24 0749       Ann Shabazz PA-C  07/23/24 [id number]

## 2024-07-15 ENCOUNTER — APPOINTMENT (OUTPATIENT)
Dept: LAB | Facility: HOSPITAL | Age: 24
End: 2024-07-15
Payer: OTHER GOVERNMENT

## 2024-07-15 DIAGNOSIS — N18.1 CHRONIC KIDNEY DISEASE, STAGE I: ICD-10-CM

## 2024-07-15 DIAGNOSIS — Z13.29 SCREENING FOR THYROID DISORDER: ICD-10-CM

## 2024-07-15 DIAGNOSIS — E78.49 FAMILIAL COMBINED HYPERLIPIDEMIA: ICD-10-CM

## 2024-07-15 DIAGNOSIS — I10 HYPERTENSION, UNSPECIFIED TYPE: ICD-10-CM

## 2024-07-15 DIAGNOSIS — E53.8 VITAMIN B 12 DEFICIENCY: ICD-10-CM

## 2024-07-15 DIAGNOSIS — E78.5 HYPERLIPIDEMIA, UNSPECIFIED HYPERLIPIDEMIA TYPE: ICD-10-CM

## 2024-07-15 DIAGNOSIS — R79.89 ABNORMAL TSH: ICD-10-CM

## 2024-07-15 DIAGNOSIS — M25.50 ARTHRALGIA, UNSPECIFIED JOINT: ICD-10-CM

## 2024-07-15 LAB
ALBUMIN SERPL BCG-MCNC: 4.4 G/DL (ref 3.5–5)
ALP SERPL-CCNC: 62 U/L (ref 34–104)
ALT SERPL W P-5'-P-CCNC: 21 U/L (ref 7–52)
ANION GAP SERPL CALCULATED.3IONS-SCNC: 8 MMOL/L (ref 4–13)
AST SERPL W P-5'-P-CCNC: 18 U/L (ref 13–39)
BASOPHILS # BLD AUTO: 0.02 THOUSANDS/ÂΜL (ref 0–0.1)
BASOPHILS NFR BLD AUTO: 0 % (ref 0–1)
BILIRUB SERPL-MCNC: 0.3 MG/DL (ref 0.2–1)
BUN SERPL-MCNC: 15 MG/DL (ref 5–25)
C TRACH DNA SPEC QL NAA+PROBE: NEGATIVE
CALCIUM SERPL-MCNC: 9.3 MG/DL (ref 8.4–10.2)
CHLORIDE SERPL-SCNC: 106 MMOL/L (ref 96–108)
CHOLEST SERPL-MCNC: 141 MG/DL
CO2 SERPL-SCNC: 23 MMOL/L (ref 21–32)
CREAT SERPL-MCNC: 0.81 MG/DL (ref 0.6–1.3)
CRP SERPL QL: 4.1 MG/L
EOSINOPHIL # BLD AUTO: 0.1 THOUSAND/ÂΜL (ref 0–0.61)
EOSINOPHIL NFR BLD AUTO: 2 % (ref 0–6)
ERYTHROCYTE [DISTWIDTH] IN BLOOD BY AUTOMATED COUNT: 13.3 % (ref 11.6–15.1)
GFR SERPL CREATININE-BSD FRML MDRD: 101 ML/MIN/1.73SQ M
GLUCOSE SERPL-MCNC: 85 MG/DL (ref 65–140)
HCT VFR BLD AUTO: 41.2 % (ref 34.8–46.1)
HDLC SERPL-MCNC: 58 MG/DL
HGB BLD-MCNC: 12.7 G/DL (ref 11.5–15.4)
IMM GRANULOCYTES # BLD AUTO: 0.01 THOUSAND/UL (ref 0–0.2)
IMM GRANULOCYTES NFR BLD AUTO: 0 % (ref 0–2)
LDLC SERPL CALC-MCNC: 72 MG/DL (ref 0–100)
LYMPHOCYTES # BLD AUTO: 2.01 THOUSANDS/ÂΜL (ref 0.6–4.47)
LYMPHOCYTES NFR BLD AUTO: 38 % (ref 14–44)
MCH RBC QN AUTO: 26.9 PG (ref 26.8–34.3)
MCHC RBC AUTO-ENTMCNC: 30.8 G/DL (ref 31.4–37.4)
MCV RBC AUTO: 87 FL (ref 82–98)
MONOCYTES # BLD AUTO: 0.34 THOUSAND/ÂΜL (ref 0.17–1.22)
MONOCYTES NFR BLD AUTO: 6 % (ref 4–12)
N GONORRHOEA DNA SPEC QL NAA+PROBE: NEGATIVE
NEUTROPHILS # BLD AUTO: 2.8 THOUSANDS/ÂΜL (ref 1.85–7.62)
NEUTS SEG NFR BLD AUTO: 54 % (ref 43–75)
NONHDLC SERPL-MCNC: 83 MG/DL
NRBC BLD AUTO-RTO: 0 /100 WBCS
PLATELET # BLD AUTO: 225 THOUSANDS/UL (ref 149–390)
PMV BLD AUTO: 11.5 FL (ref 8.9–12.7)
POTASSIUM SERPL-SCNC: 3.6 MMOL/L (ref 3.5–5.3)
PROT SERPL-MCNC: 7.7 G/DL (ref 6.4–8.4)
RBC # BLD AUTO: 4.72 MILLION/UL (ref 3.81–5.12)
SODIUM SERPL-SCNC: 137 MMOL/L (ref 135–147)
T3FREE SERPL-MCNC: 3.31 PG/ML (ref 2.5–3.9)
T4 FREE SERPL-MCNC: 0.67 NG/DL (ref 0.61–1.12)
TRIGL SERPL-MCNC: 57 MG/DL
TSH SERPL DL<=0.05 MIU/L-ACNC: 1.06 UIU/ML (ref 0.45–4.5)
VIT B12 SERPL-MCNC: 605 PG/ML (ref 180–914)
WBC # BLD AUTO: 5.28 THOUSAND/UL (ref 4.31–10.16)

## 2024-07-15 PROCEDURE — 84439 ASSAY OF FREE THYROXINE: CPT

## 2024-07-15 PROCEDURE — 84443 ASSAY THYROID STIM HORMONE: CPT

## 2024-07-15 PROCEDURE — 85025 COMPLETE CBC W/AUTO DIFF WBC: CPT

## 2024-07-15 PROCEDURE — 82607 VITAMIN B-12: CPT

## 2024-07-15 PROCEDURE — 84481 FREE ASSAY (FT-3): CPT

## 2024-07-15 PROCEDURE — 80061 LIPID PANEL: CPT

## 2024-07-15 PROCEDURE — 83695 ASSAY OF LIPOPROTEIN(A): CPT

## 2024-07-15 PROCEDURE — 80053 COMPREHEN METABOLIC PANEL: CPT

## 2024-07-15 PROCEDURE — 36415 COLL VENOUS BLD VENIPUNCTURE: CPT

## 2024-07-15 PROCEDURE — 86140 C-REACTIVE PROTEIN: CPT

## 2024-07-15 RX ORDER — CEPHALEXIN 500 MG/1
500 CAPSULE ORAL EVERY 12 HOURS SCHEDULED
Qty: 10 CAPSULE | Refills: 0 | Status: SHIPPED | OUTPATIENT
Start: 2024-07-15 | End: 2024-07-20

## 2024-07-16 LAB — BACTERIA UR CULT: ABNORMAL

## 2024-07-17 LAB — LPA SERPL-SCNC: 131.9 NMOL/L

## 2024-07-18 LAB
BACTERIA GENITAL AEROBE CULT: ABNORMAL
BACTERIA GENITAL AEROBE CULT: ABNORMAL

## 2024-07-21 LAB
M HOMINIS SPEC QL CULT: NEGATIVE
U UREALYTICUM SPEC QL CULT: NEGATIVE

## 2024-07-30 ENCOUNTER — OFFICE VISIT (OUTPATIENT)
Dept: RHEUMATOLOGY | Facility: CLINIC | Age: 24
End: 2024-07-30
Payer: OTHER GOVERNMENT

## 2024-07-30 ENCOUNTER — HOSPITAL ENCOUNTER (EMERGENCY)
Facility: HOSPITAL | Age: 24
Discharge: HOME | End: 2024-07-30
Attending: EMERGENCY MEDICINE
Payer: OTHER GOVERNMENT

## 2024-07-30 VITALS
SYSTOLIC BLOOD PRESSURE: 130 MMHG | DIASTOLIC BLOOD PRESSURE: 80 MMHG | OXYGEN SATURATION: 100 % | BODY MASS INDEX: 26.31 KG/M2 | HEIGHT: 62 IN | WEIGHT: 143 LBS | HEART RATE: 98 BPM

## 2024-07-30 VITALS
HEART RATE: 97 BPM | SYSTOLIC BLOOD PRESSURE: 137 MMHG | TEMPERATURE: 98.5 F | OXYGEN SATURATION: 100 % | DIASTOLIC BLOOD PRESSURE: 80 MMHG | RESPIRATION RATE: 19 BRPM

## 2024-07-30 DIAGNOSIS — M25.531 RIGHT WRIST PAIN: ICD-10-CM

## 2024-07-30 DIAGNOSIS — R76.8 POSITIVE DOUBLE STRANDED DNA ANTIBODY TEST: ICD-10-CM

## 2024-07-30 DIAGNOSIS — M25.50 POLYARTHRALGIA: ICD-10-CM

## 2024-07-30 DIAGNOSIS — R76.8 ANTI-RNP ANTIBODIES PRESENT: ICD-10-CM

## 2024-07-30 DIAGNOSIS — I73.00 RAYNAUD'S DISEASE WITHOUT GANGRENE: ICD-10-CM

## 2024-07-30 DIAGNOSIS — M35.1 MCTD (MIXED CONNECTIVE TISSUE DISEASE) (HCC): Primary | ICD-10-CM

## 2024-07-30 DIAGNOSIS — G89.29 CHRONIC BILATERAL LOW BACK PAIN WITHOUT SCIATICA: ICD-10-CM

## 2024-07-30 DIAGNOSIS — Z79.899 LONG-TERM USE OF PLAQUENIL: ICD-10-CM

## 2024-07-30 DIAGNOSIS — M32.9 SYSTEMIC LUPUS ERYTHEMATOSUS, UNSPECIFIED SLE TYPE, UNSPECIFIED ORGAN INVOLVEMENT STATUS (HCC): ICD-10-CM

## 2024-07-30 DIAGNOSIS — M25.531 RIGHT WRIST PAIN: Primary | ICD-10-CM

## 2024-07-30 DIAGNOSIS — M54.50 CHRONIC BILATERAL LOW BACK PAIN WITHOUT SCIATICA: ICD-10-CM

## 2024-07-30 PROCEDURE — 99284 EMERGENCY DEPT VISIT MOD MDM: CPT | Performed by: EMERGENCY MEDICINE

## 2024-07-30 PROCEDURE — 99283 EMERGENCY DEPT VISIT LOW MDM: CPT

## 2024-07-30 PROCEDURE — 99214 OFFICE O/P EST MOD 30 MIN: CPT | Performed by: PHYSICIAN ASSISTANT

## 2024-07-30 RX ORDER — ACETAMINOPHEN 325 MG/1
650 TABLET ORAL ONCE
Status: COMPLETED | OUTPATIENT
Start: 2024-07-30 | End: 2024-07-30

## 2024-07-30 RX ORDER — NAPROXEN 500 MG/1
500 TABLET ORAL ONCE
Status: COMPLETED | OUTPATIENT
Start: 2024-07-30 | End: 2024-07-30

## 2024-07-30 RX ORDER — NAPROXEN 500 MG/1
500 TABLET ORAL 2 TIMES DAILY WITH MEALS
Qty: 14 TABLET | Refills: 0 | Status: SHIPPED | OUTPATIENT
Start: 2024-07-30 | End: 2024-08-13

## 2024-07-30 RX ORDER — CLINDAMYCIN HYDROCHLORIDE 300 MG/1
CAPSULE ORAL
COMMUNITY
Start: 2024-07-25

## 2024-07-30 RX ORDER — BELIMUMAB 120 MG/1.5ML
INJECTION, POWDER, LYOPHILIZED, FOR SOLUTION INTRAVENOUS
COMMUNITY
Start: 2024-07-29

## 2024-07-30 RX ORDER — METHYLPREDNISOLONE 4 MG/1
TABLET ORAL
Qty: 21 TABLET | Refills: 0 | Status: SHIPPED | OUTPATIENT
Start: 2024-07-30

## 2024-07-30 RX ORDER — TOPIRAMATE 50 MG/1
TABLET, FILM COATED ORAL
COMMUNITY
Start: 2024-07-28

## 2024-07-30 RX ADMIN — NAPROXEN 500 MG: 500 TABLET ORAL at 12:05

## 2024-07-30 RX ADMIN — ACETAMINOPHEN 650 MG: 325 TABLET, FILM COATED ORAL at 12:05

## 2024-07-30 NOTE — ED ATTENDING ATTESTATION
7/30/2024  I, Thomas Brower MD, saw and evaluated the patient. I have discussed the patient with the resident/non-physician practitioner and agree with the resident's/non-physician practitioner's findings, Plan of Care, and MDM as documented in the resident's/non-physician practitioner's note, except where noted. All available labs and Radiology studies were reviewed.  I was present for key portions of any procedure(s) performed by the resident/non-physician practitioner and I was immediately available to provide assistance.       At this point I agree with the current assessment done in the Emergency Department.  I have conducted an independent evaluation of this patient a history and physical is as follows:    ED Course     24-year-old female, history of lupus on immunomodulators, presenting to the emergency department for evaluation of pain in the right wrist with a lancinating pain that originates in the right wrist and radiates up the right forearm.  No known trauma.  No fever.  Patient states she has had pain in the wrist previously but never so severe.    On examination the right wrist is unremarkable in its external appearance.  There is no significant edema.  The joint is not warm.  There is no erythema.  Pulses are intact.  Good cap refill in all 5 fingers.  Sensation in all 5 fingers.  No evidence of tenosynovitis.  Forearm compartment is soft.  2+ radial pulse.  Patient is able to range the wrist fully but with some discomfort with active and passive range of motion.    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  Patient will be treated with nonsteroidal anti-inflammatory medications.    Critical Care Time  Procedures

## 2024-07-30 NOTE — PROGRESS NOTES
Assessment and Plan:   Ms. Lemus is a 24-year-old female with past medical history significant for systemic lupus erythematosus, Raynaud's disease, hx bowel obstruction with ileocecal resection pathology (2022) suggestive of Crohn's, RUSLAN, and MDD who presents for rheumatology f/u of MCTD with primary manifestations of systemic lupus erythematosus and LN.     Angela was initially diagnosed with systemic lupus while hospitalized giving birth to her child in 2021.  She experienced features of facial swelling and severe arthralgias as well as preeclampsia.  She has followed with at least 2 rheumatologists thus far (1 while in the  and another in [place]) and has been on medications including HCQ, azathioprine (discontinued due to elevated LFTs), and Benlysta subcutaneous injections (discontinued due to ineffectiveness).  There were plans to begin Saphnelo, but then she transferred care.  She reports that she was also given a diagnosis of discoid lupus due to rashes that have appeared on her legs.  Most recent labs from January 2024 revealed ARON 1:1280 in a speckled pattern, dsDNA > 10, +RNP ab, CRP 15.9, 2+ protein, large blood, eGFR 80. Since her last visit with rheumatology approximately 6 months ago, she has remained on hydroxychloroquine 400 mg daily, but her symptoms have worsened.  She presently has features of severe fatigue, arthralgias, Raynaud's phenomenon (although well-controlled), and dry eye.     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 Tubular atrophy and interstitial fibrosis, mild to moderate, with foci of endocrine-type tubular atrophy. Arteriosclerosis, mild.  Recent SLE activity labs reveal rising dsDNA and she continues with fatigue, arthralgias. Recently started on Rhapatha and  received dose increase of ARB.    Today, she presents with persistent right wrist pain.  X-rays have been unremarkable for any acute or chronic changes and she has had minimal relief with topical lidocaine or OTC meds.  She was seen in the emergency department earlier today and provided a wrist splint which has provided some small amount of relief.  This does not appear to be an inflammatory monoarthritis and therefore low suspicion that this is related to her history of lupus.  I recommend that she trial a Medrol Dosepak and follow-up with orthopedic hand/wrist specialist for further evaluation and guidance.    Continue routine follow-up with nephrology and also let them know that she is hoping to become pregnant in the near future.  Changes may need to be made in regards to her regimen with Benlysta and she understands that this medication should be stopped once she becomes pregnant.    Continue lupus activity labs as ordered by nephrology.      Follow-up in 4 months    Plan:  Diagnoses and all orders for this visit:    MCTD (mixed connective tissue disease) (HCC)    Systemic lupus erythematosus, unspecified SLE type, unspecified organ involvement status ([facility])    Anti-RNP antibodies present    Right wrist pain  -     Ambulatory Referral to Orthopedic Surgery; Future  -     methylPREDNISolone 4 MG tablet therapy pack; Use as directed on package    Positive double stranded DNA antibody test    Long-term use of Plaquenil    Raynaud's disease without gangrene    Chronic bilateral low back pain without sciatica    Polyarthralgia    Other orders  -     Benlysta 120 MG  -     clindamycin (CLEOCIN) 300 MG capsule; take 1 capsule by mouth twice a day for 7 days  -     topiramate (TOPAMAX) 50 MG tablet; 1 TABLET AT NIGHT ORALLY ONCE A DAY (NIGHTLY) 60 DAYS        I have personally reviewed prior notes, recent laboratory results, and pertinent films in PACS.     Activities as tolerated.   Exercise: try to maintain a low  impact exercise regimen as much as possible. Walk for 30 minutes a day for at least 3 days a week.   Continue other medications as prescribed by PCP and other specialists.       RTC in 4 mo    Rheumatic Disease Summary:  MCTD / SLE with RP (+dsDNA, histone, RNP)  ?discoid lupus  -Initial visit 2/23/24: presents for eval of SLE. Dx'd with SLE while hospitalized giving birth to her child in 2021.  She experienced features of facial swelling and severe arthralgias as well as preeclampsia.  She has followed with at least 2 rheumatologists thus far (1 while in the  and another in [place]) and has been on medications including HCQ, AZA (discontinued due to elevated LFTs), and Benlysta subcutaneous injections (discontinued due to ineffectiveness after < 2 mo).  There were plans to begin Saphnelo, but then she transferred care.  She reports that she was also given a diagnosis of discoid lupus due to rashes that have appeared on her legs.  Most recent labs from January 2024 revealed ARON 1:1280 in a speckled pattern, dsDNA > 10, +RNP ab, CRP 15.9, 2+ protein, large blood, eGFR 80. Since her last visit with rheumatology approximately 6 months ago, she has remained on  mg daily, but her symptoms have worsened.  She presently has features of severe fatigue, arthralgias, Raynaud's phenomenon (although well-controlled), and dry eye. Decrease HCQ to 300 QD due to wt based dosing. Await renal bx to determine further tx.  -Labs 2/2024: Dsdna 29, histone 1.2, rnp 1.4  -Visit 4/5/24: Cont HCQ, will d/w nephrology re: Benlysta infusions vs MMF vs Rituximab  2. Lupus nephritis  -Follows with Dr. 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 Tubular atrophy and interstitial fibrosis, mild to moderate, with foci of endocrine-type  tubular atrophy. Arteriosclerosis, mild.    3. Other comorbidities:  hx bowel obstruction with ileocecal resection pathology (2022) suggestive of Crohn's, RUSLAN, and MDD        HPI  Ms. Lemus is a 24-year-old female with past medical history significant for systemic lupus erythematosus, Raynaud's disease, hx bowel obstruction with ileocecal resection pathology (2022) suggestive of Crohn's, RUSLAN, and MDD who presents for rheumatology f/u of MCTD with primary manifestations of systemic lupus erythematosus and LN.    States that she and her partner are having unprotected intercourse at this time as she wishes to become pregnant.   Has not yet made nephrology aware of this.    Complains of worsening right wrist pain which is present throughout the day and does not necessarily worse at any specific time of day.  She does notice it with activity.  She states she has tried lidocaine patches which did not help in the response that was provided at the emergency department this morning provides some relief.  There has been no swelling, heat, redness of the joint.    The following portions of the patient's history were reviewed and updated as appropriate: allergies, current medications, past family history, past medical history, past social history, past surgical history and problem list.      Review of Systems  Constitutional: +fatigue, night sweats. Negative for weight change, fevers, chills.  ENT/Mouth: Negative for hearing changes, ear pain, nasal congestion, sinus pain, hoarseness, sore throat, rhinorrhea, swallowing difficulty.   Eyes: Negative for pain, redness, discharge, vision changes.   Cardiovascular: Negative for chest pain, SOB, palpitations.   Respiratory: Negative for cough, sputum, wheezing, dyspnea.   Gastrointestinal: +constipation, GERD. Negative for nausea, vomiting, diarrhea  Genitourinary: Negative for dysuria, urinary frequency, hematuria.   Musculoskeletal: As per HPI.  Skin: +skin rash, color  changes.   Neuro: Negative for weakness, numbness, tingling, loss of consciousness.   Psych: +MDD, PTSD  Heme/Lymph: Negative for easy bruising, bleeding, lymphadenopathy.      Past Medical History:   Diagnosis Date    Bowel obstruction (HCC)     Clostridium difficile infection         Crohn's disease (HCC)     Depression     Hypertension     Kidney disease, chronic, stage I (GFR over 89 ml/min)     Lupus (HCC)     PTSD (post-traumatic stress disorder)     Raynaud disease        Past Surgical History:   Procedure Laterality Date    ABDOMINAL SURGERY      bowel obstruction     SECTION N/A     COLON SURGERY      IR BIOPSY KIDNEY RANDOM  3/6/2024    WISDOM TOOTH EXTRACTION         Social History     Socioeconomic History    Marital status:      Spouse name: Not on file    Number of children: Not on file    Years of education: Not on file    Highest education level: Not on file   Occupational History    Not on file   Tobacco Use    Smoking status: Never    Smokeless tobacco: Never   Vaping Use    Vaping status: Never Used   Substance and Sexual Activity    Alcohol use: Yes     Comment: socially    Drug use: Never    Sexual activity: Yes     Partners: Male     Birth control/protection: None   Other Topics Concern    Not on file   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     Social Determinants of Health     Financial Resource Strain: Not on file   Food Insecurity: Not on file   Transportation Needs: Not on file   Physical Activity: Not on file   Stress: Not on file   Social Connections: Not on file   Intimate Partner Violence: Not on file   Housing Stability: Not on file       Family History   Problem Relation Age of Onset    Lupus Mother     Diabetes Father     COPD Maternal Grandmother     Heart failure Maternal Grandmother     Substance Abuse Maternal Grandmother     Breast cancer Other        No Known Allergies      Current Outpatient Medications:     Benlysta 120 MG, , Disp: , Rfl:      clindamycin (CLEOCIN) 300 MG capsule, take 1 capsule by mouth twice a day for 7 days, Disp: , Rfl:     methylPREDNISolone 4 MG tablet therapy pack, Use as directed on package, Disp: 21 tablet, Rfl: 0    topiramate (TOPAMAX) 50 MG tablet, 1 TABLET AT NIGHT ORALLY ONCE A DAY (NIGHTLY) 60 DAYS, Disp: , Rfl:     bromocriptine (PARLODEL) 2.5 mg tablet, , Disp: , Rfl:     Cyanocobalamin (VITAMIN B 12 PO), Take 500 mcg by mouth 2 (two) times a day, Disp: , Rfl:     ferrous sulfate 325 (65 Fe) mg tablet, Take 325 mg by mouth daily with breakfast, Disp: , Rfl:     hydroxychloroquine (PLAQUENIL) 200 mg tablet, Take 1.5 tablets (300 mg total) by mouth daily with breakfast, Disp: 135 tablet, Rfl: 3    Icosapent Ethyl 1 g CAPS, 2 CAPSULES WITH MEALS ORALLY TWICE A DAY 60 DAYS, Disp: , Rfl:     lidocaine (Lidoderm) 5 %, Apply 1 patch topically over 12 hours daily Remove & Discard patch within 12 hours or as directed by MD, Disp: 30 patch, Rfl: 0    naproxen (Naprosyn) 500 mg tablet, Take 1 tablet (500 mg total) by mouth 2 (two) times a day with meals for 7 days, Disp: 14 tablet, Rfl: 0    norethindrone (Ortho Micronor) 0.35 MG tablet, Take 1 tablet (0.35 mg total) by mouth daily, Disp: 84 tablet, Rfl: 3    omeprazole (PriLOSEC) 20 mg delayed release capsule, Take 1 capsule (20 mg total) by mouth daily, Disp: 30 capsule, Rfl: 3    Repatha SureClick 140 MG/ML SOAJ, INJECT 1 ML SUBCUTANEOUS EVERY 2 WEEKS 60 DAYS, Disp: , Rfl:     sildenafil (VIAGRA) 25 MG tablet, Take 25 mg by mouth daily as needed for erectile dysfunction, Disp: , Rfl:     simvastatin (ZOCOR) 10 mg tablet, , Disp: , Rfl:     traZODone (DESYREL) 50 mg tablet, Take 1 tablet by mouth daily at bedtime, Disp: , Rfl:     venlafaxine (EFFEXOR-XR) 150 mg 24 hr capsule, Take 150 mg by mouth daily, Disp: , Rfl:     vitamin B-12 (VITAMIN B-12) 1,000 mcg tablet, Take 2,000 mcg by mouth daily, Disp: , Rfl:   No current facility-administered medications for this  "visit.      Objective:    Vitals:    07/30/24 1516   BP: 130/80   Pulse: 98   SpO2: 100%   Weight: 64.9 kg (143 lb)   Height: 5' 2\" (1.575 m)       Physical Exam  General: Well appearing, well nourished, in no acute distress. Oriented x 3, normal mood and affect.  Ambulating without difficulty.  Skin: Good turgor, no rash, unusual bruising or prominent lesions.  Hair: Normal texture and distribution.  Nails: Normal color, no deformities.  HEENT:  Head: Normocephalic, atraumatic.  Eyes: Conjunctiva clear, sclera non-icteric, EOM intact.  Nose: No external lesions, mucosa non-inflamed.  Mouth: Mucous membranes moist, no mucosal lesions.  Neck: Supple   Musculoskeletal:   Wearing wrist splint on right wrist  No tenderness to palpation or swelling of joints bilaterally to include: shoulder, elbow, MCP I-V, PIP I-V, knee, ankle  Neurologic: Alert and oriented. No focal neurological deficits appreciated.   Psychiatric: Normal mood and affect.       Terence Pacheco PA-C  Rheumatology  "

## 2024-07-30 NOTE — ED PROVIDER NOTES
History  Chief Complaint   Patient presents with    Arm Pain     Patient has SLE and started having really bad muscle spasms in her right forearm and wrist yesterday. Pain is constant in right wrist. No fall or injury noted by patient.     Patient is a 24-year-old female with pertinent past medical history of SLE who presents for evaluation of right wrist pain.  Patient states she woke up from sleep this morning at approximately 2 AM with sudden onset severe right wrist pain.  She states the pain is sharp and radiates into her right forearm.  She is also experiencing tingling pain in her fingers. She states she has had similar pain in right wrist in the past, and occasional swelling which she associates with stress or heat exposure.  She has been taking Tylenol at home and applying lidocaine patches to the area without significant improvement. She also wrapped the wrist and a silk scarf in an attempt to make her own splint.  She denies any fever, chills, weakness, numbness, chest pain, shortness of breath, or other symptoms.  She states she is compliant with her SLE medications and follows regularly with her rheumatologist.                Prior to Admission Medications   Prescriptions Last Dose Informant Patient Reported? Taking?   Cyanocobalamin (VITAMIN B 12 PO)  Self Yes No   Sig: Take 500 mcg by mouth 2 (two) times a day   Icosapent Ethyl 1 g CAPS  Self Yes No   Si CAPSULES WITH MEALS ORALLY TWICE A DAY 60 DAYS   Repatha SureClick 140 MG/ML SOAJ   Yes No   Sig: INJECT 1 ML SUBCUTANEOUS EVERY 2 WEEKS 60 DAYS   bromocriptine (PARLODEL) 2.5 mg tablet  Self Yes No   ferrous sulfate 325 (65 Fe) mg tablet  Self Yes No   Sig: Take 325 mg by mouth daily with breakfast   hydroxychloroquine (PLAQUENIL) 200 mg tablet  Self No No   Sig: Take 1.5 tablets (300 mg total) by mouth daily with breakfast   lidocaine (Lidoderm) 5 %  Self No No   Sig: Apply 1 patch topically over 12 hours daily Remove & Discard patch within 12  hours or as directed by MD   norethindrone (Ortho Micronor) 0.35 MG tablet   No No   Sig: Take 1 tablet (0.35 mg total) by mouth daily   omeprazole (PriLOSEC) 20 mg delayed release capsule  Self No No   Sig: Take 1 capsule (20 mg total) by mouth daily   sildenafil (VIAGRA) 25 MG tablet  Self Yes No   Sig: Take 25 mg by mouth daily as needed for erectile dysfunction   simvastatin (ZOCOR) 10 mg tablet   Yes No   traZODone (DESYREL) 50 mg tablet  Self Yes No   Sig: Take 1 tablet by mouth daily at bedtime   venlafaxine (EFFEXOR-XR) 150 mg 24 hr capsule  Self Yes No   Sig: Take 150 mg by mouth daily   vitamin B-12 (VITAMIN B-12) 1,000 mcg tablet  Self Yes No   Sig: Take 2,000 mcg by mouth daily      Facility-Administered Medications: None       Past Medical History:   Diagnosis Date    Bowel obstruction (HCC)     Clostridium difficile infection         Crohn's disease (HCC)     Depression     Hypertension     Kidney disease, chronic, stage I (GFR over 89 ml/min)     Lupus (HCC)     PTSD (post-traumatic stress disorder)     Raynaud disease        Past Surgical History:   Procedure Laterality Date    ABDOMINAL SURGERY      bowel obstruction     SECTION N/A     COLON SURGERY      IR BIOPSY KIDNEY RANDOM  3/6/2024    WISDOM TOOTH EXTRACTION         Family History   Problem Relation Age of Onset    Lupus Mother     Diabetes Father     COPD Maternal Grandmother     Heart failure Maternal Grandmother     Substance Abuse Maternal Grandmother     Breast cancer Other      I have reviewed and agree with the history as documented.    E-Cigarette/Vaping    E-Cigarette Use Never User      E-Cigarette/Vaping Substances    Nicotine No     THC No     CBD No     Flavoring No     Other No     Unknown No      Social History     Tobacco Use    Smoking status: Never    Smokeless tobacco: Never   Vaping Use    Vaping status: Never Used   Substance Use Topics    Alcohol use: Yes     Comment: socially    Drug use: Never        Review  of Systems   All other systems reviewed and are negative.      Physical Exam  ED Triage Vitals [07/30/24 0956]   Temperature Pulse Respirations Blood Pressure SpO2   98.5 °F (36.9 °C) 97 19 137/80 100 %      Temp Source Heart Rate Source Patient Position - Orthostatic VS BP Location FiO2 (%)   Temporal Monitor Sitting Left arm --      Pain Score       10 - Worst Possible Pain             Orthostatic Vital Signs  Vitals:    07/30/24 0956   BP: 137/80   Pulse: 97   Patient Position - Orthostatic VS: Sitting       Physical Exam  Constitutional:       Appearance: Normal appearance.   Eyes:      Extraocular Movements: Extraocular movements intact.      Conjunctiva/sclera: Conjunctivae normal.   Cardiovascular:      Rate and Rhythm: Normal rate.   Pulmonary:      Effort: Pulmonary effort is normal.   Musculoskeletal:         General: Swelling (dorsum of right wrist) and tenderness (right wrist joint) present. No deformity or signs of injury.      Cervical back: Normal range of motion and neck supple.      Comments: ROM limited by pain    Skin:     General: Skin is warm and dry.   Neurological:      General: No focal deficit present.      Mental Status: She is alert.      Sensory: No sensory deficit.      Motor: No weakness.   Psychiatric:         Mood and Affect: Mood normal.         Behavior: Behavior normal.         ED Medications  Medications   naproxen (NAPROSYN) tablet 500 mg (500 mg Oral Given 7/30/24 1205)   acetaminophen (TYLENOL) tablet 650 mg (650 mg Oral Given 7/30/24 1205)       Diagnostic Studies  Results Reviewed       None                   No orders to display         Procedures  Splint application    Date/Time: 7/30/2024 11:46 AM    Performed by: Argelia Lazaro MD  Authorized by: Argelia Lazaro MD  Universal Protocol:  Consent: Verbal consent obtained.  Risks and benefits: risks, benefits and alternatives were discussed  Consent given by: patient  Patient identity confirmed: arm  band    Pre-procedure details:     Sensation:  Normal    Skin color:  Well perfused  Procedure details:     Laterality:  Right    Location:  Wrist    Wrist:  R wrist    Splint type:  Wrist    Supplies used: Pro[employer] Quick Fit WTO Univerisal Right.  Post-procedure details:     Pain:  Improved    Sensation:  Normal    Skin color:  Well perfused    Patient tolerance of procedure:  Tolerated well, no immediate complications        ED Course                                       Medical Decision Making  Anglea Lemus is a 24 y.o. who presents with complaints of right wrist pain     Vital signs are stable, afebrile      Ddx: acute on chronic wrist pain vs. Joint pain associated with SLE   Doubt joint infection based on PE    Plan: will treat with course of NSAIDs  Recommend follow up with PCP and or rheum  Supportive care instructions provided    Disposition: Patient stable for discharge. Return precautions provided. Patient understands and is agreeable to plan.           Risk  OTC drugs.  Prescription drug management.          Disposition  Final diagnoses:   Right wrist pain     Time reflects when diagnosis was documented in both MDM as applicable and the Disposition within this note       Time User Action Codes Description Comment    7/30/2024 11:21 AM Argelia Lazaro [M25.531] Right wrist pain           ED Disposition       ED Disposition   Discharge    Condition   Stable    Date/Time   Tue Jul 30, 2024 11:21 AM    Comment   Angela Lemus discharge to home/self care.                   Follow-up Information       Follow up With Specialties Details Why Contact Info Additional Information    Lakeisha Man,  Family Medicine Schedule an appointment as soon as possible for a visit in 1 day for reevaluation of right wrist pain [address]  [address]  [facility] [id number] [phone]       [facility] Emergency Department Emergency Medicine Go to  If symptoms worsen [address]  [facility] [address]  [phone] [facility] Emergency Department, [address] [place], [place], [address]   [phone]            Discharge Medication List as of 7/30/2024 11:45 AM        START taking these medications    Details   naproxen (Naprosyn) 500 mg tablet Take 1 tablet (500 mg total) by mouth 2 (two) times a day with meals for 7 days, Starting Tue 7/30/2024, Until Tue 8/6/2024, Normal           CONTINUE these medications which have NOT CHANGED    Details   bromocriptine (PARLODEL) 2.5 mg tablet Historical Med      !! Cyanocobalamin (VITAMIN B 12 PO) Take 500 mcg by mouth 2 (two) times a day, Historical Med      ferrous sulfate 325 (65 Fe) mg tablet Take 325 mg by mouth daily with breakfast, Historical Med      hydroxychloroquine (PLAQUENIL) 200 mg tablet Take 1.5 tablets (300 mg total) by mouth daily with breakfast, Starting Fri 2/23/2024, Until Fri 6/14/2024, Normal      Icosapent Ethyl 1 g CAPS 2 CAPSULES WITH MEALS ORALLY TWICE A DAY 60 DAYS, Historical Med      lidocaine (Lidoderm) 5 % Apply 1 patch topically over 12 hours daily Remove & Discard patch within 12 hours or as directed by MD, Starting Fri 3/22/2024, Normal      norethindrone (Ortho Micronor) 0.35 MG tablet Take 1 tablet (0.35 mg total) by mouth daily, Starting Mon 7/8/2024, Normal      omeprazole (PriLOSEC) 20 mg delayed release capsule Take 1 capsule (20 mg total) by mouth daily, Starting Thu 8/24/2023, Normal      Repatha SureClick 140 MG/ML SOAJ INJECT 1 ML SUBCUTANEOUS EVERY 2 WEEKS 60 DAYS, Historical Med      sildenafil (VIAGRA) 25 MG tablet Take 25 mg by mouth daily as needed for erectile dysfunction, Historical Med      simvastatin (ZOCOR) 10 mg tablet Historical Med      traZODone (DESYREL) 50 mg tablet Take 1 tablet by mouth daily at bedtime, Starting Tue 8/29/2023, Historical Med      venlafaxine (EFFEXOR-XR) 150 mg 24 hr capsule Take 150 mg by mouth daily, Starting Tue  1/23/2024, Historical Med      !! vitamin B-12 (VITAMIN B-12) 1,000 mcg tablet Take 2,000 mcg by mouth daily, Starting Wed 9/13/2023, Historical Med      topiramate (TOPAMAX) 100 mg tablet Take 100 mg by mouth daily, Starting Fri 12/15/2023, Historical Med       !! - Potential duplicate medications found. Please discuss with provider.        No discharge procedures on file.    PDMP Review       None             ED Provider  Attending physically available and evaluated Angela Lemus. I managed the patient along with the ED Attending.    Electronically Signed by           Argelia Lazaro MD  07/31/24 3095

## 2024-07-31 ENCOUNTER — TELEPHONE (OUTPATIENT)
Dept: RHEUMATOLOGY | Facility: CLINIC | Age: 24
End: 2024-07-31

## 2024-07-31 NOTE — TELEPHONE ENCOUNTER
Please call patient to schedule 4 month follow-up visit with Dr. Hicks in [place], appointment was not made after her follow-up with me yesterday. Thanks!

## 2024-08-02 ENCOUNTER — TELEPHONE (OUTPATIENT)
Dept: OBGYN CLINIC | Facility: HOSPITAL | Age: 24
End: 2024-08-02

## 2024-08-02 NOTE — TELEPHONE ENCOUNTER
Patient is being referred to a orthopedics. Please schedule accordingly.    [facility], [place]   [phone]

## 2024-08-12 ENCOUNTER — TELEPHONE (OUTPATIENT)
Age: 24
End: 2024-08-12

## 2024-08-12 ENCOUNTER — NURSE TRIAGE (OUTPATIENT)
Age: 24
End: 2024-08-12

## 2024-08-12 ENCOUNTER — APPOINTMENT (OUTPATIENT)
Dept: LAB | Facility: HOSPITAL | Age: 24
End: 2024-08-12
Payer: OTHER GOVERNMENT

## 2024-08-12 DIAGNOSIS — Z32.01 POSITIVE PREGNANCY TEST: ICD-10-CM

## 2024-08-12 DIAGNOSIS — Z32.01 POSITIVE PREGNANCY TEST: Primary | ICD-10-CM

## 2024-08-12 LAB — B-HCG SERPL-ACNC: <0.6 MIU/ML (ref 0–5)

## 2024-08-12 PROCEDURE — 36415 COLL VENOUS BLD VENIPUNCTURE: CPT

## 2024-08-12 PROCEDURE — 84702 CHORIONIC GONADOTROPIN TEST: CPT

## 2024-08-12 NOTE — TELEPHONE ENCOUNTER
Regarding: pt called and said she pregnant and i scheduled and now asking about test ??  ----- Message from Nika SAEZ sent at 8/12/2024  3:44 PM EDT -----  Patient called and said she is pregnant and her lmp is 7/14 and I scheduled her and then at the end of call she said she took a digital pregnancy test and it was positive and then took a regular one the next day and said negative. She is asking if she should get labs and she goes to [facility].  Please call her back at [phone]. Thank you

## 2024-08-12 NOTE — TELEPHONE ENCOUNTER
Patient is calling in, LMP 7/14. She reports having a positive test 2 days ago and then a negative test the day after. She denies any bleeding currently. She would like to know if an HCG level can be ordered?

## 2024-08-12 NOTE — TELEPHONE ENCOUNTER
Pt calling as she received a positive pregnancy test over the weekend. She wanted to make an emergency visit to see a provider for her lupus. She originally sees [facility] but since she is leaving she would like to transfer her care to [facility] with Dr. Daly. Advised pt of one time provider switch and that she will need to stay with new provider from now on, pt showed understanding. Scheduled for tomorrow 8/13 as that was first available.

## 2024-08-12 NOTE — PROGRESS NOTES
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  She has had 2 doses of Benlysta via home infusion.  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  I did ask her to do a home pregnancy test if she does not get her menses, and follow-up with OB/GYN to confirm pregnancy.  If she is pregnant we will hold Benlysta.  Will refer for lupus Avise diagnostic to further evaluate status of disease.  She will continue with Plaquenil, and we will check her level to make sure she is therapeutic.  No evidence for phospholipid antibody syndrome by lab work.  There is a question about a 1 first trimester miscarriage.  Patient continues to have significant Raynaud's despite calcium channel blockers.  Will submit for sildenafil in view of efficacy.  Continue cold protection.  I asked her to have the nephrologist send me a copy of her office visit for my review.  Plan follow-up in 3 months or sooner if needed.  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  There was tubular atrophy and interstitial fibrosis mild to moderate with mild arterial sclerosis.  Patient was started on Repatha  and told to increase her ARB.  I have encouraged her to continue with Benlysta infusions every 4 weeks, particularly in view of its benefit for glomerulonephritis, however, if she is pregnant, we will have to hold Benlysta.  She has to follow-up with nephrologist.  I have asked her to have the nephrologist send a copy of her evaluation for my review.  Monitor disease activity and medication toxicity with lab work as ordered.  Will check Plaquenil level to make sure she is therapeutic.    Stage 1 chronic kidney disease  Lab Results   Component Value Date    EGFR 80 09/12/2024    EGFR 122 08/31/2024    EGFR 101 07/15/2024    CREATININE 0.98 09/12/2024    CREATININE 0.67 08/31/2024    CREATININE 0.81 07/15/2024   Renal function generally stage I with physiologic proteinuria on the most recent 24-hour urine protein.  Avoid nephrotoxic agents like nonsteroidals.  Patient will continue with Benlysta infusions if she is not pregnant.  Follow-up nephrology.  Monitor disease activity with double-stranded DNA antibodies and complement activation.  Continue to monitor.    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  I have referred her to dermatology for confirmation.  Continue Plaquenil as ordered.  Will check Plaquenil level to make sure she is therapeutic.  Continue to monitor    Raynaud's disease  Persistent Raynaud's with history of digital ulcerations despite calcium channel blockers.  Will submit for sildenafil in view of its excellent efficacy and safety profile.  Have discussed risk-benefit profile, indications, and administration with the patient in detail.  No current true ulcerations, but she does have some slight skin breakdown around a few of her distal giancarlo.  Continue to monitor closely.    Current moderate episode of major depressive disorder, unspecified whether recurrent (HCC)  Patient stopped her trazodone and Effexor as she thinks she may be pregnant.  I told her she could not just discontinue these  medications, but rather, they needed to be tapered in view of potential for withdrawal symptoms.  I asked her to contact the physician who prescribed these meds so that she could taper off with instructions.  Follow-up with primary care as scheduled.  Continue to monitor.    Long-term use of Plaquenil  Patient referred for Plaquenil Avise to monitor for therapeutic level.  Will adjust accordingly depending on results.  She can continue Plaquenil through pregnancy.  She will need yearly eye doctor follow-up while on Plaquenil.         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  She has had 2 doses of Benlysta via home infusion.  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  I did ask her to do a home pregnancy test if she does not get her menses, and follow-up with OB/GYN to confirm pregnancy.  If she is pregnant we will hold Benlysta.  Will refer for lupus Avise diagnostic to further evaluate status of disease.  She will continue with Plaquenil, and we will check her level to make sure she is therapeutic.  No evidence for phospholipid antibody syndrome by lab work.  There is a question about a 1 first trimester miscarriage.  Patient continues to have significant Raynaud's despite calcium channel blockers.  Will submit for sildenafil in view of efficacy.  Continue cold protection.  I asked her to have  the nephrologist send me a copy of her office visit for my review.  Plan follow-up in 3 months or sooner if needed.  Follow-up nephrology         Relevant Orders    Ambulatory Referral to Dermatology    MISCELLANEOUS LAB TEST (Completed)    MISCELLANEOUS LAB TEST (Completed)    C-reactive protein    CBC and differential    Comprehensive metabolic panel    dsDNA Antibody by IFA, Ernst kendall, with Reflex to Titer    C3 complement    C4 complement    Urinalysis with microscopic    Raynaud's disease     Persistent Raynaud's with history of digital ulcerations despite calcium channel blockers.  Will submit for sildenafil in view of its excellent efficacy and safety profile.  Have discussed risk-benefit profile, indications, and administration with the patient in detail.  No current true ulcerations, but she does have some slight skin breakdown around a few of her distal giancarlo.  Continue to monitor closely.         Relevant Medications    sildenafil (REVATIO) 20 mg tablet    Other Relevant Orders    MISCELLANEOUS LAB TEST (Completed)    Current moderate episode of major depressive disorder, unspecified whether recurrent (HCC)     Patient stopped her trazodone and Effexor as she thinks she may be pregnant.  I told her she could not just discontinue these medications, but rather, they needed to be tapered in view of potential for withdrawal symptoms.  I asked her to contact the physician who prescribed these meds so that she could taper off with instructions.  Follow-up with primary care as scheduled.  Continue to monitor.         Stage 1 chronic kidney disease     Lab Results   Component Value Date    EGFR 80 09/12/2024    EGFR 122 08/31/2024    EGFR 101 07/15/2024    CREATININE 0.98 09/12/2024    CREATININE 0.67 08/31/2024    CREATININE 0.81 07/15/2024   Renal function generally stage I with physiologic proteinuria on the most recent 24-hour urine protein.  Avoid nephrotoxic agents like nonsteroidals.  Patient will  continue with Benlysta infusions if she is not pregnant.  Follow-up nephrology.  Monitor disease activity with double-stranded DNA antibodies and complement activation.  Continue to monitor.         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  There was tubular atrophy and interstitial fibrosis mild to moderate with mild arterial sclerosis.  Patient was started on Repatha and told to increase her ARB.  I have encouraged her to continue with Benlysta infusions every 4 weeks, particularly in view of its benefit for glomerulonephritis, however, if she is pregnant, we will have to hold Benlysta.  She has to follow-up with nephrologist.  I have asked her to have the nephrologist send a copy of her evaluation for my review.  Monitor disease activity and medication toxicity with lab work as ordered.  Will check Plaquenil level to make sure she is therapeutic.         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  I have referred her to dermatology for confirmation.  Continue Plaquenil as ordered.  Will check Plaquenil level to make sure she is therapeutic.  Continue to monitor         Relevant Orders    Ambulatory Referral to Dermatology    Long-term use of Plaquenil     Patient referred for Plaquenil Avise to monitor for therapeutic level.  Will adjust accordingly depending on results.  She can continue Plaquenil through pregnancy.  She will need yearly eye doctor follow-up while on Plaquenil.                Reviewed records, labs, and imaging with the patient in detail.  Counseled patient.  Discussion regarding my findings and recommendations.  Office visit with documentation 50  min.    Subjective:      Patient ID: Angela Lemus is a 24 y.o. female.    Complicated medical history in this 24-year-old female, with a diagnosis of lupus made when she was hospitalized for childbirth in  presenting with facial swelling, arthralgias, and preeclampsia.  She was treated with hydroxychloroquine and Imuran.  She had to discontinue the Imuran due to elevated liver function studies.  She was started on Benlysta which she states that she was treated with home infusions for 2 months thus far. She was being considered for Saphnelo by a former rheumatologist.  She has seen several rheumatology providers.  Most recently she was seen by [facility] rheumatology physician assistant on 2024, at which time she was noted to have right wrist pain.  X-rays were negative.  She had been seen in the emergency room that day.  She was given a Medrol Dosepak by the physician assistant with plans for follow-up as scheduled.  Wrist pain has been chronic.  She did have uncontrolled hypertension with her first pregnancy and was diagnosed with preeclampsia.  She is  2 para 1 with questionable 1 first trimester miscarriage.  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  There was tubular atrophy and interstitial fibrosis mild to moderate with mild arterial sclerosis.  She was started on Repatha due to this and was told to increase her ARB.  Patient believes she may be pregnant and she stopped her antidepressant therapy yesterday due to concerns about taking the medicine if she was pregnant.  Patient has bright red blood per stool secondary to hemorrhoids.  She does have history of discoid lesions right thigh.  She has chronic Raynaud's with skin changes in her distal giancarlo  with history of digital ulcerations.  She has history of bowel obstruction with ileocecal resection.  Pathology was suggestive of Crohn's.  She did have an MRE in May of this year which did not show areas of active inflammation.  She has been treated with omeprazole 40 mg daily for GERD and she has had no recent reflux.  She does complain of bloating and occasional constipation.  She does note dry mouth and follows with her dentist every 6 months.  She has no significant morning stiffness and no other joint pain than right wrist pain.  She has had nonradicular low back pain.    Imaging  Right wrist x-ray dated 6/26/2024 unremarkable.  MRE abdomen pelvis dated 5/7/2024 significant for no imaging signs of active inflammation, stricture, perianal disease, penetrating complication.  There was mildly dilated stool in contrast-filled colon and terminal ileum which may suggest constipation/slow transit.  Left ovarian simple cyst measuring 3.2 cm noted.  CAT scan of the abdomen dated 1/2/2023 significant for no acute abdominopelvic pathology identified.  Ultrasound kidneys and bladder dated 10/28/2022 significant for unremarkable kidneys.  Limited evaluation of the underdistended bladder.  CAT scan of the abdomen and pelvis dated 7/1/2022 significant for segmental prominent areas of small intestinal dilatation most pronounced with wall thickening and fecalization in the right lower quadrant.  Areas of transition noted at the mesenteric root and right lower quadrant.  Differential diagnosis mention was small bowel obstruction and severe postoperative ileus.  Left colon is decompressed however fluid is noted within the right colon.  Prominence of the right intrahepatic biliary ducts noted without associated common bile duct dilatation or gallbladder abnormality.  Left abdominal wall fluid distention with a few scattered air foci likely related to postoperative changes however felt to be difficult to exclude infection.  Free  pelvic fluid with associated peritoneal wall enhancement without convincing organization.  No significant interval change in the left ovarian cyst.  MRI/MRA head dated 12/31/2021 significant for no acute vascular abnormality.  Normal appearing MRA of the [place].  No evidence for acute infarct, hemorrhage, mass or mass effect.  Prominent pituitary gland without evidence of mass lesion identified similar to prior MRI.    Pertinent laboratory results  Lab work dated 6/6/2024 significant for hemoglobin 11.5 with hematocrit 36.6 with normal indices.  Platelet count 251.  White blood cell count 5.11.  Creatinine 0.74 with estimated .  Calcium 9.5.  LFTs unremarkable.  Urinalysis trace protein.  1-2 WBCs and RBCs per high-power field.  Lab work dated 4/13/2024 significant for fecal calprotectin 28.  24-hour urine protein 77.25 mg however urine volume was low at 750.  Lab work dated 4/8/2024 significant for vitamin B12 936.  ANCA panel negative.  White blood cell count 4.90 with absolute neutrophils 2.85.  Estimated GFR 89.  Uric acid 4.0.  C3 and C4 complements normal.  Vitamin D low at 24.  C-reactive protein 2.3.  Lipoprotein a elevated at 109.7.  Albumin to creatinine ratio 8.  Lab work dated 3/14/2024 significant for protein S normal.  Protein C activity normal.  Protein S activity borderline low at 68.  Lupus anticoagulant negative.  Lab work dated 2/23/2024 significant for double-stranded DNA antibodies greater than 10 positive.  RNP antibodies positive with negative Sjogren's antibodies, Duarte antibodies, and Duarte/RNP antibodies.  ARON positive in a dilution of 640 in a speckled pattern.  Double-stranded DNA antibodies positive at 29.  ANCA panel negative.  Cardiolipin antibodies negative.  Beta-2 glycoprotein 1 antibodies negative.  Histone antibody borderline positive at 1.2.  C3 and C4 complements normal.  Hepatitis B and C panel nonreactive.  Rheumatoid factor and CCP negative.  QuantiFERON gold  negative.            Allergies  No Known Allergies    Home Medications    Current Outpatient Medications:     Benlysta 120 MG, , Disp: , Rfl:     bromocriptine (PARLODEL) 2.5 mg tablet, , Disp: , Rfl:     Cyanocobalamin (VITAMIN B 12 PO), Take 500 mcg by mouth 2 (two) times a day, Disp: , Rfl:     ferrous sulfate 325 (65 Fe) mg tablet, Take 325 mg by mouth daily with breakfast, Disp: , Rfl:     hydroxychloroquine (PLAQUENIL) 200 mg tablet, Take 1.5 tablets (300 mg total) by mouth daily with breakfast, Disp: 135 tablet, Rfl: 3    Icosapent Ethyl 1 g CAPS, 2 CAPSULES WITH MEALS ORALLY TWICE A DAY 60 DAYS (Patient not taking: Reported on 9/22/2024), Disp: , Rfl:     lidocaine (Lidoderm) 5 %, Apply 1 patch topically over 12 hours daily Remove & Discard patch within 12 hours or as directed by MD, Disp: 30 patch, Rfl: 0    omeprazole (PriLOSEC) 20 mg delayed release capsule, Take 1 capsule (20 mg total) by mouth daily (Patient not taking: Reported on 9/11/2024), Disp: 30 capsule, Rfl: 3    Repatha SureClick 140 MG/ML SOAJ, INJECT 1 ML SUBCUTANEOUS EVERY 2 WEEKS 60 DAYS, Disp: , Rfl:     sildenafil (REVATIO) 20 mg tablet, Take 1 tablet (20 mg total) by mouth 2 (two) times a day, Disp: 180 tablet, Rfl: 1    sildenafil (VIAGRA) 25 MG tablet, Take 25 mg by mouth daily as needed for erectile dysfunction (Patient not taking: Reported on 9/11/2024), Disp: , Rfl:     simvastatin (ZOCOR) 10 mg tablet, , Disp: , Rfl:     topiramate (TOPAMAX) 50 MG tablet, 1 TABLET AT NIGHT ORALLY ONCE A DAY (NIGHTLY) 60 DAYS, Disp: , Rfl:     traZODone (DESYREL) 50 mg tablet, Take 1 tablet by mouth daily at bedtime, Disp: , Rfl:     vitamin B-12 (VITAMIN B-12) 1,000 mcg tablet, Take 2,000 mcg by mouth daily (Patient not taking: Reported on 9/22/2024), Disp: , Rfl:     FLUoxetine (PROzac) 20 mg capsule, Take 20 mg by mouth daily, Disp: , Rfl:     NIFEdipine ER (ADALAT CC) 30 MG 24 hr tablet, Take 30 mg by mouth daily, Disp: , Rfl:     Saxenda  injection, Inject 1.8 mg under the skin daily, Disp: , Rfl:     Past Medical History  Past Medical History:   Diagnosis Date    Bowel obstruction (HCC)     Clostridium difficile infection         Crohn's disease (HCC)     Depression     Hypertension     Kidney disease, chronic, stage I (GFR over 89 ml/min)     Lupus     PTSD (post-traumatic stress disorder)     Raynaud disease        Past Surgical History   Past Surgical History:   Procedure Laterality Date    ABDOMINAL SURGERY      bowel obstruction     SECTION N/A     COLON SURGERY      IR BIOPSY KIDNEY RANDOM  3/6/2024    WISDOM TOOTH EXTRACTION         Family History   Family History   Problem Relation Age of Onset    Lupus Mother     Diabetes Father     COPD Maternal Grandmother     Heart failure Maternal Grandmother     Substance Abuse Maternal Grandmother     Breast cancer Other        The following portions of the patient's history were reviewed and updated as appropriate: allergies, current medications, past family history, past medical history, past social history, past surgical history, and problem list.    Review of Systems   Constitutional:  Positive for fatigue. Negative for chills and fever.   HENT:  Negative for ear pain and sore throat.         Dry mouth sees dentist q 6 mos, frequent caries   Eyes:  Negative for pain and visual disturbance.   Respiratory:  Negative for cough and shortness of breath.    Cardiovascular:  Negative for chest pain and palpitations.   Gastrointestinal:  Positive for blood in stool and constipation. Negative for abdominal pain and vomiting.        BM's with occas constipation  Hx GERD quiescent  Hemorrhoid with bleeding   Genitourinary:  Negative for dysuria and hematuria.   Musculoskeletal:  Positive for arthralgias (right wrist pain) and back pain (non radicular).   Skin:  Positive for color change (Raynaud's with hx digital ulcers) and rash (Hx discoid lesion righ thigh currently).   Neurological:  Negative  "for seizures and syncope.   All other systems reviewed and are negative.        Objective:      /74 (BP Location: Right arm, Patient Position: Sitting, Cuff Size: Adult)   Temp 97.6 °F (36.4 °C) (Temporal)   Ht 5' 2.25\" (1.581 m)   Wt 65 kg (143 lb 3.2 oz)   BMI 25.98 kg/m²          Physical Exam  Vitals reviewed.   Constitutional:       Appearance: Normal appearance.   HENT:      Head: Normocephalic.      Nose:      Comments: Nose and throat unremarkable.  Eyes:      Extraocular Movements: Extraocular movements intact.   Neck:      Comments: Without masses, thyromegaly, lymphadenopathy  Cardiovascular:      Rate and Rhythm: Regular rhythm.   Pulmonary:      Breath sounds: Normal breath sounds.   Abdominal:      Palpations: Abdomen is soft.   Musculoskeletal:      Cervical back: Neck supple.      Comments: Neck full range of motion as do shoulders, elbows, and wrist.  Slight tenderness noted right wrist with no active synovitis.  Hands no synovitis straight leg raising negative bilaterally.  Hips full range of motion.  Knees full range of motion with patellofemoral crepitus.  Ankles full range of motion.  Feet no synovitis.   Skin:     General: Skin is warm.      Comments: Patient will discoid lesions right thigh.  Raynaud's fingers and toes with periungual erythema and dilated nailfold capillary loops.  No digital ulcerations noted.  She does have slight distal tuft skin peeling.  No active ulcerations.   Neurological:      General: No focal deficit present.               This note was written in part using the assistance of the [employer] [id number] microphone system. Those portions using this system have been dictated and not read.  "

## 2024-08-12 NOTE — TELEPHONE ENCOUNTER
Ed calling from [facility]:    Would like to inform Rheumatology provider that Benlysta infusion was scheduled on 7/30 and was delivered.    Nurse hasn't been able to schedule (made numerous attempts but patient hasn't called back to schedule).

## 2024-08-13 ENCOUNTER — OFFICE VISIT (OUTPATIENT)
Age: 24
End: 2024-08-13
Payer: OTHER GOVERNMENT

## 2024-08-13 ENCOUNTER — TELEPHONE (OUTPATIENT)
Age: 24
End: 2024-08-13

## 2024-08-13 VITALS
TEMPERATURE: 97.6 F | HEIGHT: 62 IN | SYSTOLIC BLOOD PRESSURE: 116 MMHG | WEIGHT: 143.2 LBS | DIASTOLIC BLOOD PRESSURE: 74 MMHG | BODY MASS INDEX: 26.35 KG/M2

## 2024-08-13 DIAGNOSIS — Z79.899 LONG-TERM USE OF PLAQUENIL: ICD-10-CM

## 2024-08-13 DIAGNOSIS — M32.9 SYSTEMIC LUPUS ERYTHEMATOSUS, UNSPECIFIED SLE TYPE, UNSPECIFIED ORGAN INVOLVEMENT STATUS (HCC): Primary | ICD-10-CM

## 2024-08-13 DIAGNOSIS — N18.1 STAGE 1 CHRONIC KIDNEY DISEASE: ICD-10-CM

## 2024-08-13 DIAGNOSIS — F32.1 CURRENT MODERATE EPISODE OF MAJOR DEPRESSIVE DISORDER, UNSPECIFIED WHETHER RECURRENT (HCC): ICD-10-CM

## 2024-08-13 DIAGNOSIS — I73.00 RAYNAUD'S DISEASE WITHOUT GANGRENE: ICD-10-CM

## 2024-08-13 DIAGNOSIS — M32.14 LUPUS NEPHRITIS (HCC): ICD-10-CM

## 2024-08-13 DIAGNOSIS — L93.0 DISCOID LUPUS: ICD-10-CM

## 2024-08-13 PROCEDURE — 99215 OFFICE O/P EST HI 40 MIN: CPT | Performed by: INTERNAL MEDICINE

## 2024-08-13 RX ORDER — SILDENAFIL CITRATE 20 MG/1
20 TABLET ORAL 2 TIMES DAILY
Qty: 180 TABLET | Refills: 1 | Status: SHIPPED | OUTPATIENT
Start: 2024-08-13

## 2024-08-13 NOTE — PATIENT INSTRUCTIONS
If you do not get your period.  Within the week I would take a home test and call either primary care or OB to get another blood test.  I would not just discontinue the trazodone and Effexor as they need to be tapered. You should contact the doctor who ordered those medicines to see how to taper.  Until we have confirmation that you are pregnant, you can continue your Benlysta but once you establish that you are pregnant you would need to stop it.  Get the lab work that I ordered whenever is convenient for you.  When you establish with the OB physician, once you are pregnant you will need to be followed and monitored by a high risk OB specialist along with the general obstetrician.  Asked the nephrologist to send me a copy of her visits for my review.  Have the orthopedic surgery physician assistant send me a copy of his evaluation.  You probably will need an MRI of your wrist.  You may need to see a hand specialist as well.  I am going to order sildenafil for your Raynaud's.  I am going to put in for a dermatology consult as well.

## 2024-08-13 NOTE — TELEPHONE ENCOUNTER
Called patient, lab was done and resulted as <0.6. Patient aware that provider didn't review results yet. Once provider reviews we will send her a [employer] message or call her

## 2024-08-13 NOTE — TELEPHONE ENCOUNTER
Patient called in she is running late . Patient stated she will arrive at 9:30 . Patient was advised of our 15 min late policy

## 2024-08-15 ENCOUNTER — OFFICE VISIT (OUTPATIENT)
Dept: OBGYN CLINIC | Facility: CLINIC | Age: 24
End: 2024-08-15
Payer: OTHER GOVERNMENT

## 2024-08-15 ENCOUNTER — NURSE TRIAGE (OUTPATIENT)
Age: 24
End: 2024-08-15

## 2024-08-15 VITALS
HEART RATE: 94 BPM | SYSTOLIC BLOOD PRESSURE: 123 MMHG | DIASTOLIC BLOOD PRESSURE: 79 MMHG | BODY MASS INDEX: 26.31 KG/M2 | HEIGHT: 62 IN | WEIGHT: 143 LBS

## 2024-08-15 DIAGNOSIS — M77.8 RIGHT WRIST TENDONITIS: Primary | ICD-10-CM

## 2024-08-15 DIAGNOSIS — M25.531 RIGHT WRIST PAIN: ICD-10-CM

## 2024-08-15 PROCEDURE — 99203 OFFICE O/P NEW LOW 30 MIN: CPT | Performed by: PHYSICIAN ASSISTANT

## 2024-08-15 NOTE — TELEPHONE ENCOUNTER
"Angela concerned due to positive home pregnancy test over weekend and now bleeding.     Reviewed HCG result from 8/12/2024, negative for pregnancy. Discussed possible chemical pregnancy.    Advised to continue to monitor, track bleeding with menstrual calendar or getachew.  C/B with cycles shorter than 21 days, bleeding longer than 10 days or heavy bleeding, soaking overnite pad every hour x2.    Patient in agreement.  D/V scan appt for 9/202/2024 cancelled.       Reason for Disposition   Normal menstrual flow    Answer Assessment - Initial Assessment Questions  1. AMOUNT: \"Describe the bleeding that you are having.\"     - SPOTTING: spotting, or pinkish / brownish mucous discharge; does not fill panti-liner or pad     - MILD:  less than 1 pad / hour; less than patient's usual menstrual bleeding    - MODERATE: 1-2 pads / hour; 1 menstrual cup every 6 hours; small-medium blood clots (e.g., pea, grape, small coin)    - SEVERE: soaking 2 or more pads/hour for 2 or more hours; 1 menstrual cup every 2 hours; bleeding not contained by pads or continuous red blood from vagina; large blood clots (e.g., golf ball, large coin)       Angela reports 3-4 nickel size clots today, started with spotting yesterday, bleeding heavier today- has changed twice so far today.   2. ONSET: \"When did the bleeding begin?\" \"Is it continuing now?\"      yesterday  3. MENSTRUAL PERIOD: \"When was the last normal menstrual period?\" \"How is this different than your period?\"      Clottier than normal  4. REGULARITY: \"How regular are your periods?\"  Nexplanon removed in July 5. ABDOMINAL PAIN: \"Do you have any pain?\" \"How bad is the pain?\"  (e.g., Scale 1-10; mild, moderate, or severe)    - MILD (1-3): doesn't interfere with normal activities, abdomen soft and not tender to touch     - MODERATE (4-7): interferes with normal activities or awakens from sleep, tender to touch     - SEVERE (8-10): excruciating pain, doubled over, unable to do any " "normal activities       Mild menstrual crampng  6. PREGNANCY: \"Could you be pregnant?\" \"Are you sexually active?\" \"Did you recently give birth?\"      Neg hcg on 8/13  7. BREASTFEEDING: \"Are you breastfeeding?\"      N/a  8. HORMONES: \"Are you taking any hormone medications, prescription or OTC?\" (e.g., birth control pills, estrogen)      no  9. BLOOD THINNERS: \"Do you take any blood thinners?\" (e.g., Coumadin/warfarin, Pradaxa/dabigatran, aspirin)      denies  10. CAUSE: \"What do you think is causing the bleeding?\" (e.g., recent gyn surgery, recent gyn procedure; known bleeding disorder, cervical cancer, polycystic ovarian disease, fibroids)          Menses?    Protocols used: Vaginal Bleeding - Abnormal-ADULT-[place]    "

## 2024-08-15 NOTE — TELEPHONE ENCOUNTER
Regarding: vaginal bleeding and cramping-had miscarriage in the past  ----- Message from Seda DONOVAN sent at 8/15/2024  1:16 PM EDT -----  Patient has vaginal bleeding with cramping. In the past she has had a miscarriage and has a d an v appt on 9/20/24. Please advise as she is concerned.

## 2024-08-15 NOTE — PROGRESS NOTES
Orthopaedic Surgery - Office Note  Angela Lemus (24 y.o. female)   : 2000   MRN: [id number]  Encounter Date: 8/15/2024    Chief Complaint   Patient presents with    Right Wrist - Pain         Assessment/Plan  Diagnoses and all orders for this visit:    Right wrist tendonitis  -     Ambulatory Referral to Occupational Therapy; Future  -     Ambulatory Referral to Orthopedic Surgery; Future    Right wrist pain  -     Ambulatory Referral to Orthopedic Surgery  -     Ambulatory Referral to Occupational Therapy; Future  -     Ambulatory Referral to Orthopedic Surgery; Future    The diagnosis as well as treatment options were reviewed with the patient in the office today.  I reviewed her symptoms are most consistent with a flexor and extensor tendinitis of the wrist of unknown etiology-as there was no trauma.    I would recommend initial conservative treatments of icing the wrist 20 minutes on 1 hour off 3 times a day.  She may discontinue use of the wrist splint.    She will be referred to occupational therapy for evaluation and treatment.    She may use a topical anti-inflammatory gel such as Voltaren/diclofenac to be applied as directed on the tube.  Current records indicates she is not pregnant, she may consider oral Aleve 1 tablet twice daily with food stopping calling if any stomach upset occurs or she becomes pregnant.  I would recommend a follow-up in 3 weeks with hand surgeon, at which time if she has not improved consideration towards advanced imaging may be given.    I reviewed with the patient her symptoms are not consistent with carpal tunnel syndrome nor does she have physical exam findings indicating such.  I reviewed the hand cramping in the middle of the night may have been related to an electrolyte abnormality or possible neurological impingement and will need to be worked up further if it continues to occur.  All question concerns were answered in the office today.  She will call with any  "further questions     Return for Recheck with hand surgeon in 3 to 4 weeks.        History of Present Illness  This is a new patient who went to the emergency department on 7/30/2020 for for right wrist and forearm muscle spasm pain.  There was no trauma.  The symptoms started at 2 AM suddenly without any known trigger.  She reports the pain was severe sudden and radiating.  She states there was tingling into her fingers.  She has a history of SLE and Raynaud's.  She saw her rheumatologist the same day as the emergency department and noted she was wearing a splint.  She has had minimal relief with topical lidocaine and over-the-counter medications.  It was felt at that time to be unlikely related to her lupus diagnosis and she was started on a Medrol Dosepak.  Patient followed up with a different rheumatologist on 8/13/2024.    Currently patient reports pain in the dorsal and palmar aspect of the wrist.  She reports the pain is aggravated by wrist range of motion.  She denies any current paresthesias.  She has not had any episodes of nighttime hand cramping since the original onset.  She denies any trauma to the hand wrist digits or forearm.  She has has not had symptoms like this in the past.  She is right-handed          Review of Systems  Pertinent items are noted in HPI.  All other systems were reviewed and are negative.    Physical Exam  /79 (BP Location: Left arm, Patient Position: Sitting, Cuff Size: Standard)   Pulse 94   Ht 5' 2\" (1.575 m)   Wt 64.9 kg (143 lb)   BMI 26.16 kg/m²   Cons: Appears well.  No apparent distress.  Psych: Alert. Oriented x3.  Mood and affect normal.    On examination patient's right wrist is without skin breakdown lesion or signs of infection.  There is no soft tissue edema or ecchymosis.  She has full active and passive range of motion to the right wrist to flexion, extension, ulnar deviation, radial deviation, supination, and pronation.  She reports pain with wrist " extension and flexion only.  She has pain against resistance to wrist flexion and extension.  Strength is 5- out of 5 in these planes.  She has no pain against resistance with 5 out of 5 strength to ulnar deviation, radial deviation, supination, and pronation.  She has a negative Finklestein's test.  She is nontender at the [facility] joint and anatomical snuffbox.  She is nontender in the [facility].  She has no appreciable trigger fingers on clinical examination today.  There are no Dupuytren's contractures.  She has a negative Tinel's at the carpal and cubital tunnel.  She has a negative phalens.  Her  strength is 5 out of 5.  She is able to make a composite fist without any rotational deformity.  She is nontender through light and deep palpation of the hand and digits.  She is tender to palpation in the dorsal and palmar aspect of the wrist.  She has full active and passive range of motion of all digits.  There is no significant pain with finger extension or flexion.  Distal radial and ulnar pulses are +2 and symmetric.      Her elbow exam is unremarkable with full range of motion 5 out of 5 strength no laxity and no tenderness              Studies Reviewed  XR WRIST 3+ VW RIGHT     INDICATION: M25.531: Pain in right wrist.     COMPARISON: None     FINDINGS:     No acute fracture or dislocation.     No significant degenerative changes.     No lytic or blastic osseous lesion.     Unremarkable soft tissues.     IMPRESSION:     No acute osseous abnormality.        Computerized Assisted Algorithm (CAA) may have been used to analyze all applicable images.        Workstation performed: [id number]  X-ray images as well as reports were reviewed by myself in the office today and I agree with radiologist interpretation.  Emergency department notes were reviewed by myself in the office today.  Rheumatology notes were reviewed by myself in the office today(x2).      Procedures  No procedures today.    Medical, Surgical, Family, and  Social History  The patient's medical history, family history, and social history, were reviewed and updated as appropriate.    Past Medical History:   Diagnosis Date    Bowel obstruction (HCC)     Clostridium difficile infection         Crohn's disease (HCC)     Depression     Hypertension     Kidney disease, chronic, stage I (GFR over 89 ml/min)     Lupus (HCC)     PTSD (post-traumatic stress disorder)     Raynaud disease        Past Surgical History:   Procedure Laterality Date    ABDOMINAL SURGERY      bowel obstruction     SECTION N/A     COLON SURGERY      IR BIOPSY KIDNEY RANDOM  3/6/2024    WISDOM TOOTH EXTRACTION         Family History   Problem Relation Age of Onset    Lupus Mother     Diabetes Father     COPD Maternal Grandmother     Heart failure Maternal Grandmother     Substance Abuse Maternal Grandmother     Breast cancer Other        Social History     Occupational History    Not on file   Tobacco Use    Smoking status: Never    Smokeless tobacco: Never   Vaping Use    Vaping status: Never Used   Substance and Sexual Activity    Alcohol use: Yes     Comment: socially    Drug use: Never    Sexual activity: Yes     Partners: Male     Birth control/protection: None       No Known Allergies      Current Outpatient Medications:     Benlysta 120 MG, , Disp: , Rfl:     bromocriptine (PARLODEL) 2.5 mg tablet, , Disp: , Rfl:     clindamycin (CLEOCIN) 300 MG capsule, take 1 capsule by mouth twice a day for 7 days, Disp: , Rfl:     Cyanocobalamin (VITAMIN B 12 PO), Take 500 mcg by mouth 2 (two) times a day, Disp: , Rfl:     ferrous sulfate 325 (65 Fe) mg tablet, Take 325 mg by mouth daily with breakfast, Disp: , Rfl:     hydroxychloroquine (PLAQUENIL) 200 mg tablet, Take 1.5 tablets (300 mg total) by mouth daily with breakfast, Disp: 135 tablet, Rfl: 3    Icosapent Ethyl 1 g CAPS, 2 CAPSULES WITH MEALS ORALLY TWICE A DAY 60 DAYS, Disp: , Rfl:     lidocaine (Lidoderm) 5 %, Apply 1 patch topically  over 12 hours daily Remove & Discard patch within 12 hours or as directed by MD, Disp: 30 patch, Rfl: 0    methylPREDNISolone 4 MG tablet therapy pack, Use as directed on package, Disp: 21 tablet, Rfl: 0    naproxen (Naprosyn) 500 mg tablet, Take 1 tablet (500 mg total) by mouth 2 (two) times a day with meals for 7 days, Disp: 14 tablet, Rfl: 0    norethindrone (Ortho Micronor) 0.35 MG tablet, Take 1 tablet (0.35 mg total) by mouth daily, Disp: 84 tablet, Rfl: 3    omeprazole (PriLOSEC) 20 mg delayed release capsule, Take 1 capsule (20 mg total) by mouth daily, Disp: 30 capsule, Rfl: 3    Repatha SureClick 140 MG/ML SOAJ, INJECT 1 ML SUBCUTANEOUS EVERY 2 WEEKS 60 DAYS, Disp: , Rfl:     sildenafil (REVATIO) 20 mg tablet, Take 1 tablet (20 mg total) by mouth 2 (two) times a day, Disp: 180 tablet, Rfl: 1    sildenafil (VIAGRA) 25 MG tablet, Take 25 mg by mouth daily as needed for erectile dysfunction, Disp: , Rfl:     simvastatin (ZOCOR) 10 mg tablet, , Disp: , Rfl:     topiramate (TOPAMAX) 50 MG tablet, 1 TABLET AT NIGHT ORALLY ONCE A DAY (NIGHTLY) 60 DAYS, Disp: , Rfl:     traZODone (DESYREL) 50 mg tablet, Take 1 tablet by mouth daily at bedtime, Disp: , Rfl:     venlafaxine (EFFEXOR-XR) 150 mg 24 hr capsule, Take 150 mg by mouth daily, Disp: , Rfl:     vitamin B-12 (VITAMIN B-12) 1,000 mcg tablet, Take 2,000 mcg by mouth daily, Disp: , Rfl:       Drake Arredondo PA-C

## 2024-08-27 ENCOUNTER — NURSE TRIAGE (OUTPATIENT)
Dept: OBGYN CLINIC | Facility: CLINIC | Age: 24
End: 2024-08-27

## 2024-08-28 NOTE — TELEPHONE ENCOUNTER
"Answer Assessment - Initial Assessment Questions  1. SYMPTOM: \"What's the main symptom you're concerned about?\" (e.g., pain, itching, dryness)      Cottage cheese discharge, itching  2. LOCATION: \"Where is the  s/s located?\" (e.g., inside/outside, left/right)      Vaginal  3. ONSET: \"When did the  s/s  start?\"      Chronic - ongoing for past year. 2 days ago began with symptoms.   4. PAIN: \"Is there any pain?\" If Yes, ask: \"How bad is it?\" (Scale: 1-10; mild, moderate, severe)      Denies  5. ITCHING: \"Is there any itching?\" If Yes, ask: \"How bad is it?\" (Scale: 1-10; mild, moderate, severe)      8/10  6. CAUSE: \"What do you think is causing the discharge?\" \"Have you had the same problem before? What happened then?\"      Yeast  7. OTHER SYMPTOMS: \"Do you have any other symptoms?\" (e.g., fever, itching, vaginal bleeding, pain with urination, injury to genital area, vaginal foreign body)      Denies  8. PREGNANCY: \"Is there any chance you are pregnant?\" \"When was your last menstrual period?\"      Denies    Protocols used: Vaginal Symptoms-ADULT-[place]    "

## 2024-08-28 NOTE — PATIENT COMMUNICATION
Patient calling in reporting recurrent yeast infections for the past year. Symptoms began about 2 days ago. Reports itching and cottage cheese discharge. States she feels it happens after menses.   Patient states she has hx of Lupus and unsure if compromised immune system could be causing recurrent yeast.    Reviewed with patient can try vaginal probiotic, wearing cotton underwear during the day and no underwear at night, avoid tight fitting clothing, cleansing vaginal area with mild or no soap and water, avoid harsh or scented lotions and detergents, keeping area clean and dry at all times.    RN scheduled appointment for further evaluation. Patient unable to be seen within the next 2 weeks due to busy work schedule. Patient states she received diflucan via alternate provider and symptoms should be stable for now.

## 2024-08-29 ENCOUNTER — TELEPHONE (OUTPATIENT)
Age: 24
End: 2024-08-29

## 2024-08-29 DIAGNOSIS — M77.8 RIGHT WRIST TENDONITIS: ICD-10-CM

## 2024-08-29 DIAGNOSIS — M25.531 RIGHT WRIST PAIN: Primary | ICD-10-CM

## 2024-08-29 NOTE — TELEPHONE ENCOUNTER
Patient is requesting an insurance referral for the following specialty:      Test Name / Order Name: Eval and treat     DX Code: M25.531, M77.8    Date Of Service: 9/11/2024     Location/Facility Name/Address/Phone #: [phone] [facility]    Location / Facility NPI: [id number]    Best Phone # To Reach The Patient:

## 2024-08-29 NOTE — TELEPHONE ENCOUNTER
Caller: nam from PT    Doctor: Arnulfo    Reason for call: needs OT script to be changed to PT faxed to [phone]    Call back#: [phone]

## 2024-08-31 ENCOUNTER — HOSPITAL ENCOUNTER (EMERGENCY)
Facility: HOSPITAL | Age: 24
Discharge: HOME | End: 2024-08-31
Attending: EMERGENCY MEDICINE
Payer: OTHER GOVERNMENT

## 2024-08-31 VITALS
SYSTOLIC BLOOD PRESSURE: 140 MMHG | TEMPERATURE: 98.7 F | HEART RATE: 102 BPM | RESPIRATION RATE: 18 BRPM | OXYGEN SATURATION: 98 % | DIASTOLIC BLOOD PRESSURE: 88 MMHG

## 2024-08-31 DIAGNOSIS — M32.9 SLE (SYSTEMIC LUPUS ERYTHEMATOSUS) (HCC): ICD-10-CM

## 2024-08-31 DIAGNOSIS — M25.431 PAIN AND SWELLING OF RIGHT WRIST: Primary | ICD-10-CM

## 2024-08-31 DIAGNOSIS — M25.531 PAIN AND SWELLING OF RIGHT WRIST: Primary | ICD-10-CM

## 2024-08-31 LAB
ALBUMIN SERPL BCG-MCNC: 4.2 G/DL (ref 3.5–5)
ALP SERPL-CCNC: 80 U/L (ref 34–104)
ALT SERPL W P-5'-P-CCNC: 53 U/L (ref 7–52)
ANION GAP SERPL CALCULATED.3IONS-SCNC: 4 MMOL/L (ref 4–13)
APPEARANCE FLD: ABNORMAL
AST SERPL W P-5'-P-CCNC: 31 U/L (ref 13–39)
BASOPHILS # BLD AUTO: 0.02 THOUSANDS/ÂΜL (ref 0–0.1)
BASOPHILS NFR BLD AUTO: 0 % (ref 0–1)
BILIRUB SERPL-MCNC: 0.34 MG/DL (ref 0.2–1)
BUN SERPL-MCNC: 16 MG/DL (ref 5–25)
CALCIUM SERPL-MCNC: 9.3 MG/DL (ref 8.4–10.2)
CHLORIDE SERPL-SCNC: 104 MMOL/L (ref 96–108)
CO2 SERPL-SCNC: 31 MMOL/L (ref 21–32)
COLOR FLD: YELLOW
CREAT SERPL-MCNC: 0.67 MG/DL (ref 0.6–1.3)
CRP SERPL QL: 11.6 MG/L
CRYSTALS SNV QL MICRO: NORMAL
EOSINOPHIL # BLD AUTO: 0.09 THOUSAND/ÂΜL (ref 0–0.61)
EOSINOPHIL NFR BLD AUTO: 2 % (ref 0–6)
ERYTHROCYTE [DISTWIDTH] IN BLOOD BY AUTOMATED COUNT: 13.2 % (ref 11.6–15.1)
ERYTHROCYTE [SEDIMENTATION RATE] IN BLOOD: 26 MM/HOUR (ref 0–19)
GFR SERPL CREATININE-BSD FRML MDRD: 122 ML/MIN/1.73SQ M
GLUCOSE SERPL-MCNC: 85 MG/DL (ref 65–140)
GRAM STN SPEC: NORMAL
HCT VFR BLD AUTO: 41.9 % (ref 34.8–46.1)
HGB BLD-MCNC: 12.9 G/DL (ref 11.5–15.4)
IMM GRANULOCYTES # BLD AUTO: 0.02 THOUSAND/UL (ref 0–0.2)
IMM GRANULOCYTES NFR BLD AUTO: 0 % (ref 0–2)
LYMPHOCYTES # BLD AUTO: 1.59 THOUSANDS/ÂΜL (ref 0.6–4.47)
LYMPHOCYTES # SNV MANUAL: 3 %
LYMPHOCYTES NFR BLD AUTO: 29 % (ref 14–44)
MCH RBC QN AUTO: 27.5 PG (ref 26.8–34.3)
MCHC RBC AUTO-ENTMCNC: 30.8 G/DL (ref 31.4–37.4)
MCV RBC AUTO: 89 FL (ref 82–98)
MONOCYTES # BLD AUTO: 0.44 THOUSAND/ÂΜL (ref 0.17–1.22)
MONOCYTES NFR BLD AUTO: 8 % (ref 4–12)
MONOCYTES NFR SNV MANUAL: 97 %
NEUTROPHILS # BLD AUTO: 3.31 THOUSANDS/ÂΜL (ref 1.85–7.62)
NEUTS SEG NFR BLD AUTO: 61 % (ref 43–75)
NRBC BLD AUTO-RTO: 0 /100 WBCS
PLATELET # BLD AUTO: 254 THOUSANDS/UL (ref 149–390)
PMV BLD AUTO: 11.4 FL (ref 8.9–12.7)
POTASSIUM SERPL-SCNC: 3.5 MMOL/L (ref 3.5–5.3)
PROT SERPL-MCNC: 7.3 G/DL (ref 6.4–8.4)
RBC # BLD AUTO: 4.69 MILLION/UL (ref 3.81–5.12)
SITE: ABNORMAL
SODIUM SERPL-SCNC: 139 MMOL/L (ref 135–147)
TOTAL CELLS COUNTED SPEC: 100
WBC # BLD AUTO: 5.47 THOUSAND/UL (ref 4.31–10.16)
WBC # FLD MANUAL: 295 /UL

## 2024-08-31 PROCEDURE — 99283 EMERGENCY DEPT VISIT LOW MDM: CPT

## 2024-08-31 PROCEDURE — NC001 PR NO CHARGE: Performed by: STUDENT IN AN ORGANIZED HEALTH CARE EDUCATION/TRAINING PROGRAM

## 2024-08-31 PROCEDURE — 85025 COMPLETE CBC W/AUTO DIFF WBC: CPT

## 2024-08-31 PROCEDURE — 89051 BODY FLUID CELL COUNT: CPT

## 2024-08-31 PROCEDURE — 80053 COMPREHEN METABOLIC PANEL: CPT

## 2024-08-31 PROCEDURE — 87070 CULTURE OTHR SPECIMN AEROBIC: CPT

## 2024-08-31 PROCEDURE — 36415 COLL VENOUS BLD VENIPUNCTURE: CPT

## 2024-08-31 PROCEDURE — 99285 EMERGENCY DEPT VISIT HI MDM: CPT | Performed by: EMERGENCY MEDICINE

## 2024-08-31 PROCEDURE — 87591 N.GONORRHOEAE DNA AMP PROB: CPT

## 2024-08-31 PROCEDURE — 87205 SMEAR GRAM STAIN: CPT

## 2024-08-31 PROCEDURE — 89060 EXAM SYNOVIAL FLUID CRYSTALS: CPT

## 2024-08-31 PROCEDURE — 87491 CHLMYD TRACH DNA AMP PROBE: CPT

## 2024-08-31 PROCEDURE — 86140 C-REACTIVE PROTEIN: CPT

## 2024-08-31 PROCEDURE — 85652 RBC SED RATE AUTOMATED: CPT

## 2024-08-31 RX ORDER — PREDNISONE 20 MG/1
40 TABLET ORAL ONCE
Status: COMPLETED | OUTPATIENT
Start: 2024-08-31 | End: 2024-08-31

## 2024-08-31 RX ORDER — ACETAMINOPHEN 325 MG/1
975 TABLET ORAL ONCE
Status: COMPLETED | OUTPATIENT
Start: 2024-08-31 | End: 2024-08-31

## 2024-08-31 RX ORDER — LIDOCAINE HYDROCHLORIDE AND EPINEPHRINE 10; 10 MG/ML; UG/ML
5 INJECTION, SOLUTION INFILTRATION; PERINEURAL ONCE
Status: COMPLETED | OUTPATIENT
Start: 2024-08-31 | End: 2024-08-31

## 2024-08-31 RX ORDER — LIDOCAINE HYDROCHLORIDE 10 MG/ML
20 INJECTION, SOLUTION EPIDURAL; INFILTRATION; INTRACAUDAL; PERINEURAL ONCE
Status: DISCONTINUED | OUTPATIENT
Start: 2024-08-31 | End: 2024-08-31 | Stop reason: HOSPADM

## 2024-08-31 RX ORDER — METHYLPREDNISOLONE 4 MG
TABLET, DOSE PACK ORAL
Qty: 21 TABLET | Refills: 0 | Status: SHIPPED | OUTPATIENT
Start: 2024-08-31 | End: 2024-09-11

## 2024-08-31 RX ADMIN — PREDNISONE 40 MG: 20 TABLET ORAL at 11:29

## 2024-08-31 RX ADMIN — LIDOCAINE HYDROCHLORIDE,EPINEPHRINE BITARTRATE 5 ML: 10; .01 INJECTION, SOLUTION INFILTRATION; PERINEURAL at 11:53

## 2024-08-31 RX ADMIN — ACETAMINOPHEN 975 MG: 325 TABLET ORAL at 11:28

## 2024-08-31 NOTE — ED PROVIDER NOTES
History  Chief Complaint   Patient presents with    Wrist Pain     Pt states that she has Lupus and has had chronic wrist pain. Pt c/o severe pain and is unable to lift and use the right wrist.     Patient is a 24-year-old female past medical history of lupus presenting to the ED for evaluation of right wrist pain and swelling times multiple weeks.  Reports that she has been seen in the emergency department for similar symptoms of x-rays and steroids as well as prescribed Naprosyn which has not provided relief.  States that over the past week has had worsening of her swelling and decreased range of motion secondary to pain.  Also feels that her wrist is red and warm to touch.  Is denying fever chills nausea vomiting abdominal pain chest pain shortness of breath or any other complaints at this time.          Prior to Admission Medications   Prescriptions Last Dose Informant Patient Reported? Taking?   Benlysta 120 MG   Yes No   Cyanocobalamin (VITAMIN B 12 PO)  Self Yes No   Sig: Take 500 mcg by mouth 2 (two) times a day   Icosapent Ethyl 1 g CAPS  Self Yes No   Si CAPSULES WITH MEALS ORALLY TWICE A DAY 60 DAYS   Repatha SureClick 140 MG/ML SOAJ   Yes No   Sig: INJECT 1 ML SUBCUTANEOUS EVERY 2 WEEKS 60 DAYS   bromocriptine (PARLODEL) 2.5 mg tablet  Self Yes No   clindamycin (CLEOCIN) 300 MG capsule   Yes No   Sig: take 1 capsule by mouth twice a day for 7 days   ferrous sulfate 325 (65 Fe) mg tablet  Self Yes No   Sig: Take 325 mg by mouth daily with breakfast   hydroxychloroquine (PLAQUENIL) 200 mg tablet  Self No No   Sig: Take 1.5 tablets (300 mg total) by mouth daily with breakfast   lidocaine (Lidoderm) 5 %  Self No No   Sig: Apply 1 patch topically over 12 hours daily Remove & Discard patch within 12 hours or as directed by MD   methylPREDNISolone 4 MG tablet therapy pack   No No   Sig: Use as directed on package   naproxen (Naprosyn) 500 mg tablet   No No   Sig: Take 1 tablet (500 mg total) by mouth 2  (two) times a day with meals for 7 days   norethindrone (Ortho Micronor) 0.35 MG tablet   No No   Sig: Take 1 tablet (0.35 mg total) by mouth daily   omeprazole (PriLOSEC) 20 mg delayed release capsule  Self No No   Sig: Take 1 capsule (20 mg total) by mouth daily   sildenafil (REVATIO) 20 mg tablet   No No   Sig: Take 1 tablet (20 mg total) by mouth 2 (two) times a day   sildenafil (VIAGRA) 25 MG tablet  Self Yes No   Sig: Take 25 mg by mouth daily as needed for erectile dysfunction   simvastatin (ZOCOR) 10 mg tablet   Yes No   topiramate (TOPAMAX) 50 MG tablet   Yes No   Si TABLET AT NIGHT ORALLY ONCE A DAY (NIGHTLY) 60 DAYS   traZODone (DESYREL) 50 mg tablet  Self Yes No   Sig: Take 1 tablet by mouth daily at bedtime   venlafaxine (EFFEXOR-XR) 150 mg 24 hr capsule  Self Yes No   Sig: Take 150 mg by mouth daily   vitamin B-12 (VITAMIN B-12) 1,000 mcg tablet  Self Yes No   Sig: Take 2,000 mcg by mouth daily      Facility-Administered Medications: None       Past Medical History:   Diagnosis Date    Bowel obstruction (HCC)     Clostridium difficile infection         Crohn's disease (HCC)     Depression     Hypertension     Kidney disease, chronic, stage I (GFR over 89 ml/min)     Lupus (HCC)     PTSD (post-traumatic stress disorder)     Raynaud disease        Past Surgical History:   Procedure Laterality Date    ABDOMINAL SURGERY      bowel obstruction     SECTION N/A     COLON SURGERY      IR BIOPSY KIDNEY RANDOM  3/6/2024    WISDOM TOOTH EXTRACTION         Family History   Problem Relation Age of Onset    Lupus Mother     Diabetes Father     COPD Maternal Grandmother     Heart failure Maternal Grandmother     Substance Abuse Maternal Grandmother     Breast cancer Other      I have reviewed and agree with the history as documented.    E-Cigarette/Vaping    E-Cigarette Use Never User      E-Cigarette/Vaping Substances    Nicotine No     THC No     CBD No     Flavoring No     Other No     Unknown No       Social History     Tobacco Use    Smoking status: Never    Smokeless tobacco: Never   Vaping Use    Vaping status: Never Used   Substance Use Topics    Alcohol use: Yes     Comment: socially    Drug use: Never        Review of Systems   Constitutional:  Negative for chills and fever.   HENT:  Negative for ear pain and sore throat.    Eyes:  Negative for pain and visual disturbance.   Respiratory:  Negative for cough and shortness of breath.    Cardiovascular:  Negative for chest pain and palpitations.   Gastrointestinal:  Negative for abdominal pain and vomiting.   Genitourinary:  Negative for dysuria and hematuria.   Musculoskeletal:  Negative for arthralgias and back pain.        Right wrist pain and swelling   Skin:  Negative for color change and rash.   Neurological:  Negative for seizures and syncope.   All other systems reviewed and are negative.      Physical Exam  ED Triage Vitals [08/31/24 1003]   Temperature Pulse Respirations Blood Pressure SpO2   98.7 °F (37.1 °C) 102 18 140/88 98 %      Temp Source Heart Rate Source Patient Position - Orthostatic VS BP Location FiO2 (%)   Oral Monitor Sitting Left arm --      Pain Score       10 - Worst Possible Pain             Orthostatic Vital Signs  Vitals:    08/31/24 1003   BP: 140/88   Pulse: 102   Patient Position - Orthostatic VS: Sitting       Physical Exam  Vitals and nursing note reviewed.   Constitutional:       General: She is not in acute distress.     Appearance: She is well-developed.   HENT:      Head: Normocephalic and atraumatic.      Mouth/Throat:      Mouth: Mucous membranes are moist.   Eyes:      Extraocular Movements: Extraocular movements intact.      Conjunctiva/sclera: Conjunctivae normal.   Cardiovascular:      Rate and Rhythm: Normal rate and regular rhythm.      Heart sounds: No murmur heard.  Pulmonary:      Effort: Pulmonary effort is normal. No respiratory distress.      Breath sounds: Normal breath sounds.   Abdominal:       Palpations: Abdomen is soft.      Tenderness: There is no abdominal tenderness.   Musculoskeletal:         General: Swelling and tenderness present.      Cervical back: Neck supple.      Comments: Right wrist is mildly erythematous warm to touch has decreased range of motion and passive flexion and extension secondary to pain.  It is tender to palpation.  No obvious deformity or other bony abnormality appreciated on physical exam.  Neurovascularly intact   Skin:     General: Skin is warm and dry.      Capillary Refill: Capillary refill takes less than 2 seconds.   Neurological:      General: No focal deficit present.      Mental Status: She is alert.   Psychiatric:         Mood and Affect: Mood normal.         ED Medications  Medications   acetaminophen (TYLENOL) tablet 975 mg (975 mg Oral Given 8/31/24 1128)   predniSONE tablet 40 mg (40 mg Oral Given 8/31/24 1129)   lidocaine-epinephrine (XYLOCAINE/EPINEPHRINE) 1 %-1:100,000 injection 5 mL (5 mL Infiltration Given by Other 8/31/24 1153)       Diagnostic Studies  Results Reviewed       Procedure Component Value Units Date/Time    Body fluid culture and Gram stain [[id number]] Resulted: 09/01/24 0918    Lab Status: Preliminary result Specimen: Body Fluid from Joint, Right Wrist Updated: 09/01/24 0918     Gram Stain Result 1+ Mononuclear Cells      No bacteria seen    STAT Gram Stain [[id number]] Collected: 08/31/24 1330    Lab Status: Final result Specimen: Other from Joint, Right Wrist Updated: 08/31/24 1701     Gram Stain Result 2+ Mononuclear Cells      Rare Polys      No organisms seen    Synovial fluid white cell count w/ diff [[id number]]  (Abnormal) Collected: 08/31/24 1340    Lab Status: Final result Specimen: Synovial Fluid Updated: 08/31/24 1518     Site right wrist     Color, Fluid Yellow     Clarity, Fluid Cloudy     WBC, Fluid 295 /ul     Synovial Fluid Diff [[id number]] Collected: 08/31/24 1340    Lab Status: Final result Specimen: Synovial Fluid Updated:  08/31/24 1518     Total Counted 100     Lymph % Synovial 3 %      Monocyte % Synovial 97 %     Synovial fluid, crystal [[id number]] Resulted: 08/31/24 1353    Lab Status: Final result Specimen: Synovial Fluid Updated: 08/31/24 1353     Crystals, Synovial Fluid No Crystals Seen    Chlamydia/GC amplified DNA by PCR [[id number]] Collected: 08/31/24 1316    Lab Status: In process Specimen: Urine, Other Updated: 08/31/24 1320    Sedimentation rate, automated [[id number]]  (Abnormal) Collected: 08/31/24 1124    Lab Status: Final result Specimen: Blood from Arm, Left Updated: 08/31/24 1217     Sed Rate 26 mm/hour     Comprehensive metabolic panel [[id number]]  (Abnormal) Collected: 08/31/24 1124    Lab Status: Final result Specimen: Blood from Arm, Left Updated: 08/31/24 1213     Sodium 139 mmol/L      Potassium 3.5 mmol/L      Chloride 104 mmol/L      CO2 31 mmol/L      ANION GAP 4 mmol/L      BUN 16 mg/dL      Creatinine 0.67 mg/dL      Glucose 85 mg/dL      Calcium 9.3 mg/dL      AST 31 U/L      ALT 53 U/L      Alkaline Phosphatase 80 U/L      Total Protein 7.3 g/dL      Albumin 4.2 g/dL      Total Bilirubin 0.34 mg/dL      eGFR 122 ml/min/1.73sq m     Narrative:      National Kidney Disease Foundation guidelines for Chronic Kidney Disease (CKD):     Stage 1 with normal or high GFR (GFR > 90 mL/min/1.73 square meters)    Stage 2 Mild CKD (GFR = 60-89 mL/min/1.73 square meters)    Stage 3A Moderate CKD (GFR = 45-59 mL/min/1.73 square meters)    Stage 3B Moderate CKD (GFR = 30-44 mL/min/1.73 square meters)    Stage 4 Severe CKD (GFR = 15-29 mL/min/1.73 square meters)    Stage 5 End Stage CKD (GFR <15 mL/min/1.73 square meters)  Note: GFR calculation is accurate only with a steady state creatinine    C-reactive protein [[id number]]  (Abnormal) Collected: 08/31/24 1124    Lab Status: Final result Specimen: Blood from Arm, Left Updated: 08/31/24 1213     CRP 11.6 mg/L     CBC and differential [[id number]]  (Abnormal) Collected:  08/31/24 1124    Lab Status: Final result Specimen: Blood from Arm, Left Updated: 08/31/24 1159     WBC 5.47 Thousand/uL      RBC 4.69 Million/uL      Hemoglobin 12.9 g/dL      Hematocrit 41.9 %      MCV 89 fL      MCH 27.5 pg      MCHC 30.8 g/dL      RDW 13.2 %      MPV 11.4 fL      Platelets 254 Thousands/uL      nRBC 0 /100 WBCs      Segmented % 61 %      Immature Grans % 0 %      Lymphocytes % 29 %      Monocytes % 8 %      Eosinophils Relative 2 %      Basophils Relative 0 %      Absolute Neutrophils 3.31 Thousands/µL      Absolute Immature Grans 0.02 Thousand/uL      Absolute Lymphocytes 1.59 Thousands/µL      Absolute Monocytes 0.44 Thousand/µL      Eosinophils Absolute 0.09 Thousand/µL      Basophils Absolute 0.02 Thousands/µL                    No orders to display         Procedures  Procedures      ED Course                             SBIRT 20yo+      Flowsheet Row Most Recent Value   Initial Alcohol Screen: US AUDIT-C     1. How often do you have a drink containing alcohol? 0 Filed at: 08/31/2024 1004   2. How many drinks containing alcohol do you have on a typical day you are drinking?  0 Filed at: 08/31/2024 1004   3a. Male UNDER 65: How often do you have five or more drinks on one occasion? 0 Filed at: 08/31/2024 1004   3b. FEMALE Any Age, or MALE 65+: How often do you have 4 or more drinks on one occassion? 0 Filed at: 08/31/2024 1004   Audit-C Score 0 Filed at: 08/31/2024 1004   ARNOL: How many times in the past year have you...    Used an illegal drug or used a prescription medication for non-medical reasons? Never Filed at: 08/31/2024 1004                  Medical Decision Making  Patient is a 24-year-old female presented for evaluation of right wrist swelling.  Mild articular swelling concern for septic arthritis versus lupus flare versus reactive arthritis.  Will obtain lab work including inflammatory markers.  If elevated will discuss with orthopedics.    Inflammatory markers mildly elevated.   Discussed orthopedics who will perform bedside arthrocentesis.  Synovial fluid studies sent    Synovial fluid studies are negative.  Patient states that she feels some relief to pain after the lidocaine injection.  Will send steroids to the pharmacy.  Will have her follow-up with her rheumatologist.  Plan and results discussed with patient is in agreement verbalized understanding.  Hemodynamically stable and cleared for discharge with outpatient follow-up.  Return precautions given patient verbalized understanding    Amount and/or Complexity of Data Reviewed  Labs: ordered.    Risk  OTC drugs.  Prescription drug management.          Disposition  Final diagnoses:   Pain and swelling of right wrist   SLE (systemic lupus erythematosus) ([facility])     Time reflects when diagnosis was documented in both MDM as applicable and the Disposition within this note       Time User Action Codes Description Comment    8/31/2024  1:57 PM Fabio Puga [M25.531,  M25.431] Pain and swelling of right wrist     8/31/2024  3:43 PM Fabio Puga [M32.9] SLE (systemic lupus erythematosus) ([facility])           ED Disposition       ED Disposition   Discharge    Condition   Stable    Date/Time   Sat Aug 31, 2024 1543    Comment   Angela Lemus discharge to home/self care.                   Follow-up Information       Follow up With Specialties Details Why Contact Info    Lakeisha Man DO Family Medicine Call   [address]  [address]  [place] [facility] [address] [phone]              Discharge Medication List as of 8/31/2024  5:02 PM        START taking these medications    Details   !! methylPREDNISolone 4 MG tablet therapy pack Use as directed on package, Normal       !! - Potential duplicate medications found. Please discuss with provider.        CONTINUE these medications which have NOT CHANGED    Details   Benlysta 120 MG Historical Med      bromocriptine (PARLODEL) 2.5 mg tablet Historical Med      clindamycin (CLEOCIN) 300 MG  capsule take 1 capsule by mouth twice a day for 7 days, Historical Med      !! Cyanocobalamin (VITAMIN B 12 PO) Take 500 mcg by mouth 2 (two) times a day, Historical Med      ferrous sulfate 325 (65 Fe) mg tablet Take 325 mg by mouth daily with breakfast, Historical Med      hydroxychloroquine (PLAQUENIL) 200 mg tablet Take 1.5 tablets (300 mg total) by mouth daily with breakfast, Starting Fri 2/23/2024, Until Tue 8/13/2024, Normal      Icosapent Ethyl 1 g CAPS 2 CAPSULES WITH MEALS ORALLY TWICE A DAY 60 DAYS, Historical Med      lidocaine (Lidoderm) 5 % Apply 1 patch topically over 12 hours daily Remove & Discard patch within 12 hours or as directed by MD, Starting Fri 3/22/2024, Normal      !! methylPREDNISolone 4 MG tablet therapy pack Use as directed on package, Normal      naproxen (Naprosyn) 500 mg tablet Take 1 tablet (500 mg total) by mouth 2 (two) times a day with meals for 7 days, Starting Tue 7/30/2024, Until Tue 8/13/2024, Normal      norethindrone (Ortho Micronor) 0.35 MG tablet Take 1 tablet (0.35 mg total) by mouth daily, Starting Mon 7/8/2024, Normal      omeprazole (PriLOSEC) 20 mg delayed release capsule Take 1 capsule (20 mg total) by mouth daily, Starting Thu 8/24/2023, Normal      Repatha SureClick 140 MG/ML SOAJ INJECT 1 ML SUBCUTANEOUS EVERY 2 WEEKS 60 DAYS, Historical Med      sildenafil (REVATIO) 20 mg tablet Take 1 tablet (20 mg total) by mouth 2 (two) times a day, Starting Tue 8/13/2024, Normal      sildenafil (VIAGRA) 25 MG tablet Take 25 mg by mouth daily as needed for erectile dysfunction, Historical Med      simvastatin (ZOCOR) 10 mg tablet Historical Med      topiramate (TOPAMAX) 50 MG tablet 1 TABLET AT NIGHT ORALLY ONCE A DAY (NIGHTLY) 60 DAYS, Historical Med      traZODone (DESYREL) 50 mg tablet Take 1 tablet by mouth daily at bedtime, Starting Tue 8/29/2023, Historical Med      venlafaxine (EFFEXOR-XR) 150 mg 24 hr capsule Take 150 mg by mouth daily, Starting Tue 1/23/2024,  Historical Med      !! vitamin B-12 (VITAMIN B-12) 1,000 mcg tablet Take 2,000 mcg by mouth daily, Starting Wed 9/13/2023, Historical Med       !! - Potential duplicate medications found. Please discuss with provider.        No discharge procedures on file.    PDMP Review       None             ED Provider  Attending physically available and evaluated Angela Lemus. I managed the patient along with the ED Attending.    Electronically Signed by           Fabio Puga DO  09/01/24 3765

## 2024-08-31 NOTE — DISCHARGE INSTRUCTIONS
You were seen in the ER for right wrist pain. No infection seen on fluid testing from the joint. Likely related to the lupus. Follow up with your PCP and rheumatologist. Take tylenol and naproxen together. Will send steroids to the pharmacy as well. If symptoms worsen or persist return to the ER

## 2024-08-31 NOTE — CONSULTS
Orthopedics   Angela Lemus 24 y.o. female MRN: [id number]  Unit/Bed#: ED 15      Chief Complaint:   right wrist pain    HPI:   24 y.o.female  right hand dominant complaining of right wrist pain ongoing for over a year but worse over the last month.  She has a past medical history of lupus and states she gets these pains intermittently and denies any antecedent event.  She has been seen in the emergency department several times for this chronic problem.  She has been treated with immunosuppressants. No other complaints at this time. PMH significant for SLE, HTN, Crohns. Patient denies fevers, swelling in the hand, or redness over the hand. Orthopedics was consulted to rule out septic arthritis.    Review Of Systems:   Skin: Normal  Neuro: See HPI  Musculoskeletal: See HPI  14 point review of systems negative except as stated above     Past Medical History:   Past Medical History:   Diagnosis Date    Bowel obstruction (HCC)     Clostridium difficile infection         Crohn's disease (HCC)     Depression     Hypertension     Kidney disease, chronic, stage I (GFR over 89 ml/min)     Lupus (HCC)     PTSD (post-traumatic stress disorder)     Raynaud disease        Past Surgical History:   Past Surgical History:   Procedure Laterality Date    ABDOMINAL SURGERY      bowel obstruction     SECTION N/A     COLON SURGERY      IR BIOPSY KIDNEY RANDOM  3/6/2024    WISDOM TOOTH EXTRACTION         Family History:  Family history reviewed and non-contributory  Family History   Problem Relation Age of Onset    Lupus Mother     Diabetes Father     COPD Maternal Grandmother     Heart failure Maternal Grandmother     Substance Abuse Maternal Grandmother     Breast cancer Other        Social History:  Social History     Socioeconomic History    Marital status:      Spouse name: None    Number of children: None    Years of education: None    Highest education level: None   Occupational History    None   Tobacco  Use    Smoking status: Never    Smokeless tobacco: Never   Vaping Use    Vaping status: Never Used   Substance and Sexual Activity    Alcohol use: Yes     Comment: socially    Drug use: Never    Sexual activity: Yes     Partners: Male     Birth control/protection: None   Other Topics Concern    None   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     Social Determinants of Health     Financial Resource Strain: Not on file   Food Insecurity: Not on file   Transportation Needs: Not on file   Physical Activity: Not on file   Stress: Not on file   Social Connections: Not on file   Intimate Partner Violence: Not on file   Housing Stability: Not on file       Allergies:   No Known Allergies        Labs:  0   Lab Value Date/Time    HCT 41.9 08/31/2024 1124    HCT 41.2 07/15/2024 1643    HCT 36.6 06/06/2024 1135    HGB 12.9 08/31/2024 1124    HGB 12.7 07/15/2024 1643    HGB 11.5 06/06/2024 1135    INR 0.93 02/23/2024 1549    WBC 5.47 08/31/2024 1124    WBC 5.28 07/15/2024 1643    WBC 5.11 06/06/2024 1135    ESR 26 (H) 08/31/2024 1124    CRP 11.6 (H) 08/31/2024 1124       Meds:    Current Facility-Administered Medications:     lidocaine (PF) (XYLOCAINE-MPF) 1 % injection 20 mL, 20 mL, Infiltration, Once, Glen Garcia MD    Current Outpatient Medications:     Benlysta 120 MG, , Disp: , Rfl:     bromocriptine (PARLODEL) 2.5 mg tablet, , Disp: , Rfl:     clindamycin (CLEOCIN) 300 MG capsule, take 1 capsule by mouth twice a day for 7 days, Disp: , Rfl:     Cyanocobalamin (VITAMIN B 12 PO), Take 500 mcg by mouth 2 (two) times a day, Disp: , Rfl:     ferrous sulfate 325 (65 Fe) mg tablet, Take 325 mg by mouth daily with breakfast, Disp: , Rfl:     hydroxychloroquine (PLAQUENIL) 200 mg tablet, Take 1.5 tablets (300 mg total) by mouth daily with breakfast, Disp: 135 tablet, Rfl: 3    Icosapent Ethyl 1 g CAPS, 2 CAPSULES WITH MEALS ORALLY TWICE A DAY 60 DAYS, Disp: , Rfl:     lidocaine (Lidoderm) 5 %, Apply 1 patch  "topically over 12 hours daily Remove & Discard patch within 12 hours or as directed by MD, Disp: 30 patch, Rfl: 0    methylPREDNISolone 4 MG tablet therapy pack, Use as directed on package, Disp: 21 tablet, Rfl: 0    naproxen (Naprosyn) 500 mg tablet, Take 1 tablet (500 mg total) by mouth 2 (two) times a day with meals for 7 days, Disp: 14 tablet, Rfl: 0    norethindrone (Ortho Micronor) 0.35 MG tablet, Take 1 tablet (0.35 mg total) by mouth daily, Disp: 84 tablet, Rfl: 3    omeprazole (PriLOSEC) 20 mg delayed release capsule, Take 1 capsule (20 mg total) by mouth daily, Disp: 30 capsule, Rfl: 3    Repatha SureClick 140 MG/ML SOAJ, INJECT 1 ML SUBCUTANEOUS EVERY 2 WEEKS 60 DAYS, Disp: , Rfl:     sildenafil (REVATIO) 20 mg tablet, Take 1 tablet (20 mg total) by mouth 2 (two) times a day, Disp: 180 tablet, Rfl: 1    sildenafil (VIAGRA) 25 MG tablet, Take 25 mg by mouth daily as needed for erectile dysfunction, Disp: , Rfl:     simvastatin (ZOCOR) 10 mg tablet, , Disp: , Rfl:     topiramate (TOPAMAX) 50 MG tablet, 1 TABLET AT NIGHT ORALLY ONCE A DAY (NIGHTLY) 60 DAYS, Disp: , Rfl:     traZODone (DESYREL) 50 mg tablet, Take 1 tablet by mouth daily at bedtime, Disp: , Rfl:     venlafaxine (EFFEXOR-XR) 150 mg 24 hr capsule, Take 150 mg by mouth daily, Disp: , Rfl:     vitamin B-12 (VITAMIN B-12) 1,000 mcg tablet, Take 2,000 mcg by mouth daily, Disp: , Rfl:     Blood Culture:   No results found for: \"BLOODCX\"    Wound Culture:   No results found for: \"WOUNDCULT\"    Ins and Outs:  No intake/output data recorded.          Physical Exam:   /88 (BP Location: Left arm)   Pulse 102   Temp 98.7 °F (37.1 °C) (Oral)   Resp 18   LMP 08/17/2024 (Approximate)   SpO2 98%   Gen: No acute distress, resting comfortably in bed  HEENT: Eyes clear, moist mucus membranes, hearing intact  Respiratory: No audible wheezing or stridor  Cardiovascular: Well Perfused peripherally, 2+ distal pulse  Abdomen: nondistended, no peritoneal " signs  Musculoskeletal: right Hand  Skin: dry and intact, no swelling or effusion  Limited AROM 2/2 pain  Fingers not held in flexion  No Fusiform Swelling over the digits  No TTP along flexor tendon sheath  No Pain with Passive Extension of the digits  No micromotion pain at the wrist  Sensation intact Radial, Ulna, and Median  Motor intact to AIN, PIN, ulnar nerves  2+ Radial & Ulnar Pulses  Musculature is soft and compressible     Radiology:   I personally reviewed the films.  X-rays AP/Lateral and oblique views of right wrist from 6/26/2024 show no acute fracture or dislocation      Procedure- Orthopedics   Angela Lemus 24 y.o. female MRN: [id number]  Unit/Bed#: ED 15    Procedure: right wrist aspiration and injection    After sterile preparation of the skin overlying the wrist, local anesthetic was provided with 5cc of 1% lidocaine.  An 18 gauge needle was then  inserted distal and ulnar to Prasanna's tubercle on the dorsal aspect of the wrist.  Approx 2cc of clear yellow fluid was aspirated and sent for labs. Sterile dressing was then applied.  Pt tolerated the procedure well and was neurovascularly intact both pre and post procedure.    Glen Garcia MD     Assessment:  24 y.o.female with  right pain 2/2 Systemic Lupus.  Her synovial labs were negative for infection.  This is likely wrist pain secondary to a lupus flare and she should follow-up with her rheumatologist.  Plan:   WBAT to right upper extremity  Recommend medical management  Analgesics for pain  Dispo: Ok for discharge from ortho perspective      Glen Garcia MD    This consult was completed with the senior resident and attending on call.

## 2024-09-02 NOTE — ED ATTENDING ATTESTATION
8/31/2024  I, Suresh Fuentes DO, saw and evaluated the patient. I have discussed the patient with the resident/non-physician practitioner and agree with the resident's/non-physician practitioner's findings, Plan of Care, and MDM as documented in the resident's/non-physician practitioner's note, except where noted. All available labs and Radiology studies were reviewed.  I was present for key portions of any procedure(s) performed by the resident/non-physician practitioner and I was immediately available to provide assistance.       At this point I agree with the current assessment done in the Emergency Department.  I have conducted an independent evaluation of this patient a history and physical is as follows:    ED Course  ED Course as of 09/02/24 1603   Sat Aug 31, 2024   1057 24F with hx of lupus, right wrist pain and swelling. Finished steroid taper. Xray in past negative. Worsening swelling with warmth and redness no fever.   Ddx- septic arthritis, cellulitis, effusion, tendonitis.     Plan ESR, CRP, cbc, urine, GC ,    Reach out to orthopedics at this point in time to perform an arthrocentesis of the joint, [facility] bedside ultrasound performed does not have significant fluid accumulation, patient is afebrile, arthrocentesis to be performed for ruling out septic arthritis at this point in time, though WBCs are only 200 at this point in time, the Gram stain is negative the plan will be for outpatient management likely inflammatory arthritis and tendinitis at this point in time recommend outpatient follow-up with her rheumatologist.  An orthopedist      Critical Care Time  Procedures

## 2024-09-03 LAB
BACTERIA SPEC BFLD CULT: NO GROWTH
C TRACH DNA SPEC QL NAA+PROBE: NEGATIVE
GRAM STN SPEC: NORMAL
GRAM STN SPEC: NORMAL
N GONORRHOEA DNA SPEC QL NAA+PROBE: NEGATIVE

## 2024-09-11 ENCOUNTER — OFFICE VISIT (OUTPATIENT)
Dept: OBGYN CLINIC | Facility: CLINIC | Age: 24
End: 2024-09-11
Payer: OTHER GOVERNMENT

## 2024-09-11 VITALS
DIASTOLIC BLOOD PRESSURE: 68 MMHG | WEIGHT: 144.4 LBS | HEIGHT: 62 IN | BODY MASS INDEX: 26.57 KG/M2 | SYSTOLIC BLOOD PRESSURE: 102 MMHG

## 2024-09-11 DIAGNOSIS — N76.0 RECURRENT VAGINITIS: Primary | ICD-10-CM

## 2024-09-11 PROCEDURE — 87480 CANDIDA DNA DIR PROBE: CPT | Performed by: PHYSICIAN ASSISTANT

## 2024-09-11 PROCEDURE — 99213 OFFICE O/P EST LOW 20 MIN: CPT | Performed by: PHYSICIAN ASSISTANT

## 2024-09-11 PROCEDURE — 87660 TRICHOMONAS VAGIN DIR PROBE: CPT | Performed by: PHYSICIAN ASSISTANT

## 2024-09-11 PROCEDURE — 87510 GARDNER VAG DNA DIR PROBE: CPT | Performed by: PHYSICIAN ASSISTANT

## 2024-09-11 NOTE — PROGRESS NOTES
Assessment/Plan:      Diagnoses and all orders for this visit:    Recurrent vaginitis  -     VAGINOSIS DNA PROBE     Pleasant 24-year-old female presenting today for concern of recurrent vaginal symptoms concerning for yeast infection.  She states symptoms come back almost monthly after menstruation including irritation of the vagina and abnormal discharge.  She most recently did a virtual visit and received 2 doses of fluconazole for which resolved her symptoms at present but her menstruation is almost finished and she is concerned it will come back again.    Vulvovaginal exam unremarkable today.  Would recommend vaginal culture to determine current status of vagina and if yeast or BV is present.  I question if she continues to be treated for yeast but if she possibly has BV as well with last vaginosis probe in June positive for BV only,, genital comprehensive culture in ER positive for group B strep.  History of positive Ureaplasma with negative follow-up test of cure in July.  Most recent STD screening chlamydia and gonorrhea - August 31.    Will call patient with vaginosis probe result and treat accordingly.  Discussed common causes for recurrent BV or yeast and consideration of suppressive therapy.  Patient may benefit from boric acid treatment after completion of treatment if recurrent vaginitis infection.  Partner is uncircumcised so I am questioning if this could be contributing to some of the imbalance in the vagina so I did advise trial of consistent condom use during intercourse for the next 3 months to see if any recurrent vaginal issues occur.    Patient is agreeable to plan and will treat for acute infection if needed based on vaginosis probe results.    RTO annual exam    Chief Complaint   Patient presents with    Vaginitis     FREQUENT YEAST INFECTIONS. Pt states that she gets a yeast infection usually after her periods. Currently she does not have one.        Subjective:     Patient ID: Angela  Mariah is a 24 y.o. female.    25y/o F here today for f/u to recurrent vaginitis.  States she continues to have symptoms of yeast infection almost monthly after menstruation.  She uses only water to clean vaginal and genital area, she denies any new products, detergents or soaps.  She denies douching.  She has the same sexual partner, does admit he is not circumcised but he is not having any symptoms of balanitis.    All STD testing has been negative recently.    Had positive ureaplasma in June treated, partner treated and negative testing in July.     She had an online virtual visit with a provider which prescribed her fluconazole x 2 doses 2 weeks ago and sxs resolved.     She is not on any hormonals at present.  She is taking daily probiotic.    Pre-existing medical conditions include systemic lupus on plaquenil, IBD, Raynaud's, depression, kidney disease (lupus nephritis)/renal hypertension, hyperlipidemia.        Review of Systems   Constitutional: Negative.    Gastrointestinal:  Negative for abdominal distention and abdominal pain.   Genitourinary:  Positive for vaginal discharge and vaginal pain (itching). Negative for dyspareunia, dysuria, frequency, hematuria, menstrual problem, pelvic pain and urgency.         The following portions of the patient's history were reviewed and updated as appropriate: allergies, current medications, past family history, past medical history, past social history, past surgical history, and problem list.      Objective:     Physical Exam  Vitals reviewed.   Constitutional:       Appearance: Normal appearance. She is not ill-appearing.   Abdominal:      Tenderness: There is no abdominal tenderness.   Genitourinary:     General: Normal vulva.      Labia:         Right: No rash, tenderness or lesion.         Left: No rash, tenderness or lesion.       Vagina: No signs of injury and foreign body. No vaginal discharge, erythema, tenderness or bleeding.      Cervix: Normal. No  cervical motion tenderness, discharge, friability, lesion, erythema or cervical bleeding.   Lymphadenopathy:      Lower Body: No right inguinal adenopathy. No left inguinal adenopathy.   Neurological:      Mental Status: She is alert and oriented to person, place, and time.   Psychiatric:         Mood and Affect: Mood normal.         Behavior: Behavior normal. Behavior is cooperative.

## 2024-09-12 ENCOUNTER — APPOINTMENT (OUTPATIENT)
Dept: LAB | Facility: CLINIC | Age: 24
End: 2024-09-12
Payer: OTHER GOVERNMENT

## 2024-09-12 DIAGNOSIS — M32.14 LUPUS NEPHRITIS (HCC): ICD-10-CM

## 2024-09-12 DIAGNOSIS — N18.1 CHRONIC KIDNEY DISEASE, STAGE I: ICD-10-CM

## 2024-09-12 DIAGNOSIS — M32.9 SYSTEMIC LUPUS ERYTHEMATOSUS, UNSPECIFIED SLE TYPE, UNSPECIFIED ORGAN INVOLVEMENT STATUS (HCC): ICD-10-CM

## 2024-09-12 DIAGNOSIS — E78.5 HYPERLIPIDEMIA, UNSPECIFIED HYPERLIPIDEMIA TYPE: ICD-10-CM

## 2024-09-12 DIAGNOSIS — I73.00 RAYNAUD'S DISEASE WITHOUT GANGRENE: ICD-10-CM

## 2024-09-12 DIAGNOSIS — I15.1 HYPERTENSION SECONDARY TO RENAL DISEASE, WITH DELIVERY: ICD-10-CM

## 2024-09-12 LAB
25(OH)D3 SERPL-MCNC: 38.4 NG/ML (ref 30–100)
ALBUMIN SERPL BCG-MCNC: 4.3 G/DL (ref 3.5–5)
ALP SERPL-CCNC: 82 U/L (ref 34–104)
ALT SERPL W P-5'-P-CCNC: 45 U/L (ref 7–52)
ANION GAP SERPL CALCULATED.3IONS-SCNC: 6 MMOL/L (ref 4–13)
AST SERPL W P-5'-P-CCNC: 28 U/L (ref 13–39)
BACTERIA UR QL AUTO: ABNORMAL /HPF
BILIRUB SERPL-MCNC: 0.27 MG/DL (ref 0.2–1)
BILIRUB UR QL STRIP: NEGATIVE
BUN SERPL-MCNC: 17 MG/DL (ref 5–25)
CALCIUM SERPL-MCNC: 9.8 MG/DL (ref 8.4–10.2)
CANDIDA RRNA VAG QL PROBE: DETECTED
CHLORIDE SERPL-SCNC: 104 MMOL/L (ref 96–108)
CLARITY UR: CLEAR
CO2 SERPL-SCNC: 30 MMOL/L (ref 21–32)
COLOR UR: YELLOW
CREAT SERPL-MCNC: 0.98 MG/DL (ref 0.6–1.3)
CREAT UR-MCNC: 424.8 MG/DL
G VAGINALIS RRNA GENITAL QL PROBE: DETECTED
GFR SERPL CREATININE-BSD FRML MDRD: 80 ML/MIN/1.73SQ M
GLUCOSE P FAST SERPL-MCNC: 84 MG/DL (ref 65–99)
GLUCOSE UR STRIP-MCNC: NEGATIVE MG/DL
HGB UR QL STRIP.AUTO: NEGATIVE
KETONES UR STRIP-MCNC: ABNORMAL MG/DL
LEUKOCYTE ESTERASE UR QL STRIP: ABNORMAL
MICROALBUMIN UR-MCNC: 27.8 MG/L
MICROALBUMIN/CREAT 24H UR: 7 MG/G CREATININE (ref 0–30)
MUCOUS THREADS UR QL AUTO: ABNORMAL
NITRITE UR QL STRIP: NEGATIVE
NON-SQ EPI CELLS URNS QL MICRO: ABNORMAL /HPF
PH UR STRIP.AUTO: 6 [PH]
POTASSIUM SERPL-SCNC: 4 MMOL/L (ref 3.5–5.3)
PROT SERPL-MCNC: 7.5 G/DL (ref 6.4–8.4)
PROT UR STRIP-MCNC: ABNORMAL MG/DL
RBC #/AREA URNS AUTO: ABNORMAL /HPF
SODIUM SERPL-SCNC: 140 MMOL/L (ref 135–147)
SP GR UR STRIP.AUTO: 1.03 (ref 1–1.03)
T VAGINALIS RRNA GENITAL QL PROBE: NOT DETECTED
UROBILINOGEN UR STRIP-ACNC: <2 MG/DL
WBC #/AREA URNS AUTO: ABNORMAL /HPF

## 2024-09-12 PROCEDURE — 80053 COMPREHEN METABOLIC PANEL: CPT

## 2024-09-12 PROCEDURE — 36415 COLL VENOUS BLD VENIPUNCTURE: CPT

## 2024-09-12 PROCEDURE — 83695 ASSAY OF LIPOPROTEIN(A): CPT

## 2024-09-12 PROCEDURE — 82306 VITAMIN D 25 HYDROXY: CPT

## 2024-09-12 PROCEDURE — 82570 ASSAY OF URINE CREATININE: CPT

## 2024-09-12 PROCEDURE — 81001 URINALYSIS AUTO W/SCOPE: CPT

## 2024-09-12 PROCEDURE — 82043 UR ALBUMIN QUANTITATIVE: CPT

## 2024-09-13 DIAGNOSIS — B37.31 VAGINAL YEAST INFECTION: ICD-10-CM

## 2024-09-13 DIAGNOSIS — N76.0 BV (BACTERIAL VAGINOSIS): Primary | ICD-10-CM

## 2024-09-13 DIAGNOSIS — B96.89 BV (BACTERIAL VAGINOSIS): Primary | ICD-10-CM

## 2024-09-13 LAB — LPA SERPL-SCNC: 118.9 NMOL/L

## 2024-09-13 RX ORDER — FLUCONAZOLE 150 MG/1
TABLET ORAL
Qty: 2 TABLET | Refills: 0 | Status: SHIPPED | OUTPATIENT
Start: 2024-09-13 | End: 2024-09-21

## 2024-09-13 RX ORDER — CLINDAMYCIN PHOSPHATE 20 MG/G
1 CREAM VAGINAL
Qty: 40 G | Refills: 0 | Status: SHIPPED | OUTPATIENT
Start: 2024-09-13 | End: 2024-09-20

## 2024-09-22 ENCOUNTER — OFFICE VISIT (OUTPATIENT)
Dept: URGENT CARE | Age: 24
End: 2024-09-22
Payer: OTHER GOVERNMENT

## 2024-09-22 VITALS
RESPIRATION RATE: 16 BRPM | DIASTOLIC BLOOD PRESSURE: 90 MMHG | SYSTOLIC BLOOD PRESSURE: 137 MMHG | TEMPERATURE: 98.8 F | HEART RATE: 84 BPM

## 2024-09-22 DIAGNOSIS — R31.9 URINARY TRACT INFECTION WITH HEMATURIA, SITE UNSPECIFIED: Primary | ICD-10-CM

## 2024-09-22 DIAGNOSIS — N39.0 URINARY TRACT INFECTION WITH HEMATURIA, SITE UNSPECIFIED: Primary | ICD-10-CM

## 2024-09-22 LAB
SL AMB  POCT GLUCOSE, UA: NEGATIVE
SL AMB LEUKOCYTE ESTERASE,UA: ABNORMAL
SL AMB POCT BILIRUBIN,UA: NEGATIVE
SL AMB POCT BLOOD,UA: ABNORMAL
SL AMB POCT CLARITY,UA: ABNORMAL
SL AMB POCT COLOR,UA: ABNORMAL
SL AMB POCT KETONES,UA: NEGATIVE
SL AMB POCT NITRITE,UA: NEGATIVE
SL AMB POCT PH,UA: 7
SL AMB POCT SPECIFIC GRAVITY,UA: 1.02
SL AMB POCT URINE HCG: NEGATIVE
SL AMB POCT URINE PROTEIN: 30
SL AMB POCT UROBILINOGEN: 0.2

## 2024-09-22 PROCEDURE — 87077 CULTURE AEROBIC IDENTIFY: CPT | Performed by: PHYSICIAN ASSISTANT

## 2024-09-22 PROCEDURE — 81025 URINE PREGNANCY TEST: CPT | Performed by: PHYSICIAN ASSISTANT

## 2024-09-22 PROCEDURE — G0463 HOSPITAL OUTPT CLINIC VISIT: HCPCS | Performed by: PHYSICIAN ASSISTANT

## 2024-09-22 PROCEDURE — 81002 URINALYSIS NONAUTO W/O SCOPE: CPT | Performed by: PHYSICIAN ASSISTANT

## 2024-09-22 PROCEDURE — 87186 SC STD MICRODIL/AGAR DIL: CPT | Performed by: PHYSICIAN ASSISTANT

## 2024-09-22 PROCEDURE — 99213 OFFICE O/P EST LOW 20 MIN: CPT | Performed by: PHYSICIAN ASSISTANT

## 2024-09-22 PROCEDURE — 87086 URINE CULTURE/COLONY COUNT: CPT | Performed by: PHYSICIAN ASSISTANT

## 2024-09-22 PROCEDURE — 87147 CULTURE TYPE IMMUNOLOGIC: CPT | Performed by: PHYSICIAN ASSISTANT

## 2024-09-22 RX ORDER — CEPHALEXIN 500 MG/1
500 CAPSULE ORAL EVERY 8 HOURS SCHEDULED
Qty: 21 CAPSULE | Refills: 0 | Status: SHIPPED | OUTPATIENT
Start: 2024-09-22 | End: 2024-09-29

## 2024-09-22 RX ORDER — NIFEDIPINE 30 MG
30 TABLET, EXTENDED RELEASE ORAL DAILY
COMMUNITY
Start: 2024-09-17

## 2024-09-22 RX ORDER — LIRAGLUTIDE 6 MG/ML
1.8 INJECTION, SOLUTION SUBCUTANEOUS DAILY
COMMUNITY
Start: 2024-09-13

## 2024-09-22 NOTE — PROGRESS NOTES
[facility]        NAME: Angela Lemus is a 24 y.o. female  : 2000    MRN: [id number]  DATE: 2024  TIME: 5:54 PM    Assessment and Plan   Urinary tract infection with hematuria, site unspecified [N39.0, R31.9]  1. Urinary tract infection with hematuria, site unspecified  POCT urine dip        Patient Instructions     Take medicine as prescribed  Follow up with PCP in 3-5 days.  Proceed to  ER if symptoms worsen.    If tests have been performed at [facility], our office will contact you with results if changes need to be made to the care plan discussed with you at the visit.  You can review your full results on [facility] MyChart.    Chief Complaint     Chief Complaint   Patient presents with    Possible UTI     Patient reports burning, frequent urination, incomplete bladder emptying that started today.         History of Present Illness       Urinary Tract Infection   This is a new problem. The current episode started today. The problem occurs every urination. The pain is moderate. Associated symptoms include frequency, nausea, a possible pregnancy (last period was 24, patient is attempting to conceive) and urgency. Pertinent negatives include no chills, discharge, flank pain, hematuria, sweats or vomiting. Associated symptoms comments: Burning upon urination, sensation of incomplete bladder emptying, cramping lower abdominal pain, increased stooling. [id number]       Review of Systems   Review of Systems   Constitutional:  Negative for chills, fatigue and fever.   Respiratory:  Negative for shortness of breath.    Cardiovascular:  Negative for chest pain.   Gastrointestinal:  Positive for nausea. Negative for vomiting.   Genitourinary:  Positive for dysuria, frequency and urgency. Negative for flank pain, hematuria and vaginal discharge.         Current Medications       Current Outpatient Medications:     Benlysta 120 MG, , Disp: , Rfl:     bromocriptine (PARLODEL) 2.5 mg tablet, ,  Disp: , Rfl:     Cyanocobalamin (VITAMIN B 12 PO), Take 500 mcg by mouth 2 (two) times a day, Disp: , Rfl:     ferrous sulfate 325 (65 Fe) mg tablet, Take 325 mg by mouth daily with breakfast, Disp: , Rfl:     FLUoxetine (PROzac) 20 mg capsule, Take 20 mg by mouth daily, Disp: , Rfl:     lidocaine (Lidoderm) 5 %, Apply 1 patch topically over 12 hours daily Remove & Discard patch within 12 hours or as directed by MD, Disp: 30 patch, Rfl: 0    NIFEdipine ER (ADALAT CC) 30 MG 24 hr tablet, Take 30 mg by mouth daily, Disp: , Rfl:     Repatha SureClick 140 MG/ML SOAJ, INJECT 1 ML SUBCUTANEOUS EVERY 2 WEEKS 60 DAYS, Disp: , Rfl:     Saxenda injection, Inject 1.8 mg under the skin daily, Disp: , Rfl:     sildenafil (REVATIO) 20 mg tablet, Take 1 tablet (20 mg total) by mouth 2 (two) times a day, Disp: 180 tablet, Rfl: 1    simvastatin (ZOCOR) 10 mg tablet, , Disp: , Rfl:     topiramate (TOPAMAX) 50 MG tablet, 1 TABLET AT NIGHT ORALLY ONCE A DAY (NIGHTLY) 60 DAYS, Disp: , Rfl:     traZODone (DESYREL) 50 mg tablet, Take 1 tablet by mouth daily at bedtime, Disp: , Rfl:     hydroxychloroquine (PLAQUENIL) 200 mg tablet, Take 1.5 tablets (300 mg total) by mouth daily with breakfast, Disp: 135 tablet, Rfl: 3    Icosapent Ethyl 1 g CAPS, 2 CAPSULES WITH MEALS ORALLY TWICE A DAY 60 DAYS (Patient not taking: Reported on 9/22/2024), Disp: , Rfl:     omeprazole (PriLOSEC) 20 mg delayed release capsule, Take 1 capsule (20 mg total) by mouth daily (Patient not taking: Reported on 9/11/2024), Disp: 30 capsule, Rfl: 3    sildenafil (VIAGRA) 25 MG tablet, Take 25 mg by mouth daily as needed for erectile dysfunction (Patient not taking: Reported on 9/11/2024), Disp: , Rfl:     vitamin B-12 (VITAMIN B-12) 1,000 mcg tablet, Take 2,000 mcg by mouth daily (Patient not taking: Reported on 9/22/2024), Disp: , Rfl:     Current Allergies     Allergies as of 09/22/2024    (No Known Allergies)            The following portions of the patient's  history were reviewed and updated as appropriate: allergies, current medications, past family history, past medical history, past social history, past surgical history and problem list.     Past Medical History:   Diagnosis Date    Bowel obstruction (HCC)     Clostridium difficile infection         Crohn's disease (HCC)     Depression     Hypertension     Kidney disease, chronic, stage I (GFR over 89 ml/min)     Lupus (HCC)     PTSD (post-traumatic stress disorder)     Raynaud disease        Past Surgical History:   Procedure Laterality Date    ABDOMINAL SURGERY      bowel obstruction     SECTION N/A     COLON SURGERY      IR BIOPSY KIDNEY RANDOM  3/6/2024    WISDOM TOOTH EXTRACTION         Family History   Problem Relation Age of Onset    Lupus Mother     Diabetes Father     COPD Maternal Grandmother     Heart failure Maternal Grandmother     Substance Abuse Maternal Grandmother     Breast cancer Other          Medications have been verified.        Objective   /90   Pulse 84   Temp 98.8 °F (37.1 °C)   Resp 16   LMP 2024 (Approximate)   Patient's last menstrual period was 2024 (approximate).       Physical Exam     Physical Exam  Constitutional:       Appearance: Normal appearance.   Cardiovascular:      Rate and Rhythm: Normal rate and regular rhythm.      Heart sounds: Normal heart sounds. No murmur heard.     No friction rub. No gallop.   Pulmonary:      Effort: Pulmonary effort is normal. No respiratory distress.      Breath sounds: Normal breath sounds. No stridor. No wheezing, rhonchi or rales.   Abdominal:      General: Abdomen is flat. There is no distension.      Palpations: Abdomen is soft.      Tenderness: There is abdominal tenderness in the suprapubic area. There is no right CVA tenderness, left CVA tenderness, guarding or rebound.   Neurological:      Mental Status: She is alert.

## 2024-09-25 LAB
BACTERIA UR CULT: ABNORMAL
BACTERIA UR CULT: ABNORMAL

## 2024-09-26 ENCOUNTER — APPOINTMENT (OUTPATIENT)
Dept: LAB | Facility: CLINIC | Age: 24
End: 2024-09-26

## 2024-09-26 PROCEDURE — 36415 COLL VENOUS BLD VENIPUNCTURE: CPT

## 2024-10-09 PROBLEM — Z79.899 LONG-TERM USE OF PLAQUENIL: Status: ACTIVE | Noted: 2024-10-09

## 2024-10-09 PROBLEM — L93.0 DISCOID LUPUS: Status: ACTIVE | Noted: 2024-10-09

## 2024-10-09 PROBLEM — M32.14 LUPUS NEPHRITIS (HCC): Status: ACTIVE | Noted: 2024-10-09

## 2024-10-09 NOTE — ASSESSMENT & PLAN NOTE
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  There was tubular atrophy and interstitial fibrosis mild to moderate with mild arterial sclerosis.  Patient was started on Repatha and told to increase her ARB.  I have encouraged her to continue with Benlysta infusions every 4 weeks, particularly in view of its benefit for glomerulonephritis, however, if she is pregnant, we will have to hold Benlysta.  She has to follow-up with nephrologist.  I have asked her to have the nephrologist send a copy of her evaluation for my review.  Monitor disease activity and medication toxicity with lab work as ordered.  Will check Plaquenil level to make sure she is therapeutic.

## 2024-10-09 NOTE — ASSESSMENT & PLAN NOTE
Patient stopped her trazodone and Effexor as she thinks she may be pregnant.  I told her she could not just discontinue these medications, but rather, they needed to be tapered in view of potential for withdrawal symptoms.  I asked her to contact the physician who prescribed these meds so that she could taper off with instructions.  Follow-up with primary care as scheduled.  Continue to monitor.

## 2024-10-09 NOTE — ASSESSMENT & PLAN NOTE
Lab Results   Component Value Date    EGFR 80 09/12/2024    EGFR 122 08/31/2024    EGFR 101 07/15/2024    CREATININE 0.98 09/12/2024    CREATININE 0.67 08/31/2024    CREATININE 0.81 07/15/2024   Renal function generally stage I with physiologic proteinuria on the most recent 24-hour urine protein.  Avoid nephrotoxic agents like nonsteroidals.  Patient will continue with Benlysta infusions if she is not pregnant.  Follow-up nephrology.  Monitor disease activity with double-stranded DNA antibodies and complement activation.  Continue to monitor.

## 2024-10-09 NOTE — ASSESSMENT & PLAN NOTE
Persistent Raynaud's with history of digital ulcerations despite calcium channel blockers.  Will submit for sildenafil in view of its excellent efficacy and safety profile.  Have discussed risk-benefit profile, indications, and administration with the patient in detail.  No current true ulcerations, but she does have some slight skin breakdown around a few of her distal giancarlo.  Continue to monitor closely.

## 2024-10-09 NOTE — ASSESSMENT & PLAN NOTE
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  I have referred her to dermatology for confirmation.  Continue Plaquenil as ordered.  Will check Plaquenil level to make sure she is therapeutic.  Continue to monitor

## 2024-10-09 NOTE — ASSESSMENT & PLAN NOTE
Patient referred for Plaquenil Avise to monitor for therapeutic level.  Will adjust accordingly depending on results.  She can continue Plaquenil through pregnancy.  She will need yearly eye doctor follow-up while on Plaquenil.

## 2024-10-09 NOTE — ASSESSMENT & PLAN NOTE
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  She has had 2 doses of Benlysta via home infusion.  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  I did ask her to do a home pregnancy test if she does not get her menses, and follow-up with OB/GYN to confirm pregnancy.  If she is pregnant we will hold Benlysta.  Will refer for lupus Avise diagnostic to further evaluate status of disease.  She will continue with Plaquenil, and we will check her level to make sure she is therapeutic.  No evidence for phospholipid antibody syndrome by lab work.  There is a question about a 1 first trimester miscarriage.  Patient continues to have significant Raynaud's despite calcium channel blockers.  Will submit for sildenafil in view of efficacy.  Continue cold protection.  I asked her to have the nephrologist send me a copy of her office visit for my review.  Plan follow-up in 3 months or sooner if needed.  Follow-up nephrology

## 2024-10-10 ENCOUNTER — OFFICE VISIT (OUTPATIENT)
Dept: URGENT CARE | Facility: CLINIC | Age: 24
End: 2024-10-10
Payer: OTHER GOVERNMENT

## 2024-10-10 VITALS — SYSTOLIC BLOOD PRESSURE: 100 MMHG | TEMPERATURE: 98.2 F | RESPIRATION RATE: 18 BRPM | DIASTOLIC BLOOD PRESSURE: 62 MMHG

## 2024-10-10 DIAGNOSIS — B37.31 VAGINAL YEAST INFECTION: Primary | ICD-10-CM

## 2024-10-10 PROCEDURE — 99213 OFFICE O/P EST LOW 20 MIN: CPT | Performed by: NURSE PRACTITIONER

## 2024-10-10 PROCEDURE — G0463 HOSPITAL OUTPT CLINIC VISIT: HCPCS | Performed by: NURSE PRACTITIONER

## 2024-10-10 RX ORDER — FLUCONAZOLE 150 MG/1
TABLET ORAL
Qty: 3 TABLET | Refills: 0 | Status: SHIPPED | OUTPATIENT
Start: 2024-10-10 | End: 2024-10-17

## 2024-10-10 NOTE — PROGRESS NOTES
[facility]        NAME: Angela Lemus is a 24 y.o. female  : 2000    MRN: [id number]  DATE: October 10, 2024  TIME: 6:45 PM    Assessment and Plan   Vaginal yeast infection [B37.31]  1. Vaginal yeast infection  fluconazole (DIFLUCAN) 150 mg tablet            Patient Instructions       Medication as prescribed   F/u with GYN  Follow up with PCP in 3-5 days.  Proceed to  ER if symptoms worsen.    If tests have been performed at [facility], our office will contact you with results if changes need to be made to the care plan discussed with you at the visit.  You can review your full results [facility]    Chief Complaint     Chief Complaint   Patient presents with    Possible Yeast Infection      Patient states that she is here because she gets frequent yeast infections and she is here to see if there are any different meds to try because the ones she's been taking does not work.         History of Present Illness       HPI  To clinic with complaint of frequent reoccurring yeast infections in the vaginal area.  States she has been using fluconazole but the vaginal yeast keep reoccurring.  Currently having vaginal discharge, cheesy.  States she has a weakened immune system      Review of Systems   Review of Systems   Constitutional:  Negative for fever.   Gastrointestinal:  Negative for abdominal pain.   Genitourinary:  Positive for vaginal discharge. Negative for difficulty urinating, vaginal bleeding and vaginal pain.   Skin:  Negative for color change and wound.         Current Medications       Current Outpatient Medications:     fluconazole (DIFLUCAN) 150 mg tablet, Take 1 tab on day, then day 3, and finally on day 7, Disp: 3 tablet, Rfl: 0    Benlysta 120 MG, , Disp: , Rfl:     bromocriptine (PARLODEL) 2.5 mg tablet, , Disp: , Rfl:     Cyanocobalamin (VITAMIN B 12 PO), Take 500 mcg by mouth 2 (two) times a day, Disp: , Rfl:     ferrous sulfate 325 (65 Fe) mg tablet, Take 325 mg by  mouth daily with breakfast, Disp: , Rfl:     FLUoxetine (PROzac) 20 mg capsule, Take 20 mg by mouth daily, Disp: , Rfl:     hydroxychloroquine (PLAQUENIL) 200 mg tablet, Take 1.5 tablets (300 mg total) by mouth daily with breakfast, Disp: 135 tablet, Rfl: 3    Icosapent Ethyl 1 g CAPS, 2 CAPSULES WITH MEALS ORALLY TWICE A DAY 60 DAYS (Patient not taking: Reported on 9/22/2024), Disp: , Rfl:     lidocaine (Lidoderm) 5 %, Apply 1 patch topically over 12 hours daily Remove & Discard patch within 12 hours or as directed by MD, Disp: 30 patch, Rfl: 0    NIFEdipine ER (ADALAT CC) 30 MG 24 hr tablet, Take 30 mg by mouth daily, Disp: , Rfl:     omeprazole (PriLOSEC) 20 mg delayed release capsule, Take 1 capsule (20 mg total) by mouth daily (Patient not taking: Reported on 9/11/2024), Disp: 30 capsule, Rfl: 3    Repatha SureClick 140 MG/ML SOAJ, INJECT 1 ML SUBCUTANEOUS EVERY 2 WEEKS 60 DAYS, Disp: , Rfl:     Saxenda injection, Inject 1.8 mg under the skin daily, Disp: , Rfl:     sildenafil (REVATIO) 20 mg tablet, Take 1 tablet (20 mg total) by mouth 2 (two) times a day, Disp: 180 tablet, Rfl: 1    sildenafil (VIAGRA) 25 MG tablet, Take 25 mg by mouth daily as needed for erectile dysfunction (Patient not taking: Reported on 9/11/2024), Disp: , Rfl:     simvastatin (ZOCOR) 10 mg tablet, , Disp: , Rfl:     topiramate (TOPAMAX) 50 MG tablet, 1 TABLET AT NIGHT ORALLY ONCE A DAY (NIGHTLY) 60 DAYS, Disp: , Rfl:     traZODone (DESYREL) 50 mg tablet, Take 1 tablet by mouth daily at bedtime, Disp: , Rfl:     vitamin B-12 (VITAMIN B-12) 1,000 mcg tablet, Take 2,000 mcg by mouth daily (Patient not taking: Reported on 9/22/2024), Disp: , Rfl:     Current Allergies     Allergies as of 10/10/2024    (No Known Allergies)            The following portions of the patient's history were reviewed and updated as appropriate: allergies, current medications, past family history, past medical history, past social history, past surgical history and  problem list.     Past Medical History:   Diagnosis Date    Bowel obstruction (HCC)     Clostridium difficile infection         Crohn's disease (HCC)     Depression     Hypertension     Kidney disease, chronic, stage I (GFR over 89 ml/min)     Lupus     PTSD (post-traumatic stress disorder)     Raynaud disease        Past Surgical History:   Procedure Laterality Date    ABDOMINAL SURGERY      bowel obstruction     SECTION N/A     COLON SURGERY      IR BIOPSY KIDNEY RANDOM  3/6/2024    WISDOM TOOTH EXTRACTION         Family History   Problem Relation Age of Onset    Lupus Mother     Diabetes Father     COPD Maternal Grandmother     Heart failure Maternal Grandmother     Substance Abuse Maternal Grandmother     Breast cancer Other          Medications have been verified.        Objective   /62   Temp 98.2 °F (36.8 °C)   Resp 18   LMP 10/07/2024 (Approximate)   Patient's last menstrual period was 10/07/2024 (approximate).       Physical Exam     Physical Exam  Constitutional:       General: She is not in acute distress.     Appearance: She is not ill-appearing.   Cardiovascular:      Rate and Rhythm: Regular rhythm.      Heart sounds: Normal heart sounds.   Pulmonary:      Effort: Pulmonary effort is normal.      Breath sounds: Normal breath sounds.   Abdominal:      Tenderness: There is no abdominal tenderness. There is no guarding or rebound.   Genitourinary:     Comments: Deferred

## 2024-10-17 ENCOUNTER — NURSE TRIAGE (OUTPATIENT)
Age: 24
End: 2024-10-17

## 2024-10-17 NOTE — TELEPHONE ENCOUNTER
"Pt calling due to recurring symptoms of Yeast infection. Was seen at Urgent care on 10/10 and prescribed diflucan, however, states diflucan and OTC treatments do not seem to fully clear symptoms anymore. Urgent Care provider advised patient that since patient has Lupus which compromises immune system, likely needs a stronger medication to suppress yeast infection which only gynecologist can prescribe. Which is why patient is looking to schedule appointment. Pt reports at this time severe itching, 10/10, and cottage cheese-like vaginal discharge. RN able to schedule patient to be seen tomorrow afternoon by provider for further evaluation. Pt agreeable to plan. No further questions.       Reason for Disposition  • Patient wants to be seen    Answer Assessment - Initial Assessment Questions  1. SYMPTOM: \"What's the main symptom you're concerned about?\" (e.g., pain, itching, dryness)      Cottage cheese-like discharge, itching  2. LOCATION: \"Where is the  s/s located?\" (e.g., inside/outside, left/right)      Internal  3. ONSET: \"When did the  s/s  start?\"      Ongoing/lingering symptoms  4. PAIN: \"Is there any pain?\" If Yes, ask: \"How bad is it?\" (Scale: 1-10; mild, moderate, severe)      Denies  5. ITCHING: \"Is there any itching?\" If Yes, ask: \"How bad is it?\" (Scale: 1-10; mild, moderate, severe)      10/10  6. CAUSE: \"What do you think is causing the discharge?\" \"Have you had the same problem before? What happened then?\"      Yeast  7. OTHER SYMPTOMS: \"Do you have any other symptoms?\" (e.g., fever, itching, vaginal bleeding, pain with urination, injury to genital area, vaginal foreign body)      Denies  8. PREGNANCY: \"Is there any chance you are pregnant?\" \"When was your last menstrual period?\"      Denies    Protocols used: Vaginal Symptoms-ADULT-[place]    "

## 2024-10-17 NOTE — TELEPHONE ENCOUNTER
Regarding: Yeast infection  ----- Message from Keerthi CRAMER sent at 10/17/2024  3:05 PM EDT -----  Pt established  with [facility]  calling  with symptoms of Yeast ifnection

## 2024-10-18 ENCOUNTER — OFFICE VISIT (OUTPATIENT)
Dept: OBGYN CLINIC | Facility: CLINIC | Age: 24
End: 2024-10-18
Payer: OTHER GOVERNMENT

## 2024-10-18 VITALS
BODY MASS INDEX: 27.2 KG/M2 | HEIGHT: 62 IN | WEIGHT: 147.8 LBS | SYSTOLIC BLOOD PRESSURE: 116 MMHG | DIASTOLIC BLOOD PRESSURE: 70 MMHG

## 2024-10-18 DIAGNOSIS — Z11.3 SCREEN FOR STD (SEXUALLY TRANSMITTED DISEASE): ICD-10-CM

## 2024-10-18 DIAGNOSIS — N76.0 RECURRENT VAGINITIS: Primary | ICD-10-CM

## 2024-10-18 PROCEDURE — 87591 N.GONORRHOEAE DNA AMP PROB: CPT | Performed by: PHYSICIAN ASSISTANT

## 2024-10-18 PROCEDURE — 87660 TRICHOMONAS VAGIN DIR PROBE: CPT | Performed by: PHYSICIAN ASSISTANT

## 2024-10-18 PROCEDURE — 87480 CANDIDA DNA DIR PROBE: CPT | Performed by: PHYSICIAN ASSISTANT

## 2024-10-18 PROCEDURE — 87491 CHLMYD TRACH DNA AMP PROBE: CPT | Performed by: PHYSICIAN ASSISTANT

## 2024-10-18 PROCEDURE — 99213 OFFICE O/P EST LOW 20 MIN: CPT | Performed by: PHYSICIAN ASSISTANT

## 2024-10-18 PROCEDURE — 87510 GARDNER VAG DNA DIR PROBE: CPT | Performed by: PHYSICIAN ASSISTANT

## 2024-10-18 RX ORDER — ERGOCALCIFEROL 1.25 MG/1
CAPSULE, LIQUID FILLED ORAL
COMMUNITY
Start: 2024-07-31

## 2024-10-18 RX ORDER — ONDANSETRON 8 MG/1
TABLET, ORALLY DISINTEGRATING ORAL
COMMUNITY
Start: 2024-08-11

## 2024-10-18 NOTE — PROGRESS NOTES
"Assessment/Plan:      Diagnoses and all orders for this visit:    Recurrent vaginitis  -     VAGINOSIS DNA PROBE    Screen for STD (sexually transmitted disease)  -     Chlamydia/GC amplified DNA by PCR    Other orders  -     ergocalciferol (VITAMIN D2) 50,000 units; 1 CAPSULE ORALLY ONCE A WEEK 60 DAYS  -     ondansetron (ZOFRAN-ODT) 8 mg disintegrating tablet; DISSOLVE ONE TABLET BY MOUTH EVERY 8 HOURS OR AS NEEDED FOR VOMITING. (Patient not taking: Reported on 10/18/2024)        GC/chlamydia screening and vaginal cultures done.  We will call with results and treat as necessary.  Female hygiene discussed with patient.  F/u in 2-3 wks for yearly gyn exam.  Call if symptoms worsen or change.    Subjective:     Patient ID: Angela Lemus is a 24 y.o. female.    Patient is here with complaint of recurrent vaginal infections.  Has had recurrent BV and yeast.  Has tried abx and Diflucan without much relief.  Mycoplasma and ureaplasma testing July was negative.  Most recent positive culture was in September.  Just finished course of Diflucan.  Complains of discharge, itching, and vaginal irritation.  Her skin burns when she urinates.  Denies further urinary symptoms, odor, pelvic pain, abdominal pain, n/v, and fever/chills.        Review of Systems   Constitutional:  Negative for chills and fever.   Gastrointestinal:  Negative for abdominal distention, abdominal pain, nausea and vomiting.   Genitourinary:  Positive for vaginal discharge and vaginal pain. Negative for difficulty urinating, dysuria, frequency, genital sores, hematuria, menstrual problem, pelvic pain, urgency and vaginal bleeding.        Vulvar itching; vaginal irritation         Objective:  Visit Vitals  /70 (BP Location: Left arm, Patient Position: Sitting, Cuff Size: Adult)   Ht 5' 2\" (1.575 m)   Wt 67 kg (147 lb 12.8 oz)   LMP 10/07/2024 (Approximate)   BMI 27.03 kg/m²   OB Status Implant   Smoking Status Never   BSA 1.68 m²         Physical " Exam  Vitals reviewed. Exam conducted with a chaperone present.   Constitutional:       Appearance: Normal appearance. She is well-developed.   Genitourinary:     General: Normal vulva.      Pubic Area: No rash.       Labia:         Right: No rash, tenderness, lesion or injury.         Left: No rash, tenderness, lesion or injury.       Vagina: Vaginal discharge (Moderate, white, curd-like), erythema and tenderness present. No bleeding.      Cervix: Normal.      Uterus: Normal.       Adnexa: Right adnexa normal and left adnexa normal.        Right: No mass, tenderness or fullness.          Left: No mass, tenderness or fullness.     Lymphadenopathy:      Lower Body: No right inguinal adenopathy. No left inguinal adenopathy.   Skin:     General: Skin is warm and dry.   Neurological:      Mental Status: She is alert and oriented to person, place, and time.   Psychiatric:         Mood and Affect: Mood normal.         Behavior: Behavior normal. Behavior is cooperative.         Thought Content: Thought content normal.         Judgment: Judgment normal.

## 2024-10-19 LAB
CANDIDA RRNA VAG QL PROBE: DETECTED
G VAGINALIS RRNA GENITAL QL PROBE: DETECTED
T VAGINALIS RRNA GENITAL QL PROBE: NOT DETECTED

## 2024-10-20 LAB
C TRACH DNA SPEC QL NAA+PROBE: NEGATIVE
N GONORRHOEA DNA SPEC QL NAA+PROBE: NEGATIVE

## 2024-10-21 ENCOUNTER — TELEPHONE (OUTPATIENT)
Dept: OBGYN CLINIC | Facility: CLINIC | Age: 24
End: 2024-10-21

## 2024-10-21 DIAGNOSIS — B37.31 VAGINAL CANDIDIASIS: ICD-10-CM

## 2024-10-21 DIAGNOSIS — N76.0 BV (BACTERIAL VAGINOSIS): Primary | ICD-10-CM

## 2024-10-21 DIAGNOSIS — B96.89 BV (BACTERIAL VAGINOSIS): Primary | ICD-10-CM

## 2024-10-21 RX ORDER — FLUCONAZOLE 150 MG/1
150 TABLET ORAL DAILY
Qty: 2 TABLET | Refills: 0 | Status: SHIPPED | OUTPATIENT
Start: 2024-10-21 | End: 2024-10-23

## 2024-10-21 RX ORDER — METRONIDAZOLE 500 MG/1
500 TABLET ORAL EVERY 12 HOURS SCHEDULED
Qty: 14 TABLET | Refills: 0 | Status: SHIPPED | OUTPATIENT
Start: 2024-10-21 | End: 2024-10-28

## 2024-10-21 NOTE — TELEPHONE ENCOUNTER
Spoke with patient regarding results.  GC/chlamydia screening negative.  Vaginal cultures positive for BV and Candida.  Rx for metronidazole 500 mg BID x 7 days and Diflucan x 2 doses 48 hours apart sent to pharmacy.  Instructed patient to avoid alcohol while on abx.  Finish metronidazole first, then take Diflucan.  Call if symptoms do not resolve.

## 2024-10-22 ENCOUNTER — TELEPHONE (OUTPATIENT)
Age: 24
End: 2024-10-22

## 2024-10-22 NOTE — TELEPHONE ENCOUNTER
Patient returned missed call. Told her we needed NPI and other info from infertility clinic in order to send in insurance referral. She will call back with that information.

## 2024-10-22 NOTE — TELEPHONE ENCOUNTER
Patient called, requested  insurance referral for fertility clinic/ fertility tx.   Please reach out to pt once referral is issued.

## 2024-10-24 NOTE — TELEPHONE ENCOUNTER
I need to attach the referral to a problem/diagnosis/reason for fertility referral. I have seen ehr for recurrent vaginal issues recently but I dont believe we've talked about fertility issues. Please triage to see why she is requesting the referral to I can order it. (Ex, she has had failed try at conceiving x 1 year, recurrent miscarriages, chronic medical conditions/medication use that specialist is advising reviewing with a fertility specialist, etc).

## 2024-11-12 ENCOUNTER — APPOINTMENT (OUTPATIENT)
Dept: LAB | Facility: CLINIC | Age: 24
End: 2024-11-12
Payer: OTHER GOVERNMENT

## 2024-11-12 DIAGNOSIS — M32.9 SYSTEMIC LUPUS ERYTHEMATOSUS, UNSPECIFIED SLE TYPE, UNSPECIFIED ORGAN INVOLVEMENT STATUS (HCC): ICD-10-CM

## 2024-11-12 LAB
ALBUMIN SERPL BCG-MCNC: 4.5 G/DL (ref 3.5–5)
ALP SERPL-CCNC: 59 U/L (ref 34–104)
ALT SERPL W P-5'-P-CCNC: 24 U/L (ref 7–52)
ANION GAP SERPL CALCULATED.3IONS-SCNC: 7 MMOL/L (ref 4–13)
AST SERPL W P-5'-P-CCNC: 21 U/L (ref 13–39)
BACTERIA UR QL AUTO: ABNORMAL /HPF
BASOPHILS # BLD AUTO: 0.01 THOUSANDS/ÂΜL (ref 0–0.1)
BASOPHILS NFR BLD AUTO: 0 % (ref 0–1)
BILIRUB SERPL-MCNC: 0.33 MG/DL (ref 0.2–1)
BILIRUB UR QL STRIP: NEGATIVE
BUN SERPL-MCNC: 20 MG/DL (ref 5–25)
C3 SERPL-MCNC: 123 MG/DL (ref 87–200)
C4 SERPL-MCNC: 37 MG/DL (ref 19–52)
CALCIUM SERPL-MCNC: 9.6 MG/DL (ref 8.4–10.2)
CHLORIDE SERPL-SCNC: 106 MMOL/L (ref 96–108)
CLARITY UR: CLEAR
CO2 SERPL-SCNC: 27 MMOL/L (ref 21–32)
COLOR UR: YELLOW
CREAT SERPL-MCNC: 0.88 MG/DL (ref 0.6–1.3)
CRP SERPL QL: 1.4 MG/L
EOSINOPHIL # BLD AUTO: 0.08 THOUSAND/ÂΜL (ref 0–0.61)
EOSINOPHIL NFR BLD AUTO: 2 % (ref 0–6)
ERYTHROCYTE [DISTWIDTH] IN BLOOD BY AUTOMATED COUNT: 12.6 % (ref 11.6–15.1)
GFR SERPL CREATININE-BSD FRML MDRD: 91 ML/MIN/1.73SQ M
GLUCOSE SERPL-MCNC: 111 MG/DL (ref 65–140)
GLUCOSE UR STRIP-MCNC: NEGATIVE MG/DL
HCT VFR BLD AUTO: 42 % (ref 34.8–46.1)
HGB BLD-MCNC: 13.2 G/DL (ref 11.5–15.4)
HGB UR QL STRIP.AUTO: ABNORMAL
IMM GRANULOCYTES # BLD AUTO: 0.01 THOUSAND/UL (ref 0–0.2)
IMM GRANULOCYTES NFR BLD AUTO: 0 % (ref 0–2)
KETONES UR STRIP-MCNC: NEGATIVE MG/DL
LEUKOCYTE ESTERASE UR QL STRIP: NEGATIVE
LYMPHOCYTES # BLD AUTO: 1.85 THOUSANDS/ÂΜL (ref 0.6–4.47)
LYMPHOCYTES NFR BLD AUTO: 40 % (ref 14–44)
MCH RBC QN AUTO: 28.3 PG (ref 26.8–34.3)
MCHC RBC AUTO-ENTMCNC: 31.4 G/DL (ref 31.4–37.4)
MCV RBC AUTO: 90 FL (ref 82–98)
MONOCYTES # BLD AUTO: 0.25 THOUSAND/ÂΜL (ref 0.17–1.22)
MONOCYTES NFR BLD AUTO: 5 % (ref 4–12)
MUCOUS THREADS UR QL AUTO: ABNORMAL
NEUTROPHILS # BLD AUTO: 2.47 THOUSANDS/ÂΜL (ref 1.85–7.62)
NEUTS SEG NFR BLD AUTO: 53 % (ref 43–75)
NITRITE UR QL STRIP: NEGATIVE
NON-SQ EPI CELLS URNS QL MICRO: ABNORMAL /HPF
NRBC BLD AUTO-RTO: 0 /100 WBCS
PH UR STRIP.AUTO: 6 [PH]
PLATELET # BLD AUTO: 278 THOUSANDS/UL (ref 149–390)
PMV BLD AUTO: 12.2 FL (ref 8.9–12.7)
POTASSIUM SERPL-SCNC: 3.7 MMOL/L (ref 3.5–5.3)
PROT SERPL-MCNC: 7.7 G/DL (ref 6.4–8.4)
PROT UR STRIP-MCNC: ABNORMAL MG/DL
RBC # BLD AUTO: 4.66 MILLION/UL (ref 3.81–5.12)
RBC #/AREA URNS AUTO: ABNORMAL /HPF
SODIUM SERPL-SCNC: 140 MMOL/L (ref 135–147)
SP GR UR STRIP.AUTO: 1.03 (ref 1–1.03)
UROBILINOGEN UR STRIP-ACNC: <2 MG/DL
WBC # BLD AUTO: 4.67 THOUSAND/UL (ref 4.31–10.16)
WBC #/AREA URNS AUTO: ABNORMAL /HPF

## 2024-11-12 PROCEDURE — 85025 COMPLETE CBC W/AUTO DIFF WBC: CPT

## 2024-11-12 PROCEDURE — 36415 COLL VENOUS BLD VENIPUNCTURE: CPT

## 2024-11-12 PROCEDURE — 80053 COMPREHEN METABOLIC PANEL: CPT

## 2024-11-12 PROCEDURE — 81001 URINALYSIS AUTO W/SCOPE: CPT

## 2024-11-12 PROCEDURE — 86140 C-REACTIVE PROTEIN: CPT

## 2024-11-12 PROCEDURE — 86225 DNA ANTIBODY NATIVE: CPT

## 2024-11-12 PROCEDURE — 86160 COMPLEMENT ANTIGEN: CPT

## 2024-11-13 ENCOUNTER — RESULTS FOLLOW-UP (OUTPATIENT)
Age: 24
End: 2024-11-13

## 2024-11-14 ENCOUNTER — OFFICE VISIT (OUTPATIENT)
Age: 24
End: 2024-11-14
Payer: OTHER GOVERNMENT

## 2024-11-14 ENCOUNTER — TELEPHONE (OUTPATIENT)
Age: 24
End: 2024-11-14

## 2024-11-14 VITALS
HEIGHT: 62 IN | BODY MASS INDEX: 26.68 KG/M2 | DIASTOLIC BLOOD PRESSURE: 72 MMHG | HEART RATE: 93 BPM | SYSTOLIC BLOOD PRESSURE: 106 MMHG | TEMPERATURE: 98.1 F | OXYGEN SATURATION: 95 % | WEIGHT: 145 LBS

## 2024-11-14 DIAGNOSIS — M32.14 LUPUS NEPHRITIS (HCC): ICD-10-CM

## 2024-11-14 DIAGNOSIS — I73.00 RAYNAUD'S DISEASE WITHOUT GANGRENE: ICD-10-CM

## 2024-11-14 DIAGNOSIS — M32.9 SYSTEMIC LUPUS ERYTHEMATOSUS, UNSPECIFIED SLE TYPE, UNSPECIFIED ORGAN INVOLVEMENT STATUS (HCC): Primary | ICD-10-CM

## 2024-11-14 DIAGNOSIS — L93.0 DISCOID LUPUS: ICD-10-CM

## 2024-11-14 DIAGNOSIS — Z79.899 LONG-TERM USE OF PLAQUENIL: ICD-10-CM

## 2024-11-14 DIAGNOSIS — M32.14 OTHER SYSTEMIC LUPUS ERYTHEMATOSUS WITH GLOMERULAR DISEASE (HCC): ICD-10-CM

## 2024-11-14 DIAGNOSIS — N18.1 STAGE 1 CHRONIC KIDNEY DISEASE: ICD-10-CM

## 2024-11-14 DIAGNOSIS — M25.531 RIGHT WRIST PAIN: ICD-10-CM

## 2024-11-14 LAB — DSDNA AB SER QL CLIF: NEGATIVE

## 2024-11-14 PROCEDURE — 99214 OFFICE O/P EST MOD 30 MIN: CPT | Performed by: INTERNAL MEDICINE

## 2024-11-14 RX ORDER — BELIMUMAB 200 MG/ML
SOLUTION SUBCUTANEOUS
Qty: 12 ML | Refills: 1 | Status: SHIPPED | OUTPATIENT
Start: 2024-11-14

## 2024-11-14 NOTE — TELEPHONE ENCOUNTER
PA for Benlysta SUBMITTED to [employer]    via    []CMM-KEY:    [x]Surescripts-Case ID #  [id number]  []Availity-Auth ID #  NDC #    []Faxed to plan   []Other website    []Phone call Case ID #      [x]PA sent as URGENT    All office notes, labs and other pertaining documents and studies sent. Clinical questions answered. Awaiting determination from insurance company.     Turnaround time for your insurance to make a decision on your Prior Authorization can take 7-21 business days.

## 2024-11-14 NOTE — TELEPHONE ENCOUNTER
----- Message from Johanna Daly MD sent at 11/14/2024 12:43 PM EST -----  Please do prior authorization for Benlysta autoinjector as home infusion nurses were unable to get IV access in this patient.  I think it goes through [employer].  I did write for a prescription.  Thx

## 2024-11-14 NOTE — PROGRESS NOTES
Assessment/Plan:    Systemic lupus erythematosus with glomerular disease (HCC)  Systemic lupus with kidney biopsy positive for nephritis with diffuse mesangial proliferative and focal sclerosing glomerulonephritis with focal ischemic glomerular changes consistent with lupus nephritis with minimal activity and mild to moderate chronicity.  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  History Raynaud's with no current ulcerations, however, she did have ulcerations in the past.  Persistence of right wrist pain, overall improved, with no significant findings on exam or x-ray.  She has been on Benlysta infusions, however, there is difficulty getting access to her veins, so patient has requested switch back to subcu Benlysta which has been ordered.  Await prior authorization so the patient can begin.  She does not feel she needs additional instructions for subcu injections as she had done them in the past.  She will resume 200 mg weekly subcu when prior authorization is completed.  She will continue Plaquenil, at a reduced dosage of 300 mg each week day and 200 mg each weekend day, in view of high Plaquenil level on lab work from 9/26/2024.  Will recheck Plaquenil level after the next office visit.  Lupus Avise with borderline positive index related to elevated ARON by IgG and hep 2 antibody with no evidence of disease activity with negative activation, negative Duarte antibody, and negative double-stranded DNA antibodies.  No evidence for phospholipid antibody syndrome.  Continue cold protection for Raynaud's.  She does have prescription for sildenafil but does not feel that she needs it at the present time.  If Raynaud's symptoms worsen, she will start sildenafil as she did not have benefit with calcium channel blockers.  Monitor disease activity and medication toxicity with lab work as ordered.  Will check protein to creatinine clearance with her next labs.  She has to follow-up with her nephrologist in the near future.   Follow-up with me 6 months or sooner if needed.  Continue to monitor.    Discoid lupus  Discoid lupus right thigh markedly improved since the last office visit.  I had referred her to dermatology but she has not yet made an appointment.  Continue Plaquenil, however, at a reduced dose of 300 mg each week day and 200 mg each weekend day in view of elevated level on lab work from 9/26/2024.  Will plan to recheck her Plaquenil level at the time of the next office visit in 6 months.  Monitor disease activity and medication toxicity with lab work as ordered.  Encouraged dermatology follow-up, particularly, if she has an exacerbation of her discoid lesions.  Continue to monitor.    Lupus nephritis (HCC)  Lupus nephritis with kidney biopsy in March of this year with pathology positive for diffuse mesangial proliferative and focal sclerosing glomerulonephritis with focal ischemic glomerular changes consistent with lupus nephritis with minimal activity and mild to moderate chronicity.  There was tubular atrophy and interstitial fibrosis mild to moderate with mild arterial sclerosis.  Patient was to start Repatha and increase her ARB per nephrology.  She has had several Benlysta infusions, however, she has had significant difficulty with the nurses accessing her veins, and the patient is requesting a switch to subcu weekly Benlysta.  Benlysta does have excellent data for treatment of nephritis.  She will start 200 mg subcu weekly once approved by insurance.  She has follow-up scheduled with nephrologist.  I have asked her to have the nephrologist send a copy of her evaluation for my review.  Monitor disease activity and medication toxicity with lab work as ordered.     Stage 1 chronic kidney disease  Lab Results   Component Value Date    EGFR 91 11/12/2024    EGFR 80 09/12/2024    EGFR 122 08/31/2024    CREATININE 0.88 11/12/2024    CREATININE 0.98 09/12/2024    CREATININE 0.67 08/31/2024   Kidney function stage I with  physiologic proteinuria on the more recent 24-hour urine.  Avoid nephrotoxic agents like nonsteroidals.  Patient is switching from Benlysta infusions to subcu weekly Benlysta in view of access difficulties with IV.  Monitor disease activity with complements and double-stranded DNA antibodies.  Follow-up nephrology.  Continue to monitor.    Long-term use of Plaquenil  Plaquenil level high at 1332.4 on lab work dated 9/26/2024 which prompted a decrease in dosing from 300 mg daily to 300 mg each week day and 200 mg each weekend day.  Will plan to recheck Plaquenil level after the next office visit in 6 months.  Continue to monitor.    Raynaud's disease  Patient does have prescription for sildenafil in view of incomplete response with calcium channel blockers for her Raynaud's, with history of prior digital ulcerations, however, Raynaud's has been less bothersome over the summer and early fall.  I did tell her that if she developed more significant Raynaud's or any sign of digital ulcerations that she should start sildenafil immediately as ordered.  She will call to report on symptoms as well.  Continue to monitor closely.    Right wrist pain  Right wrist arthralgias overall improved from the prior visit.  Plain film negative.  Orthopedic consult suggested observation.  Hopefully symptoms will improve with more consistent dosing with Benlysta.  Would obtain musculoskeletal ultrasound if symptoms returned.  Continue to monitor.         Problem List Items Addressed This Visit       Systemic lupus erythematosus with glomerular disease (HCC) - Primary    Systemic lupus with kidney biopsy positive for nephritis with diffuse mesangial proliferative and focal sclerosing glomerulonephritis with focal ischemic glomerular changes consistent with lupus nephritis with minimal activity and mild to moderate chronicity.  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  History Raynaud's with no current ulcerations, however, she did have  ulcerations in the past.  Persistence of right wrist pain, overall improved, with no significant findings on exam or x-ray.  She has been on Benlysta infusions, however, there is difficulty getting access to her veins, so patient has requested switch back to subcu Benlysta which has been ordered.  Await prior authorization so the patient can begin.  She does not feel she needs additional instructions for subcu injections as she had done them in the past.  She will resume 200 mg weekly subcu when prior authorization is completed.  She will continue Plaquenil, at a reduced dosage of 300 mg each week day and 200 mg each weekend day, in view of high Plaquenil level on lab work from 9/26/2024.  Will recheck Plaquenil level after the next office visit.  Lupus Avise with borderline positive index related to elevated ARON by IgG and hep 2 antibody with no evidence of disease activity with negative activation, negative Duarte antibody, and negative double-stranded DNA antibodies.  No evidence for phospholipid antibody syndrome.  Continue cold protection for Raynaud's.  She does have prescription for sildenafil but does not feel that she needs it at the present time.  If Raynaud's symptoms worsen, she will start sildenafil as she did not have benefit with calcium channel blockers.  Monitor disease activity and medication toxicity with lab work as ordered.  Will check protein to creatinine clearance with her next labs.  She has to follow-up with her nephrologist in the near future.  Follow-up with me 6 months or sooner if needed.  Continue to monitor.         Relevant Medications    Belimumab (Benlysta) 200 MG/ML SOAJ    Raynaud's disease    Patient does have prescription for sildenafil in view of incomplete response with calcium channel blockers for her Raynaud's, with history of prior digital ulcerations, however, Raynaud's has been less bothersome over the summer and early fall.  I did tell her that if she developed more  significant Raynaud's or any sign of digital ulcerations that she should start sildenafil immediately as ordered.  She will call to report on symptoms as well.  Continue to monitor closely.         Stage 1 chronic kidney disease    Lab Results   Component Value Date    EGFR 91 11/12/2024    EGFR 80 09/12/2024    EGFR 122 08/31/2024    CREATININE 0.88 11/12/2024    CREATININE 0.98 09/12/2024    CREATININE 0.67 08/31/2024   Kidney function stage I with physiologic proteinuria on the more recent 24-hour urine.  Avoid nephrotoxic agents like nonsteroidals.  Patient is switching from Benlysta infusions to subcu weekly Benlysta in view of access difficulties with IV.  Monitor disease activity with complements and double-stranded DNA antibodies.  Follow-up nephrology.  Continue to monitor.         Lupus nephritis (HCC)    Lupus nephritis with kidney biopsy in March of this year with pathology positive for diffuse mesangial proliferative and focal sclerosing glomerulonephritis with focal ischemic glomerular changes consistent with lupus nephritis with minimal activity and mild to moderate chronicity.  There was tubular atrophy and interstitial fibrosis mild to moderate with mild arterial sclerosis.  Patient was to start Repatha and increase her ARB per nephrology.  She has had several Benlysta infusions, however, she has had significant difficulty with the nurses accessing her veins, and the patient is requesting a switch to subcu weekly Benlysta.  Benlysta does have excellent data for treatment of nephritis.  She will start 200 mg subcu weekly once approved by insurance.  She has follow-up scheduled with nephrologist.  I have asked her to have the nephrologist send a copy of her evaluation for my review.  Monitor disease activity and medication toxicity with lab work as ordered.          Relevant Medications    Belimumab (Benlysta) 200 MG/ML SOAJ    Other Relevant Orders    C-reactive protein    CBC and differential     Comprehensive metabolic panel    dsDNA Antibody by IFA, Ernst kendall, with Reflex to Titer    C3 complement    C4 complement    Urinalysis with microscopic    Protein / creatinine ratio, urine    Discoid lupus    Discoid lupus right thigh markedly improved since the last office visit.  I had referred her to dermatology but she has not yet made an appointment.  Continue Plaquenil, however, at a reduced dose of 300 mg each week day and 200 mg each weekend day in view of elevated level on lab work from 9/26/2024.  Will plan to recheck her Plaquenil level at the time of the next office visit in 6 months.  Monitor disease activity and medication toxicity with lab work as ordered.  Encouraged dermatology follow-up, particularly, if she has an exacerbation of her discoid lesions.  Continue to monitor.         Relevant Medications    Belimumab (Benlysta) 200 MG/ML SOAJ    Long-term use of Plaquenil    Plaquenil level high at 1332.4 on lab work dated 9/26/2024 which prompted a decrease in dosing from 300 mg daily to 300 mg each week day and 200 mg each weekend day.  Will plan to recheck Plaquenil level after the next office visit in 6 months.  Continue to monitor.         Right wrist pain    Right wrist arthralgias overall improved from the prior visit.  Plain film negative.  Orthopedic consult suggested observation.  Hopefully symptoms will improve with more consistent dosing with Benlysta.  Would obtain musculoskeletal ultrasound if symptoms returned.  Continue to monitor.                Reviewed records, labs, and imaging with the patient in detail.  Counseled patient.  Discussion regarding my findings and recommendations.  Office visit with documentation 35 min.    Subjective:      Patient ID: Angela Lemus is a 24 y.o. female.    Complicated medical history in this 24-year-old female, with a diagnosis of lupus made when she was hospitalized for childbirth in 2021 presenting with facial swelling, arthralgias,  and preeclampsia.  She was treated with hydroxychloroquine and Imuran.  She had to discontinue the Imuran due to elevated liver function studies.  She was started on Benlysta utilizing home infusions with difficulty with vein access as she is a hard stick.  She has requested going back to subcu Benlysta injections. She has continued to have right wrist arthralgias, with no current swelling.  Overall the right wrist symptoms have improved.  She has significantly less pain.  X-rays were negative.  She has been seen by orthopedic surgery, who reconmended observation in view of no significant findings.  She did have uncontrolled hypertension with her first pregnancy and was diagnosed with preeclampsia.  She is  2 para 1 with questionable 1 first trimester miscarriage.  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  There was tubular atrophy and interstitial fibrosis mild to moderate with mild arterial sclerosis.  She was started on Repatha due to this and was told to increase her ARB.  Patient thought she may have been pregnant at the time of our initial visit, however, she was not pregnant, however, she and her fiancé are considering infertility workup in view of difficulty with conception.  The right thigh discoid lesions seen at the last visit have significantly improved. She has chronic Raynaud's with skin changes in her distal giancarlo with history of digital ulcerations, currently quiescent.  She has history of bowel obstruction with ileocecal resection.  Pathology was suggestive of Crohn's.  She did have an MRE in May of this year which did not show areas of active inflammation.  She has been treated with omeprazole 40 mg daily for GERD and she has had no recent reflux.  She  does complain of bloating and occasional constipation.  She does note dry mouth and follows with her dentist every 6 months.  She has no significant morning stiffness and no other joint pain than right wrist pain.  She has had nonradicular low back pain.  She notes that her depression symptoms have improved and she is currently maintaining on trazodone only.  She has not had more recent follow-up with behavioral health.    Imaging  Right wrist x-ray dated 6/26/2024 unremarkable.  MRE abdomen pelvis dated 5/7/2024 significant for no imaging signs of active inflammation, stricture, perianal disease, penetrating complication.  There was mildly dilated stool in contrast-filled colon and terminal ileum which may suggest constipation/slow transit.  Left ovarian simple cyst measuring 3.2 cm noted.  CAT scan of the abdomen dated 1/2/2023 significant for no acute abdominopelvic pathology identified.  Ultrasound kidneys and bladder dated 10/28/2022 significant for unremarkable kidneys.  Limited evaluation of the underdistended bladder.  CAT scan of the abdomen and pelvis dated 7/1/2022 significant for segmental prominent areas of small intestinal dilatation most pronounced with wall thickening and fecalization in the right lower quadrant.  Areas of transition noted at the mesenteric root and right lower quadrant.  Differential diagnosis mention was small bowel obstruction and severe postoperative ileus.  Left colon is decompressed however fluid is noted within the right colon.  Prominence of the right intrahepatic biliary ducts noted without associated common bile duct dilatation or gallbladder abnormality.  Left abdominal wall fluid distention with a few scattered air foci likely related to postoperative changes however felt to be difficult to exclude infection.  Free pelvic fluid with associated peritoneal wall enhancement without convincing organization.  No significant interval change in the left ovarian cyst.  MRI/MRA  head dated 12/31/2021 significant for no acute vascular abnormality.  Normal appearing MRA of the [place].  No evidence for acute infarct, hemorrhage, mass or mass effect.  Prominent pituitary gland without evidence of mass lesion identified similar to prior MRI.    Pertinent laboratory results  Lab work dated 11/2/2024 significant for urinalysis with dipstick 1+ protein.  2-4 RBCs per high-power field with no menses at the time of the specimen, however, she is due for her menstrual cycle.  Additionally she took an emergency contraceptive levonorgestrel on Monday, 11/4/2024 which may have contributed to the microscopic hematuria.  C3 and C4 complements normal.  Double-stranded DNA antibodies negative.  White blood cell count 4.67 with absolute neutrophil count 2.47.  Platelet count 278.  CRP 1.4.  Creatinine 0.88 with estimated GFR 91.  Calcium 9.6.  Lupus Avise had borderline positive index of +0.2 with positive ARON by IgG and hep 2 antibody and a dilution of 1280 in a speckled and nucleolar pattern.  Duarte antibody negative.  No evidence for complement activation.  Double-stranded DNA antibodies negative.  Suspect he positive index is on the basis of the positive ARON.  Plaquenil level was elevated at 1332.4 which prompted a decrease of her Plaquenil dosing to 300 mg each week day and 200 mg each weekend day.  She was treated for bacterial vaginosis in October.  Review of lab work dated 6/6/2024 significant for hemoglobin 11.5 with hematocrit 36.6 with normal indices.  Platelet count 251.  White blood cell count 5.11.  Creatinine 0.74 with estimated .  Calcium 9.5.  LFTs unremarkable.  Urinalysis trace protein.  1-2 WBCs and RBCs per high-power field.  Lab work dated 4/13/2024 significant for fecal calprotectin 28.  24-hour urine protein 77.25 mg however urine volume was low at 750.  Lab work dated 4/8/2024 significant for vitamin B12 936.  ANCA panel negative.  White blood cell count 4.90 with  absolute neutrophils 2.85.  Estimated GFR 89.  Uric acid 4.0.  C3 and C4 complements normal.  Vitamin D low at 24.  C-reactive protein 2.3.  Lipoprotein a elevated at 109.7.  Albumin to creatinine ratio 8.  Lab work dated 3/14/2024 significant for protein S normal.  Protein C activity normal.  Protein S activity borderline low at 68.  Lupus anticoagulant negative.  Lab work dated 2/23/2024 significant for double-stranded DNA antibodies greater than 10 positive.  RNP antibodies positive with negative Sjogren's antibodies, Duarte antibodies, and Duarte/RNP antibodies.  ARON positive in a dilution of 640 in a speckled pattern.  Double-stranded DNA antibodies positive at 29.  ANCA panel negative.  Cardiolipin antibodies negative.  Beta-2 glycoprotein 1 antibodies negative.  Histone antibody borderline positive at 1.2.  C3 and C4 complements normal.  Hepatitis B and C panel nonreactive.  Rheumatoid factor and CCP negative.  QuantiFERON gold negative.          Allergies  No Known Allergies    Home Medications    Current Outpatient Medications:     Belimumab (Benlysta) 200 MG/ML SOAJ, Inject 1 cc subcu weekly, Disp: 12 mL, Rfl: 1    bromocriptine (PARLODEL) 2.5 mg tablet, , Disp: , Rfl:     Cyanocobalamin (VITAMIN B 12 PO), Take 500 mcg by mouth 2 (two) times a day, Disp: , Rfl:     ergocalciferol (VITAMIN D2) 50,000 units, 1 CAPSULE ORALLY ONCE A WEEK 60 DAYS, Disp: , Rfl:     ferrous sulfate 325 (65 Fe) mg tablet, Take 325 mg by mouth daily with breakfast, Disp: , Rfl:     FLUoxetine (PROzac) 20 mg capsule, Take 20 mg by mouth daily, Disp: , Rfl:     Icosapent Ethyl 1 g CAPS, , Disp: , Rfl:     lidocaine (Lidoderm) 5 %, Apply 1 patch topically over 12 hours daily Remove & Discard patch within 12 hours or as directed by MD, Disp: 30 patch, Rfl: 0    NIFEdipine ER (ADALAT CC) 30 MG 24 hr tablet, Take 30 mg by mouth daily, Disp: , Rfl:     omeprazole (PriLOSEC) 20 mg delayed release capsule, Take 1 capsule (20 mg total) by  mouth daily, Disp: 30 capsule, Rfl: 3    Repatha SureClick 140 MG/ML SOAJ, INJECT 1 ML SUBCUTANEOUS EVERY 2 WEEKS 60 DAYS, Disp: , Rfl:     Saxenda injection, Inject 1.8 mg under the skin daily, Disp: , Rfl:     simvastatin (ZOCOR) 10 mg tablet, , Disp: , Rfl:     topiramate (TOPAMAX) 50 MG tablet, 1 TABLET AT NIGHT ORALLY ONCE A DAY (NIGHTLY) 60 DAYS, Disp: , Rfl:     traZODone (DESYREL) 50 mg tablet, Take 1 tablet by mouth daily at bedtime, Disp: , Rfl:     hydroxychloroquine (PLAQUENIL) 200 mg tablet, Take 1.5 tablets (300 mg total) by mouth daily with breakfast, Disp: 135 tablet, Rfl: 3    ondansetron (ZOFRAN-ODT) 8 mg disintegrating tablet, DISSOLVE ONE TABLET BY MOUTH EVERY 8 HOURS OR AS NEEDED FOR VOMITING. (Patient not taking: Reported on 2024), Disp: , Rfl:     sildenafil (REVATIO) 20 mg tablet, Take 1 tablet (20 mg total) by mouth 2 (two) times a day (Patient not taking: Reported on 2024), Disp: 180 tablet, Rfl: 1    sildenafil (VIAGRA) 25 MG tablet, Take 25 mg by mouth daily as needed for erectile dysfunction (Patient not taking: Reported on 2024), Disp: , Rfl:     vitamin B-12 (VITAMIN B-12) 1,000 mcg tablet, Take 2,000 mcg by mouth daily (Patient not taking: Reported on 2024), Disp: , Rfl:     Past Medical History  Past Medical History:   Diagnosis Date    Bowel obstruction (HCC)     Clostridium difficile infection         Crohn's disease (HCC)     Depression     Hypertension     Kidney disease, chronic, stage I (GFR over 89 ml/min)     Lupus     PTSD (post-traumatic stress disorder)     Raynaud disease        Past Surgical History   Past Surgical History:   Procedure Laterality Date    ABDOMINAL SURGERY      bowel obstruction     SECTION N/A     COLON SURGERY      IR BIOPSY KIDNEY RANDOM  3/6/2024    WISDOM TOOTH EXTRACTION         Family History   Family History   Problem Relation Age of Onset    Lupus Mother     Diabetes Father     COPD Maternal Grandmother     Heart  "failure Maternal Grandmother     Substance Abuse Maternal Grandmother     Breast cancer Other        The following portions of the patient's history were reviewed and updated as appropriate: allergies, current medications, past family history, past medical history, past social history, past surgical history, and problem list.    Review of Systems   Constitutional:  Positive for fatigue. Negative for chills and fever.   HENT:  Negative for ear pain and sore throat.         Dry mouth sees dentist q 6 mos, frequent caries   Eyes:  Negative for pain and visual disturbance.   Respiratory:  Negative for cough and shortness of breath.    Cardiovascular:  Negative for chest pain and palpitations.   Gastrointestinal:  Positive for blood in stool and constipation. Negative for abdominal pain and vomiting.        BM's with occas constipation  Hx GERD quiescent  Hemorrhoid with bleeding occas   Genitourinary:  Negative for dysuria and hematuria.   Musculoskeletal:  Positive for arthralgias (right wrist pain less bothersome) and back pain (resolved).   Skin:  Positive for color change (Raynaud's with hx digital ulcers) and rash (Hx discoid lesion righ thigh currently improved).   Neurological:  Negative for seizures and syncope.   All other systems reviewed and are negative.        Objective:      /72   Pulse 93   Temp 98.1 °F (36.7 °C) (Temporal)   Ht 5' 2\" (1.575 m)   Wt 65.8 kg (145 lb)   SpO2 95%   BMI 26.52 kg/m²          Physical Exam  Vitals reviewed.   Constitutional:       Appearance: Normal appearance.   HENT:      Head: Normocephalic.      Nose:      Comments: Nose and throat unremarkable.  Eyes:      Extraocular Movements: Extraocular movements intact.   Neck:      Comments: Without masses, thyromegaly, lymphadenopathy  Cardiovascular:      Rate and Rhythm: Regular rhythm.   Pulmonary:      Breath sounds: Normal breath sounds.   Abdominal:      Palpations: Abdomen is soft.   Musculoskeletal:      Cervical " back: Neck supple.      Comments: Neck full range of motion as do shoulders, elbows, and wrist.  Very slight tenderness noted right wrist with no active synovitis.  Hands no synovitis straight leg raising negative bilaterally.  Hips full range of motion.  Knees full range of motion with patellofemoral crepitus.  Ankles full range of motion.  Feet no synovitis.   Skin:     General: Skin is warm.      Comments: Patient with near resolution of the discoid lesions right thigh.  Raynaud's fingers and toes with periungual erythema and dilated nailfold capillary loops.  No digital ulcerations noted.    Neurological:      General: No focal deficit present.               This note was written in part using the assistance of the [employer] [id number] microphone system. Those portions using this system have been dictated and not read.

## 2024-11-14 NOTE — PATIENT INSTRUCTIONS
I put in for lab work to get in 3 months.  Once I see that lab work come back, I will put in for the lab work before your next office visit in 6 months.  Follow-up with the nephrologist.  I did submit for prior authorization for Benlysta subcu weekly injections.  We will notify you as soon as it has been cleared.  Call the home infusion company and cancel your follow-up infusions.  If you get significant wrist pain, call the office and I will refer you for an ultrasound.  I will also likely refer you directly to the hand surgeon, Dr. Gonzalez.  Ask the nephrologist to send me a copy of her notes.  I have sent her my first note and will continue to send her notes with each visit.

## 2024-11-15 NOTE — TELEPHONE ENCOUNTER
LAWANDA Dolan APPROVED     Date(s) approved November 14, 2024 to November 14, 2025     Case #      Patient advised by          []relocalityhart Message  []Phone call   [x]LMOM  []L/M to call office as no active Communication consent on file  []Unable to leave detailed message as VM not approved on Communication consent       Pharmacy advised by    [x]Fax  []Phone call    Approval letter scanned into Media Yes

## 2024-11-16 PROBLEM — M25.531 RIGHT WRIST PAIN: Status: ACTIVE | Noted: 2024-11-16

## 2024-11-16 PROBLEM — M32.14 SYSTEMIC LUPUS ERYTHEMATOSUS WITH GLOMERULAR DISEASE (HCC): Status: ACTIVE | Noted: 2023-01-11

## 2024-11-16 NOTE — ASSESSMENT & PLAN NOTE
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  History Raynaud's with no current ulcerations, however, she did have ulcerations in the past.  Persistence of right wrist pain, overall improved, with no significant findings on exam or x-ray.  She has been on Benlysta infusions, however, there is difficulty getting access to her veins, so patient has requested switch back to subcu Benlysta which has been ordered.  Await prior authorization so the patient can begin.  She does not feel she needs additional instructions for subcu injections as she had done them in the past.  She will resume 200 mg weekly subcu when prior authorization is completed.  She will continue Plaquenil, at a reduced dosage of 300 mg each week day and 200 mg each weekend day, in view of high Plaquenil level on lab work from 9/26/2024.  Will recheck Plaquenil level after the next office visit.  Lupus Avise with borderline positive index related to elevated ARON by IgG and hep 2 antibody with no evidence of disease activity with negative activation, negative Duarte antibody, and negative double-stranded DNA antibodies.  No evidence for phospholipid antibody syndrome.  Continue cold protection for Raynaud's.  She does have prescription for sildenafil but does not feel that she needs it at the present time.  If Raynaud's symptoms worsen, she will start sildenafil as she did not have benefit with calcium channel blockers.  Monitor disease activity and medication toxicity with lab work as ordered.  Will check protein to creatinine clearance with her next labs.  She has to follow-up with her nephrologist in the near future.  Follow-up with me 6 months or sooner if needed.  Continue to monitor.

## 2024-11-16 NOTE — ASSESSMENT & PLAN NOTE
Right wrist arthralgias overall improved from the prior visit.  Plain film negative.  Orthopedic consult suggested observation.  Hopefully symptoms will improve with more consistent dosing with Benlysta.  Would obtain musculoskeletal ultrasound if symptoms returned.  Continue to monitor.

## 2024-11-16 NOTE — ASSESSMENT & PLAN NOTE
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  There was tubular atrophy and interstitial fibrosis mild to moderate with mild arterial sclerosis.  Patient was to start Repatha and increase her ARB per nephrology.  She has had several Benlysta infusions, however, she has had significant difficulty with the nurses accessing her veins, and the patient is requesting a switch to subcu weekly Benlysta.  Benlysta does have excellent data for treatment of nephritis.  She will start 200 mg subcu weekly once approved by insurance.  She has follow-up scheduled with nephrologist.  I have asked her to have the nephrologist send a copy of her evaluation for my review.  Monitor disease activity and medication toxicity with lab work as ordered.

## 2024-11-16 NOTE — ASSESSMENT & PLAN NOTE
Patient does have prescription for sildenafil in view of incomplete response with calcium channel blockers for her Raynaud's, with history of prior digital ulcerations, however, Raynaud's has been less bothersome over the summer and early fall.  I did tell her that if she developed more significant Raynaud's or any sign of digital ulcerations that she should start sildenafil immediately as ordered.  She will call to report on symptoms as well.  Continue to monitor closely.

## 2024-11-16 NOTE — ASSESSMENT & PLAN NOTE
Plaquenil level high at 1332.4 on lab work dated 9/26/2024 which prompted a decrease in dosing from 300 mg daily to 300 mg each week day and 200 mg each weekend day.  Will plan to recheck Plaquenil level after the next office visit in 6 months.  Continue to monitor.

## 2024-11-16 NOTE — ASSESSMENT & PLAN NOTE
Lab Results   Component Value Date    EGFR 91 11/12/2024    EGFR 80 09/12/2024    EGFR 122 08/31/2024    CREATININE 0.88 11/12/2024    CREATININE 0.98 09/12/2024    CREATININE 0.67 08/31/2024   Kidney function stage I with physiologic proteinuria on the more recent 24-hour urine.  Avoid nephrotoxic agents like nonsteroidals.  Patient is switching from Benlysta infusions to subcu weekly Benlysta in view of access difficulties with IV.  Monitor disease activity with complements and double-stranded DNA antibodies.  Follow-up nephrology.  Continue to monitor.

## 2024-11-16 NOTE — ASSESSMENT & PLAN NOTE
Discoid lupus right thigh markedly improved since the last office visit.  I had referred her to dermatology but she has not yet made an appointment.  Continue Plaquenil, however, at a reduced dose of 300 mg each week day and 200 mg each weekend day in view of elevated level on lab work from 9/26/2024.  Will plan to recheck her Plaquenil level at the time of the next office visit in 6 months.  Monitor disease activity and medication toxicity with lab work as ordered.  Encouraged dermatology follow-up, particularly, if she has an exacerbation of her discoid lesions.  Continue to monitor.

## 2024-11-18 ENCOUNTER — PATIENT MESSAGE (OUTPATIENT)
Age: 24
End: 2024-11-18

## 2024-11-19 NOTE — TELEPHONE ENCOUNTER
[facility] calling regarding pt's IV Benlysta. They are wondering if this will be D/C'd before pt's next appointment with them on 12/5. Do see that subcutaneous Benlysta was approved. Please send DC orders for IV Benlysta to [facility] at [phone].

## 2024-11-26 ENCOUNTER — APPOINTMENT (EMERGENCY)
Dept: RADIOLOGY | Facility: HOSPITAL | Age: 24
End: 2024-11-26
Payer: OTHER GOVERNMENT

## 2024-11-26 ENCOUNTER — HOSPITAL ENCOUNTER (EMERGENCY)
Facility: HOSPITAL | Age: 24
Discharge: HOME | End: 2024-11-26
Attending: EMERGENCY MEDICINE | Admitting: EMERGENCY MEDICINE
Payer: OTHER GOVERNMENT

## 2024-11-26 VITALS
SYSTOLIC BLOOD PRESSURE: 125 MMHG | TEMPERATURE: 98.2 F | DIASTOLIC BLOOD PRESSURE: 71 MMHG | OXYGEN SATURATION: 98 % | RESPIRATION RATE: 16 BRPM | HEART RATE: 102 BPM

## 2024-11-26 DIAGNOSIS — S82.842A CLOSED BIMALLEOLAR FRACTURE OF LEFT ANKLE, INITIAL ENCOUNTER: Primary | ICD-10-CM

## 2024-11-26 PROCEDURE — 96374 THER/PROPH/DIAG INJ IV PUSH: CPT

## 2024-11-26 PROCEDURE — 99283 EMERGENCY DEPT VISIT LOW MDM: CPT

## 2024-11-26 PROCEDURE — 73610 X-RAY EXAM OF ANKLE: CPT

## 2024-11-26 PROCEDURE — 73630 X-RAY EXAM OF FOOT: CPT

## 2024-11-26 RX ORDER — FENTANYL CITRATE 50 UG/ML
25 INJECTION, SOLUTION INTRAMUSCULAR; INTRAVENOUS ONCE
Refills: 0 | Status: COMPLETED | OUTPATIENT
Start: 2024-11-26 | End: 2024-11-26

## 2024-11-26 RX ORDER — SENNOSIDES 8.6 MG
650 CAPSULE ORAL EVERY 8 HOURS PRN
Qty: 30 TABLET | Refills: 0 | Status: SHIPPED | OUTPATIENT
Start: 2024-11-26 | End: 2024-12-11

## 2024-11-26 RX ORDER — OXYCODONE HYDROCHLORIDE 5 MG/1
5 TABLET ORAL EVERY 4 HOURS PRN
Qty: 15 TABLET | Refills: 0 | Status: SHIPPED | OUTPATIENT
Start: 2024-11-26 | End: 2024-11-30 | Stop reason: ALTCHOICE

## 2024-11-26 RX ORDER — ACETAMINOPHEN 325 MG/1
650 TABLET ORAL ONCE
Status: COMPLETED | OUTPATIENT
Start: 2024-11-26 | End: 2024-11-26

## 2024-11-26 RX ORDER — IBUPROFEN 600 MG/1
600 TABLET, FILM COATED ORAL ONCE
Status: COMPLETED | OUTPATIENT
Start: 2024-11-26 | End: 2024-11-26

## 2024-11-26 RX ADMIN — FENTANYL CITRATE 25 MCG: 50 INJECTION INTRAMUSCULAR; INTRAVENOUS at 21:27

## 2024-11-26 RX ADMIN — IBUPROFEN 600 MG: 600 TABLET, FILM COATED ORAL at 20:29

## 2024-11-26 RX ADMIN — ACETAMINOPHEN 650 MG: 325 TABLET, FILM COATED ORAL at 20:29

## 2024-11-26 NOTE — Clinical Note
Angela Lemus was seen and treated in our emergency department on 11/26/2024.        No work until cleared by Family Doctor/Orthopedics    Non-weight bearing on the Left leg    Diagnosis: Bimalleolar ankle fracture    Angela  .    She may return on this date:     Non-weight bearing status on left leg until cleared by orthopedic surgery      If you have any questions or concerns, please don't hesitate to call.      Imelda Milner MD    ______________________________           _______________          _______________  Hospital Representative                              Date                                Time

## 2024-11-27 NOTE — DISCHARGE INSTRUCTIONS
You were seen today for a broken ankle. Do not bear weight on this leg. Do not get your splint wet. Please return to the ER if you noticed and change in color of your toes, numbness, or tingling.  Please follow up with orthopedic surgery as soon as possible for continued care (referral and phone number on this paper).    You are not clear to bear weight on the leg until cleared by orthopedic surgery.

## 2024-11-27 NOTE — ED PROVIDER NOTES
Time reflects when diagnosis was documented in both MDM as applicable and the Disposition within this note       Time User Action Codes Description Comment    11/26/2024  8:37 PM AlfImelda pedersen Add [S82.842A] Closed bimalleolar fracture of left ankle, initial encounter           ED Disposition       ED Disposition   Discharge    Condition   Stable    Date/Time   Tue Nov 26, 2024  8:53 PM    Comment   Angela Lemus discharge to home/self care.                   Assessment & Plan       Medical Decision Making  Amount and/or Complexity of Data Reviewed  Radiology: ordered and independent interpretation performed.    Risk  OTC drugs.  Prescription drug management.        Patient is a 24 y.o. female  who presents to the ED with CC L ankle pain. Was mopping the floor when she had a L ankle inversion injury and fell to her knees. Normal pulses and sensation. Swelling to medial malleolus with TTP over medial and lateral malleolus    Vital signs stable. Exam as listed below.    Differential diagnosis includes but is not limited to sprain vs ligamentous tear vs fracture     Plan analgesia, foot xray. Ankle xray    View ED course above for further discussion on patient workup.     All labs reviewed and utilized in the medical decision making process  All radiology studies independently viewed by me and interpreted by the radiologist.  I reviewed all testing with the patient.     Upon re-evaluation patient has bimalleolar fracture. Splinted with stirrup splint, non-weight bearing, work note, follow up with orthopedic surgery.     ED Course as of 11/27/24 0054   Tue Nov 26, 2024 2036 Bimalleolar fracture        Medications   acetaminophen (TYLENOL) tablet 650 mg (650 mg Oral Given 11/26/24 2029)   ibuprofen (MOTRIN) tablet 600 mg (600 mg Oral Given 11/26/24 2029)   fentaNYL injection 25 mcg (25 mcg Intravenous Given 11/26/24 2127)       ED Risk Strat Scores                           SBIRT 22yo+      Flowsheet Row Most  Recent Value   Initial Alcohol Screen: US AUDIT-C     1. How often do you have a drink containing alcohol? 0 Filed at: 2024   2. How many drinks containing alcohol do you have on a typical day you are drinking?  0 Filed at: 2024   3b. FEMALE Any Age, or MALE 65+: How often do you have 4 or more drinks on one occassion? 0 Filed at: 2024   Audit-C Score 0 Filed at: 2024   ARNOL: How many times in the past year have you...    Used an illegal drug or used a prescription medication for non-medical reasons? Never Filed at: 2024                            History of Present Illness       Chief Complaint   Patient presents with    Leg Injury     Pt states when mopping tonight fell backwards and twisted her L ankle. Pt states area is painful, black and blue and swollen. Denies HS.        Past Medical History:   Diagnosis Date    Bowel obstruction (HCC)     Clostridium difficile infection         Crohn's disease (HCC)     Depression     Hypertension     Kidney disease, chronic, stage I (GFR over 89 ml/min)     Lupus     PTSD (post-traumatic stress disorder)     Raynaud disease       Past Surgical History:   Procedure Laterality Date    ABDOMINAL SURGERY      bowel obstruction     SECTION N/A     COLON SURGERY      IR BIOPSY KIDNEY RANDOM  3/6/2024    WISDOM TOOTH EXTRACTION        Family History   Problem Relation Age of Onset    Lupus Mother     Diabetes Father     COPD Maternal Grandmother     Heart failure Maternal Grandmother     Substance Abuse Maternal Grandmother     Breast cancer Other       Social History     Tobacco Use    Smoking status: Never    Smokeless tobacco: Never   Vaping Use    Vaping status: Never Used   Substance Use Topics    Alcohol use: Not Currently     Comment: socially    Drug use: Never      E-Cigarette/Vaping    E-Cigarette Use Never User       E-Cigarette/Vaping Substances    Nicotine No     THC No     CBD No     Flavoring No      Other No     Unknown No       I have reviewed and agree with the history as documented.     24 y.o. female CC L ankle pain        Review of Systems   Constitutional:  Negative for activity change and diaphoresis.   Respiratory:  Negative for apnea.    Cardiovascular:  Negative for chest pain.   Musculoskeletal:  Positive for gait problem and joint swelling.   Skin:  Positive for color change and wound.           Objective       ED Triage Vitals [11/26/24 1921]   Temperature Pulse Blood Pressure Respirations SpO2 Patient Position - Orthostatic VS   98.2 °F (36.8 °C) 102 125/71 16 98 % --      Temp Source Heart Rate Source BP Location FiO2 (%) Pain Score    Oral Monitor -- -- 9      Vitals      Date and Time Temp Pulse SpO2 Resp BP Pain Score FACES Pain Rating User   11/26/24 1922 -- -- 98 % -- -- -- -- AS   11/26/24 1921 98.2 °F (36.8 °C) 102 98 % 16 125/71 9 -- AS            Physical Exam  Constitutional:       Appearance: Normal appearance.   HENT:      Head: Normocephalic and atraumatic.      Nose: Nose normal.      Mouth/Throat:      Mouth: Mucous membranes are moist.   Eyes:      Pupils: Pupils are equal, round, and reactive to light.   Cardiovascular:      Pulses: Normal pulses.   Pulmonary:      Effort: Pulmonary effort is normal.   Abdominal:      General: Abdomen is flat.   Musculoskeletal:         General: Swelling, tenderness and signs of injury present.      Right lower leg: No edema.      Left lower leg: No edema.      Comments: TTP over medial and lateral malleolus with swelling   TTP anterior tibia   Neurovascularly intact BLE    Skin:     General: Skin is warm and dry.   Neurological:      General: No focal deficit present.      Mental Status: She is alert and oriented to person, place, and time.         Results Reviewed       None            XR foot 3+ views LEFT   ED Interpretation by Thomas Brower MD (11/26 2023)   Medial malleolus and posterior malleolus fracture.      XR ankle 3+ views LEFT    ED Interpretation by Thomas Brower MD (2023)   Medial malleolus and posterior malleolus fracture.          Splint application    Date/Time: 2024 9:53 PM    Performed by: Imelda Milner MD  Authorized by: Imelda Milner MD  Universal Protocol:  procedure performed by consultantConsent: Verbal consent obtained.  Consent given by: patient    Pre-procedure details:     Sensation:  Normal  Procedure details:     Laterality:  Left    Location:  Leg    Leg:  L lower leg    Splint type:  Short leg    Supplies:  Cotton padding, Ortho-Glass and elastic bandage  Post-procedure details:     Pain:  Improved    Sensation:  Normal    Patient tolerance of procedure:  Tolerated well, no immediate complications      ED Medication and Procedure Management   Prior to Admission Medications   Prescriptions Last Dose Informant Patient Reported? Taking?   Belimumab (Benlysta) 200 MG/ML SOAJ   No No   Sig: Inject 1 cc subcu weekly   Cyanocobalamin (VITAMIN B 12 PO)  Self Yes No   Sig: Take 500 mcg by mouth 2 (two) times a day   FLUoxetine (PROzac) 20 mg capsule   Yes No   Sig: Take 20 mg by mouth daily   Icosapent Ethyl 1 g CAPS  Self Yes No   NIFEdipine ER (ADALAT CC) 30 MG 24 hr tablet   Yes No   Sig: Take 30 mg by mouth daily   Repatha SureClick 140 MG/ML SOAJ   Yes No   Sig: INJECT 1 ML SUBCUTANEOUS EVERY 2 WEEKS 60 DAYS   Saxenda injection   Yes No   Sig: Inject 1.8 mg under the skin daily   bromocriptine (PARLODEL) 2.5 mg tablet  Self Yes No   ergocalciferol (VITAMIN D2) 50,000 units   Yes No   Si CAPSULE ORALLY ONCE A WEEK 60 DAYS   ferrous sulfate 325 (65 Fe) mg tablet  Self Yes No   Sig: Take 325 mg by mouth daily with breakfast   hydroxychloroquine (PLAQUENIL) 200 mg tablet  Self No No   Sig: Take 1.5 tablets (300 mg total) by mouth daily with breakfast   lidocaine (Lidoderm) 5 %  Self No No   Sig: Apply 1 patch topically over 12 hours daily Remove & Discard patch within 12 hours or as directed by MD    omeprazole (PriLOSEC) 20 mg delayed release capsule  Self No No   Sig: Take 1 capsule (20 mg total) by mouth daily   ondansetron (ZOFRAN-ODT) 8 mg disintegrating tablet   Yes No   Sig: DISSOLVE ONE TABLET BY MOUTH EVERY 8 HOURS OR AS NEEDED FOR VOMITING.   Patient not taking: Reported on 2024   sildenafil (REVATIO) 20 mg tablet   No No   Sig: Take 1 tablet (20 mg total) by mouth 2 (two) times a day   Patient not taking: Reported on 2024   sildenafil (VIAGRA) 25 MG tablet  Self Yes No   Sig: Take 25 mg by mouth daily as needed for erectile dysfunction   Patient not taking: Reported on 2024   simvastatin (ZOCOR) 10 mg tablet   Yes No   topiramate (TOPAMAX) 50 MG tablet   Yes No   Si TABLET AT NIGHT ORALLY ONCE A DAY (NIGHTLY) 60 DAYS   traZODone (DESYREL) 50 mg tablet  Self Yes No   Sig: Take 1 tablet by mouth daily at bedtime   vitamin B-12 (VITAMIN B-12) 1,000 mcg tablet  Self Yes No   Sig: Take 2,000 mcg by mouth daily   Patient not taking: Reported on 2024      Facility-Administered Medications: None     Discharge Medication List as of 2024  9:00 PM        START taking these medications    Details   acetaminophen (TYLENOL) 650 mg CR tablet Take 1 tablet (650 mg total) by mouth every 8 (eight) hours as needed for mild pain, Starting 2024, Normal      oxyCODONE (Roxicodone) 5 immediate release tablet Take 1 tablet (5 mg total) by mouth every 4 (four) hours as needed for moderate pain for up to 10 days Max Daily Amount: 30 mg, Starting 2024, Until 2024 at 2359, Normal           CONTINUE these medications which have NOT CHANGED    Details   Belimumab (Benlysta) 200 MG/ML SOAJ Inject 1 cc subcu weekly, Normal      bromocriptine (PARLODEL) 2.5 mg tablet Historical Med      !! Cyanocobalamin (VITAMIN B 12 PO) Take 500 mcg by mouth 2 (two) times a day, Historical Med      ergocalciferol (VITAMIN D2) 50,000 units 1 CAPSULE ORALLY ONCE A WEEK 60 DAYS, Historical  Med      ferrous sulfate 325 (65 Fe) mg tablet Take 325 mg by mouth daily with breakfast, Historical Med      FLUoxetine (PROzac) 20 mg capsule Take 20 mg by mouth daily, Starting Wed 9/11/2024, Historical Med      hydroxychloroquine (PLAQUENIL) 200 mg tablet Take 1.5 tablets (300 mg total) by mouth daily with breakfast, Starting Fri 2/23/2024, Until Wed 9/11/2024, Normal      Icosapent Ethyl 1 g CAPS Historical Med      lidocaine (Lidoderm) 5 % Apply 1 patch topically over 12 hours daily Remove & Discard patch within 12 hours or as directed by MD, Starting Fri 3/22/2024, Normal      NIFEdipine ER (ADALAT CC) 30 MG 24 hr tablet Take 30 mg by mouth daily, Starting Tue 9/17/2024, Historical Med      omeprazole (PriLOSEC) 20 mg delayed release capsule Take 1 capsule (20 mg total) by mouth daily, Starting Thu 8/24/2023, Normal      ondansetron (ZOFRAN-ODT) 8 mg disintegrating tablet DISSOLVE ONE TABLET BY MOUTH EVERY 8 HOURS OR AS NEEDED FOR VOMITING., Historical Med      Repatha SureClick 140 MG/ML SOAJ INJECT 1 ML SUBCUTANEOUS EVERY 2 WEEKS 60 DAYS, Historical Med      Saxenda injection Inject 1.8 mg under the skin daily, Starting Fri 9/13/2024, Historical Med      sildenafil (REVATIO) 20 mg tablet Take 1 tablet (20 mg total) by mouth 2 (two) times a day, Starting Tue 8/13/2024, Normal      sildenafil (VIAGRA) 25 MG tablet Take 25 mg by mouth daily as needed for erectile dysfunction, Historical Med      simvastatin (ZOCOR) 10 mg tablet Historical Med      topiramate (TOPAMAX) 50 MG tablet 1 TABLET AT NIGHT ORALLY ONCE A DAY (NIGHTLY) 60 DAYS, Historical Med      traZODone (DESYREL) 50 mg tablet Take 1 tablet by mouth daily at bedtime, Starting Tue 8/29/2023, Historical Med      !! vitamin B-12 (VITAMIN B-12) 1,000 mcg tablet Take 2,000 mcg by mouth daily, Starting Wed 9/13/2023, Historical Med       !! - Potential duplicate medications found. Please discuss with provider.          ED SEPSIS DOCUMENTATION   Time  reflects when diagnosis was documented in both MDM as applicable and the Disposition within this note       Time User Action Codes Description Comment    11/26/2024  8:37 PM Imelda Milner Add [S82.842A] Closed bimalleolar fracture of left ankle, initial encounter                  Imelda Milner MD  11/27/24 0054

## 2024-11-27 NOTE — ED ATTENDING ATTESTATION
11/26/2024  I, Thomas Brower MD, saw and evaluated the patient. I have discussed the patient with the resident/non-physician practitioner and agree with the resident's/non-physician practitioner's findings, Plan of Care, and MDM as documented in the resident's/non-physician practitioner's note, except where noted. All available labs and Radiology studies were reviewed.  I was present for key portions of any procedure(s) performed by the resident/non-physician practitioner and I was immediately available to provide assistance.       At this point I agree with the current assessment done in the Emergency Department.  I have conducted an independent evaluation of this patient a history and physical is as follows:    ED Course     Male presenting to the emergency department for evaluation of right ankle pain after inversion injury.  This is intact.  Tender to palpation.    MEDICAL DECISION MAKING    Number and Complexity of Problems  Differential diagnosis: Fracture versus ankle sprain.    Medical Decision Making Data    My X-ray interpretation: Bimaleolar fracture.      XR foot 3+ views LEFT   ED Interpretation   Medial malleolus and posterior malleolus fracture.      XR ankle 3+ views LEFT   ED Interpretation   Medial malleolus and posterior malleolus fracture.            Treatment and Disposition  ED course: Urgency department.  Patient was counseled on being nonweightbearing.  Prescription for oxycodone was transmitted to the patient's pharmacy.  Recommend follow-up with orthopedics as an outpatient.  Shared decision making: Patient agrees with outpatient followup  Code status: full code              Critical Care Time  Procedures

## 2024-11-30 ENCOUNTER — OFFICE VISIT (OUTPATIENT)
Dept: OBGYN CLINIC | Facility: CLINIC | Age: 24
End: 2024-11-30
Payer: OTHER GOVERNMENT

## 2024-11-30 VITALS — BODY MASS INDEX: 26.52 KG/M2 | HEIGHT: 62 IN

## 2024-11-30 DIAGNOSIS — S82.842A CLOSED BIMALLEOLAR FRACTURE OF LEFT ANKLE, INITIAL ENCOUNTER: ICD-10-CM

## 2024-11-30 PROCEDURE — 99214 OFFICE O/P EST MOD 30 MIN: CPT | Performed by: PHYSICIAN ASSISTANT

## 2024-11-30 RX ORDER — HYDROCODONE BITARTRATE AND ACETAMINOPHEN 5; 325 MG/1; MG/1
1 TABLET ORAL EVERY 4 HOURS PRN
Qty: 15 TABLET | Refills: 0 | Status: SHIPPED | OUTPATIENT
Start: 2024-11-30

## 2024-11-30 NOTE — PROGRESS NOTES
Assessment & Plan   Diagnoses and all orders for this visit:    Closed bimalleolar fracture of left ankle, initial encounter  - Remain in splint  - Non-weightbearing on crutches  - Elevate, ice as needed  - D/C oxycodone.  Small, one-time rx for Norco provided  - Follow up on Monday or Tuesday with Dr. Reynolds or one of his partners for a surgical consult           Subjective   Patient ID: Angela Lemus is a 24 y.o. female.    There were no vitals filed for this visit.  25yo female comes in for an evaluation of her left ankle.  She was injured on 23 when she slipped and fell while mopping the floor.  Xrays in the ED demonstrated a bimalleolar fracture.  She was treated with a splint and presents on crutches.  The pain is dull in character, mild in severity, does not radiate and is not associated with numbness.  She was prescribed oxycodone, but has been unable to take it due to a side effect of nausea and vomiting.          The following portions of the patient's history were reviewed and updated as appropriate: allergies, current medications, past family history, past medical history, past social history, past surgical history, and problem list.    Review of Systems  Ortho Exam  Past Medical History:   Diagnosis Date    Bowel obstruction (HCC)     Clostridium difficile infection         Crohn's disease (HCC)     Depression     Hypertension     Kidney disease, chronic, stage I (GFR over 89 ml/min)     Lupus     PTSD (post-traumatic stress disorder)     Raynaud disease      Past Surgical History:   Procedure Laterality Date    ABDOMINAL SURGERY      bowel obstruction     SECTION N/A     COLON SURGERY      IR BIOPSY KIDNEY RANDOM  3/6/2024    WISDOM TOOTH EXTRACTION       Family History   Problem Relation Age of Onset    Lupus Mother     Diabetes Father     COPD Maternal Grandmother     Heart failure Maternal Grandmother     Substance Abuse Maternal Grandmother     Breast cancer Other      Social  History     Occupational History    Not on file   Tobacco Use    Smoking status: Never    Smokeless tobacco: Never   Vaping Use    Vaping status: Never Used   Substance and Sexual Activity    Alcohol use: Not Currently     Comment: socially    Drug use: Never    Sexual activity: Yes     Partners: Male     Birth control/protection: Condom Male       Review of Systems   Constitutional: Negative.    HENT: Negative.    Eyes: Negative.    Respiratory: Negative.    Cardiovascular: Negative.    Gastrointestinal: Negative.    Endocrine: Negative.    Genitourinary: Negative.    Musculoskeletal: As below..   Allergic/Immunologic: Negative.    Neurological: Negative.    Hematological: Negative.    Psychiatric/Behavioral: Negative.          Objective   Physical Exam      Xray:  I have personally reviewed pertinent films in PACS and my interpretation is Nondisplaced bimalleolar ankle fracture, medial and posterior malleoli.      Constitutional: Awake, Alert, Oriented  Eyes: EOMI  Psych: Mood and affect appropriate  Heart: regular rate   Lungs: No audible wheezing  Abdomen: No guarding  Lymph: no lymphedema            left ankle:  Appearance  Splint left in place  Palpation  No tenderness of the proximal fibula or fibular shaft  ROM  Not tested due to fracture status  Motor  Not tested due to fracture status  NVI distally

## 2024-12-02 ENCOUNTER — TELEPHONE (OUTPATIENT)
Age: 24
End: 2024-12-02

## 2024-12-02 ENCOUNTER — APPOINTMENT (OUTPATIENT)
Dept: LAB | Facility: HOSPITAL | Age: 24
End: 2024-12-02
Attending: STUDENT IN AN ORGANIZED HEALTH CARE EDUCATION/TRAINING PROGRAM
Payer: OTHER GOVERNMENT

## 2024-12-02 ENCOUNTER — HOSPITAL ENCOUNTER (OUTPATIENT)
Dept: RADIOLOGY | Facility: HOSPITAL | Age: 24
Discharge: HOME | End: 2024-12-02
Attending: STUDENT IN AN ORGANIZED HEALTH CARE EDUCATION/TRAINING PROGRAM
Payer: OTHER GOVERNMENT

## 2024-12-02 ENCOUNTER — OFFICE VISIT (OUTPATIENT)
Dept: OBGYN CLINIC | Facility: CLINIC | Age: 24
End: 2024-12-02
Payer: OTHER GOVERNMENT

## 2024-12-02 VITALS
BODY MASS INDEX: 26.68 KG/M2 | WEIGHT: 145 LBS | HEART RATE: 113 BPM | SYSTOLIC BLOOD PRESSURE: 119 MMHG | DIASTOLIC BLOOD PRESSURE: 89 MMHG | HEIGHT: 62 IN

## 2024-12-02 DIAGNOSIS — M32.14 LUPUS NEPHRITIS (HCC): ICD-10-CM

## 2024-12-02 DIAGNOSIS — S82.842A CLOSED BIMALLEOLAR FRACTURE OF LEFT ANKLE, INITIAL ENCOUNTER: ICD-10-CM

## 2024-12-02 DIAGNOSIS — L93.0 DISCOID LUPUS: ICD-10-CM

## 2024-12-02 DIAGNOSIS — S82.842A CLOSED BIMALLEOLAR FRACTURE OF LEFT ANKLE, INITIAL ENCOUNTER: Primary | ICD-10-CM

## 2024-12-02 DIAGNOSIS — M32.14 OTHER SYSTEMIC LUPUS ERYTHEMATOSUS WITH GLOMERULAR DISEASE (HCC): ICD-10-CM

## 2024-12-02 DIAGNOSIS — I12.9 RENAL HYPERTENSION: ICD-10-CM

## 2024-12-02 LAB
ALBUMIN SERPL BCG-MCNC: 4.5 G/DL (ref 3.5–5)
ALP SERPL-CCNC: 86 U/L (ref 34–104)
ALT SERPL W P-5'-P-CCNC: 54 U/L (ref 7–52)
ANION GAP SERPL CALCULATED.3IONS-SCNC: 10 MMOL/L (ref 4–13)
APTT PPP: 28 SECONDS (ref 23–34)
AST SERPL W P-5'-P-CCNC: 52 U/L (ref 13–39)
B-HCG SERPL-ACNC: <0.6 MIU/ML (ref 0–5)
BASOPHILS # BLD AUTO: 0.02 THOUSANDS/ΜL (ref 0–0.1)
BASOPHILS NFR BLD AUTO: 1 % (ref 0–1)
BILIRUB SERPL-MCNC: 0.23 MG/DL (ref 0.2–1)
BUN SERPL-MCNC: 24 MG/DL (ref 5–25)
CALCIUM SERPL-MCNC: 9.6 MG/DL (ref 8.4–10.2)
CHLORIDE SERPL-SCNC: 104 MMOL/L (ref 96–108)
CO2 SERPL-SCNC: 23 MMOL/L (ref 21–32)
CREAT SERPL-MCNC: 1.22 MG/DL (ref 0.6–1.3)
EOSINOPHIL # BLD AUTO: 0.19 THOUSAND/ΜL (ref 0–0.61)
EOSINOPHIL NFR BLD AUTO: 4 % (ref 0–6)
ERYTHROCYTE [DISTWIDTH] IN BLOOD BY AUTOMATED COUNT: 12 % (ref 11.6–15.1)
GFR SERPL CREATININE-BSD FRML MDRD: 61 ML/MIN/1.73SQ M
GLUCOSE SERPL-MCNC: 92 MG/DL (ref 65–140)
HCT VFR BLD AUTO: 42.3 % (ref 34.8–46.1)
HGB BLD-MCNC: 13.4 G/DL (ref 11.5–15.4)
IMM GRANULOCYTES # BLD AUTO: 0 THOUSAND/UL (ref 0–0.2)
IMM GRANULOCYTES NFR BLD AUTO: 0 % (ref 0–2)
INR PPP: 0.91 (ref 0.85–1.19)
LYMPHOCYTES # BLD AUTO: 1.21 THOUSANDS/ΜL (ref 0.6–4.47)
LYMPHOCYTES NFR BLD AUTO: 28 % (ref 14–44)
MCH RBC QN AUTO: 27.7 PG (ref 26.8–34.3)
MCHC RBC AUTO-ENTMCNC: 31.7 G/DL (ref 31.4–37.4)
MCV RBC AUTO: 87 FL (ref 82–98)
MONOCYTES # BLD AUTO: 0.37 THOUSAND/ΜL (ref 0.17–1.22)
MONOCYTES NFR BLD AUTO: 8 % (ref 4–12)
NEUTROPHILS # BLD AUTO: 2.61 THOUSANDS/ΜL (ref 1.85–7.62)
NEUTS SEG NFR BLD AUTO: 59 % (ref 43–75)
NRBC BLD AUTO-RTO: 0 /100 WBCS
PHOSPHATE SERPL-MCNC: 3.6 MG/DL (ref 2.7–4.5)
PLATELET # BLD AUTO: 262 THOUSANDS/UL (ref 149–390)
PMV BLD AUTO: 12.4 FL (ref 8.9–12.7)
POTASSIUM SERPL-SCNC: 4.7 MMOL/L (ref 3.5–5.3)
PROT SERPL-MCNC: 8.2 G/DL (ref 6.4–8.4)
PROTHROMBIN TIME: 12.7 SECONDS (ref 12.3–15)
RBC # BLD AUTO: 4.84 MILLION/UL (ref 3.81–5.12)
SODIUM SERPL-SCNC: 137 MMOL/L (ref 135–147)
WBC # BLD AUTO: 4.4 THOUSAND/UL (ref 4.31–10.16)

## 2024-12-02 PROCEDURE — 36415 COLL VENOUS BLD VENIPUNCTURE: CPT

## 2024-12-02 PROCEDURE — 85610 PROTHROMBIN TIME: CPT

## 2024-12-02 PROCEDURE — 84702 CHORIONIC GONADOTROPIN TEST: CPT

## 2024-12-02 PROCEDURE — 85025 COMPLETE CBC W/AUTO DIFF WBC: CPT

## 2024-12-02 PROCEDURE — 85730 THROMBOPLASTIN TIME PARTIAL: CPT

## 2024-12-02 PROCEDURE — 99215 OFFICE O/P EST HI 40 MIN: CPT | Performed by: STUDENT IN AN ORGANIZED HEALTH CARE EDUCATION/TRAINING PROGRAM

## 2024-12-02 PROCEDURE — 80053 COMPREHEN METABOLIC PANEL: CPT

## 2024-12-02 PROCEDURE — 73590 X-RAY EXAM OF LOWER LEG: CPT

## 2024-12-02 PROCEDURE — 84100 ASSAY OF PHOSPHORUS: CPT

## 2024-12-02 RX ORDER — CEFAZOLIN SODIUM 2 G/50ML
2000 SOLUTION INTRAVENOUS ONCE
Status: CANCELLED | OUTPATIENT
Start: 2024-12-04 | End: 2024-12-02

## 2024-12-02 RX ORDER — CHLORHEXIDINE GLUCONATE 40 MG/ML
SOLUTION TOPICAL DAILY PRN
Status: CANCELLED | OUTPATIENT
Start: 2024-12-02

## 2024-12-02 RX ORDER — CHLORHEXIDINE GLUCONATE ORAL RINSE 1.2 MG/ML
15 SOLUTION DENTAL ONCE
Status: CANCELLED | OUTPATIENT
Start: 2024-12-02 | End: 2024-12-02

## 2024-12-02 NOTE — TELEPHONE ENCOUNTER
Pt calling as she is having emergency surgery on her left ankle 12/4, and needs a clearance asap to have it completed. She would like a cb once done

## 2024-12-02 NOTE — PROGRESS NOTES
Ortho Sports Medicine New Patient Ankle Visit    Assesment:  Angela Lemus is a 24 y.o. female who presents with left displaced bimalleolar ankle fracture of the medial and posterior malleoli    Plan:    Discussed imaging and physical exam findings of the left ankle at length with patient in the office today. Discussed secondary to CT and xray findings of fracture of the medial and posterior malleolus, this fracture will require surgery as this is an unstable injury with displaced fractures. We discussed the plan for medial malleolar fixation with a medial approach and possible syndesmotic fixation through a lateral incision. Will obtain CT to evaluate size of posterior malleolar fragment. Patient expressed understanding and elected for surgical intervention. The procedure was explained and discussed at length with  patient in the office today. Side effects and risks of surgery was discussed at length with patient in the office today. Expected outcomes pre and post surgical intervention also dicussed in the office today. We discussed that this was a significant injury and many patients never return to full function. We discussed risks including but not limited to infection, wound complication, non-union, mal-union, need for further surgery, arthritis, continued pain, hardware prominence, neurovascular injury, and risks of anesthesia. We discussed that pt's lupus and being on biologics for immuno supression did place her at increased risk for infection and complications, however given her young age and the urgent nature of this surgery, will plan to proceed pending clearance from pt's rheumatologist and nephrologist. I did speak to pt's nephrologist, Dr. Johanna Daly, who stated that while pt is at increased risk given recent Benlysta medication use, okay to proceed given urgent nature of surgery. Will also get clearance from pt's nephrologist. Patient expressed understanding. Patient chose to move forward with  surgical intervention. Pt was advised to continue nonweightbearing left lower extremity with use of crutches, to minimize use of opioids pre-op and to try to only use Tylenol, and to elevate her left ankle above the heart to help decrease the swelling. Surgical consent signed and obtained today. Patient to meet with surgical scheduler for scheduling today. Will get pre-op medical clearance including medicine, rheumatology, and nephrology. All of patient's questions were answered in the office today. Patient encouraged to reach out via [facility] for further questions or concerns.     Chief Complaint   Patient presents with    Left Ankle - Pain       History of Present Illness:  The patient is a 24 y.o. female  w/ SLE w/ stage 2 CKD presenting with pain in the left ankle.  Patient was referred by Thaddeus Rico PA-C.  Patient had an injury on 11/26/2024.  Patient states she was mopping the floor at home when she slipped and fell backwards and twisted her left ankle.  Patient states that she immediately noticed a deformity in the left ankle.  Patient was seen at the ED and had x-rays taken of the left ankle and foot which showed nondisplaced medial and posterior malleoli fractures, and she was placed in a short leg splint and was provided with crutches and prescribed oxycodone 5 mg.  Patient was seen by Thaddeus Rico on 11/30/2024 and was prescribed hydrocodone 5/325 for pain relief.  Patient stopped taking oxycodone due to causing her nausea.  Patient states today her pain level is 8.5 out of 10.  She has present in the room today in wheelchair and with her father.    Patient does have stage II kidney disease and also has history of lupus, not on steroids. She sees a nephrologist and rheumatologist. She is here today with her father. Denies any prior L ankle injuries. Denies numbness / tingling / open wounds. Denies fevers / chills. She works as a travel nurse.        Ankle Surgical History:  None      Past Medical, Social  and Family History:  Past Medical History:   Diagnosis Date    Bowel obstruction (HCC)     Clostridium difficile infection         Crohn's disease (HCC)     Depression     Hypertension     Kidney disease, chronic, stage I (GFR over 89 ml/min)     Lupus     PTSD (post-traumatic stress disorder)     Raynaud disease      Past Surgical History:   Procedure Laterality Date    ABDOMINAL SURGERY      bowel obstruction     SECTION N/A     COLON SURGERY      IR BIOPSY KIDNEY RANDOM  3/6/2024    WISDOM TOOTH EXTRACTION       Allergies   Allergen Reactions    Oxycodone GI Intolerance     Nausea/vomiting     Current Outpatient Medications on File Prior to Visit   Medication Sig Dispense Refill    acetaminophen (TYLENOL) 650 mg CR tablet Take 1 tablet (650 mg total) by mouth every 8 (eight) hours as needed for mild pain 30 tablet 0    Belimumab (Benlysta) 200 MG/ML SOAJ Inject 1 cc subcu weekly 12 mL 1    bromocriptine (PARLODEL) 2.5 mg tablet       Cyanocobalamin (VITAMIN B 12 PO) Take 500 mcg by mouth 2 (two) times a day      ergocalciferol (VITAMIN D2) 50,000 units 1 CAPSULE ORALLY ONCE A WEEK 60 DAYS      ferrous sulfate 325 (65 Fe) mg tablet Take 325 mg by mouth daily with breakfast      FLUoxetine (PROzac) 20 mg capsule Take 20 mg by mouth daily      HYDROcodone-acetaminophen (Norco) 5-325 mg per tablet Take 1 tablet by mouth every 4 (four) hours as needed for pain Max Daily Amount: 6 tablets 15 tablet 0    Icosapent Ethyl 1 g CAPS       lidocaine (Lidoderm) 5 % Apply 1 patch topically over 12 hours daily Remove & Discard patch within 12 hours or as directed by MD 30 patch 0    naloxone (NARCAN) 4 mg/0.1 mL nasal spray Administer 1 spray into a nostril. If no response after 2-3 minutes, give another dose in the other nostril using a new spray. 1 each 1    NIFEdipine ER (ADALAT CC) 30 MG 24 hr tablet Take 30 mg by mouth daily      omeprazole (PriLOSEC) 20 mg delayed release capsule Take 1 capsule (20 mg total)  by mouth daily 30 capsule 3    Repatha SureClick 140 MG/ML SOAJ INJECT 1 ML SUBCUTANEOUS EVERY 2 WEEKS 60 DAYS      Saxenda injection Inject 1.8 mg under the skin daily      simvastatin (ZOCOR) 10 mg tablet       topiramate (TOPAMAX) 50 MG tablet 1 TABLET AT NIGHT ORALLY ONCE A DAY (NIGHTLY) 60 DAYS      traZODone (DESYREL) 50 mg tablet Take 1 tablet by mouth daily at bedtime      hydroxychloroquine (PLAQUENIL) 200 mg tablet Take 1.5 tablets (300 mg total) by mouth daily with breakfast 135 tablet 3    ondansetron (ZOFRAN-ODT) 8 mg disintegrating tablet DISSOLVE ONE TABLET BY MOUTH EVERY 8 HOURS OR AS NEEDED FOR VOMITING. (Patient not taking: Reported on 10/18/2024)      sildenafil (REVATIO) 20 mg tablet Take 1 tablet (20 mg total) by mouth 2 (two) times a day (Patient not taking: Reported on 10/18/2024) 180 tablet 1    sildenafil (VIAGRA) 25 MG tablet Take 25 mg by mouth daily as needed for erectile dysfunction (Patient not taking: Reported on 9/11/2024)      vitamin B-12 (VITAMIN B-12) 1,000 mcg tablet Take 2,000 mcg by mouth daily (Patient not taking: Reported on 9/22/2024)       No current facility-administered medications on file prior to visit.     Social History     Socioeconomic History    Marital status:      Spouse name: Not on file    Number of children: Not on file    Years of education: Not on file    Highest education level: Not on file   Occupational History    Not on file   Tobacco Use    Smoking status: Never    Smokeless tobacco: Never   Vaping Use    Vaping status: Never Used   Substance and Sexual Activity    Alcohol use: Not Currently     Comment: socially    Drug use: Never    Sexual activity: Yes     Partners: Male     Birth control/protection: Condom Male   Other Topics Concern    Not on file   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    Review of systems: ROS is negative other than that noted in the HPI.  Constitutional: Negative for fatigue and fever.   HENT: Negative for sore throat.    Respiratory: Negative for shortness of breath.    Cardiovascular: Negative for chest pain.   Gastrointestinal: Negative for abdominal pain.   Endocrine: Negative for cold intolerance and heat intolerance.   Genitourinary: Negative for flank pain.   Musculoskeletal: Negative for back pain.   Skin: Negative for rash.   Allergic/Immunologic: Negative for immunocompromised state.   Neurological: Negative for dizziness.   Psychiatric/Behavioral: Negative for agitation.      Physical Exam:    Vitals:    12/02/24 1442   BP: 119/89   Pulse: (!) 113       General/Constitutional: NAD, well developed, well nourished  HENT: Normocephalic, atraumatic  CV: Intact distal pulses, regular rate  Resp: No respiratory distress or labored breathing  Lymphatic: No lymphadenopathy palpated  Neuro: Alert and Oriented x 3, no focal deficits  Psych: Normal mood, normal affect, normal judgement, normal behavior  Skin: Warm, dry, no rashes, no erythema       Ankle Examination (focused): Left ankle       Patient is sitting in wheelchair today  She is in a short leg splint that is clean, dry and intact   Toes wwp  Sensation intact to light touch in sp/dp/tibial nerve distributions, unable to assess saphenous / sural due to splint  Firing EHL and FHL, unable to assess TA/GSC due to splint        Ankle Imaging:    I personally reviewed and interpreted 3 radiographs of the left foot/ankle which were obtained in the ED on 11/26/24 and reviewed in detail with the patient. These demonstrate displaced medial  and posterior malleoli fractures. I have reviewed the radiology report and agree with their impression except that I do see displacement of both malleolar fractures.    I also personally reviewed and interpreted 2 radiographs of the left tib / fib obtained in the office today which are negative for fibular shaft fracture, no other osseous pathologies noted besides fractures as noted on ankle x-rays.        Scribe Attestation      I,:  Blanca Hall MA am acting as a scribe while in the presence of the attending physician.:       I,:  Ananda Corea MD personally performed the services described in this documentation    as scribed in my presence.:           Administrative Statements   I have spent a total time of 45 minutes in caring for this patient on the day of the visit/encounter including Diagnostic results, Prognosis, Risks and benefits of tx options, Instructions for management, Patient and family education, Importance of tx compliance, Risk factor reductions, Impressions, Counseling / Coordination of care, Documenting in the medical record, Reviewing / ordering tests, medicine, procedures  , Obtaining or reviewing history  , and Communicating with other healthcare professionals . This visit involved a surgical booking which involved extensive counseling. Pt has complex medical history including SLE with CKD, and this required me communicating with pt's rheumatologist to pre-operatively optimize patient as well as discussing these medical conditions with the patient and discussing her unique risk profile for this surgery.

## 2024-12-03 ENCOUNTER — HOSPITAL ENCOUNTER (OUTPATIENT)
Dept: CT IMAGING | Facility: HOSPITAL | Age: 24
Discharge: HOME | End: 2024-12-03
Attending: STUDENT IN AN ORGANIZED HEALTH CARE EDUCATION/TRAINING PROGRAM
Payer: OTHER GOVERNMENT

## 2024-12-03 ENCOUNTER — TELEPHONE (OUTPATIENT)
Age: 24
End: 2024-12-03

## 2024-12-03 ENCOUNTER — ANESTHESIA EVENT (OUTPATIENT)
Dept: PERIOP | Facility: HOSPITAL | Age: 24
End: 2024-12-03
Payer: OTHER GOVERNMENT

## 2024-12-03 ENCOUNTER — OFFICE VISIT (OUTPATIENT)
Age: 24
End: 2024-12-03
Payer: OTHER GOVERNMENT

## 2024-12-03 VITALS
HEIGHT: 62 IN | DIASTOLIC BLOOD PRESSURE: 85 MMHG | SYSTOLIC BLOOD PRESSURE: 122 MMHG | HEART RATE: 96 BPM | WEIGHT: 145 LBS | BODY MASS INDEX: 26.68 KG/M2

## 2024-12-03 DIAGNOSIS — Z79.899 LONG-TERM USE OF PLAQUENIL: ICD-10-CM

## 2024-12-03 DIAGNOSIS — L93.0 DISCOID LUPUS: ICD-10-CM

## 2024-12-03 DIAGNOSIS — S82.842P CLOSED BIMALLEOLAR FRACTURE OF LEFT ANKLE WITH MALUNION, SUBSEQUENT ENCOUNTER: Primary | ICD-10-CM

## 2024-12-03 DIAGNOSIS — F34.1 DYSTHYMIC DISORDER: ICD-10-CM

## 2024-12-03 DIAGNOSIS — M32.14 OTHER SYSTEMIC LUPUS ERYTHEMATOSUS WITH GLOMERULAR DISEASE (HCC): ICD-10-CM

## 2024-12-03 DIAGNOSIS — I73.00 RAYNAUD'S DISEASE WITHOUT GANGRENE: ICD-10-CM

## 2024-12-03 DIAGNOSIS — S82.842A CLOSED BIMALLEOLAR FRACTURE OF LEFT ANKLE, INITIAL ENCOUNTER: ICD-10-CM

## 2024-12-03 DIAGNOSIS — M32.14 LUPUS NEPHRITIS (HCC): ICD-10-CM

## 2024-12-03 PROCEDURE — 73700 CT LOWER EXTREMITY W/O DYE: CPT

## 2024-12-03 PROCEDURE — 99215 OFFICE O/P EST HI 40 MIN: CPT | Performed by: NURSE PRACTITIONER

## 2024-12-03 NOTE — PRE-PROCEDURE INSTRUCTIONS
Pre-Surgery Instructions:   Medication Instructions    acetaminophen (TYLENOL) 650 mg CR tablet Uses PRN- OK to take day of surgery    Belimumab (Benlysta) 200 MG/ML SOAJ Last dose 11/30/24    bromocriptine (PARLODEL) 2.5 mg tablet Last dose 11/30/24    Cyanocobalamin (VITAMIN B 12 PO) Hold day of surgery.    ergocalciferol (VITAMIN D2) 50,000 units Hold day of surgery.    ferrous sulfate 325 (65 Fe) mg tablet Hold day of surgery.    HYDROcodone-acetaminophen (Norco) 5-325 mg per tablet Uses PRN- OK to take day of surgery    hydroxychloroquine (PLAQUENIL) 200 mg tablet Hold day of surgery.    Icosapent Ethyl 1 g CAPS Hold day of surgery.    naloxone (NARCAN) 4 mg/0.1 mL nasal spray Uses PRN- OK to take day of surgery    NIFEdipine ER (ADALAT CC) 30 MG 24 hr tablet Take day of surgery.    omeprazole (PriLOSEC) 20 mg delayed release capsule Take day of surgery.    Saxenda injection Instructions provided by MD-Last dose 11/30/24    topiramate (TOPAMAX) 50 MG tablet Last dose 11/30/24    traZODone (DESYREL) 50 mg tablet Take night before surgery        Medication instructions for day surgery reviewed. Please use only a sip of water to take your instructed medications. Avoid all over the counter vitamins, supplements and NSAIDS for one week prior to surgery per anesthesia guidelines. Tylenol is ok to take as needed.     You will receive a call one business day prior to surgery with an arrival time and hospital directions. If your surgery is scheduled on a Monday, the hospital will be calling you on the Friday prior to your surgery. If you have not heard from anyone by 8pm, please call the hospital supervisor through the hospital  at [phone]. ([place] [phone] or [place] [phone]).    Do not eat or drink anything after midnight the night before your surgery, including candy, mints, lifesavers, or chewing gum. Do not drink alcohol 24hrs before your surgery. Try not to smoke at least 24hrs before your  surgery.       Follow the pre surgery showering instructions as listed in the “My Surgical Experience Booklet” or otherwise provided by your surgeon's office. Do not use a blade to shave the surgical area 1 week before surgery. It is okay to use a clean electric clippers up to 24 hours before surgery. Do not apply any lotions, creams, including makeup, cologne, deodorant, or perfumes after showering on the day of your surgery. Do not use dry shampoo, hair spray, hair gel, or any type of hair products.     No contact lenses, eye make-up, or artificial eyelashes. Remove nail polish, including gel polish, and any artificial, gel, or acrylic nails if possible. Remove all jewelry including rings and body piercing jewelry.     Wear causal clothing that is easy to take on and off. Consider your type of surgery.    Keep any valuables, jewelry, piercings at home. Please bring any specially ordered equipment (sling, braces) if indicated.    Arrange for a responsible person to drive you to and from the hospital on the day of your surgery. Please confirm the visitor policy for the day of your procedure when you receive your phone call with an arrival time.     Call the surgeon's office with any new illnesses, exposures, or additional questions prior to surgery.    Please reference your “My Surgical Experience Booklet” for additional information to prepare for your upcoming surgery.

## 2024-12-03 NOTE — ASSESSMENT & PLAN NOTE
Surgeon to obtain CT imaging and will determine from there whether or not surgical intervention is necessary

## 2024-12-03 NOTE — PROGRESS NOTES
Internal Medicine Pre-Operative Evaluation:     Reason for Visit: Pre-operative Evaluation for Risk Stratification and Optimization    Patient ID: Angela Lemus is a 24 y.o. female.     Surgery:   Case: [id number] Date/Time: 12/04/24 1030   Procedure: ORIF BIMALLEOLAR ANKLE FRACTURE (Left: Ankle)   Anesthesia type: General w/ Regional   Diagnosis: Closed bimalleolar fracture of left ankle, initial encounter [S82.408U]   Pre-op diagnosis: Closed bimalleolar fracture of left ankle, initial encounter [B36.659E]   Location:  OR ROOM 03 / [facility]   Surgeons: Aannda Corea MD           Recommendations to Proceed withSurgery    Patient is considered to be High risk d/t her h/o lupus w/significant renal involvement a/t nephrologist for Medium risk procedure.     After evaluation and discussion with patient with emphasis that all surgery has some degree of inherent risk it is acknowledged by patient this risk is Acceptable.    Patient is optimized and may proceed with planned procedure.     Assessment    Pre-operative Medical Evaluation for planned surgery  Recommendations as listed in PLAN section below  Contact surgical nurse  navigator with any questions regarding preoperative plan or schedule.      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 Further preoperative workup as follows:   -Cardiology per nephrology recommendation    2. Preoperative Medication Management Review performed by PAT nursing  NO    3. Patient requires further consultation with:   cardiology    4. Discharge Planning / Barriers to Discharge  none identified - patients has post discharge therapy plan in place, transportation arranged for discharge day, adequate family support at home to assist with discharge to home.        Subjective:           History of Present Illness:     Angela Lemus is a 24 y.o. female who presents to the office today for a preoperative consultation at the request of surgeon. They are electing to undergo planned procedure with an understanding that all surgery has inherent risk. Today they present for preoperative risk assessment and recommendations for optimization in preparation for surgery.    Pre-op Exam    Pt has a long standing h/o discoid lupus which she currently takes plaquenil at decreased dosing d/t high levels and Benlysta, lupus nephritis along with raynaud's. Her current levels indicate Stage II. Her surgeon would like to use eliquis 2.5mg bid for post operative DVT prophylaxis, and based on her current labs this would be fine. Her nephrologist cleared this as well. The patient had an EKG completed and it was read as Atrial fibrillation however after evaluating the EKG it appears to have been incorrectly interpreted by the machine and is indeed NSR, however her nephrologist is recommending cardiology evaluation d/t her high risk. Her surgeon was going to evaluate and speak to the patient after my visit to determine the best path for the patient after reviewing CT scan.    Previous history of bleeding disorders or clots?: No  Previous Anesthesia reaction?: No  Prolonged steroid use in the last 6 months?:  "No    Assessment of Cardiac Risk:   - Unstable or severe angina or MI in the last 6 weeks or history of stent placement in the last year?: No   - Decompensated heart failure (e.g. New onset heart failure, NYHA  Class IV heart failure, or worsening existing heart failure)?: No  - Significant arrhythmias such as high grade AV block, symptomatic ventricular arrhythmia, newly recognized ventricular tachycardia, supraventricular tachycardia with resting heart rate >100, or symptomatic bradycardia?: No  - Severe heart valve disease including aortic stenosis or symptomatic mitral stenosis?: No      Pre-operative Risk Factors:  Elevated-risk surgery: Yes    History of cerebrovascular disease: No    History of ischemic heart disease: No  Pre-operative treatment with insulin: No  Pre-operative creatinine >2 mg/dL: No    History of congestive heart failure: No    Duke Activity Status Index (DASI):   DASI Total Score: 28.7  METs: 6.3        ROS: No TIA's or unusual headaches, no dysphagia.  No prolonged cough. No dyspnea or chest pain on exertion.  No abdominal pain, change in bowel habits, black or bloody stools.  No urinary tract or BPH symptoms.  Positive reported pain in arthritic joint. Positive difficulty with gait. No skin rashes or issues.      Objective:    /85 (BP Location: Left arm, Patient Position: Sitting, Cuff Size: Standard)   Pulse 96   Ht 5' 2\" (1.575 m)   Wt 65.8 kg (145 lb)   BMI 26.52 kg/m²       General Appearance: no distress, conversive  HEENT: PERRLA, conjuctiva normal; oropharynx clear; mucous membranes moist;   Neck:  Supple, no lymphadenopathy or thyromegaly  Lungs: breath sounds normal, normal respiratory effort, no retractions, expiratory effort normal  CV: normal heart sounds S1/S2, PMI normal   ABD: soft non tender, no masses , no hepatic or splenomegaly  EXT: DP pulses intact, no lymphadenopathy, no edema, left ankle dressing in place  Skin: normal turgor, normal texture, no " rash  Psych: affect normal, mood normal  Neuro: AAOx3        The following portions of the patient's history were reviewed and updated as appropriate: allergies, current medications, past family history, past medical history, past social history, past surgical history and problem list.     Past History:       Past Medical History:   Diagnosis Date   • Bowel obstruction (HCC)    • Clostridium difficile infection        • Crohn's disease (HCC)    • Depression    • Hypertension    • Kidney disease, chronic, stage I (GFR over 89 ml/min)    • Lupus    • PTSD (post-traumatic stress disorder)    • Raynaud disease     Past Surgical History:   Procedure Laterality Date   • ABDOMINAL SURGERY      bowel obstruction   •  SECTION N/A    • COLON SURGERY     • IR BIOPSY KIDNEY RANDOM  3/6/2024   • WISDOM TOOTH EXTRACTION            Social History     Tobacco Use   • Smoking status: Never   • Smokeless tobacco: Never   Vaping Use   • Vaping status: Never Used   Substance Use Topics   • Alcohol use: Yes     Comment: 1 x month   • Drug use: Never     Family History   Problem Relation Age of Onset   • Lupus Mother    • Diabetes Father    • COPD Maternal Grandmother    • Heart failure Maternal Grandmother    • Substance Abuse Maternal Grandmother    • Breast cancer Other           Allergies:     Allergies   Allergen Reactions   • Oxycodone GI Intolerance and Vomiting     Nausea/vomiting        Current Medications:     Current Outpatient Medications   Medication Instructions   • acetaminophen (TYLENOL) 650 mg, Oral, Every 8 hours PRN   • apixaban (ELIQUIS) 2.5 mg, Oral, 2 times daily   • Belimumab (Benlysta) 200 MG/ML SOAJ Inject 1 cc subcu weekly   • bromocriptine (PARLODEL) 2.5 mg tablet    • Cyanocobalamin (VITAMIN B 12 PO) 500 mcg, 2 times daily   • ergocalciferol (VITAMIN D2) 50,000 units 1 CAPSULE ORALLY ONCE A WEEK 60 DAYS   • ferrous sulfate 325 mg, Daily with breakfast   • FLUoxetine (PROZAC) 20 mg, Daily   •  "HYDROcodone-acetaminophen (Norco) 5-325 mg per tablet 1 tablet, Oral, Every 4 hours PRN   • hydroxychloroquine (PLAQUENIL) 300 mg, Oral, Daily with breakfast   • Icosapent Ethyl 1 g CAPS    • lidocaine (Lidoderm) 5 % 1 patch, Topical, Daily, Remove & Discard patch within 12 hours or as directed by MD   • naloxone (NARCAN) 4 mg/0.1 mL nasal spray Administer 1 spray into a nostril. If no response after 2-3 minutes, give another dose in the other nostril using a new spray.   • NIFEdipine ER (ADALAT CC) 30 mg, Daily   • omeprazole (PRILOSEC) 20 mg, Oral, Daily   • ondansetron (ZOFRAN-ODT) 8 mg disintegrating tablet DISSOLVE ONE TABLET BY MOUTH EVERY 8 HOURS OR AS NEEDED FOR VOMITING.   • Repatha SureClick 140 MG/ML SOAJ INJECT 1 ML SUBCUTANEOUS EVERY 2 WEEKS 60 DAYS   • Saxenda 1.8 mg, Daily   • sildenafil (REVATIO) 20 mg, Oral, 2 times daily   • sildenafil (VIAGRA) 25 mg, Daily PRN   • simvastatin (ZOCOR) 10 mg tablet    • topiramate (TOPAMAX) 50 MG tablet 1 TABLET AT NIGHT ORALLY ONCE A DAY (NIGHTLY) 60 DAYS   • traZODone (DESYREL) 50 mg tablet 1 tablet, Daily at bedtime   • vitamin B-12 (VITAMIN B-12) 2,000 mcg, Daily           PRE-OP WORKSHEET DATA    Assessment of Pre-Operative Risks     MLJ Quality Hard Stops:    BMI (<40) : Estimated body mass index is 26.52 kg/m² as calculated from the following:    Height as of this encounter: 5' 2\" (1.575 m).    Weight as of this encounter: 65.8 kg (145 lb).    Hgb ( >11):   Lab Results   Component Value Date    HGB 13.4 12/02/2024    HGB 13.2 11/12/2024    HGB 12.9 08/31/2024       HbA1c (<7.5) : No results found for: \"HGBA1C\"    GFR (>60) (Less then 45 = Nephrology consult):    Lab Results   Component Value Date    EGFR 61 12/02/2024    EGFR 91 11/12/2024    EGFR 80 09/12/2024            Pre-Op Data Reviewed:       Laboratory Results: I have personally reviewed the pertinent laboratory results/reports     EKG:I have personally reviewed pertinent reports.  . I personally " reviewed and interpreted available tracings in the electronic medical record    Encounter Date: 06/06/24   ECG 12 lead   Result Value    Ventricular Rate 83    Atrial Rate 83    MN Interval 150    QRSD Interval 84    QT Interval 346    QTC Interval 406    P Axis 53    QRS Axis 27    T Wave Axis 41    Narrative    Normal sinus rhythm with sinus arrhythmia  Normal ECG  No previous ECGs available  Confirmed by Vin Obregon ([id number]) on 6/6/2024 1:53:30 PM       OLD RECORDS: reviewed old records in the chart review section if EHR on day of visit.    Previous cardiopulmonary studies within the past year:  Echocardiogram: no   Cardiac Catheterization: no  Stress Test: no      Time of visit including pre-visit chart review, visit and post-visit coordination of plan and care , review of pre-surgical lab work, preparation and time spent documenting note in electronic medical record, time spent face-to-face in physical examination answering patient questions by care team 35 minutes             [facility]

## 2024-12-03 NOTE — ASSESSMENT & PLAN NOTE
High risk for post operative DVT  Surgeon prefers to use eliquis 2.5mg bid  Cleared by nephrology for same  Fine by medicine standpoint as well

## 2024-12-03 NOTE — TELEPHONE ENCOUNTER
----- Message from Johanna Daly MD sent at 12/2/2024 11:00 PM EST -----  Regarding: Surgery clearance  Please notify the patient that I did send a message to the orthopedic surgeon regarding surgical considerations in view of her lupus.  Since this is not an elective surgery, there is no need to delay surgery due to the Benlysta.  Has she started the subcu injections?  She should definitely hold her Benlysta for 2 weeks postop as long as there is no sign of infection, after which, she can restart Benlysta.  She probably should get clearance from the orthopedic surgeon however, before restarting it.   Thanks

## 2024-12-03 NOTE — TELEPHONE ENCOUNTER
Caller: Patient     Doctor: Nahomy     Reason for call: Call was warm transferred to surgery coordinator

## 2024-12-03 NOTE — TELEPHONE ENCOUNTER
Spoke with pt and informed her that per Dr. Daly should stop Benlysta until 2-3 weeks post-op. Pt had her last Benlysta On Saturday 11/30.

## 2024-12-03 NOTE — TELEPHONE ENCOUNTER
Caller: Dr Mary Martino office/Nephrologist    Doctor: Nahomy    Reason for call: Please fax surgical clearance form to [phone]    Call back#: [phone]

## 2024-12-03 NOTE — ASSESSMENT & PLAN NOTE
H/o lesions in the past  None currently  Continue medications per rheumatology discretion  If surgery is warranted will hold Benlysta for 2 weeks post operatively

## 2024-12-03 NOTE — TELEPHONE ENCOUNTER
Called and spoke with pt and informed of provider message. No further questions/concerns at this time.

## 2024-12-04 ENCOUNTER — ANESTHESIA (OUTPATIENT)
Dept: PERIOP | Facility: HOSPITAL | Age: 24
End: 2024-12-04
Payer: OTHER GOVERNMENT

## 2024-12-04 ENCOUNTER — TELEPHONE (OUTPATIENT)
Age: 24
End: 2024-12-04

## 2024-12-04 NOTE — TELEPHONE ENCOUNTER
"Hello,    The following message was sent via e-mail to the leadership team:     Please advise if you can help facilitate the following overbook request:    Patient Name: Angela Lemus    Patient MRN: [id number]    Call back #: [phone]    Insurance: [facility]    Department:Cardiology    Speciality: General Cardiology    Reason for overbook request: CARDIAC CLEARANCE PRIOR TO PROCEDURE (14-30 DAYS PRIOR TO PROCEDURE)    Comments (Write \"N/a\" if no comments): Patient has an upcoming surgery on 12/11/24 for an urgent orthopedic surgery due to an ankle fracture. Patient is a new patient with cardiology who will need cardiac clearance prior to her surgery on 12/11/24. Patient was advised by her nephrology doctor to get cardiac clearance prior to her surgery.    Requested doctor and location: Any Doctor/ [place] or [place]    Date of current appointment: 1/2/25      Thank you.    "

## 2024-12-05 ENCOUNTER — TELEPHONE (OUTPATIENT)
Age: 24
End: 2024-12-05

## 2024-12-05 ENCOUNTER — OFFICE VISIT (OUTPATIENT)
Dept: OBGYN CLINIC | Facility: CLINIC | Age: 24
End: 2024-12-05
Payer: OTHER GOVERNMENT

## 2024-12-05 VITALS
SYSTOLIC BLOOD PRESSURE: 116 MMHG | WEIGHT: 145 LBS | DIASTOLIC BLOOD PRESSURE: 84 MMHG | HEART RATE: 94 BPM | HEIGHT: 62 IN | BODY MASS INDEX: 26.68 KG/M2

## 2024-12-05 DIAGNOSIS — M32.14 LUPUS NEPHRITIS (HCC): ICD-10-CM

## 2024-12-05 DIAGNOSIS — L93.0 DISCOID LUPUS: ICD-10-CM

## 2024-12-05 DIAGNOSIS — M32.14 OTHER SYSTEMIC LUPUS ERYTHEMATOSUS WITH GLOMERULAR DISEASE (HCC): ICD-10-CM

## 2024-12-05 DIAGNOSIS — S82.842A CLOSED BIMALLEOLAR FRACTURE OF LEFT ANKLE, INITIAL ENCOUNTER: Primary | ICD-10-CM

## 2024-12-05 DIAGNOSIS — I12.9 RENAL HYPERTENSION: ICD-10-CM

## 2024-12-05 PROCEDURE — 99214 OFFICE O/P EST MOD 30 MIN: CPT | Performed by: STUDENT IN AN ORGANIZED HEALTH CARE EDUCATION/TRAINING PROGRAM

## 2024-12-05 NOTE — TELEPHONE ENCOUNTER
"Hello,    The following message was sent via e-mail to the leadership team:     Please advise if you can help facilitate the following overbook request:    Patient Name: Angela Lemus    Patient MRN: [id number]     Call back #: [phone]     Insurance: [facility]    Department:Cardiology    Speciality: General Cardiology    Reason for overbook request: CARDIAC CLEARANCE PRIOR TO PROCEDURE (14-30 DAYS PRIOR TO PROCEDURE)    Comments (Write \"N/a\" if no comments): Patient has a pre-op appointment on 12/27/2024. Olesya from [facility] called and said the surgery has been moved to  12/11/2024 because it is a fracture and needs to be done sooner.    Requested doctor and location: any Doctor/[place], [place], [place]    Date of current appointment: 12/27/2024      Thank you.    "

## 2024-12-06 NOTE — PROGRESS NOTES
Ortho Sports Medicine Follow-Up Ankle Visit    Assesment:  24 y.o. female left SER4 ankle fracture with medial malleolar fracture, posterior malleolar fracture, AITFL avulsion fracture. Pt also has lupus with several complications including CKD / lupus nephritis.    Plan:    Discussed imaging and physical exam findings of the left ankle at length with patient in the office today. We had a lengthy discussion during which we reviewed both non operative and operative treatment options. I personally spoke to the patient's nephrologist, Dr. Martino, to understand the severity of the patient's lupus, and she did communicate to me that the patient is high risk given her lupus nephritis and CKD. Pt was also noted during nephrology appointment to have possible findings of afib on her EKG. On her EKG repeated by our pre-operative medical optimization team here, pt was noted to have NSR, however we will obtain cardiology evaluation prior to surgery to evaluate further. I discussed with the patient that she was at high risk for medical complications from surgery including but not limited to cardiovascular event, stroke, even death, as well as possible complications related to her CKD. I discussed that her biologic therapy which would be re-started post-op would place her at higher risk for infection, wound complications, and surgical complications. I discussed the risk of complications including but not limited to wound issues, non-union, mal-union, hardware prominence, need for further surgery, as well as complications of anesthesia. Given that the fracture is non-displaced, I did discuss that this injury is amenable to non-operative treatment in a cast, however I did discuss that we would have to follow it closely with interval weekly x-rays to confirm no significant displacement, and if there was any displacement, there was the possibility of surgery down the line. I did recommend non-operative treatment given her high risk  for medical / surgical complications, however the patient expressed concern at the possibility of fracture displacement and need for surgery in the future, and she stated that she needed to get back to work as quick as possible and was concerned about the risk of future fracture displacement. I discussed that the fact that the fracture had not displaced from the time of injury to her CT scan suggested low risk for displacement, however I did discuss that displacement was still possible, and given her young age as well as the risk for arthritis / increased joint contact forces with even 1 mm lateral talar subluxation given the unstable nature of her injury. Patient expressed understanding and elected for surgical intervention. She understands that she is at very high risk for complications and that I recommended trialing non-operative treatment. The procedure was explained and discussed at length with  patient in the office today. Side effects and risks of surgery was discussed at length with patient in the office today. Expected outcomes pre and post surgical intervention also dicussed in the office today. We discussed that this was a significant injury and many patients never return to full function. Pt was advised to continue nonweightbearing left lower extremity with use of crutches, to minimize use of opioids pre-op and to try to only use Tylenol, and to elevate her left ankle above the heart to help decrease the swelling. All of patient's questions were answered in the office today. Pt last used Benlysta on 11/30, will stop pre-operatively and plan to resume 2 weeks post-op once patient's incisions have healed. Patient encouraged to reach out via [employer] for further questions or concerns.        Chief Complaint   Patient presents with    Left Ankle - Follow-up       History of Present Illness:      The patient is returns for follow up of her left ankle. She was last seen on 12/2/24, CT ordered at that time to  evaluate degree of displacement. Otherwise no significant changes since that visit. She continues to have significant pain and has been maintaining the left ankle in a splint. No new injuries. No numbness / tingling. Pt saw her nephrologist and also reached out to her rheumatologist and also saw medicine for pre-op clearance.    I have reviewed the past medical, surgical, social and family history, medications and allergies as documented in the EMR.    Ankle Surgical History:  None    Past Medical, Social and Family History:  Past Medical History:   Diagnosis Date    Bowel obstruction (HCC)     Clostridium difficile infection         Crohn's disease (HCC)     Depression     Hypertension     Kidney disease, chronic, stage I (GFR over 89 ml/min)     Lupus     PTSD (post-traumatic stress disorder)     Raynaud disease      Past Surgical History:   Procedure Laterality Date    ABDOMINAL SURGERY      bowel obstruction     SECTION N/A     COLON SURGERY      IR BIOPSY KIDNEY RANDOM  3/6/2024    WISDOM TOOTH EXTRACTION       Allergies   Allergen Reactions    Oxycodone GI Intolerance and Vomiting     Nausea/vomiting     Current Outpatient Medications on File Prior to Visit   Medication Sig Dispense Refill    acetaminophen (TYLENOL) 650 mg CR tablet Take 1 tablet (650 mg total) by mouth every 8 (eight) hours as needed for mild pain 30 tablet 0    Cyanocobalamin (VITAMIN B 12 PO) Take 500 mcg by mouth 2 (two) times a day      ergocalciferol (VITAMIN D2) 50,000 units 1 CAPSULE ORALLY ONCE A WEEK 60 DAYS      ferrous sulfate 325 (65 Fe) mg tablet Take 325 mg by mouth daily with breakfast      HYDROcodone-acetaminophen (Norco) 5-325 mg per tablet Take 1 tablet by mouth every 4 (four) hours as needed for pain Max Daily Amount: 6 tablets 15 tablet 0    Icosapent Ethyl 1 g CAPS       naloxone (NARCAN) 4 mg/0.1 mL nasal spray Administer 1 spray into a nostril. If no response after 2-3 minutes, give another dose in the  other nostril using a new spray. 1 each 1    NIFEdipine ER (ADALAT CC) 30 MG 24 hr tablet Take 30 mg by mouth daily      omeprazole (PriLOSEC) 20 mg delayed release capsule Take 1 capsule (20 mg total) by mouth daily 30 capsule 3    Saxenda injection Inject 1.8 mg under the skin daily      topiramate (TOPAMAX) 50 MG tablet 1 TABLET AT NIGHT ORALLY ONCE A DAY (NIGHTLY) 60 DAYS      traZODone (DESYREL) 50 mg tablet Take 1 tablet by mouth daily at bedtime      apixaban (Eliquis) 2.5 mg Take 1 tablet (2.5 mg total) by mouth 2 (two) times a day (Patient not taking: Reported on 12/5/2024) 84 tablet 0    Belimumab (Benlysta) 200 MG/ML SOAJ Inject 1 cc subcu weekly (Patient not taking: Reported on 12/5/2024) 12 mL 1    bromocriptine (PARLODEL) 2.5 mg tablet  (Patient not taking: Reported on 12/5/2024)      FLUoxetine (PROzac) 20 mg capsule Take 20 mg by mouth daily (Patient not taking: Reported on 12/5/2024)      hydroxychloroquine (PLAQUENIL) 200 mg tablet Take 1.5 tablets (300 mg total) by mouth daily with breakfast 135 tablet 3    lidocaine (Lidoderm) 5 % Apply 1 patch topically over 12 hours daily Remove & Discard patch within 12 hours or as directed by MD (Patient not taking: Reported on 12/3/2024) 30 patch 0    ondansetron (ZOFRAN-ODT) 8 mg disintegrating tablet DISSOLVE ONE TABLET BY MOUTH EVERY 8 HOURS OR AS NEEDED FOR VOMITING. (Patient not taking: Reported on 10/18/2024)      Repatha SureClick 140 MG/ML SOAJ INJECT 1 ML SUBCUTANEOUS EVERY 2 WEEKS 60 DAYS (Patient not taking: Reported on 12/5/2024)      sildenafil (REVATIO) 20 mg tablet Take 1 tablet (20 mg total) by mouth 2 (two) times a day (Patient not taking: Reported on 10/18/2024) 180 tablet 1    sildenafil (VIAGRA) 25 MG tablet Take 25 mg by mouth daily as needed for erectile dysfunction (Patient not taking: Reported on 9/11/2024)      simvastatin (ZOCOR) 10 mg tablet  (Patient not taking: Reported on 12/5/2024)      vitamin B-12 (VITAMIN B-12) 1,000 mcg  "tablet Take 2,000 mcg by mouth daily (Patient not taking: Reported on 9/22/2024)       No current facility-administered medications on file prior to visit.     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    Review of systems: ROS is negative other than that noted in the HPI.  Constitutional: Negative for fatigue and fever.   HENT: Negative for sore throat.    Respiratory: Negative for shortness of breath.    Cardiovascular: Negative for chest pain.   Gastrointestinal: Negative for abdominal pain.   Endocrine: Negative for cold intolerance and heat intolerance.   Genitourinary: Negative for flank pain.   Musculoskeletal: Negative for back pain.   Skin: Negative for rash.   Allergic/Immunologic: Negative for immunocompromised state.   Neurological: Negative for dizziness.   Psychiatric/Behavioral: Negative for agitation.      Physical Exam:    Blood pressure 116/84, pulse 94, height 5' 2\" (1.575 m), weight 65.8 kg (145 lb), " not currently breastfeeding.    General/Constitutional: NAD, well developed, well nourished  HENT: Normocephalic, atraumatic  CV: Intact distal pulses, regular rate  Resp: No respiratory distress or labored breathing  Lymphatic: No lymphadenopathy palpated  Neuro: Alert and Oriented x 3, no focal deficits  Psych: Normal mood, normal affect, normal judgement, normal behavior  Skin: Warm, dry, no rashes, no erythema       Ankle Examination (focused):     Ankle Examination (focused): Left ankle      Short leg splint in place, clean, dry and intact   Toes wwp  Sensation intact to light touch in sp/dp/tibial nerve distributions, unable to assess saphenous / sural due to splint  Firing EHL and FHL, unable to assess TA/GSC due to splint      Ankle Imaging:    I personally reviewed and interpreted CT of the left foot/ankle obtained on 12/3/24 which was reviewed in detail with the patient. This demonstrated non-displaced medial malleolar and posterior malleolar fractures as well as AITFL avulsion fracture. No major lateral talar subluxation.  I have reviewed the radiology report and agree with their impression.      Administrative Statements   I have spent a total time of 35 minutes in caring for this patient on the day of the visit/encounter including Diagnostic results, Prognosis, Risks and benefits of tx options, Instructions for management, Patient and family education, Importance of tx compliance, Risk factor reductions, Impressions, Counseling / Coordination of care, Documenting in the medical record, Reviewing / ordering tests, medicine, procedures  , Obtaining or reviewing history  , and Communicating with other healthcare professionals . Pt has lupus with multiple complications of lupus, and I spent an extensive amount of time communicating directly with pt's nephrologist, Dr. Martino, and pt's rheumatologist, Dr. Daly, as well as extensive time with the patient reviewing her imaging and discussing treatment options  and risks / benefits / alternatives.

## 2024-12-06 NOTE — H&P (VIEW-ONLY)
Ortho Sports Medicine Follow-Up Ankle Visit    Assesment:  24 y.o. female left SER4 ankle fracture with medial malleolar fracture, posterior malleolar fracture, AITFL avulsion fracture. Pt also has lupus with several complications including CKD / lupus nephritis.    Plan:    Discussed imaging and physical exam findings of the left ankle at length with patient in the office today. We had a lengthy discussion during which we reviewed both non operative and operative treatment options. I personally spoke to the patient's nephrologist, Dr. Martino, to understand the severity of the patient's lupus, and she did communicate to me that the patient is high risk given her lupus nephritis and CKD. Pt was also noted during nephrology appointment to have possible findings of afib on her EKG. On her EKG repeated by our pre-operative medical optimization team here, pt was noted to have NSR, however we will obtain cardiology evaluation prior to surgery to evaluate further. I discussed with the patient that she was at high risk for medical complications from surgery including but not limited to cardiovascular event, stroke, even death, as well as possible complications related to her CKD. I discussed that her biologic therapy which would be re-started post-op would place her at higher risk for infection, wound complications, and surgical complications. I discussed the risk of complications including but not limited to wound issues, non-union, mal-union, hardware prominence, need for further surgery, as well as complications of anesthesia. Given that the fracture is non-displaced, I did discuss that this injury is amenable to non-operative treatment in a cast, however I did discuss that we would have to follow it closely with interval weekly x-rays to confirm no significant displacement, and if there was any displacement, there was the possibility of surgery down the line. I did recommend non-operative treatment given her high risk  for medical / surgical complications, however the patient expressed concern at the possibility of fracture displacement and need for surgery in the future, and she stated that she needed to get back to work as quick as possible and was concerned about the risk of future fracture displacement. I discussed that the fact that the fracture had not displaced from the time of injury to her CT scan suggested low risk for displacement, however I did discuss that displacement was still possible, and given her young age as well as the risk for arthritis / increased joint contact forces with even 1 mm lateral talar subluxation given the unstable nature of her injury. Patient expressed understanding and elected for surgical intervention. She understands that she is at very high risk for complications and that I recommended trialing non-operative treatment. The procedure was explained and discussed at length with  patient in the office today. Side effects and risks of surgery was discussed at length with patient in the office today. Expected outcomes pre and post surgical intervention also dicussed in the office today. We discussed that this was a significant injury and many patients never return to full function. Pt was advised to continue nonweightbearing left lower extremity with use of crutches, to minimize use of opioids pre-op and to try to only use Tylenol, and to elevate her left ankle above the heart to help decrease the swelling. All of patient's questions were answered in the office today. Pt last used Benlysta on 11/30, will stop pre-operatively and plan to resume 2 weeks post-op once patient's incisions have healed. Patient encouraged to reach out via [employer] for further questions or concerns.        Chief Complaint   Patient presents with    Left Ankle - Follow-up       History of Present Illness:      The patient is returns for follow up of her left ankle. She was last seen on 12/2/24, CT ordered at that time to  evaluate degree of displacement. Otherwise no significant changes since that visit. She continues to have significant pain and has been maintaining the left ankle in a splint. No new injuries. No numbness / tingling. Pt saw her nephrologist and also reached out to her rheumatologist and also saw medicine for pre-op clearance.    I have reviewed the past medical, surgical, social and family history, medications and allergies as documented in the EMR.    Ankle Surgical History:  None    Past Medical, Social and Family History:  Past Medical History:   Diagnosis Date    Bowel obstruction (HCC)     Clostridium difficile infection         Crohn's disease (HCC)     Depression     Hypertension     Kidney disease, chronic, stage I (GFR over 89 ml/min)     Lupus     PTSD (post-traumatic stress disorder)     Raynaud disease      Past Surgical History:   Procedure Laterality Date    ABDOMINAL SURGERY      bowel obstruction     SECTION N/A     COLON SURGERY      IR BIOPSY KIDNEY RANDOM  3/6/2024    WISDOM TOOTH EXTRACTION       Allergies   Allergen Reactions    Oxycodone GI Intolerance and Vomiting     Nausea/vomiting     Current Outpatient Medications on File Prior to Visit   Medication Sig Dispense Refill    acetaminophen (TYLENOL) 650 mg CR tablet Take 1 tablet (650 mg total) by mouth every 8 (eight) hours as needed for mild pain 30 tablet 0    Cyanocobalamin (VITAMIN B 12 PO) Take 500 mcg by mouth 2 (two) times a day      ergocalciferol (VITAMIN D2) 50,000 units 1 CAPSULE ORALLY ONCE A WEEK 60 DAYS      ferrous sulfate 325 (65 Fe) mg tablet Take 325 mg by mouth daily with breakfast      HYDROcodone-acetaminophen (Norco) 5-325 mg per tablet Take 1 tablet by mouth every 4 (four) hours as needed for pain Max Daily Amount: 6 tablets 15 tablet 0    Icosapent Ethyl 1 g CAPS       naloxone (NARCAN) 4 mg/0.1 mL nasal spray Administer 1 spray into a nostril. If no response after 2-3 minutes, give another dose in the  other nostril using a new spray. 1 each 1    NIFEdipine ER (ADALAT CC) 30 MG 24 hr tablet Take 30 mg by mouth daily      omeprazole (PriLOSEC) 20 mg delayed release capsule Take 1 capsule (20 mg total) by mouth daily 30 capsule 3    Saxenda injection Inject 1.8 mg under the skin daily      topiramate (TOPAMAX) 50 MG tablet 1 TABLET AT NIGHT ORALLY ONCE A DAY (NIGHTLY) 60 DAYS      traZODone (DESYREL) 50 mg tablet Take 1 tablet by mouth daily at bedtime      apixaban (Eliquis) 2.5 mg Take 1 tablet (2.5 mg total) by mouth 2 (two) times a day (Patient not taking: Reported on 12/5/2024) 84 tablet 0    Belimumab (Benlysta) 200 MG/ML SOAJ Inject 1 cc subcu weekly (Patient not taking: Reported on 12/5/2024) 12 mL 1    bromocriptine (PARLODEL) 2.5 mg tablet  (Patient not taking: Reported on 12/5/2024)      FLUoxetine (PROzac) 20 mg capsule Take 20 mg by mouth daily (Patient not taking: Reported on 12/5/2024)      hydroxychloroquine (PLAQUENIL) 200 mg tablet Take 1.5 tablets (300 mg total) by mouth daily with breakfast 135 tablet 3    lidocaine (Lidoderm) 5 % Apply 1 patch topically over 12 hours daily Remove & Discard patch within 12 hours or as directed by MD (Patient not taking: Reported on 12/3/2024) 30 patch 0    ondansetron (ZOFRAN-ODT) 8 mg disintegrating tablet DISSOLVE ONE TABLET BY MOUTH EVERY 8 HOURS OR AS NEEDED FOR VOMITING. (Patient not taking: Reported on 10/18/2024)      Repatha SureClick 140 MG/ML SOAJ INJECT 1 ML SUBCUTANEOUS EVERY 2 WEEKS 60 DAYS (Patient not taking: Reported on 12/5/2024)      sildenafil (REVATIO) 20 mg tablet Take 1 tablet (20 mg total) by mouth 2 (two) times a day (Patient not taking: Reported on 10/18/2024) 180 tablet 1    sildenafil (VIAGRA) 25 MG tablet Take 25 mg by mouth daily as needed for erectile dysfunction (Patient not taking: Reported on 9/11/2024)      simvastatin (ZOCOR) 10 mg tablet  (Patient not taking: Reported on 12/5/2024)      vitamin B-12 (VITAMIN B-12) 1,000 mcg  "tablet Take 2,000 mcg by mouth daily (Patient not taking: Reported on 9/22/2024)       No current facility-administered medications on file prior to visit.     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    Review of systems: ROS is negative other than that noted in the HPI.  Constitutional: Negative for fatigue and fever.   HENT: Negative for sore throat.    Respiratory: Negative for shortness of breath.    Cardiovascular: Negative for chest pain.   Gastrointestinal: Negative for abdominal pain.   Endocrine: Negative for cold intolerance and heat intolerance.   Genitourinary: Negative for flank pain.   Musculoskeletal: Negative for back pain.   Skin: Negative for rash.   Allergic/Immunologic: Negative for immunocompromised state.   Neurological: Negative for dizziness.   Psychiatric/Behavioral: Negative for agitation.      Physical Exam:    Blood pressure 116/84, pulse 94, height 5' 2\" (1.575 m), weight 65.8 kg (145 lb), " not currently breastfeeding.    General/Constitutional: NAD, well developed, well nourished  HENT: Normocephalic, atraumatic  CV: Intact distal pulses, regular rate  Resp: No respiratory distress or labored breathing  Lymphatic: No lymphadenopathy palpated  Neuro: Alert and Oriented x 3, no focal deficits  Psych: Normal mood, normal affect, normal judgement, normal behavior  Skin: Warm, dry, no rashes, no erythema       Ankle Examination (focused):     Ankle Examination (focused): Left ankle      Short leg splint in place, clean, dry and intact   Toes wwp  Sensation intact to light touch in sp/dp/tibial nerve distributions, unable to assess saphenous / sural due to splint  Firing EHL and FHL, unable to assess TA/GSC due to splint      Ankle Imaging:    I personally reviewed and interpreted CT of the left foot/ankle obtained on 12/3/24 which was reviewed in detail with the patient. This demonstrated non-displaced medial malleolar and posterior malleolar fractures as well as AITFL avulsion fracture. No major lateral talar subluxation.  I have reviewed the radiology report and agree with their impression.      Administrative Statements   I have spent a total time of 35 minutes in caring for this patient on the day of the visit/encounter including Diagnostic results, Prognosis, Risks and benefits of tx options, Instructions for management, Patient and family education, Importance of tx compliance, Risk factor reductions, Impressions, Counseling / Coordination of care, Documenting in the medical record, Reviewing / ordering tests, medicine, procedures  , Obtaining or reviewing history  , and Communicating with other healthcare professionals . Pt has lupus with multiple complications of lupus, and I spent an extensive amount of time communicating directly with pt's nephrologist, Dr. Martino, and pt's rheumatologist, Dr. Daly, as well as extensive time with the patient reviewing her imaging and discussing treatment options  and risks / benefits / alternatives.

## 2024-12-09 ENCOUNTER — OFFICE VISIT (OUTPATIENT)
Dept: CARDIOLOGY CLINIC | Facility: CLINIC | Age: 24
End: 2024-12-09
Payer: OTHER GOVERNMENT

## 2024-12-09 VITALS
BODY MASS INDEX: 25.61 KG/M2 | DIASTOLIC BLOOD PRESSURE: 70 MMHG | SYSTOLIC BLOOD PRESSURE: 100 MMHG | WEIGHT: 140 LBS | HEART RATE: 72 BPM

## 2024-12-09 DIAGNOSIS — M32.14 OTHER SYSTEMIC LUPUS ERYTHEMATOSUS WITH GLOMERULAR DISEASE (HCC): ICD-10-CM

## 2024-12-09 DIAGNOSIS — I10 ESSENTIAL HYPERTENSION: ICD-10-CM

## 2024-12-09 DIAGNOSIS — Z01.810 PREOPERATIVE CARDIOVASCULAR EXAMINATION: Primary | ICD-10-CM

## 2024-12-09 PROCEDURE — 93000 ELECTROCARDIOGRAM COMPLETE: CPT | Performed by: INTERNAL MEDICINE

## 2024-12-09 PROCEDURE — 99244 OFF/OP CNSLTJ NEW/EST MOD 40: CPT | Performed by: INTERNAL MEDICINE

## 2024-12-09 RX ORDER — ASPIRIN 81 MG
TABLET,CHEWABLE ORAL
COMMUNITY
Start: 2024-12-04

## 2024-12-09 NOTE — PROGRESS NOTES
Cardiology Office Note  MD Regan Oscar MD, [facility]  Case Calle DO, [facility]  MD Sujata Gregory DO, [facility]  Enrique Daniels DO, [facility]  ----------------------------------------------------------------  [address] [address]    Angela Lemus 24 y.o. female MRN: [id number]  Unit/Bed#:  Encounter: [id number]      History of Present Illness:  It was a pleasure to see Angela Lemus in the office today for initial CV evaluation. 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  She has no family history of CAD or sudden cardiac death.  She establish care with us in December 2024.  The patient had fallen in late November 2024 resulting in a fracture of her left ankle.  During her evaluation and preoperative ECG in early December 2024, there was an electrocardiogram that was read as atrial fibrillation.  This ECG showed clear discernible P waves, but the electrocardiogram was read by the computer as atrial fibrillation.  The patient had not been experiencing any significant chest discomfort, shortness of breath or exertional symptoms.  Due to the patient's ECG interpretation and concern for possible atrial fibrillation, she was referred to cardiology office for evaluation.    She denies any chest pain, pressure, tightness or squeezing.  Denies lightheadedness, dizziness or palpitations.  Denies lower extremity swelling, orthopnea or paroxysmal nocturnal dyspnea.  The patient can climb greater than 2 flights of stairs without significant exertional symptoms.    Review of Systems:  Review of Systems   Constitutional: Negative for decreased appetite, fever, weight gain and weight loss.   HENT:  Negative for congestion and sore throat.    Eyes:  Negative for visual disturbance.   Cardiovascular:  Negative for chest pain, dyspnea on exertion, leg swelling, near-syncope and  palpitations.   Respiratory:  Negative for cough and shortness of breath.    Hematologic/Lymphatic: Negative for bleeding problem.   Skin:  Negative for rash.   Musculoskeletal:  Negative for myalgias and neck pain.   Gastrointestinal:  Negative for abdominal pain and nausea.   Neurological:  Negative for light-headedness and weakness.   Psychiatric/Behavioral:  Negative for depression.        Past Medical History:   Diagnosis Date    Bowel obstruction (HCC)     Clostridium difficile infection         Crohn's disease (HCC)     Depression     Hypertension     Kidney disease, chronic, stage I (GFR over 89 ml/min)     Lupus     PTSD (post-traumatic stress disorder)     Raynaud disease        Past Surgical History:   Procedure Laterality Date    ABDOMINAL SURGERY      bowel obstruction     SECTION N/A     COLON SURGERY      IR BIOPSY KIDNEY RANDOM  3/6/2024    WISDOM TOOTH EXTRACTION         Social History     Socioeconomic History    Marital status:      Spouse name: Not on file    Number of children: Not on file    Years of education: Not on file    Highest education level: Not on file   Occupational History    Not on file   Tobacco Use    Smoking status: Never    Smokeless tobacco: Never   Vaping Use    Vaping status: Never Used   Substance and Sexual Activity    Alcohol use: Yes     Comment: 1 x month    Drug use: Never    Sexual activity: Yes     Partners: Male     Birth control/protection: Condom Male   Other Topics Concern    Not on file   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     Social Drivers of Health     Financial Resource Strain: Not on file   Food Insecurity: Not on file   Transportation Needs: Not on file   Physical Activity: Not on file   Stress: Not on file   Social Connections: Not on file   Intimate Partner Violence: Not on file   Housing Stability: Not on file       Family History   Problem Relation Age of Onset    Lupus Mother     Diabetes Father     COPD Maternal  Grandmother     Heart failure Maternal Grandmother     Substance Abuse Maternal Grandmother     Breast cancer Other        Allergies   Allergen Reactions    Oxycodone GI Intolerance and Vomiting     Nausea/vomiting         Current Outpatient Medications:     acetaminophen (TYLENOL) 650 mg CR tablet, Take 1 tablet (650 mg total) by mouth every 8 (eight) hours as needed for mild pain, Disp: 30 tablet, Rfl: 0    Aspirin Low Dose 81 MG chewable tablet, , Disp: , Rfl:     Belimumab (Benlysta) 200 MG/ML SOAJ, Inject 1 cc subcu weekly, Disp: 12 mL, Rfl: 1    Cyanocobalamin (VITAMIN B 12 PO), Take 500 mcg by mouth 2 (two) times a day, Disp: , Rfl:     ergocalciferol (VITAMIN D2) 50,000 units, 1 CAPSULE ORALLY ONCE A WEEK 60 DAYS, Disp: , Rfl:     ferrous sulfate 325 (65 Fe) mg tablet, Take 325 mg by mouth daily with breakfast, Disp: , Rfl:     HYDROcodone-acetaminophen (Norco) 5-325 mg per tablet, Take 1 tablet by mouth every 4 (four) hours as needed for pain Max Daily Amount: 6 tablets, Disp: 15 tablet, Rfl: 0    hydroxychloroquine (PLAQUENIL) 200 mg tablet, Take 1.5 tablets (300 mg total) by mouth daily with breakfast, Disp: 135 tablet, Rfl: 3    naloxone (NARCAN) 4 mg/0.1 mL nasal spray, Administer 1 spray into a nostril. If no response after 2-3 minutes, give another dose in the other nostril using a new spray., Disp: 1 each, Rfl: 1    NIFEdipine ER (ADALAT CC) 30 MG 24 hr tablet, Take 30 mg by mouth daily, Disp: , Rfl:     traZODone (DESYREL) 50 mg tablet, Take 1 tablet by mouth daily at bedtime, Disp: , Rfl:     vitamin B-12 (VITAMIN B-12) 1,000 mcg tablet, Take 2,000 mcg by mouth daily, Disp: , Rfl:     apixaban (Eliquis) 2.5 mg, Take 1 tablet (2.5 mg total) by mouth 2 (two) times a day (Patient not taking: Reported on 12/9/2024), Disp: 84 tablet, Rfl: 0    bromocriptine (PARLODEL) 2.5 mg tablet, , Disp: , Rfl:     FLUoxetine (PROzac) 20 mg capsule, Take 20 mg by mouth daily (Patient not taking: Reported on  12/3/2024), Disp: , Rfl:     Icosapent Ethyl 1 g CAPS, , Disp: , Rfl:     lidocaine (Lidoderm) 5 %, Apply 1 patch topically over 12 hours daily Remove & Discard patch within 12 hours or as directed by MD (Patient not taking: Reported on 12/3/2024), Disp: 30 patch, Rfl: 0    omeprazole (PriLOSEC) 20 mg delayed release capsule, Take 1 capsule (20 mg total) by mouth daily (Patient not taking: Reported on 12/9/2024), Disp: 30 capsule, Rfl: 3    ondansetron (ZOFRAN-ODT) 8 mg disintegrating tablet, DISSOLVE ONE TABLET BY MOUTH EVERY 8 HOURS OR AS NEEDED FOR VOMITING. (Patient not taking: Reported on 12/9/2024), Disp: , Rfl:     Repatha SureClick 140 MG/ML SOAJ, INJECT 1 ML SUBCUTANEOUS EVERY 2 WEEKS 60 DAYS (Patient not taking: Reported on 12/3/2024), Disp: , Rfl:     Saxenda injection, Inject 1.8 mg under the skin daily (Patient not taking: Reported on 12/9/2024), Disp: , Rfl:     sildenafil (REVATIO) 20 mg tablet, Take 1 tablet (20 mg total) by mouth 2 (two) times a day (Patient not taking: Reported on 12/9/2024), Disp: 180 tablet, Rfl: 1    sildenafil (VIAGRA) 25 MG tablet, Take 25 mg by mouth daily as needed for erectile dysfunction (Patient not taking: Reported on 9/11/2024), Disp: , Rfl:     simvastatin (ZOCOR) 10 mg tablet, , Disp: , Rfl:     topiramate (TOPAMAX) 50 MG tablet, 1 TABLET AT NIGHT ORALLY ONCE A DAY (NIGHTLY) 60 DAYS (Patient not taking: Reported on 12/9/2024), Disp: , Rfl:     Vitals:    12/09/24 1504   BP: 100/70   BP Location: Left arm   Patient Position: Sitting   Cuff Size: Adult   Pulse: 72   Weight: 63.5 kg (140 lb)     Body mass index is 25.61 kg/m².    PHYSICAL EXAMINATION:  Gen: Awake, Alert, NAD   Head/eyes: AT/NC, pupils equal and round, Anicteric  ENT: mmm  Neck: Supple, No elevated JVP, trachea midline  Resp: CTA bilaterally no w/r/r  CV: RRR +S1, S2, No m/r/g  Abd: Soft, NT/ND + BS  Ext: no LE edema bilaterally; left lower extremity wrapped  Neuro: Follows commands, moves all  "extermities  Psych: Appropriate affect, normal mood, pleasant attitude, non-combative  Skin: warm; no rash, erythema or venous stasis changes on exposed skin    --------------------------------------------------------------------------------  TREADMILL STRESS  No results found for this or any previous visit.     --------------------------------------------------------------------------------  NUCLEAR STRESS TEST: No results found for this or any previous visit.    No results found for this or any previous visit.      --------------------------------------------------------------------------------  CATH:  No results found for this or any previous visit.    --------------------------------------------------------------------------------  ECHO:   No results found for this or any previous visit.    No results found for this or any previous visit.    --------------------------------------------------------------------------------  HOLTER  No results found for this or any previous visit.    No results found for this or any previous visit.    --------------------------------------------------------------------------------  CAROTIDS  No results found for this or any previous visit.     --------------------------------------------------------------------------------  ECGs:  Results for orders placed or performed in visit on 12/09/24   POCT ECG    Impression    Sinus rhythm 72 bpm, normal ECG        Lab Results   Component Value Date    WBC 4.40 12/02/2024    HGB 13.4 12/02/2024    HCT 42.3 12/02/2024    MCV 87 12/02/2024     12/02/2024      Lab Results   Component Value Date    SODIUM 137 12/02/2024    K 4.7 12/02/2024     12/02/2024    CO2 23 12/02/2024    BUN 24 12/02/2024    CREATININE 1.22 12/02/2024    GLUC 92 12/02/2024    CALCIUM 9.6 12/02/2024      No results found for: \"HGBA1C\"   No results found for: \"CHOL\"  Lab Results   Component Value Date    HDL 58 07/15/2024    HDL 53 06/03/2024    HDL 46 (L) " "12/05/2023     Lab Results   Component Value Date    LDLCALC 72 07/15/2024    LDLCALC 50 06/03/2024    LDLCALC 64 12/05/2023     Lab Results   Component Value Date    TRIG 57 07/15/2024    TRIG 75 06/03/2024    TRIG 240 (H) 12/05/2023     No results found for: \"CHOLHDL\"   Lab Results   Component Value Date    INR 0.91 12/02/2024    INR 0.93 02/23/2024    PROTIME 12.7 12/02/2024    PROTIME 13.0 02/23/2024        1. Preoperative cardiovascular examination  -     POCT ECG  2. Essential hypertension  3. 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  She is here today for preoperative CV risk assessment to her upcoming orthopedic surgery.  She has had no chest pain concerning for angina and no signs or symptoms of heart failure.  Clinically she examines to be euvolemic.  Blood pressure and heart rate are currently stable.  She is tolerating her current medications without any reported adverse effects.  She can perform greater than 4 METS on a daily basis without significant exertional symptoms.  ECG is nonischemic.  Reviewed her ECG from early December 2023 demonstrated sinus rhythm with heart rates in the high 90s.  ECG had clear discernible P waves without any evidence of atrial fibrillation and no acute ischemic changes.  Based on her clinical presentation, I have the following recommendations:    1.  As she has limited risk factors with good overall functional capacity, nonischemic ECG, the patient is at a low CV risk for upcoming orthopedic surgery.  No further CV testing prior to the OR as it would not change our management  2.  At some point in the future it may be reasonable to check a 2D echocardiogram with her history of hypertension.  This can be deferred until " "after her surgical intervention.  This should also not hold up her surgery.  3.  Continue current antihypertensive regimen including nifedipine  4.  Statin and PCSK9 inhibitor as per nephrology.  Repeat lipid panel in 3 to 6 months.  Continue Vascepa.  5.  Thromboembolic prophylaxis with apixaban as per orthopedic surgery.  6.  We will follow-up with her in the future to review the results of her echocardiogram.    As always, please not hesitate to call with any questions.    Portions of the record may have been created with voice recognition software. Occasional wrong word or \"sound a like\" substitutions may have occurred due to the inherent limitations of voice recognition software. Read the chart carefully and recognize, using context, where substitutions have occurred.      Signed: Case Calle DO, FACC, FASE, FASNC, FACP  "

## 2024-12-10 ENCOUNTER — DOCUMENTATION (OUTPATIENT)
Dept: OBGYN CLINIC | Facility: CLINIC | Age: 24
End: 2024-12-10

## 2024-12-10 DIAGNOSIS — S82.842A CLOSED BIMALLEOLAR FRACTURE OF LEFT ANKLE, INITIAL ENCOUNTER: ICD-10-CM

## 2024-12-11 ENCOUNTER — HOSPITAL ENCOUNTER (OUTPATIENT)
Facility: HOSPITAL | Age: 24
Setting detail: OUTPATIENT SURGERY
Discharge: HOME | End: 2024-12-11
Attending: STUDENT IN AN ORGANIZED HEALTH CARE EDUCATION/TRAINING PROGRAM | Admitting: STUDENT IN AN ORGANIZED HEALTH CARE EDUCATION/TRAINING PROGRAM
Payer: OTHER GOVERNMENT

## 2024-12-11 ENCOUNTER — APPOINTMENT (OUTPATIENT)
Dept: RADIOLOGY | Facility: HOSPITAL | Age: 24
End: 2024-12-11
Payer: OTHER GOVERNMENT

## 2024-12-11 VITALS
RESPIRATION RATE: 16 BRPM | TEMPERATURE: 97.1 F | BODY MASS INDEX: 27.47 KG/M2 | WEIGHT: 149.3 LBS | HEIGHT: 62 IN | DIASTOLIC BLOOD PRESSURE: 68 MMHG | HEART RATE: 66 BPM | SYSTOLIC BLOOD PRESSURE: 122 MMHG | OXYGEN SATURATION: 98 %

## 2024-12-11 DIAGNOSIS — Z87.81 S/P ORIF (OPEN REDUCTION INTERNAL FIXATION) FRACTURE: Primary | ICD-10-CM

## 2024-12-11 DIAGNOSIS — S82.842A CLOSED BIMALLEOLAR FRACTURE OF LEFT ANKLE, INITIAL ENCOUNTER: ICD-10-CM

## 2024-12-11 DIAGNOSIS — Z98.890 S/P ORIF (OPEN REDUCTION INTERNAL FIXATION) FRACTURE: Primary | ICD-10-CM

## 2024-12-11 LAB
EXT PREGNANCY TEST URINE: NEGATIVE
EXT. CONTROL: NORMAL

## 2024-12-11 PROCEDURE — 27766 OPTX MEDIAL ANKLE FX: CPT

## 2024-12-11 PROCEDURE — C9290 INJ, BUPIVACAINE LIPOSOME: HCPCS | Performed by: STUDENT IN AN ORGANIZED HEALTH CARE EDUCATION/TRAINING PROGRAM

## 2024-12-11 PROCEDURE — 77071 MNL APPL STRS JT RADIOGRAPHY: CPT | Performed by: STUDENT IN AN ORGANIZED HEALTH CARE EDUCATION/TRAINING PROGRAM

## 2024-12-11 PROCEDURE — 27829 TREAT LOWER LEG JOINT: CPT

## 2024-12-11 PROCEDURE — 27766 OPTX MEDIAL ANKLE FX: CPT | Performed by: STUDENT IN AN ORGANIZED HEALTH CARE EDUCATION/TRAINING PROGRAM

## 2024-12-11 PROCEDURE — 73600 X-RAY EXAM OF ANKLE: CPT

## 2024-12-11 PROCEDURE — C1713 ANCHOR/SCREW BN/BN,TIS/BN: HCPCS | Performed by: STUDENT IN AN ORGANIZED HEALTH CARE EDUCATION/TRAINING PROGRAM

## 2024-12-11 PROCEDURE — 81025 URINE PREGNANCY TEST: CPT | Performed by: STUDENT IN AN ORGANIZED HEALTH CARE EDUCATION/TRAINING PROGRAM

## 2024-12-11 PROCEDURE — 27829 TREAT LOWER LEG JOINT: CPT | Performed by: STUDENT IN AN ORGANIZED HEALTH CARE EDUCATION/TRAINING PROGRAM

## 2024-12-11 DEVICE — TIGHTROPE XP BUTTRESS PLATE IMPLANT SYS
Type: IMPLANTABLE DEVICE | Site: ANKLE | Status: FUNCTIONAL
Brand: ARTHREX®

## 2024-12-11 DEVICE — CANNULATED SCREW
Type: IMPLANTABLE DEVICE | Site: ANKLE | Status: FUNCTIONAL
Brand: ASNIS

## 2024-12-11 RX ORDER — TRAMADOL HYDROCHLORIDE 50 MG/1
50 TABLET ORAL EVERY 6 HOURS PRN
Qty: 20 TABLET | Refills: 0 | Status: SHIPPED | OUTPATIENT
Start: 2024-12-11 | End: 2024-12-21

## 2024-12-11 RX ORDER — KETAMINE HCL IN NACL, ISO-OSM 100MG/10ML
SYRINGE (ML) INJECTION AS NEEDED
Status: DISCONTINUED | OUTPATIENT
Start: 2024-12-11 | End: 2024-12-11

## 2024-12-11 RX ORDER — HYDROMORPHONE HCL/PF 1 MG/ML
SYRINGE (ML) INJECTION AS NEEDED
Status: DISCONTINUED | OUTPATIENT
Start: 2024-12-11 | End: 2024-12-11

## 2024-12-11 RX ORDER — CHLORHEXIDINE GLUCONATE 40 MG/ML
SOLUTION TOPICAL DAILY PRN
Status: DISCONTINUED | OUTPATIENT
Start: 2024-12-11 | End: 2024-12-11 | Stop reason: HOSPADM

## 2024-12-11 RX ORDER — ONDANSETRON 4 MG/1
4 TABLET, FILM COATED ORAL EVERY 8 HOURS PRN
Qty: 4 TABLET | Refills: 0 | Status: SHIPPED | OUTPATIENT
Start: 2024-12-11

## 2024-12-11 RX ORDER — MAGNESIUM HYDROXIDE 1200 MG/15ML
LIQUID ORAL AS NEEDED
Status: DISCONTINUED | OUTPATIENT
Start: 2024-12-11 | End: 2024-12-11 | Stop reason: HOSPADM

## 2024-12-11 RX ORDER — ONDANSETRON 2 MG/ML
4 INJECTION INTRAMUSCULAR; INTRAVENOUS ONCE AS NEEDED
Status: DISCONTINUED | OUTPATIENT
Start: 2024-12-11 | End: 2024-12-11 | Stop reason: HOSPADM

## 2024-12-11 RX ORDER — ALBUTEROL SULFATE 0.83 MG/ML
2.5 SOLUTION RESPIRATORY (INHALATION) ONCE AS NEEDED
Status: DISCONTINUED | OUTPATIENT
Start: 2024-12-11 | End: 2024-12-11 | Stop reason: HOSPADM

## 2024-12-11 RX ORDER — HYDROMORPHONE HCL/PF 1 MG/ML
0.5 SYRINGE (ML) INJECTION
Status: DISCONTINUED | OUTPATIENT
Start: 2024-12-11 | End: 2024-12-11 | Stop reason: HOSPADM

## 2024-12-11 RX ORDER — FENTANYL CITRATE/PF 50 MCG/ML
50 SYRINGE (ML) INJECTION
Status: DISCONTINUED | OUTPATIENT
Start: 2024-12-11 | End: 2024-12-11 | Stop reason: HOSPADM

## 2024-12-11 RX ORDER — PROPOFOL 10 MG/ML
INJECTION, EMULSION INTRAVENOUS AS NEEDED
Status: DISCONTINUED | OUTPATIENT
Start: 2024-12-11 | End: 2024-12-11

## 2024-12-11 RX ORDER — BUPIVACAINE HYDROCHLORIDE 2.5 MG/ML
INJECTION, SOLUTION EPIDURAL; INFILTRATION; INTRACAUDAL
Status: COMPLETED | OUTPATIENT
Start: 2024-12-11 | End: 2024-12-11

## 2024-12-11 RX ORDER — CEPHALEXIN 500 MG/1
500 CAPSULE ORAL EVERY 6 HOURS SCHEDULED
Qty: 8 CAPSULE | Refills: 0 | Status: SHIPPED | OUTPATIENT
Start: 2024-12-11 | End: 2024-12-13

## 2024-12-11 RX ORDER — ONDANSETRON 2 MG/ML
INJECTION INTRAMUSCULAR; INTRAVENOUS AS NEEDED
Status: DISCONTINUED | OUTPATIENT
Start: 2024-12-11 | End: 2024-12-11

## 2024-12-11 RX ORDER — PROMETHAZINE HYDROCHLORIDE 25 MG/ML
6.25 INJECTION, SOLUTION INTRAMUSCULAR; INTRAVENOUS ONCE AS NEEDED
Status: DISCONTINUED | OUTPATIENT
Start: 2024-12-11 | End: 2024-12-11 | Stop reason: HOSPADM

## 2024-12-11 RX ORDER — CHLORHEXIDINE GLUCONATE ORAL RINSE 1.2 MG/ML
15 SOLUTION DENTAL ONCE
Status: COMPLETED | OUTPATIENT
Start: 2024-12-11 | End: 2024-12-11

## 2024-12-11 RX ORDER — CEFAZOLIN SODIUM 2 G/50ML
2000 SOLUTION INTRAVENOUS ONCE
Status: COMPLETED | OUTPATIENT
Start: 2024-12-11 | End: 2024-12-11

## 2024-12-11 RX ORDER — ACETAMINOPHEN 500 MG
1000 TABLET ORAL EVERY 8 HOURS
Qty: 90 TABLET | Refills: 0 | Status: SHIPPED | OUTPATIENT
Start: 2024-12-11 | End: 2024-12-26

## 2024-12-11 RX ORDER — DEXAMETHASONE SODIUM PHOSPHATE 10 MG/ML
INJECTION, SOLUTION INTRAMUSCULAR; INTRAVENOUS AS NEEDED
Status: DISCONTINUED | OUTPATIENT
Start: 2024-12-11 | End: 2024-12-11

## 2024-12-11 RX ORDER — MIDAZOLAM HYDROCHLORIDE 2 MG/2ML
INJECTION, SOLUTION INTRAMUSCULAR; INTRAVENOUS AS NEEDED
Status: DISCONTINUED | OUTPATIENT
Start: 2024-12-11 | End: 2024-12-11

## 2024-12-11 RX ORDER — SODIUM CHLORIDE, SODIUM LACTATE, POTASSIUM CHLORIDE, CALCIUM CHLORIDE 600; 310; 30; 20 MG/100ML; MG/100ML; MG/100ML; MG/100ML
125 INJECTION, SOLUTION INTRAVENOUS CONTINUOUS
Status: DISCONTINUED | OUTPATIENT
Start: 2024-12-11 | End: 2024-12-11 | Stop reason: HOSPADM

## 2024-12-11 RX ORDER — BUPIVACAINE HYDROCHLORIDE 5 MG/ML
INJECTION, SOLUTION EPIDURAL; INTRACAUDAL
Status: COMPLETED | OUTPATIENT
Start: 2024-12-11 | End: 2024-12-11

## 2024-12-11 RX ORDER — PROPOFOL 10 MG/ML
INJECTION, EMULSION INTRAVENOUS CONTINUOUS PRN
Status: DISCONTINUED | OUTPATIENT
Start: 2024-12-11 | End: 2024-12-11

## 2024-12-11 RX ORDER — FENTANYL CITRATE 50 UG/ML
INJECTION, SOLUTION INTRAMUSCULAR; INTRAVENOUS AS NEEDED
Status: DISCONTINUED | OUTPATIENT
Start: 2024-12-11 | End: 2024-12-11

## 2024-12-11 RX ADMIN — CHLORHEXIDINE GLUCONATE 0.12% ORAL RINSE 15 ML: 1.2 LIQUID ORAL at 08:49

## 2024-12-11 RX ADMIN — PROPOFOL 150 MCG/KG/MIN: 10 INJECTION, EMULSION INTRAVENOUS at 09:46

## 2024-12-11 RX ADMIN — BUPIVACAINE HYDROCHLORIDE 5 ML: 5 INJECTION, SOLUTION EPIDURAL; INTRACAUDAL; PERINEURAL at 09:20

## 2024-12-11 RX ADMIN — BUPIVACAINE HYDROCHLORIDE 12 ML: 2.5 INJECTION, SOLUTION EPIDURAL; INFILTRATION; INTRACAUDAL; PERINEURAL at 09:23

## 2024-12-11 RX ADMIN — PHENYLEPHRINE HYDROCHLORIDE 20 MCG/MIN: 10 INJECTION INTRAVENOUS at 09:49

## 2024-12-11 RX ADMIN — FENTANYL CITRATE 50 MCG: 50 INJECTION, SOLUTION INTRAMUSCULAR; INTRAVENOUS at 09:19

## 2024-12-11 RX ADMIN — FENTANYL CITRATE 50 MCG: 50 INJECTION, SOLUTION INTRAMUSCULAR; INTRAVENOUS at 09:17

## 2024-12-11 RX ADMIN — Medication 10 MG: at 11:38

## 2024-12-11 RX ADMIN — SODIUM CHLORIDE, SODIUM LACTATE, POTASSIUM CHLORIDE, AND CALCIUM CHLORIDE: .6; .31; .03; .02 INJECTION, SOLUTION INTRAVENOUS at 11:41

## 2024-12-11 RX ADMIN — PROPOFOL 200 MG: 10 INJECTION, EMULSION INTRAVENOUS at 09:43

## 2024-12-11 RX ADMIN — CEFAZOLIN SODIUM 2000 MG: 2 SOLUTION INTRAVENOUS at 09:50

## 2024-12-11 RX ADMIN — Medication 10 MG: at 11:54

## 2024-12-11 RX ADMIN — MIDAZOLAM HYDROCHLORIDE 2 MG: 1 INJECTION, SOLUTION INTRAMUSCULAR; INTRAVENOUS at 09:16

## 2024-12-11 RX ADMIN — BUPIVACAINE 14 ML: 13.3 INJECTION, SUSPENSION, LIPOSOMAL INFILTRATION at 09:20

## 2024-12-11 RX ADMIN — HYDROMORPHONE HYDROCHLORIDE 0.5 MG: 1 INJECTION, SOLUTION INTRAMUSCULAR; INTRAVENOUS; SUBCUTANEOUS at 09:54

## 2024-12-11 RX ADMIN — HYDROMORPHONE HYDROCHLORIDE 0.5 MG: 1 INJECTION, SOLUTION INTRAMUSCULAR; INTRAVENOUS; SUBCUTANEOUS at 10:34

## 2024-12-11 RX ADMIN — SODIUM CHLORIDE, SODIUM LACTATE, POTASSIUM CHLORIDE, AND CALCIUM CHLORIDE 125 ML/HR: .6; .31; .03; .02 INJECTION, SOLUTION INTRAVENOUS at 08:52

## 2024-12-11 RX ADMIN — DEXAMETHASONE SODIUM PHOSPHATE 10 MG: 10 INJECTION, SOLUTION INTRAMUSCULAR; INTRAVENOUS at 09:47

## 2024-12-11 RX ADMIN — Medication 20 MG: at 10:48

## 2024-12-11 RX ADMIN — Medication 10 MG: at 11:19

## 2024-12-11 RX ADMIN — PROPOFOL 50 MG: 10 INJECTION, EMULSION INTRAVENOUS at 09:55

## 2024-12-11 RX ADMIN — ONDANSETRON 4 MG: 2 INJECTION INTRAMUSCULAR; INTRAVENOUS at 11:00

## 2024-12-11 RX ADMIN — PROPOFOL 50 MG: 10 INJECTION, EMULSION INTRAVENOUS at 10:34

## 2024-12-11 NOTE — INTERVAL H&P NOTE
H&P reviewed. After examining the patient I find no changes in the patients condition since the H&P had been written. I had a lengthy discussion with the patient pre-operatively about risks, benefits, and alternatives which is well documented in the medical record.    Vitals:    12/11/24 0841   BP: 135/81   Pulse: 75   Resp: 18   Temp: 97.6 °F (36.4 °C)   SpO2: 100%

## 2024-12-11 NOTE — OP NOTE
OPERATIVE REPORT  PATIENT NAME: Angela Lemus    :  2000  MRN: [id number]  Pt Location: [facility] OR ROOM 01    SURGERY DATE: 2024    Surgeons and Role:     * Ananda Corea MD - Primary     * Zainab Swanson PA-C - Assisting    Preop Diagnosis:  Closed bimalleolar fracture of left ankle, initial encounter [S82.842A]    Post-Op Diagnosis Codes:     * Closed bimalleolar fracture of left ankle, initial encounter [S82.842A]  Displaced closed left bimalleolar ankle fracture with syndesmotic disruption        Procedure(s):    Open reduction and internal fixation of left bimalleolar ankle fracture without posterior malleolus fixation  2.   Repair of Syndesmosis    Specimen(s):  * No specimens in log *    Estimated Blood Loss:   75 mL    Drains:  * No LDAs found *    Anesthesia Type:   General w/ Regional    Operative Indications:  Closed bimalleolar fracture of left ankle, initial encounter [S82.842A]  Angela is a 24 year old female with lupus complicated by lupus nephritis who sustained a left ankle fracture with evidence of mortise instability. Based on the patient's age, health and activity level, a lengthy discussion was had with her regarding surgical and non surgical discussion. Both in the office and in the pre-operative holding area I reviewed the risks, benefits, alternatives and surgical details associated with open reduction and internal fixation of the ankle fracture with the patient. These risks include but were not limited to bleeding, infection, injury to nerves and blood vessels mal-union, non-union, limited post-operative ankle ROM, treatment failure requiring the need for revision surgery and the risks associated with anesthesia. The patient verbalized understanding of the treatment plan, acknowledged the associated risks and she provided informed consent and elected to move forward with surgery. I initialed the correct ankle in the holding area. Pre-operative discussion is well  documented in the medical record.     Operative Findings:    Left medial malleolar fracture with interposed periosteum at fracture site which was reduced out of the fracture site  Left minimally displaced posterior malleolar fracture  Left syndesmotic disruption as evidenced by posterior malleolar fracture and AITFL avulsion fracture    Complications:   None    Procedure and Technique:  The patient was seen and examined in the pre-operative holding area. The patient's ID was confirmed against the hospital wristband and chart. The operative extremity was marked and identified by both the patient and myself. The patient had an opportunity to ask questions and all questions were answered. A pre-operative regional block was provided by the anesthesia provider.    The patient was brought into the operating room and placed supine on the operating room table. All bony prominences were padded. Anesthesia was induced. The patient's splint was removed and the skin was inspected and demonstrated + wrinkle sign both medially and laterally. A tourniquet was applied and the limb was prepped and draped in sterile fashion. A formal timeout was performed matching the operative extremity to the consent form and the patient radiographs/imaging.     MEDIAL  A 5 cm medially based incision was made with a 15 blade into the skin and the subcutaneous tissues were divided with a metzenbaum scissors and bovie. The saphenous vein was identified and protected. The fracture site was encountered and there was noted to be interposed periosteum. The periosteum was flipped out of the fracture site, and the fracture site was irrigated and debrided with a curette, and 15 blade. The fracture was reduced with the use of a pointed reduction clamp. The reduction was held provisionally with K wires. C-arm fluoroscopy was obtained and demonstrated anatomic reduction of the fracture site. The K wires were advanced and drilled with the corresponding drill  bit. The depth gauge was used to determine screw length. Two 42 mm length 4.0mm partially threaded cannulated screws were inserted. The anterior screw was inserted in unicortical fashion. The pt was noted to have somewhat osteopenic bone, likely related to her lupus and chronic kidney disease, and the decision was made to insert the posterior screw in bicortical fashion to maximize purchase. Both screws achieved excellent purchase. Fluoroscopy demonstrated excellent reduction of the fracture site.     Given that the patient had a non-displaced posterior malleolus and anterolateral tibia avulsion fracture, this was determined to be equivalent to complete disruption of the syndesmosis, and the decision was made to perform syndesmotic fixation. An external rotation stress x-ray was not performed to minimize the risk of displacing the posterior malleolus fracture.    REPAIR OF SYNDESMOSIS  A 5 cm  lateral skin incision was made with a 15 blade through the skin and subcutaneous tissues. The deep tissues were divided with a bovie electrocautery down to the lateral fibula. The fibula was manually reduced into the tibial incisura, and the reduction was fluoroscopically confirmed.  A 2 hole Arthrex titanium plate was then selected and provisionally pinned to the fibula.  The location of the plate was confirmed on fluoroscopy to be approximately 1 cm above the level of the syndesmosis.  A guidewire was then placed for the planned tight rope.  Once the location and trajectory of the guidewire was confirmed, this guidewire was overdrilled with a cannulated drill, and the Arthrex tight rope was then inserted.  The far button was then flipped on the medial tibial cortex, and the button was tightened down to the plate.  Fluoroscopy was used to confirm satisfactory position of the tight rope and anatomic reduction of the syndesmosis.  A second guidewire was placed through the proximal hole of the plate for the second tight rope.   Once the location of this guidewire was also confirmed, this guidewire was similarly overdrilled with a cannulated drill, and the Arthrex tight rope was inserted through the proximal hole of the plate.The far button was then flipped on the medial tibial cortex, and the button was tightened down to the plate. The pre-existing medial incision was used to confirm that the medial buttons were directly on cortex without any interposed periosteum, nerve, or vasculature.     At this point, intraoperative fluoroscopy was used to obtain AP, lateral, and mortise views, which confirmed the reduction and placement of hardware. An external rotation stress exam was performed and noted to have no syndesmotic widening.    Hemostasis was achieved prior to closure. The wounds were copiously irrigated prior to closure. Subcutaneous tissue and skin were closed with 2-0 vicryl and 3-0 stratafix in a tension free manner followed by exofin adhesive.  One horizontal mattress nylon suture was placed in the lateral incision distally. Sterile dressings were applied as well as a posterior plaster splint with U mold. The patient was awoken and returned to the recovery room in stable condition.     Plan is for elevation, antibiotics, and DVT prophylaxis. She will be non weight bearing for 6 weeks given the syndesmotic injury. She will be on 2.5 mg eliquis BID post-operatively given her increased risk for DVT due to her lupus / CKD. This was discussed with her rheumatologist and nephrologist. Pt will follow-up in 10-14 days for wound check,  and possible suture removal.     I was present for the entire procedure. A qualified resident physician was not available, and a physician assistant was required during the procedure for retraction, tissue handling, dissection and suturing.    Patient Disposition:  PACU     Implant Name Type Inv. Item Serial No.  Lot No. LRB No. Used Action   PLATE BUTTRESS 1.5MM 2HL TI TIGHTROPE XP - [id number]   PLATE BUTTRESS 1.5MM 2HL TI TIGHTROPE XP  [employer] [id number] Left 1 Implanted   SCREW NYDIA TI 4 X 42MM STRL - [id number]  SCREW NYDIA TI 4 X 42MM STRL  [employer]  Left 2 Implanted              SIGNATURE: Ananda Corea MD  DATE: December 11, 2024  TIME: 11:57 AM

## 2024-12-11 NOTE — ANESTHESIA PROCEDURE NOTES
Peripheral Block    Patient location during procedure: pre-op  Start time: 12/11/2024 9:23 AM  Reason for block: at surgeon's request and post-op pain management  Staffing  Performed by: Angelo Pappas MD  Authorized by: Angelo Pappas MD    Preanesthetic Checklist  Completed: patient identified, IV checked, site marked, risks and benefits discussed, surgical consent, monitors and equipment checked, pre-op evaluation and timeout performed  Peripheral Block  Patient position: sitting  Prep: ChloraPrep  Patient monitoring: heart rate, cardiac monitor, frequent blood pressure checks and continuous pulse oximetry  Block type: Adductor Canal  Laterality: left  Injection technique: single-shot  Procedures: ultrasound guided, Ultrasound guidance required for the procedure to increase accuracy and safety of medication placement and decrease risk of complications.  Ultrasound permanent image saved  bupivacaine (PF) (MARCAINE) 0.25 % injection 20 mL - Perineural   12 mL - 12/11/2024 9:23:00 AM  bupivacaine liposomal (EXPAREL) 1.3 % injection 20 mL - Perineural   6 mL - 12/11/2024 9:23:00 AM  Needle  Needle type: Stimuplex   Needle gauge: 22 G  Needle length: 4 in  Needle localization: ultrasound guidance and anatomical landmarks  Assessment  Injection assessment: incremental injection, local visualized surrounding nerve on ultrasound, negative aspiration for heme, no paresthesia on injection, negative aspiration, negative for heart rate change, injected with ease, no symptoms of intraneural/intravenous injection, frequent aspiration and needle tip visualized at all times  Paresthesia pain: none  Post-procedure:  site cleaned  patient tolerated the procedure well with no immediate complications  Additional Notes  Unremarkable adductor canal nerve block

## 2024-12-11 NOTE — DISCHARGE INSTR - AVS FIRST PAGE
Postoperative Instructions Following ORIF Left ankle    MEDICATIONS:  Resume all home medications unless otherwise instructed by your surgeon.  Pain Medication:   Take Tylenol 1000mg three times a day (every 8 hours)  Take Tramadol  as needed every 4-6 hours for severe / breakthrough pain   If you were given a regional anesthetic (nerve block), it is helpful to take your pain medication before the block wears off.    Possible side effects include nausea, constipation, and urinary retention.  If you experience these side effects, please call our office for assistance.  Take 50 mg colace 1-2 times daily as needed for constipation. Purchase over the counter.   Pain med refills are authorized only during office hours (8am-4pm, Mon-Fri).  Nausea Medication:   Zofran 4mg, take 1 tablet every 6 hours as needed for nausea or vomiting   Fill prescription ONLY if you expericnce severe nausea.  Nausea and/or vomiting may occur as you are more active or begin food intake. If this occurs return to liquid diet and call surgeon if condition persists.  Blood Clot Prevention:   Pump your foot up and down 20 times per hour while you are less mobile.  Ambulate with your crutches at least once every hour   Take Eliquis 2.5 mg twice daily   Medication taken may have significant effects following discharge; therefore on the day of surgery:  YOU MUST BE ACCOMPANIED by a responsible adult upon discharge and for 24 hours after surgery.  DO NOT DRIVE a motor vehicle; operate machinery, power tools or appliances, for at least 2 weeks or unless the surgeon allows prior.  CHANGE POSITIONS SLOWLY AND WITH CARE  EATING: May resume regular diet after starting slowly with liquid diet    WOUND CARE:  Maintain splint clean, dry, and intact until follow up   Please call our office ([phone]) if you experience either of the following:  Sudden increase in swelling, redness, or warmth at the surgical site  Excessive incisional drainage that persists  "beyond the 3rd day after surgery  Oral temperature greater than 101 degrees, not relieved with Tylenol  Shortness of breath, chest pain, nausea, or any other concerning symptoms  If you have not urinated within 12 hours of discharge.  If it is after hours, go to the Emergency Room for evaluation    Other pain/swelling control measures:  Cold Therapy:  Apply ice (15 min on, 15 min off) as often as you feel is necessary, at least 3-4x per day. Ice helps with pain.  Elevation:  Elevate the entire leg above heart level when lying down / sleeping for the first week.  Place pillows under your ankle / heel to keep your knee straight. This will help with swelling and prevents stiffness.    IMMOBILIZATION:  DO NOT REMOVE YOUR SPLINT.  Keep it clean and dry.    ACTIVITY:   DO NOT BEAR WEIGHT on the operative leg.  Always use crutches.  Using Crutches on Stairs:  Going up, lead with your \"good\" (nonoperative) leg.  Going down, lead with your \"bad\" (operative) leg.  Use a hand rail when available.    PHYSICAL THERAPY:  It is very important to start physical therapy as soon as possible, ideally within the first week after the procedure. You can begin as early as you like.   You will be given a physical therapy prescription when you are seen in the office for your postoperative appointment.    FOLLOW-UP APPOINTMENT:  Follow up on 12/24/2024 with:        Ananda Corea MD    [address] [address]    Phone: [phone]    "

## 2024-12-11 NOTE — ANESTHESIA PREPROCEDURE EVALUATION
Procedure:  ORIF BIMALLEOLAR ANKLE FRACTURE (Left: Ankle)    Discoid lupus with lupus nephritis  - Cr. 1.22    Relevant Problems   CARDIO   (+) Raynaud's disease   (+) Renal hypertension      GI/HEPATIC   (+) Partial small bowel obstruction (HCC)      /RENAL   (+) Lupus nephritis (HCC)   (+) Stage 1 chronic kidney disease      HEMATOLOGY   (+) Iron deficiency anemia      NEURO/PSYCH   (+) Current moderate episode of major depressive disorder, unspecified whether recurrent (HCC)   (+) Depression   (+) Depression, unspecified   (+) Dysthymic disorder   (+) Post-traumatic stress disorder, unspecified        Physical Exam    Airway    Mallampati score: II  TM Distance: >3 FB  Neck ROM: full     Dental   No notable dental hx     Cardiovascular  Rhythm: regular, Rate: normal, Cardiovascular exam normal    Pulmonary  Pulmonary exam normal Breath sounds clear to auscultation    Other Findings  post-pubertal.      Anesthesia Plan  ASA Score- 3     Anesthesia Type- general with ASA Monitors.         Additional Monitors:     Airway Plan: LMA.    Comment: Risks/benefits and alternatives discussed including medication reaction, sore throat, aspiration, dental/oropharyngeal/ocular injuries, and/or grave/life threatening anesthetic and surgical emergencies..       Plan Factors-Exercise tolerance (METS): >4 METS.    Chart reviewed.    Patient summary reviewed.      Patient instructed to abstain from smoking on day of procedure. Patient did not smoke on day of surgery.            Induction- intravenous.    Postoperative Plan- Plan for postoperative opioid use. Planned trial extubation        Informed Consent- Anesthetic plan and risks discussed with patient.  I personally reviewed this patient with the CRNA. Discussed and agreed on the Anesthesia Plan with the CRNA..

## 2024-12-11 NOTE — ANESTHESIA PROCEDURE NOTES
Peripheral Block    Patient location during procedure: pre-op  Start time: 12/11/2024 9:20 AM  Reason for block: at surgeon's request and post-op pain management  Staffing  Performed by: Angelo Pappas MD  Authorized by: Angelo Pappas MD    Preanesthetic Checklist  Completed: patient identified, IV checked, site marked, risks and benefits discussed, surgical consent, monitors and equipment checked, pre-op evaluation and timeout performed  Peripheral Block  Patient position: sitting  Prep: ChloraPrep  Patient monitoring: heart rate, cardiac monitor, frequent blood pressure checks and continuous pulse oximetry  Block type: Popliteal  Laterality: left  Injection technique: single-shot  Procedures: ultrasound guided, Ultrasound guidance required for the procedure to increase accuracy and safety of medication placement and decrease risk of complications.  Ultrasound permanent image saved  bupivacaine (PF) (MARCAINE) 0.5 % injection 20 mL - Perineural   5 mL - 12/11/2024 9:20:00 AM  bupivacaine liposomal (EXPAREL) 1.3 % injection 20 mL - Perineural   14 mL - 12/11/2024 9:20:00 AM  Needle  Needle type: Stimuplex   Needle gauge: 22 G  Needle length: 4 in  Needle localization: ultrasound guidance and anatomical landmarks  Assessment  Injection assessment: incremental injection, local visualized surrounding nerve on ultrasound, negative aspiration for heme, no paresthesia on injection, negative aspiration, negative for heart rate change, injected with ease, no symptoms of intraneural/intravenous injection, frequent aspiration and needle tip visualized at all times  Paresthesia pain: none  Post-procedure:  site cleaned  patient tolerated the procedure well with no immediate complications  Additional Notes  Tight neural bundle. During injection patient reported multiple incidents of paraesthesia despite spread that looked insufficiently close to the nerve sheath. Near the end of the procedure the patient clarified that it  was more of pressure than a shocking sensation, making it less likely that this was true parasthesia vs patient-provider miscommunication. During post-procedure debrief about possible block failure, the patient would like a rescue block in PACU if required.

## 2024-12-11 NOTE — ANESTHESIA POSTPROCEDURE EVALUATION
Post-Op Assessment Note    CV Status:  Stable    Pain management: adequate       Mental Status:  Alert and awake   Hydration Status:  Euvolemic   PONV Controlled:  Controlled   Airway Patency:  Patent     Post Op Vitals Reviewed: Yes    No anethesia notable event occurred.    Staff: CRNA           Last Filed PACU Vitals:  Vitals Value Taken Time   Temp 97.2    Pulse 73    /74    Resp 16    SpO2 100%        Modified Benjamin:  No data recorded

## 2024-12-11 NOTE — ANESTHESIA POSTPROCEDURE EVALUATION
Post-Op Assessment Note    CV Status:  Stable  Pain Score: 0    Pain management: adequate       Mental Status:  Awake and sleepy   Hydration Status:  Stable   PONV Controlled:  None   Airway Patency:  Patent     Post Op Vitals Reviewed: Yes    No anethesia notable event occurred.    Staff: Anesthesiologist           Last Filed PACU Vitals:  Vitals Value Taken Time   Temp 98.1 °F (36.7 °C) 12/11/24 1251   Pulse 71 12/11/24 1251   /83 12/11/24 1251   Resp 18 12/11/24 1251   SpO2 98 % 12/11/24 1251       Modified Benjamin:  Activity: 2 (12/11/2024  1:30 PM)  Respiration: 2 (12/11/2024  1:30 PM)  Circulation: 2 (12/11/2024  1:30 PM)  Consciousness: 2 (12/11/2024  1:30 PM)  Oxygen Saturation: 2 (12/11/2024  1:30 PM)  Modified Benjamin Score: 10 (12/11/2024  1:30 PM)

## 2024-12-13 ENCOUNTER — TELEPHONE (OUTPATIENT)
Age: 24
End: 2024-12-13

## 2024-12-13 NOTE — TELEPHONE ENCOUNTER
[facility]    [facility] Auth Form -See  Media 12/10/24   Refaxed to [phone]

## 2024-12-18 NOTE — TELEPHONE ENCOUNTER
Nephrology  Patient: Tori Domínguez Date:2024   : 1971 Attending: Johan Triplett MD   53 year old female        Chief complaint: KRISTEN, SOB    HPI from initial consult:   This is a 53 year old female with a past medical history significant for CKD3b, anemia, DM2, anxiety, HTN, obesity, HFpEF, hypothyroidism, lymphedema who presented to the hospital 3 days ago for evaluation of SOB. Patient was admitted about a month ago and was discharged on bumex 1 po bid - she was only taking daily as outpatient. She also had weight gain. Cardiology was consulted on admission and started patient on a lasix drip. Patient diuresed well, with improvement in NTproBNP level however she developed hypotension so diuretics were held. She had a CT pe on admission which showed possible small PE, no evidence of strain. Patient was also hypertensive on admission and home medications were resumed.   Nephrology has been consulted for KRISTEN on CKD3b. Baseline creatinine is low 1.0s. Cr up to 2.4 mg/dl today. As above, diuretics were held. Contrast received on admission    2024  No acute events overnight  Na better    Past Medical History:   Diagnosis Date    Allergic rhinitis 2015    Allergic rhinitis, cause unspecified 2013    Anxiety     Asthma (CMD) 2014    Atypical moles 12/15/2014    Cellulitis and abscess of leg, except foot 2014    Cellulitis of left lower leg 2018    Chronic kidney disease (CKD) 2013    Depression     Diabetes mellitus, type 2  (CMD) 2011    History of acute renal failure     Requiring dialysis, believed secondary to Cipro    History of cellulitis of skin with lymphangitis     Hypertension     Hypothyroidism     Lymphedema 2012    Morbid obesity  (CMD)     Oxygen dependent     4LPM ATC - [facility]    Pneumonia due to COVID-19 virus 10/29/2021    Restless leg syndrome 2014    Septic shock  (CMD) 2024    Sleep apnea     Home NIV - [facility]  L/m for patient to r/s appt with Dr. Landin for tomorrow as he will be out of the office.          Past Surgical History:   Procedure Laterality Date    Cervical polypectomy  2000    Cholecystectomy  2004    D and c      July 2021    Dilation and curettage of uterus  2000    Gynecologic cryosurgery  2000    Sinus surgery      deviated septum, ? nasal polyp removal       Social History     Socioeconomic History    Marital status: /Civil Union     Spouse name: Not on file    Number of children: Not on file    Years of education: Not on file    Highest education level: Not on file   Occupational History    Not on file   Tobacco Use    Smoking status: Never    Smokeless tobacco: Never   Vaping Use    Vaping status: never used   Substance and Sexual Activity    Alcohol use: Yes     Comment: rare consumption    Drug use: No    Sexual activity: Not Currently     Partners: Male   Other Topics Concern    Not on file   Social History Narrative    , [employer] as a . Volunteers at [employer]. Loves cats!     Social Determinants of Health     Financial Resource Strain: Low Risk  (12/1/2024)    Financial Resource Strain     Unable to Get: None   Food Insecurity: Low Risk  (12/1/2024)    Food Insecurity     Worried about Food: Never true     Food is Gone: Never true   Transportation Needs: Not At Risk (12/1/2024)    Transportation Needs     Lack of Reliable Transportation: No   Physical Activity: High Risk (9/9/2024)    Exercise Vital Sign     Days of Exercise per Week: 0 days     Minutes of Exercise per Session: 0 min   Stress: Low Risk  (9/9/2024)    Stress     How Stressed: A little bit   Social Connections: Low Risk  (12/1/2024)    Social Connections     Social Connectivity: 3 to 5 times a week   Interpersonal Safety: Low Risk  (12/1/2024)    Interpersonal Safety     How often physically hurt: Never     How often insulted or talked down to: Never     How often threatened with harm: Never     How often scream or curse at: Never       Family History   Problem Relation Age of  Onset    Hypertension Mother     Hypertension Father     Stroke Maternal Grandmother     Diabetes Maternal Grandmother     Cancer Maternal Grandfather     Myocardial Infarction Paternal Grandmother        ALLERGIES:   Allergen Reactions    Amoxicillin Other (See Comments)     Patient describes as \"electrical\" shocks in legs.   Tolerated ceftriaxone (5/2024, 1/2022)    Augmentin [Amoclan] Other (See Comments)     Patient describes as \"electrical\" shocks in legs.     Ciprofloxacin Other (See Comments)         Review of Systems:  Positive per HPI, all other systems negative.    Vital Last Value 24 Hour Range   Temperature 97.3 °F (36.3 °C) Temp  Min: 97.3 °F (36.3 °C)  Max: 98.4 °F (36.9 °C)   Pulse 81 Pulse  Min: 70  Max: 82   Respiratory 18 Resp  Min: 18  Max: 20   Blood Pressure 116/58 BP  Min: 100/51  Max: 131/62   Art BP   No data recorded   Pulse Oximetry 97 % SpO2  Min: 92 %  Max: 99 %     Vital Today Admitted   Weight (!) 201.4 kg (444 lb 0.1 oz) Weight: (!) 228.8 kg (504 lb 6.6 oz)   Height N/A Height: 5' 6\" (167.6 cm)   BMI N/A BMI (Calculated): 81.41     Weight over the past 48 Hours:  Patient Vitals for the past 48 hrs:   Weight   12/18/24 0535 (!) 201.4 kg (444 lb 0.1 oz)            Hemodynamics:      Last Value 24 Hour Range   CVP   No data recorded   PAS/PAD   No data recorded   PCWP   No data recorded   CO   No data recorded   CI   No data recorded   SVR   No data recorded   SV02   No data recorded     Intake/Output:    Last Stool Occurrence: 1 (12/17/24 1500)    I/O this shift:  In: 530 [P.O.:530]  Out: -     I/O last 3 completed shifts:  In: 1200 [P.O.:1200]  Out: 1850 [Urine:1850]      Intake/Output Summary (Last 24 hours) at 12/18/2024 1128  Last data filed at 12/18/2024 1000  Gross per 24 hour   Intake 1250 ml   Output 1200 ml   Net 50 ml       Medications/Infusions:  Medications Prior to Admission   Medication Sig Dispense Refill    tirzepatide (Mounjaro) 10 MG/0.5ML Solution Auto-injector Inject  10 mg into the skin every 7 days for 28 days. Indications: Type 2 Diabetes 2 mL 0    [START ON 12/23/2024] tirzepatide (Mounjaro) 12.5 MG/0.5ML Solution Auto-injector Inject 12.5 mg into the skin every 7 days for 28 days. Indications: Type 2 Diabetes Begin taking on December 23, 2024. 2 mL 0    [START ON 1/20/2025] tirzepatide (Mounjaro) 15 MG/0.5ML Solution Auto-injector Inject 15 mg into the skin every 7 days. Indications: Type 2 Diabetes Begin taking on January 20, 2025. 6 mL 3    empagliflozin (JARDIANCE) 10 MG tablet Take 1 tablet by mouth daily (before breakfast). 90 tablet 1    hydrALAZINE (APRESOLINE) 100 MG tablet Take 1 tablet by mouth in the morning and 1 tablet at noon and 1 tablet in the evening. 270 tablet 3    oxygen (O2) gas Inhale 2 L/min into the lungs as needed (As needed). 2L as needed and 4L on ambulation      amLODIPine (NORVASC) 10 MG tablet Take 1 tablet by mouth once daily 90 tablet 0    insulin regular human, CONCENTRATED, (HumuLIN R U-500 KwikPen) 500 UNIT/ML pen-injector Inject under the skin 3 times daily per sliding scale. PRIME 5 UNITS BEFORE EACH DOSE.  units in divided doses. 60 mL 1    albuterol (Ventolin HFA) 108 (90 Base) MCG/ACT inhaler Inhale 2 puffs into the lungs up to four times daily as needed. May change to different as insurance prefers 1 each 1    fluticasone-salmeterol (Advair Diskus) 500-50 MCG/ACT inhaler Inhale 1 puff into the lungs in the morning and 1 puff in the evening. Rinse mouth after use. 60 each 1    ipratropium-albuterol (DUONEB) 0.5-2.5 (3) MG/3ML nebulizer solution Take 3 mLs by nebulization every 6 hours as needed for Shortness of Breath. 90 mL 1    bumetanide (BUMEX) 1 MG tablet Take 1 tablet by mouth 2 times daily. (Patient taking differently: Take 1 mg by mouth daily.) 60 tablet 0    rOPINIRole (REQUIP) 1 MG tablet Take one tablet by mouth in the morning, take TWO tablets at supper and one tablet at bedtime.      citalopram (CeleXA) 40 MG tablet  Take 1 tablet by mouth once daily 90 tablet 1    levothyroxine 125 MCG tablet Take 2 tablets by mouth daily. 180 tablet 1    cloNIDine (CATAPRES) 0.1 MG tablet Take 1 tablet by mouth daily. 90 tablet 1    Continuous Glucose  (Dexcom G7 ) Device Use with Dexcom G7 sensors. 1 each 0    Continuous Glucose Sensor (Dexcom G7 Sensor) Misc Apply new sensor to skin every 14 days. DX:E11.65 6 each 1    rosuvastatin (CRESTOR) 20 MG tablet Take 1 tablet by mouth nightly. 90 tablet 1    Insulin Pen Needle (RELION PEN NEEDLE) 31G X 8 MM Misc USE 1 PEN NEEDLE TO INJECT INSULIN SUBCUTANEOUSLY TWICE DAILY 100 each 11     Current Facility-Administered Medications   Medication Dose Route Frequency Provider Last Rate Last Admin    insulin lispro (ADMELOG, HumaLOG) injection 10 Units  10 Units Subcutaneous TID  Johan Triplett MD   10 Units at 12/18/24 1008    bumetanide (BUMEX) tablet 1 mg  1 mg Oral BID Iain Parry MD   1 mg at 12/18/24 0959    insulin glargine (LANTUS) injection 21 Units  21 Units Subcutaneous Nightly Khadijah Ordaz MD   21 Units at 12/17/24 2154    empagliflozin (JARDIANCE) tablet 10 mg  10 mg Oral QAM  Khadijah Ordaz MD   10 mg at 12/18/24 0910    sodium chloride 0.9 % injection 10 mL  10 mL Injection 2 times per day Dale Leslie, RN   10 mL at 12/18/24 0916    pantoprazole (PROTONIX) EC tablet 40 mg  40 mg Oral QAM  Quirino Aguilar MD   40 mg at 12/18/24 0910    insulin lispro (ADMELOG,HumaLOG) - Correction Dose   Subcutaneous TID  Khadijah Ordaz MD   6 Units at 12/18/24 1008    insulin lispro (ADMELOG,HumaLOG) - Correction Dose   Subcutaneous Nightly Khadijah Ordaz MD   4 Units at 12/17/24 2154    amLODIPine (NORVASC) tablet 10 mg  10 mg Oral Daily Quirino Aguilar MD   10 mg at 12/18/24 0910    apixaBAN (ELIQUIS) tablet 5 mg  5 mg Oral 2 times per day Quirino Aguilar MD   5 mg at 12/18/24 0636    hydrALAZINE (APRESOLINE) tablet 100 mg  100 mg Oral TID Jeff,  MD Quirino   100 mg at 12/18/24 0910    levothyroxine (SYNTHROID, LEVOTHROID) tablet 250 mcg  250 mcg Oral QAM AC Quirino Aguilar MD   250 mcg at 12/18/24 0909    rOPINIRole (REQUIP) tablet 1 mg  1 mg Oral BID Quirino Aguilar MD   1 mg at 12/18/24 0910    citalopram (CeleXA) tablet 40 mg  40 mg Oral Daily Quirino Aguilar MD   40 mg at 12/18/24 0910    cloNIDine (CATAPRES) tablet 0.1 mg  0.1 mg Oral Daily Quirino Aguilar MD   0.1 mg at 12/18/24 0910    potassium CHLORIDE (KLOR-CON M) irais ER tablet 40 mEq  40 mEq Oral Daily with breakfast Quirino Aguilar MD   40 mEq at 12/18/24 0910    sodium chloride 0.9 % injection 2 mL  2 mL Intracatheter 2 times per day Sammie Jaimes PA-C   2 mL at 12/18/24 0916    fluticasone-vilanterol (BREO ELLIPTA) 200-25 MCG/ACT inhaler 1 puff  1 puff Inhalation Daily Resp Quirino Aguilar MD   1 puff at 12/18/24 1057    rosuvastatin (CRESTOR) tablet 20 mg  20 mg Oral Nightly Khadijah Ordaz MD   20 mg at 12/17/24 2138     Current Facility-Administered Medications   Medication Dose Route Frequency Provider Last Rate Last Admin       Physical Exam:    General: no acute distress,  HEENT: Head atraumatic, normocephalic.   Chest/Lungs:  Symmetrical expansion. Non labored  Neuro:no focal deficits  Extremities: . bl edema.      Laboratory Results:    I have personally reviewed the following labs results and imaging studies as noted below:    Recent Labs   Lab 12/18/24  0529 12/17/24  0535 12/16/24  0538 12/15/24  0506 12/14/24  1505 12/14/24  0520   SODIUM 142 143 144 143  --  148*   POTASSIUM 3.4 3.4 3.5 3.5   < > 3.3*   CHLORIDE 101 101 105 103  --  107   CO2 33* 32 33* 32  --  32   ANIONGAP 11 13 10 12  --  12   BUN 93* 91* 88* 88*  --  84*   CREATININE 2.70* 2.68* 2.64* 2.42*  --  2.40*   GLUCOSE 258* 253* 241* 271*  --  219*   CALCIUM 9.0 9.5 9.6 9.6  --  9.6   ALBUMIN  --  3.0* 3.1* 3.1*  --  3.3*   MG  --  2.0  --   --   --  2.1   AST  --  13 19 18  --  21   GPT  --  39 41 46  --  39   ALKPT  --   58 57 56  --  56   BILIRUBIN  --  0.4 0.5 0.6  --  0.7    < > = values in this interval not displayed.       Recent Labs   Lab 12/18/24  0529 12/17/24  0536 12/16/24  0538   WBC 12.5* 14.1* 13.8*   HGB 8.5* 8.9* 9.3*   HCT 28.6* 29.8* 31.0*    244 267       No results found      No results found    Urine Panel  No results found        Impression, Plan & Recommendations:    I personally reviewed pertinent notes from the past 24 hours.     KRISTEN, non-oliguric, improving   CKD3a  - Suspect related to contrast nephropathy and hypotension. Likely progressed to ATN given oliguria, but better,   - Renal US noted no obstruction  - Baseline Cr 1.1 mg/dl, Cr worsening to 2.4 mg/dl at time of consult,   BACK ON JARDIANCE      Shock, resolved   Hypertensive urgency on admission  - BP has stabilized  - Medications reviewed  - off pressors     HFpEF  Pulmonary hypertension   Chronic lymphedema   - Last ECHO showed EF 63%, RVSP 44 mmHg  DIURETICS BELOW  - Cardiology and pulmonology following    Anemia of chronic disease  - Hgb stable at goal for CKD    Hypernatremia  BETTER    Hypokalemia  - 2/2 diureiss. Supplement ordered. Trend     Hypoxic respiratory failure  - Extubated now  - Pertussis +     CONT DIURETICS, CHANGED TO ORAL BID BUMEX  DC METOLAZONE NOW

## 2024-12-24 ENCOUNTER — OFFICE VISIT (OUTPATIENT)
Dept: OBGYN CLINIC | Facility: CLINIC | Age: 24
End: 2024-12-24

## 2024-12-24 VITALS — HEIGHT: 62 IN | WEIGHT: 149 LBS | BODY MASS INDEX: 27.42 KG/M2

## 2024-12-24 DIAGNOSIS — S82.842D CLOSED BIMALLEOLAR FRACTURE OF LEFT ANKLE WITH ROUTINE HEALING, SUBSEQUENT ENCOUNTER: Primary | ICD-10-CM

## 2024-12-24 PROBLEM — S82.842A CLOSED BIMALLEOLAR FRACTURE OF LEFT ANKLE, INITIAL ENCOUNTER: Status: ACTIVE | Noted: 2024-12-24

## 2024-12-24 PROCEDURE — 99024 POSTOP FOLLOW-UP VISIT: CPT | Performed by: STUDENT IN AN ORGANIZED HEALTH CARE EDUCATION/TRAINING PROGRAM

## 2024-12-24 NOTE — PROGRESS NOTES
Ortho Sports Medicine Follow-Up Ankle Visit    Assesment:  24 y.o. female ORIF bimalleolar left ankle fracture DOS:12/11/2024     Plan:    We reviewed her imaging from surgery, physical exam and history in detail today. We discussed that she will remain non weight bearing at this time until she is 6 weeks s/p. Her splint was removed today and she was placed into an ace wrap and a cam boot. She will continue to take her Eliquis as prescribed. At this time she can start to take Benlysta again. We reviewed post operative wound care. She will follow up in clinic in 4.5 weeks with repeat x-rays.  We reviewed ROM exercises.       Conservative treatment:    Ice to knee for 20 minutes at least 1-2 times daily.  Continue with crutches.    Imaging:    All imaging from today was reviewed by myself and explained to the patient.       Injection:    No Injection planned at this time.      Surgery:     No surgery is recommended at this point, continue with conservative treatment plan as noted.      Follow up:    Return in about 4 weeks (around 1/21/2025).        Chief Complaint   Patient presents with    Left Ankle - Post-op       History of Present Illness:      The patient is returns for follow up of ORIF bimalleolar left ankle fracture DOS:12/11/2024.  She reports that she has discontinued all narcotic pain medication. She is continuing to take Eliquis. She states that she does not have any pain at this time. When she does have pain it is in her medial ankle. She has been compliant with non weight bearing status. She is continuing to elevate her ankle. At this time she has no complaints.     I have reviewed the past medical, surgical, social and family history, medications and allergies as documented in the EMR.    Ankle Surgical History:  None    Past Medical, Social and Family History:  Past Medical History:   Diagnosis Date    Bowel obstruction (HCC)     Clostridium difficile infection     2021    Crohn's disease (HCC)      Depression     Hypertension     Kidney disease, chronic, stage I (GFR over 89 ml/min)     Lupus     PTSD (post-traumatic stress disorder)     Raynaud disease      Past Surgical History:   Procedure Laterality Date    ABDOMINAL SURGERY      bowel obstruction     SECTION N/A     COLON SURGERY      IR BIOPSY KIDNEY RANDOM  3/6/2024    LA OPEN TREATMENT BIMALLEOLAR ANKLE FRACTURE Left 2024    Procedure: ORIF BIMALLEOLAR ANKLE FRACTURE;  Surgeon: Ananda Corea MD;  Location:  MAIN OR;  Service: Orthopedics    WISDOM TOOTH EXTRACTION       Allergies   Allergen Reactions    Oxycodone GI Intolerance and Vomiting     Nausea/vomiting     Current Outpatient Medications on File Prior to Visit   Medication Sig Dispense Refill    acetaminophen (TYLENOL) 500 mg tablet Take 2 tablets (1,000 mg total) by mouth every 8 (eight) hours for 15 days 90 tablet 0    [START ON 2024] apixaban (Eliquis) 2.5 mg Take 1 tablet (2.5 mg total) by mouth 2 (two) times a day for 9 days Do not start before 2024. 18 tablet 0    Aspirin Low Dose 81 MG chewable tablet       Belimumab (Benlysta) 200 MG/ML SOAJ Inject 1 cc subcu weekly 12 mL 1    bromocriptine (PARLODEL) 2.5 mg tablet       Cyanocobalamin (VITAMIN B 12 PO) Take 500 mcg by mouth 2 (two) times a day      ergocalciferol (VITAMIN D2) 50,000 units 1 CAPSULE ORALLY ONCE A WEEK 60 DAYS      ferrous sulfate 325 (65 Fe) mg tablet Take 325 mg by mouth daily with breakfast      naloxone (NARCAN) 4 mg/0.1 mL nasal spray Administer 1 spray into a nostril. If no response after 2-3 minutes, give another dose in the other nostril using a new spray. 1 each 1    NIFEdipine ER (ADALAT CC) 30 MG 24 hr tablet Take 30 mg by mouth daily      ondansetron (ZOFRAN) 4 mg tablet Take 1 tablet (4 mg total) by mouth every 8 (eight) hours as needed for nausea or vomiting for up to 4 doses 4 tablet 0    traZODone (DESYREL) 50 mg tablet Take 1 tablet by mouth daily at bedtime       vitamin B-12 (VITAMIN B-12) 1,000 mcg tablet Take 2,000 mcg by mouth daily      FLUoxetine (PROzac) 20 mg capsule Take 20 mg by mouth daily (Patient not taking: Reported on 12/24/2024)      hydroxychloroquine (PLAQUENIL) 200 mg tablet Take 1.5 tablets (300 mg total) by mouth daily with breakfast 135 tablet 3    Icosapent Ethyl 1 g CAPS  (Patient not taking: Reported on 12/9/2024)      lidocaine (Lidoderm) 5 % Apply 1 patch topically over 12 hours daily Remove & Discard patch within 12 hours or as directed by MD (Patient not taking: Reported on 12/3/2024) 30 patch 0    omeprazole (PriLOSEC) 20 mg delayed release capsule Take 1 capsule (20 mg total) by mouth daily (Patient not taking: Reported on 12/9/2024) 30 capsule 3    ondansetron (ZOFRAN-ODT) 8 mg disintegrating tablet DISSOLVE ONE TABLET BY MOUTH EVERY 8 HOURS OR AS NEEDED FOR VOMITING. (Patient not taking: Reported on 10/18/2024)      Repatha SureClick 140 MG/ML SOAJ INJECT 1 ML SUBCUTANEOUS EVERY 2 WEEKS 60 DAYS (Patient not taking: Reported on 12/24/2024)      Saxenda injection Inject 1.8 mg under the skin daily (Patient not taking: Reported on 12/24/2024)      sildenafil (REVATIO) 20 mg tablet Take 1 tablet (20 mg total) by mouth 2 (two) times a day (Patient not taking: Reported on 10/18/2024) 180 tablet 1    sildenafil (VIAGRA) 25 MG tablet Take 25 mg by mouth daily as needed for erectile dysfunction (Patient not taking: Reported on 9/11/2024)      simvastatin (ZOCOR) 10 mg tablet  (Patient not taking: Reported on 12/24/2024)      topiramate (TOPAMAX) 50 MG tablet 1 TABLET AT NIGHT ORALLY ONCE A DAY (NIGHTLY) 60 DAYS (Patient not taking: Reported on 12/24/2024)       No current facility-administered medications on file prior to visit.     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    Review of systems: ROS is negative other than that noted in the HPI.  Constitutional: Negative for fatigue and fever.   HENT: Negative for sore throat.    Respiratory: Negative for shortness of breath.    Cardiovascular: Negative for chest pain.   Gastrointestinal: Negative for abdominal pain.   Endocrine: Negative for cold intolerance and heat intolerance.   Genitourinary: Negative for flank pain.   Musculoskeletal: Negative for back pain.   Skin: Negative for rash.   Allergic/Immunologic: Negative for immunocompromised state.   Neurological: Negative for dizziness.   Psychiatric/Behavioral: Negative for agitation.      Physical Exam:    Height 5' 2\" (1.575 m), weight 67.6 kg (149 lb), last menstrual period 12/07/2024, not currently breastfeeding.    General/Constitutional: NAD, well developed, well nourished  HENT: Normocephalic, atraumatic  CV: Intact distal pulses, regular rate  Resp: No respiratory distress or labored breathing  Lymphatic: No lymphadenopathy palpated  Neuro: Alert and Oriented x 3, no focal deficits  Psych: Normal mood, normal affect, normal judgement, normal behavior  Skin: " Warm, dry, no rashes, no erythema       Ankle Examination (focused):   Incisions clean, dry, and intact   Moderate swelling   NVI distally      No calf tenderness to palpation bilaterally    LE NV Exam: +2 DP/PT pulses bilaterally  Sensation intact to light touch L2-S1 bilaterally        Ankle Imaging:    No new imaging performed today.       Scribe Attestation      I,:  Nisreen Watkins am acting as a scribe while in the presence of the attending physician.:       I,:  Ananda Corea MD personally performed the services described in this documentation    as scribed in my presence.:

## 2024-12-27 ENCOUNTER — TELEPHONE (OUTPATIENT)
Age: 24
End: 2024-12-27

## 2024-12-27 DIAGNOSIS — L93.0 DISCOID LUPUS: ICD-10-CM

## 2024-12-27 DIAGNOSIS — M32.14 LUPUS NEPHRITIS (HCC): ICD-10-CM

## 2024-12-27 DIAGNOSIS — M32.9 SYSTEMIC LUPUS ERYTHEMATOSUS, UNSPECIFIED SLE TYPE, UNSPECIFIED ORGAN INVOLVEMENT STATUS (HCC): ICD-10-CM

## 2024-12-27 RX ORDER — BELIMUMAB 200 MG/ML
SOLUTION SUBCUTANEOUS
Qty: 12 ML | Refills: 1 | Status: CANCELLED | OUTPATIENT
Start: 2024-12-27

## 2025-01-02 ENCOUNTER — TELEPHONE (OUTPATIENT)
Dept: OBGYN CLINIC | Facility: HOSPITAL | Age: 25
End: 2025-01-02

## 2025-01-02 ENCOUNTER — TELEPHONE (OUTPATIENT)
Age: 25
End: 2025-01-02

## 2025-01-02 DIAGNOSIS — Z87.81 S/P ORIF (OPEN REDUCTION INTERNAL FIXATION) FRACTURE: Primary | ICD-10-CM

## 2025-01-02 DIAGNOSIS — Z98.890 S/P ORIF (OPEN REDUCTION INTERNAL FIXATION) FRACTURE: Primary | ICD-10-CM

## 2025-01-02 NOTE — TELEPHONE ENCOUNTER
Patient called and states Ortho is referring her to PT for her ankle not what is listed on PT referral from August. Instructed patient that there is no ambulatory referral listed from ortho for PT. Codes needed from Ortho to re-process PT insurance referral. Instructed patient to contact Ortho for referral codes which would be listed on ambulatory referral. Once correct codes are received we can update PT insurance referral.

## 2025-01-02 NOTE — TELEPHONE ENCOUNTER
Caller: Patient    Doctor: Nahomy    Reason for call: Patient needs a referral put in for PT so she can schedule an appt. Thank you.    Call back#: [phone]

## 2025-01-02 NOTE — TELEPHONE ENCOUNTER
Pt called to make Prior Auth Dept aware there is a new ambulatory referral in for PT.  Please update her insurance referral.  Thank you!

## 2025-01-02 NOTE — TELEPHONE ENCOUNTER
PT insurance referral entered and Approved. Documentation scanned under media tab.    Patient notified insurance referral was approved.

## 2025-01-02 NOTE — TELEPHONE ENCOUNTER
Patient called in and is requesting an Insurance referral for [facility] on [facility].    Patient stated all the information we need for the referral is in [facility], she also stated didn't have an appt date as of yet until she obtain the Insurance Referral.

## 2025-01-03 NOTE — TELEPHONE ENCOUNTER
Requested dx code for insurance to update referral. Awaiting update from [facility].

## 2025-01-06 ENCOUNTER — TELEPHONE (OUTPATIENT)
Age: 25
End: 2025-01-06

## 2025-01-06 NOTE — TELEPHONE ENCOUNTER
Dani from [employer] called in to let the office know they have been trying to contact the pt for the 1 week now . They have not been successful on getting the patient schedule for her injection

## 2025-01-07 NOTE — TELEPHONE ENCOUNTER
Patient called to check on the status of her insurance referral. Please call patient back to update.

## 2025-01-16 ENCOUNTER — EVALUATION (OUTPATIENT)
Dept: PHYSICAL THERAPY | Facility: CLINIC | Age: 25
End: 2025-01-16
Payer: OTHER GOVERNMENT

## 2025-01-16 DIAGNOSIS — Z87.81 S/P ORIF (OPEN REDUCTION INTERNAL FIXATION) FRACTURE: ICD-10-CM

## 2025-01-16 DIAGNOSIS — Z98.890 S/P ORIF (OPEN REDUCTION INTERNAL FIXATION) FRACTURE: ICD-10-CM

## 2025-01-16 PROCEDURE — 97161 PT EVAL LOW COMPLEX 20 MIN: CPT | Performed by: PHYSICAL THERAPIST

## 2025-01-16 PROCEDURE — 97110 THERAPEUTIC EXERCISES: CPT | Performed by: PHYSICAL THERAPIST

## 2025-01-16 PROCEDURE — 97140 MANUAL THERAPY 1/> REGIONS: CPT | Performed by: PHYSICAL THERAPIST

## 2025-01-16 NOTE — PROGRESS NOTES
PT Evaluation     Today's date: 2025  Patient name: Angela Lemus  : 2000  MRN: [id number]  Referring provider: Zainab Swanson PA-C  Dx:   Encounter Diagnosis     ICD-10-CM    1. S/P ORIF (open reduction internal fixation) fracture  Z98.890 Ambulatory referral to Physical Therapy    Z87.81                      Assessment  Impairments: abnormal gait, abnormal muscle firing, abnormal or restricted ROM, abnormal movement, activity intolerance, impaired balance, impaired physical strength, lacks appropriate home exercise program, pain with function, safety issue, weight-bearing intolerance, poor body mechanics, participation limitations, activity limitations and endurance  Symptom irritability: moderate    Assessment details: Pt is a 24 y.o. year old female presenting to physical therapy for S/P ORIF (open reduction internal fixation) fracture on 24.  She presents with the following impairments: limited ankle ROM, ankle strength deficits, gait deviations w NWB restrictions and b/l crutch, as well as some TTP along b/l malleoli and possible delayed healing of incision along lateral malleoli (encouraged to discuss with MD at f/u appt next week).  Pt is functionally affected with walking, standing, squatting, stair navigation, exercising, running, and working.  Pt will benefit from skilled physical therapy to address functional limitations noted in evaluation and meet patient goals.    Goals  ST. Pt will reduce pain to 0/10 at rest.  2. Pt will improve L ankle DF AROM to 5+ degrees.    LT. Pt will improve L ankle DF AROM to 10 degrees for improved stair navigation.  2. Pt will improve ankle strength to 4/5 grossly for improved walking ability.  3. Pt will meet projected FOTO score.    Plan  Patient would benefit from: PT eval and skilled physical therapy  Planned modality interventions: cryotherapy, electrical stimulation/Russian stimulation, thermotherapy: hydrocollator packs, TENS and  unattended electrical stimulation    Planned therapy interventions: joint mobilization, abdominal trunk stabilization, manual therapy, nerve gliding, neuromuscular re-education, balance/weight bearing training, balance, body mechanics training, strengthening, stretching, therapeutic activities, therapeutic exercise, flexibility, functional ROM exercises, gait training and home exercise program    Frequency: 2x week  Duration in weeks: 8        Subjective Evaluation    History of Present Illness  Date of surgery: 12/11/2024  Mechanism of injury: surgery  Mechanism of injury: Pt underwent left ankle ORIF on 12/11/24 for bimalleolar fracture s/p fall down stairs in November.  Pt reports her ankle is healing well and she has been wearing the CAM boot and using crutches to walk.  Pt works as a travel nurse prior to injury.  She now has difficulty ambulating and ankle stiffness, but has minimal pain thinks everything is healing well.    Pain  Current pain rating: 3          Objective     Observations   Left Ankle/Foot   Positive for drainage, edema, effusion and incision.     Tenderness   Left Ankle/Foot   Tenderness in the lateral malleolus and medial malleolus.     Active Range of Motion   Left Ankle/Foot   Dorsiflexion (ke): -5 degrees   Plantar flexion: 22 degrees   Inversion: 5 degrees   Eversion: 2 degrees     Right Ankle/Foot   Dorsiflexion (ke): 12 degrees   Plantar flexion: 40 degrees   Inversion: 44 degrees   Eversion: 18 degrees     Passive Range of Motion   Left Ankle/Foot    Dorsiflexion (ke): 2 degrees   Plantar flexion: 25 degrees     Strength/Myotome Testing     Right Ankle/Foot   Normal strength    Swelling   Left Ankle/Foot   Figure 8: 52 cm    Right Ankle/Foot   Figure 8: 49 cm    Ambulation   Weight-Bearing Status   Weight-Bearing Status (Left): non-weight-bearing   Assistive device used: crutches    Observational Gait   Gait: antalgic and asymmetric   Decreased walking speed and stride length.  "             Precautions: NWB 6 weeks    Date 1/16            Visit # IE            FOTO IE             Re-eval IE              Manuals 1/16            L ankle PROM SF            L gastroc str SF                                      Neuro Re-Ed             Ankle isometrics (manual)             Rockerboard (seated)                                                                              Ther Ex             Bike             Ankle Pumps 20x            Ankle circles 20x            Gastroc str 2x30\"            Ankle 4 way 15x OTB            SLR 3 way             Seated calf raises                                       Ther Activity                                       Gait Training                                       Modalities                                            "

## 2025-01-20 ENCOUNTER — HOSPITAL ENCOUNTER (EMERGENCY)
Facility: HOSPITAL | Age: 25
Discharge: HOME | End: 2025-01-20
Attending: EMERGENCY MEDICINE
Payer: OTHER GOVERNMENT

## 2025-01-20 VITALS
HEART RATE: 105 BPM | DIASTOLIC BLOOD PRESSURE: 79 MMHG | TEMPERATURE: 97.6 F | OXYGEN SATURATION: 98 % | RESPIRATION RATE: 18 BRPM | SYSTOLIC BLOOD PRESSURE: 127 MMHG

## 2025-01-20 DIAGNOSIS — T81.49XA WOUND INFECTION AFTER SURGERY: Primary | ICD-10-CM

## 2025-01-20 LAB
EXT PREGNANCY TEST URINE: NEGATIVE
EXT. CONTROL: NORMAL

## 2025-01-20 PROCEDURE — 81025 URINE PREGNANCY TEST: CPT

## 2025-01-20 PROCEDURE — 99284 EMERGENCY DEPT VISIT MOD MDM: CPT

## 2025-01-20 PROCEDURE — 99284 EMERGENCY DEPT VISIT MOD MDM: CPT | Performed by: EMERGENCY MEDICINE

## 2025-01-20 RX ORDER — GINSENG 100 MG
1 CAPSULE ORAL 2 TIMES DAILY
Qty: 10 G | Refills: 0 | Status: SHIPPED | OUTPATIENT
Start: 2025-01-20 | End: 2025-01-25

## 2025-01-20 RX ORDER — DOXYCYCLINE 100 MG/1
100 CAPSULE ORAL 2 TIMES DAILY
Qty: 10 CAPSULE | Refills: 0 | Status: SHIPPED | OUTPATIENT
Start: 2025-01-20 | End: 2025-01-25

## 2025-01-20 RX ORDER — GINSENG 100 MG
1 CAPSULE ORAL ONCE
Status: COMPLETED | OUTPATIENT
Start: 2025-01-20 | End: 2025-01-20

## 2025-01-20 RX ADMIN — BACITRACIN ZINC 1 SMALL APPLICATION: 500 OINTMENT TOPICAL at 12:38

## 2025-01-20 NOTE — ED ATTENDING ATTESTATION
1/20/2025  I, Suresh Fuentes DO, saw and evaluated the patient. I have discussed the patient with the resident/non-physician practitioner and agree with the resident's/non-physician practitioner's findings, Plan of Care, and MDM as documented in the resident's/non-physician practitioner's note, except where noted. All available labs and Radiology studies were reviewed.  I was present for key portions of any procedure(s) performed by the resident/non-physician practitioner and I was immediately available to provide assistance.       At this point I agree with the current assessment done in the Emergency Department.  I have conducted an independent evaluation of this patient a history and physical is as follows:    ED Course  ED Course as of 01/20/25 1140   Mon Jan 20, 2025   1122 24F with noted left ankle surgery > 1 week before, left lateral incision noted with opening up, 1cm. No bleeding or discharge noted. Pt worried about wound infection. No fevers or chills noted.     Exam- incision dry, no discharge noted. Mild redness     Ddx- wound infection, wound healing,     Plan- pain control, evidence of infection, plan fu with surgery, abx to start for wound infection          Critical Care Time  Procedures

## 2025-01-21 ENCOUNTER — TELEPHONE (OUTPATIENT)
Age: 25
End: 2025-01-21

## 2025-01-21 ENCOUNTER — APPOINTMENT (OUTPATIENT)
Dept: LAB | Facility: HOSPITAL | Age: 25
End: 2025-01-21
Payer: OTHER GOVERNMENT

## 2025-01-21 ENCOUNTER — OFFICE VISIT (OUTPATIENT)
Dept: OBGYN CLINIC | Facility: CLINIC | Age: 25
End: 2025-01-21

## 2025-01-21 ENCOUNTER — RESULTS FOLLOW-UP (OUTPATIENT)
Age: 25
End: 2025-01-21

## 2025-01-21 ENCOUNTER — HOSPITAL ENCOUNTER (OUTPATIENT)
Dept: RADIOLOGY | Facility: HOSPITAL | Age: 25
Discharge: HOME | End: 2025-01-21
Attending: STUDENT IN AN ORGANIZED HEALTH CARE EDUCATION/TRAINING PROGRAM
Payer: OTHER GOVERNMENT

## 2025-01-21 VITALS — HEIGHT: 62 IN | WEIGHT: 149 LBS | BODY MASS INDEX: 27.42 KG/M2

## 2025-01-21 DIAGNOSIS — S82.842D CLOSED BIMALLEOLAR FRACTURE OF LEFT ANKLE WITH ROUTINE HEALING, SUBSEQUENT ENCOUNTER: Primary | ICD-10-CM

## 2025-01-21 DIAGNOSIS — S82.842D CLOSED BIMALLEOLAR FRACTURE OF LEFT ANKLE WITH ROUTINE HEALING, SUBSEQUENT ENCOUNTER: ICD-10-CM

## 2025-01-21 DIAGNOSIS — M32.9 SYSTEMIC LUPUS ERYTHEMATOSUS, UNSPECIFIED SLE TYPE, UNSPECIFIED ORGAN INVOLVEMENT STATUS (HCC): ICD-10-CM

## 2025-01-21 DIAGNOSIS — M32.14 LUPUS NEPHRITIS (HCC): ICD-10-CM

## 2025-01-21 LAB
ALBUMIN SERPL BCG-MCNC: 4.2 G/DL (ref 3.5–5)
ALP SERPL-CCNC: 67 U/L (ref 34–104)
ALT SERPL W P-5'-P-CCNC: 28 U/L (ref 7–52)
ANION GAP SERPL CALCULATED.3IONS-SCNC: 6 MMOL/L (ref 4–13)
AST SERPL W P-5'-P-CCNC: 20 U/L (ref 13–39)
BACTERIA UR QL AUTO: ABNORMAL /HPF
BASOPHILS # BLD AUTO: 0.01 THOUSANDS/ΜL (ref 0–0.1)
BASOPHILS NFR BLD AUTO: 0 % (ref 0–1)
BILIRUB SERPL-MCNC: 0.29 MG/DL (ref 0.2–1)
BILIRUB UR QL STRIP: NEGATIVE
BUN SERPL-MCNC: 18 MG/DL (ref 5–25)
C3 SERPL-MCNC: 141 MG/DL (ref 87–200)
C4 SERPL-MCNC: 36 MG/DL (ref 19–52)
CALCIUM SERPL-MCNC: 9.5 MG/DL (ref 8.4–10.2)
CHLORIDE SERPL-SCNC: 106 MMOL/L (ref 96–108)
CLARITY UR: CLEAR
CO2 SERPL-SCNC: 26 MMOL/L (ref 21–32)
COLOR UR: YELLOW
CREAT SERPL-MCNC: 0.73 MG/DL (ref 0.6–1.3)
CREAT UR-MCNC: 129.5 MG/DL
CRP SERPL QL: 6.7 MG/L
EOSINOPHIL # BLD AUTO: 0.02 THOUSAND/ΜL (ref 0–0.61)
EOSINOPHIL NFR BLD AUTO: 1 % (ref 0–6)
ERYTHROCYTE [DISTWIDTH] IN BLOOD BY AUTOMATED COUNT: 13 % (ref 11.6–15.1)
ERYTHROCYTE [SEDIMENTATION RATE] IN BLOOD: 20 MM/HOUR (ref 0–19)
GFR SERPL CREATININE-BSD FRML MDRD: 114 ML/MIN/1.73SQ M
GLUCOSE SERPL-MCNC: 104 MG/DL (ref 65–140)
GLUCOSE UR STRIP-MCNC: NEGATIVE MG/DL
HCT VFR BLD AUTO: 37.4 % (ref 34.8–46.1)
HGB BLD-MCNC: 11.4 G/DL (ref 11.5–15.4)
HGB UR QL STRIP.AUTO: NEGATIVE
IMM GRANULOCYTES # BLD AUTO: 0.01 THOUSAND/UL (ref 0–0.2)
IMM GRANULOCYTES NFR BLD AUTO: 0 % (ref 0–2)
KETONES UR STRIP-MCNC: NEGATIVE MG/DL
LEUKOCYTE ESTERASE UR QL STRIP: NEGATIVE
LYMPHOCYTES # BLD AUTO: 1.27 THOUSANDS/ΜL (ref 0.6–4.47)
LYMPHOCYTES NFR BLD AUTO: 36 % (ref 14–44)
MCH RBC QN AUTO: 27.3 PG (ref 26.8–34.3)
MCHC RBC AUTO-ENTMCNC: 30.5 G/DL (ref 31.4–37.4)
MCV RBC AUTO: 90 FL (ref 82–98)
MONOCYTES # BLD AUTO: 0.25 THOUSAND/ΜL (ref 0.17–1.22)
MONOCYTES NFR BLD AUTO: 7 % (ref 4–12)
NEUTROPHILS # BLD AUTO: 2.01 THOUSANDS/ΜL (ref 1.85–7.62)
NEUTS SEG NFR BLD AUTO: 56 % (ref 43–75)
NITRITE UR QL STRIP: NEGATIVE
NON-SQ EPI CELLS URNS QL MICRO: ABNORMAL /HPF
NRBC BLD AUTO-RTO: 0 /100 WBCS
PH UR STRIP.AUTO: 6 [PH]
PLATELET # BLD AUTO: 262 THOUSANDS/UL (ref 149–390)
PMV BLD AUTO: 12 FL (ref 8.9–12.7)
POTASSIUM SERPL-SCNC: 3.9 MMOL/L (ref 3.5–5.3)
PROCALCITONIN SERPL-MCNC: <0.05 NG/ML
PROT SERPL-MCNC: 7.3 G/DL (ref 6.4–8.4)
PROT UR STRIP-MCNC: NEGATIVE MG/DL
PROT UR-MCNC: 10 MG/DL
PROT/CREAT UR: 0.1 MG/G{CREAT} (ref 0–0.1)
RBC # BLD AUTO: 4.18 MILLION/UL (ref 3.81–5.12)
RBC #/AREA URNS AUTO: ABNORMAL /HPF
SODIUM SERPL-SCNC: 138 MMOL/L (ref 135–147)
SP GR UR STRIP.AUTO: >=1.03 (ref 1–1.03)
UROBILINOGEN UR QL STRIP.AUTO: 0.2 E.U./DL
WBC # BLD AUTO: 3.57 THOUSAND/UL (ref 4.31–10.16)
WBC #/AREA URNS AUTO: ABNORMAL /HPF

## 2025-01-21 PROCEDURE — 73610 X-RAY EXAM OF ANKLE: CPT

## 2025-01-21 PROCEDURE — 80053 COMPREHEN METABOLIC PANEL: CPT

## 2025-01-21 PROCEDURE — 85025 COMPLETE CBC W/AUTO DIFF WBC: CPT

## 2025-01-21 PROCEDURE — 82570 ASSAY OF URINE CREATININE: CPT

## 2025-01-21 PROCEDURE — 86160 COMPLEMENT ANTIGEN: CPT

## 2025-01-21 PROCEDURE — 84145 PROCALCITONIN (PCT): CPT

## 2025-01-21 PROCEDURE — 81001 URINALYSIS AUTO W/SCOPE: CPT

## 2025-01-21 PROCEDURE — 99024 POSTOP FOLLOW-UP VISIT: CPT | Performed by: STUDENT IN AN ORGANIZED HEALTH CARE EDUCATION/TRAINING PROGRAM

## 2025-01-21 PROCEDURE — 86225 DNA ANTIBODY NATIVE: CPT

## 2025-01-21 PROCEDURE — 84156 ASSAY OF PROTEIN URINE: CPT

## 2025-01-21 PROCEDURE — 85652 RBC SED RATE AUTOMATED: CPT

## 2025-01-21 PROCEDURE — 86140 C-REACTIVE PROTEIN: CPT

## 2025-01-21 PROCEDURE — 36415 COLL VENOUS BLD VENIPUNCTURE: CPT

## 2025-01-21 NOTE — ED PROVIDER NOTES
Time reflects when diagnosis was documented in both MDM as applicable and the Disposition within this note       Time User Action Codes Description Comment    1/20/2025 12:17 PM Argelia Lazaro Add [T81.49XA] Wound infection after surgery           ED Disposition       ED Disposition   Discharge    Condition   Stable    Date/Time   Mon Jan 20, 2025 12:17 PM    Comment   Angela Lemus discharge to home/self care.                   Assessment & Plan       Medical Decision Making  Angela Lemus is a 24 y.o. who presents with complaints of wound over surgical incision of left ankle    Vital signs are stable, afebrile  PE: see media for image of wound     Ddx: concern for wound infection vs. Delayed wound healing   Doubt cellulitis, septic joint     Plan: bacitracin applied  Will prescribe 5 day course of doxycyline  Recommend routine follow up as scheduled tomorrow with ortho   Supportive care instructions and return precautions provided  Patient understands and is agreeable to plan    Disposition: Patient stable for discharge to home.              Amount and/or Complexity of Data Reviewed  Labs: ordered.    Risk  OTC drugs.  Prescription drug management.             Medications   bacitracin topical ointment 1 small application (1 small application Topical Given 1/20/25 1238)       ED Risk Strat Scores                          SBIRT 22yo+      Flowsheet Row Most Recent Value   Initial Alcohol Screen: US AUDIT-C     1. How often do you have a drink containing alcohol? 0 Filed at: 01/20/2025 1105   2. How many drinks containing alcohol do you have on a typical day you are drinking?  0 Filed at: 01/20/2025 1105   3b. FEMALE Any Age, or MALE 65+: How often do you have 4 or more drinks on one occassion? 0 Filed at: 01/20/2025 1105   Audit-C Score 0 Filed at: 01/20/2025 1105   ARNOL: How many times in the past year have you...    Used an illegal drug or used a prescription medication for non-medical reasons? Never  Filed at: 2025 [id number]                            History of Present Illness       Chief Complaint   Patient presents with    Post-op Problem     Pt had surgery to L ankle x1 month ago. Pt states the incision area is red and having yellow drainage. Pt complains of tenderness/swelling to the area.        Past Medical History:   Diagnosis Date    Bowel obstruction (HCC)     Clostridium difficile infection         Crohn's disease (HCC)     Depression     Hypertension     Kidney disease, chronic, stage I (GFR over 89 ml/min)     Lupus     PTSD (post-traumatic stress disorder)     Raynaud disease       Past Surgical History:   Procedure Laterality Date    ABDOMINAL SURGERY      bowel obstruction     SECTION N/A     COLON SURGERY      IR BIOPSY KIDNEY RANDOM  3/6/2024    TN OPEN TREATMENT BIMALLEOLAR ANKLE FRACTURE Left 2024    Procedure: ORIF BIMALLEOLAR ANKLE FRACTURE;  Surgeon: Ananda Corea MD;  Location:  MAIN OR;  Service: Orthopedics    WISDOM TOOTH EXTRACTION        Family History   Problem Relation Age of Onset    Lupus Mother     Diabetes Father     COPD Maternal Grandmother     Heart failure Maternal Grandmother     Substance Abuse Maternal Grandmother     Breast cancer Other       Social History     Tobacco Use    Smoking status: Never    Smokeless tobacco: Never   Vaping Use    Vaping status: Never Used   Substance Use Topics    Alcohol use: Yes     Comment: 1 x month    Drug use: Never      E-Cigarette/Vaping    E-Cigarette Use Never User       E-Cigarette/Vaping Substances    Nicotine No     THC No     CBD No     Flavoring No     Other No     Unknown No       I have reviewed and agree with the history as documented.     Patient is a 23 yo female with pertinent PMH of bimalleolar fracture of left ankle s/p ORIF. She presents for evaluation of left lateral ankle wound at site of surgical incision. She states the scab at the lateral incision fell off approx. 1 week ago, and she has  noticed increasing redness and drainage of yellow discharge from the wound. She endorses pain at the site. Denies pain in ankle joint. Remains non-weightbearing per orthopedic surgery recs, but is able to move ankle and toes on left foot. Denies fever, chills, calf pain or erythema. Has been applying neosporin to the wound. Has a regularly scheduled outpatient post-op follow up with orthopedics tomorrow.             Review of Systems   All other systems reviewed and are negative.          Objective       ED Triage Vitals [01/20/25 1103]   Temperature Pulse Blood Pressure Respirations SpO2 Patient Position - Orthostatic VS   97.6 °F (36.4 °C) 105 127/79 18 98 % Sitting      Temp Source Heart Rate Source BP Location FiO2 (%) Pain Score    Oral Monitor Left arm -- 7      Vitals      Date and Time Temp Pulse SpO2 Resp BP Pain Score FACES Pain Rating User   01/20/25 1103 97.6 °F (36.4 °C) 105 98 % 18 127/79 7 -- AS            Physical Exam  Constitutional:       General: She is not in acute distress.     Appearance: Normal appearance. She is not ill-appearing, toxic-appearing or diaphoretic.   Cardiovascular:      Rate and Rhythm: Regular rhythm. Tachycardia present.   Pulmonary:      Effort: Pulmonary effort is normal.   Musculoskeletal:         General: Swelling (mild swelling of left ankle) and tenderness (over wound of lateral surgical incision) present. Normal range of motion.      Right lower leg: No edema.      Left lower leg: No edema.   Skin:     General: Skin is warm and dry.      Capillary Refill: Capillary refill takes less than 2 seconds.      Findings: Erythema (1 cm area of erythema at site of surgical incision. tender. serous drainage and granulation tissue. mildly warm to touch. no surrouding cellulitis) present.   Neurological:      Mental Status: She is alert.      Sensory: No sensory deficit.      Motor: No weakness.   Psychiatric:         Mood and Affect: Mood normal.         Behavior: Behavior  normal.         Results Reviewed       Procedure Component Value Units Date/Time    POCT pregnancy, urine [[id number]]  (Normal) Collected: 25 114    Lab Status: Final result Updated: 25 1143     EXT Preg Test, Ur Negative     Control Valid            No orders to display       Procedures    ED Medication and Procedure Management   Prior to Admission Medications   Prescriptions Last Dose Informant Patient Reported? Taking?   Aspirin Low Dose 81 MG chewable tablet   Yes No   Belimumab (Benlysta) 200 MG/ML SOAJ   No No   Sig: Inject 1 cc subcu weekly   Cyanocobalamin (VITAMIN B 12 PO)  Self Yes No   Sig: Take 500 mcg by mouth 2 (two) times a day   FLUoxetine (PROzac) 20 mg capsule   Yes No   Sig: Take 20 mg by mouth daily   Patient not taking: Reported on 2024   Icosapent Ethyl 1 g CAPS  Self Yes No   Patient not taking: Reported on 2024   NIFEdipine ER (ADALAT CC) 30 MG 24 hr tablet   Yes No   Sig: Take 30 mg by mouth daily   Repatha SureClick 140 MG/ML SOAJ   Yes No   Sig: INJECT 1 ML SUBCUTANEOUS EVERY 2 WEEKS 60 DAYS   Patient not taking: Reported on 2024   Saxenda injection   Yes No   Sig: Inject 1.8 mg under the skin daily   Patient not taking: Reported on 2024   apixaban (Eliquis) 2.5 mg   No No   Sig: Take 1 tablet (2.5 mg total) by mouth 2 (two) times a day for 9 days Do not start before 2024.   bromocriptine (PARLODEL) 2.5 mg tablet  Self Yes No   ergocalciferol (VITAMIN D2) 50,000 units   Yes No   Si CAPSULE ORALLY ONCE A WEEK 60 DAYS   ferrous sulfate 325 (65 Fe) mg tablet  Self Yes No   Sig: Take 325 mg by mouth daily with breakfast   hydroxychloroquine (PLAQUENIL) 200 mg tablet  Self No No   Sig: Take 1.5 tablets (300 mg total) by mouth daily with breakfast   lidocaine (Lidoderm) 5 %  Self No No   Sig: Apply 1 patch topically over 12 hours daily Remove & Discard patch within 12 hours or as directed by MD   Patient not taking: Reported on 12/3/2024    naloxone (NARCAN) 4 mg/0.1 mL nasal spray   No No   Sig: Administer 1 spray into a nostril. If no response after 2-3 minutes, give another dose in the other nostril using a new spray.   omeprazole (PriLOSEC) 20 mg delayed release capsule  Self No No   Sig: Take 1 capsule (20 mg total) by mouth daily   Patient not taking: Reported on 2024   ondansetron (ZOFRAN) 4 mg tablet   No No   Sig: Take 1 tablet (4 mg total) by mouth every 8 (eight) hours as needed for nausea or vomiting for up to 4 doses   ondansetron (ZOFRAN-ODT) 8 mg disintegrating tablet   Yes No   Sig: DISSOLVE ONE TABLET BY MOUTH EVERY 8 HOURS OR AS NEEDED FOR VOMITING.   Patient not taking: Reported on 10/18/2024   sildenafil (REVATIO) 20 mg tablet   No No   Sig: Take 1 tablet (20 mg total) by mouth 2 (two) times a day   Patient not taking: Reported on 10/18/2024   sildenafil (VIAGRA) 25 MG tablet  Self Yes No   Sig: Take 25 mg by mouth daily as needed for erectile dysfunction   Patient not taking: Reported on 2024   simvastatin (ZOCOR) 10 mg tablet   Yes No   Patient not taking: Reported on 2024   topiramate (TOPAMAX) 50 MG tablet   Yes No   Si TABLET AT NIGHT ORALLY ONCE A DAY (NIGHTLY) 60 DAYS   Patient not taking: Reported on 2024   traZODone (DESYREL) 50 mg tablet  Self Yes No   Sig: Take 1 tablet by mouth daily at bedtime   vitamin B-12 (VITAMIN B-12) 1,000 mcg tablet  Self Yes No   Sig: Take 2,000 mcg by mouth daily      Facility-Administered Medications: None     Discharge Medication List as of 2025 12:19 PM        START taking these medications    Details   doxycycline hyclate (VIBRAMYCIN) 100 mg capsule Take 1 capsule (100 mg total) by mouth 2 (two) times a day for 5 days, Starting Mon 2025, Until Sat 2025, Normal           CONTINUE these medications which have NOT CHANGED    Details   apixaban (Eliquis) 2.5 mg Take 1 tablet (2.5 mg total) by mouth 2 (two) times a day for 9 days Do not start before  December 30, 2024., Starting Mon 12/30/2024, Until Wed 1/8/2025, Normal      Aspirin Low Dose 81 MG chewable tablet Historical Med      Belimumab (Benlysta) 200 MG/ML SOAJ Inject 1 cc subcu weekly, Normal      bromocriptine (PARLODEL) 2.5 mg tablet Historical Med      !! Cyanocobalamin (VITAMIN B 12 PO) Take 500 mcg by mouth 2 (two) times a day, Historical Med      ergocalciferol (VITAMIN D2) 50,000 units 1 CAPSULE ORALLY ONCE A WEEK 60 DAYS, Historical Med      ferrous sulfate 325 (65 Fe) mg tablet Take 325 mg by mouth daily with breakfast, Historical Med      FLUoxetine (PROzac) 20 mg capsule Take 20 mg by mouth daily, Starting Wed 9/11/2024, Historical Med      hydroxychloroquine (PLAQUENIL) 200 mg tablet Take 1.5 tablets (300 mg total) by mouth daily with breakfast, Starting Fri 2/23/2024, Until Mon 12/9/2024, Normal      Icosapent Ethyl 1 g CAPS Historical Med      lidocaine (Lidoderm) 5 % Apply 1 patch topically over 12 hours daily Remove & Discard patch within 12 hours or as directed by MD, Starting Fri 3/22/2024, Normal      naloxone (NARCAN) 4 mg/0.1 mL nasal spray Administer 1 spray into a nostril. If no response after 2-3 minutes, give another dose in the other nostril using a new spray., Normal      NIFEdipine ER (ADALAT CC) 30 MG 24 hr tablet Take 30 mg by mouth daily, Starting Tue 9/17/2024, Historical Med      omeprazole (PriLOSEC) 20 mg delayed release capsule Take 1 capsule (20 mg total) by mouth daily, Starting Thu 8/24/2023, Normal      ondansetron (ZOFRAN) 4 mg tablet Take 1 tablet (4 mg total) by mouth every 8 (eight) hours as needed for nausea or vomiting for up to 4 doses, Starting Wed 12/11/2024, Normal      ondansetron (ZOFRAN-ODT) 8 mg disintegrating tablet DISSOLVE ONE TABLET BY MOUTH EVERY 8 HOURS OR AS NEEDED FOR VOMITING., Historical Med      Repatha SureClick 140 MG/ML SOAJ INJECT 1 ML SUBCUTANEOUS EVERY 2 WEEKS 60 DAYS, Historical Med      Saxenda injection Inject 1.8 mg under the  skin daily, Starting Fri 9/13/2024, Historical Med      sildenafil (REVATIO) 20 mg tablet Take 1 tablet (20 mg total) by mouth 2 (two) times a day, Starting Tue 8/13/2024, Normal      sildenafil (VIAGRA) 25 MG tablet Take 25 mg by mouth daily as needed for erectile dysfunction, Historical Med      simvastatin (ZOCOR) 10 mg tablet Historical Med      topiramate (TOPAMAX) 50 MG tablet 1 TABLET AT NIGHT ORALLY ONCE A DAY (NIGHTLY) 60 DAYS, Historical Med      traZODone (DESYREL) 50 mg tablet Take 1 tablet by mouth daily at bedtime, Starting Tue 8/29/2023, Historical Med      !! vitamin B-12 (VITAMIN B-12) 1,000 mcg tablet Take 2,000 mcg by mouth daily, Starting Wed 9/13/2023, Historical Med       !! - Potential duplicate medications found. Please discuss with provider.        No discharge procedures on file.  ED SEPSIS DOCUMENTATION   Time reflects when diagnosis was documented in both MDM as applicable and the Disposition within this note       Time User Action Codes Description Comment    1/20/2025 12:17 PM Argelia Lazaro Add [T81.49XA] Wound infection after surgery                  Argelia Lazaro MD  01/20/25 2059

## 2025-01-21 NOTE — TELEPHONE ENCOUNTER
Olesya from ortho is calling stating that Dr. Corea is going to have to perform surgery again on the patient for the same ankle surgery it is not healing correctly and patient needs another clearance by rheum patient was seen in Nov. By Dr. Daly office is faxing over a clearance form does patient need to be seen again they would like to bring patient back for surgery with in the next week or two/

## 2025-01-21 NOTE — PROGRESS NOTES
Ortho Sports Medicine Follow-Up Ankle Visit    Assesment:  24 y.o. female 5 weeks 6 days s/p L ankle ORIF, with post-operative course notable for lateral wound dehiscence. No obvious evidence of infection at this time.    Plan:    I had a lengthy discussion with Angela in the office today during which we reviewed her imaging, most recent wound issues, current exam, and treatment options moving forward. I discussed that her x-rays showed significant healing and a well-aligned mortise, and that, if it were not for the wound breakdown, she would typically be advanced to weight bearing as tolerated, however we will more slowly advance her weight bearing given her recent wound issues. We discussed that while she has had lateral wound breakdown likely related to her Benlysta biologic immunomodulatory therapy, there was no obvious evidence of infection at this time. We also discussed that based on the hypertrophic appearance of the breakdown, this could also possibly be related to her underlying lupus / autoimmune disorder. No erythema, induration, and I was unable to express any drainage at all. No obvious purulence, and she is minimally tender around this area. We discussed that, given that her wounds were excellently healing with no issues at all her her post-op visit two weeks post-op, she was allowed to re-start her Benlysta per ACR guidelines.    I discussed treatment options for wound breakdown without obvious infection including local wound care with wet to dry dressings, the possible use of silver nitrate for judicious use of antibiotics if there is concern for a superficial wound infection. We also discussed surgical treatments including return to the operating room for formal irrigation and excisional debridement, cultures, and closure. I discussed that, given that she just recently stopped her Benlysta, in the absence of any obvious clinical evidence of infection, I felt that there was no urgent need for a  return to the OR at this time. I did discuss this case with two of my senior colleagues who also had noted sudden lateral wound breakdown several weeks after ankle fracture fixation despite initially well-healing wounds, similar to this case, in patients with autoimmune / collagen disorders or who were on medications that affected wound healing that resolved with local wound care. Angela voiced understanding and noted that she was aware this could happen especially based on our extensive pre-operative discussion based on the risks of surgery during which I emphasized that she was at increased risk of infection / wound complications.    After lengthy discussion, we mutually decided to begin with conservative treatment. I reviewed how to perform wet to dry dressing changes with Angela including using a slightly moist but not overtly wet piece of gauze followed by a dry piece and an ace wrap, and I provided her with sterile saline, gauze, and ace wraps. She can put some more weight on her ankle but I did discuss advancing weight bearing slowly and monitoring her incision.    We will hold off on formal physical therapy while her wound issues resolve, however I did review ankle range of motion exercises including circumduction / ABC's / DF and PF that she can perform at home.     She will follow up in 3 days for repeat wound check. She will continue on the doxycycline prescribed to her by the ED. She is aware that we will have to follow this closely and she was counselled on red flag signs including increasing redness, induration, and drainage.       Follow Up:  3 days, no x-rays      Chief Complaint   Patient presents with    Left Ankle - Post-op       History of Present Illness:    Angela is a 24F who presents for follow up after Open Reduction W/ Internal Fixation (orif) Ankle - Left on 12/11/24, now 5 weeks 6 days s/p surgery. She reports that she was doing well and her wound was very well-appearing and then  approximately 3 days ago she started to notice scabbing and breakdown of her lateral wound. She also noted serosanguineous drainage. She denies significant erythema or induration. She placed a dressing over it and noticed red / yellow drainage on it. She went to the ED yesterday and was discharged with doxycycline. She stopped her Benlysta 2 days ago once she started to notice wound issues. No fevers or chills. She has maintained NWB status to the left lower extremity and has been using CAM Boot and crutches for ambulation.  No new injuries. She denies pain today. She completed her eliquis prescription. No numbness or tingling. She denies any issues with her medial incision.      Past Medical, Social and Family History:  Past Medical History:   Diagnosis Date    Bowel obstruction (HCC)     Clostridium difficile infection         Crohn's disease (HCC)     Depression     Hypertension     Kidney disease, chronic, stage I (GFR over 89 ml/min)     Lupus     PTSD (post-traumatic stress disorder)     Raynaud disease      Past Surgical History:   Procedure Laterality Date    ABDOMINAL SURGERY      bowel obstruction     SECTION N/A     COLON SURGERY      IR BIOPSY KIDNEY RANDOM  3/6/2024    [place] OPEN TREATMENT BIMALLEOLAR ANKLE FRACTURE Left 2024    Procedure: ORIF BIMALLEOLAR ANKLE FRACTURE;  Surgeon: Ananda Corea MD;  Location:  MAIN OR;  Service: Orthopedics    WISDOM TOOTH EXTRACTION       Allergies   Allergen Reactions    Oxycodone GI Intolerance and Vomiting     Nausea/vomiting     Current Outpatient Medications on File Prior to Visit   Medication Sig Dispense Refill    Aspirin Low Dose 81 MG chewable tablet       bacitracin topical ointment 500 units/g topical ointment Apply 1 large application topically 2 (two) times a day for 5 days 10 g 0    Belimumab (Benlysta) 200 MG/ML SOAJ Inject 1 cc subcu weekly 12 mL 1    bromocriptine (PARLODEL) 2.5 mg tablet       Cyanocobalamin (VITAMIN B 12 PO)  Take 500 mcg by mouth 2 (two) times a day      doxycycline hyclate (VIBRAMYCIN) 100 mg capsule Take 1 capsule (100 mg total) by mouth 2 (two) times a day for 5 days 10 capsule 0    ergocalciferol (VITAMIN D2) 50,000 units 1 CAPSULE ORALLY ONCE A WEEK 60 DAYS      ferrous sulfate 325 (65 Fe) mg tablet Take 325 mg by mouth daily with breakfast      naloxone (NARCAN) 4 mg/0.1 mL nasal spray Administer 1 spray into a nostril. If no response after 2-3 minutes, give another dose in the other nostril using a new spray. 1 each 1    NIFEdipine ER (ADALAT CC) 30 MG 24 hr tablet Take 30 mg by mouth daily      ondansetron (ZOFRAN) 4 mg tablet Take 1 tablet (4 mg total) by mouth every 8 (eight) hours as needed for nausea or vomiting for up to 4 doses 4 tablet 0    traZODone (DESYREL) 50 mg tablet Take 1 tablet by mouth daily at bedtime      vitamin B-12 (VITAMIN B-12) 1,000 mcg tablet Take 2,000 mcg by mouth daily      apixaban (Eliquis) 2.5 mg Take 1 tablet (2.5 mg total) by mouth 2 (two) times a day for 9 days Do not start before December 30, 2024. 18 tablet 0    FLUoxetine (PROzac) 20 mg capsule Take 20 mg by mouth daily (Patient not taking: Reported on 12/3/2024)      hydroxychloroquine (PLAQUENIL) 200 mg tablet Take 1.5 tablets (300 mg total) by mouth daily with breakfast 135 tablet 3    Icosapent Ethyl 1 g CAPS  (Patient not taking: Reported on 12/9/2024)      lidocaine (Lidoderm) 5 % Apply 1 patch topically over 12 hours daily Remove & Discard patch within 12 hours or as directed by MD (Patient not taking: Reported on 12/3/2024) 30 patch 0    omeprazole (PriLOSEC) 20 mg delayed release capsule Take 1 capsule (20 mg total) by mouth daily (Patient not taking: Reported on 12/9/2024) 30 capsule 3    ondansetron (ZOFRAN-ODT) 8 mg disintegrating tablet DISSOLVE ONE TABLET BY MOUTH EVERY 8 HOURS OR AS NEEDED FOR VOMITING. (Patient not taking: Reported on 1/21/2025)      Repatha SureClick 140 MG/ML SOAJ INJECT 1 ML SUBCUTANEOUS  EVERY 2 WEEKS 60 DAYS (Patient not taking: Reported on 12/3/2024)      Saxenda injection Inject 1.8 mg under the skin daily (Patient not taking: Reported on 12/9/2024)      sildenafil (REVATIO) 20 mg tablet Take 1 tablet (20 mg total) by mouth 2 (two) times a day (Patient not taking: Reported on 1/21/2025) 180 tablet 1    sildenafil (VIAGRA) 25 MG tablet Take 25 mg by mouth daily as needed for erectile dysfunction (Patient not taking: Reported on 9/11/2024)      simvastatin (ZOCOR) 10 mg tablet  (Patient not taking: Reported on 12/3/2024)      topiramate (TOPAMAX) 50 MG tablet 1 TABLET AT NIGHT ORALLY ONCE A DAY (NIGHTLY) 60 DAYS (Patient not taking: Reported on 12/9/2024)       No current facility-administered medications on file prior to visit.     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    Review of systems: ROS is negative other than that noted in the HPI.  Constitutional: Negative for fatigue and fever.   HENT:  "Negative for sore throat.    Respiratory: Negative for shortness of breath.    Cardiovascular: Negative for chest pain.   Gastrointestinal: Negative for abdominal pain.   Endocrine: Negative for cold intolerance and heat intolerance.   Genitourinary: Negative for flank pain.   Musculoskeletal: Negative for back pain.   Skin: Negative for rash.   Allergic/Immunologic: Negative for immunocompromised state.   Neurological: Negative for dizziness.   Psychiatric/Behavioral: Negative for agitation.      Physical Exam:    Height 5' 2\" (1.575 m), weight 67.6 kg (149 lb), last menstrual period 01/06/2025, not currently breastfeeding.    General/Constitutional: NAD, well developed, well nourished  HENT: Normocephalic, atraumatic  CV: Intact distal pulses, regular rate  Resp: No respiratory distress or labored breathing  Lymphatic: No lymphadenopathy palpated  Neuro: Alert and Oriented x 3, no focal deficits  Psych: Normal mood, normal affect, normal judgement, normal behavior  Skin: Warm, dry, no rashes, no erythema       Ankle Examination (focused):     Incision: Medial incision clean, dry, intact, well healed  Lateral incision with superficial breakdown of entire incision with exposed dermis and granulation tissue, no erythema, fluctuance, or induration  I am unable to express any drainage at all from the lateral incision or wound and she is non-tender over this area  Mild swelling  Range of Motion: DF 5 PF 40  5/5 EHL/FHL/TA/GSC  Neurovascular status: Sensation intact to light touch in superficial peroneal / deep peronal / saphenous / sural/ tibial nerve distributions  Pulses: 2+ DP and PT pulse     Medial Incision      Lateral incision         STUDIES REVIEWED:  I personally reviewed and interpreted 3 views of the left ankle obtained in the office today which demonstrate s/p ankle ORIF with interval healing and callous formation. Medial malleolar and posterior malleolar fractures appear to be well healing with less " conspicuous fracure lines. No evidence of hardware complication or displacement. On the mortise view, the mortise appears to be well reduced and the talus is reduced under the tibia with no evidence of lateral talar subluxation or syndesmotic widening.

## 2025-01-22 ENCOUNTER — APPOINTMENT (OUTPATIENT)
Dept: PHYSICAL THERAPY | Facility: CLINIC | Age: 25
End: 2025-01-22
Payer: OTHER GOVERNMENT

## 2025-01-22 DIAGNOSIS — R82.71 BACTERIURIA: Primary | ICD-10-CM

## 2025-01-23 ENCOUNTER — APPOINTMENT (OUTPATIENT)
Dept: LAB | Facility: CLINIC | Age: 25
End: 2025-01-23
Payer: OTHER GOVERNMENT

## 2025-01-23 ENCOUNTER — TELEPHONE (OUTPATIENT)
Age: 25
End: 2025-01-23

## 2025-01-23 ENCOUNTER — PATIENT MESSAGE (OUTPATIENT)
Dept: FAMILY MEDICINE CLINIC | Facility: CLINIC | Age: 25
End: 2025-01-23

## 2025-01-23 DIAGNOSIS — Z11.1 SCREENING FOR TUBERCULOSIS: Primary | ICD-10-CM

## 2025-01-23 DIAGNOSIS — R82.71 BACTERIURIA: ICD-10-CM

## 2025-01-23 PROCEDURE — 87086 URINE CULTURE/COLONY COUNT: CPT

## 2025-01-23 NOTE — TELEPHONE ENCOUNTER
Patient called to make sure the insurance referrals in her chart were still good for [facility] which it is.  I did tell her to give us a call if she needed the doctor order updated.  NFA

## 2025-01-24 ENCOUNTER — OFFICE VISIT (OUTPATIENT)
Dept: OBGYN CLINIC | Facility: CLINIC | Age: 25
End: 2025-01-24

## 2025-01-24 VITALS — WEIGHT: 149 LBS | HEIGHT: 62 IN | BODY MASS INDEX: 27.42 KG/M2

## 2025-01-24 DIAGNOSIS — S82.842D CLOSED BIMALLEOLAR FRACTURE OF LEFT ANKLE WITH ROUTINE HEALING, SUBSEQUENT ENCOUNTER: Primary | ICD-10-CM

## 2025-01-24 LAB — DSDNA AB SER QL CLIF: NEGATIVE

## 2025-01-24 PROCEDURE — 99024 POSTOP FOLLOW-UP VISIT: CPT | Performed by: STUDENT IN AN ORGANIZED HEALTH CARE EDUCATION/TRAINING PROGRAM

## 2025-01-24 RX ORDER — CEPHALEXIN 500 MG/1
500 CAPSULE ORAL EVERY 6 HOURS SCHEDULED
Qty: 20 CAPSULE | Refills: 0 | Status: SHIPPED | OUTPATIENT
Start: 2025-01-24 | End: 2025-01-29

## 2025-01-24 NOTE — PROGRESS NOTES
Ortho Sports Medicine Follow-Up Ankle Visit    Assesment:  24 y.o. female 6 weeks 2 days days s/p L ankle ORIF, with post-operative course notable for lateral wound dehiscence, improving at this time. No obvious evidence of infection at this time.    Plan:    Angela has had significant improvement in her lateral wound breakdown compared to her prior visit.  Low concern for infection at this time given that her wound is improving and no erythema, induration, fluctuance, or tenderness to palpation over this area, however I discussed that we would continue to closely monitor this.  She will continue wet-to-dry dressing changes.  She will continue to not take her Benlysta.  She may continue to bear weight as tolerated.  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  She has a prescription for doxycycline for 2 more days from the emergency room.  I gave her an additional prescription for Keflex for an additional period of 5 days afterwards, 500 milligrams 4 times daily.  She will follow-up in 1 week, no x-rays at that time, for repeat wound check.  Will discuss her return to work at that time.      We will continue hold off on formal physical therapy while her wound issues resolve, however I did again review ankle range of motion exercises including circumduction / ABC's / DF and PF that she can perform at home.     She is aware that we will have to follow this closely and she was counselled on red flag signs including increasing redness, induration, and drainage.       Follow Up:  1 week, no x-rays      Chief Complaint   Patient presents with    Left Ankle - Post-op       History of Present Illness:    Angela is a 24F who presents for follow up after Open Reduction W/ Internal Fixation (orif) Ankle - Left on 12/11/24, now 6 weeks and 2 days s/p surgery.  She reports that she  has had improvement in her lateral wound breakdown from the prior visit and this has been well-healing since that visit.  She has been taking doxycycline as prescribed by the ED.  There has been some serosanguineous drainage on the dressing, however no major drainage or anything coming from the wounds, and she states the wounds have been closing up and there was just some weeping from them, serosanguineous in nature.  She denies any fevers or chills.  No worsening pain or erythema or other skin changes.  She is overall pleased with her progress.  She has been bearing weight as tolerated on her left ankle.  She did stop taking her Benlysta.  She has been using the cam boot for ambulation.  No new injuries.  No numbness or tingling. She denies any issues with her medial incision.      Past Medical, Social and Family History:  Past Medical History:   Diagnosis Date    Bowel obstruction (HCC)     Clostridium difficile infection         Crohn's disease (HCC)     Depression     Hypertension     Kidney disease, chronic, stage I (GFR over 89 ml/min)     Lupus     PTSD (post-traumatic stress disorder)     Raynaud disease      Past Surgical History:   Procedure Laterality Date    ABDOMINAL SURGERY      bowel obstruction     SECTION N/A     COLON SURGERY      IR BIOPSY KIDNEY RANDOM  3/6/2024    [facility] BIMAL[facility] ANKLE FRACTURE Left 2024    Procedure: ORIF BIMALLEOLAR ANKLE FRACTURE;  Surgeon: Ananda Corea MD;  Location:  MAIN OR;  Service: Orthopedics    WISDOM TOOTH EXTRACTION       Allergies   Allergen Reactions    Oxycodone GI Intolerance and Vomiting     Nausea/vomiting     Current Outpatient Medications on File Prior to Visit   Medication Sig Dispense Refill    Aspirin Low Dose 81 MG chewable tablet       bacitracin topical ointment 500 units/g topical ointment Apply 1 large application topically 2 (two) times a day for 5 days 10 g 0    Belimumab (Benlysta) 200 MG/ML SOAJ Inject 1 cc  subcu weekly 12 mL 1    bromocriptine (PARLODEL) 2.5 mg tablet       Cyanocobalamin (VITAMIN B 12 PO) Take 500 mcg by mouth 2 (two) times a day      doxycycline hyclate (VIBRAMYCIN) 100 mg capsule Take 1 capsule (100 mg total) by mouth 2 (two) times a day for 5 days 10 capsule 0    ergocalciferol (VITAMIN D2) 50,000 units 1 CAPSULE ORALLY ONCE A WEEK 60 DAYS      ferrous sulfate 325 (65 Fe) mg tablet Take 325 mg by mouth daily with breakfast      naloxone (NARCAN) 4 mg/0.1 mL nasal spray Administer 1 spray into a nostril. If no response after 2-3 minutes, give another dose in the other nostril using a new spray. 1 each 1    NIFEdipine ER (ADALAT CC) 30 MG 24 hr tablet Take 30 mg by mouth daily      ondansetron (ZOFRAN) 4 mg tablet Take 1 tablet (4 mg total) by mouth every 8 (eight) hours as needed for nausea or vomiting for up to 4 doses 4 tablet 0    traZODone (DESYREL) 50 mg tablet Take 1 tablet by mouth daily at bedtime      vitamin B-12 (VITAMIN B-12) 1,000 mcg tablet Take 2,000 mcg by mouth daily      apixaban (Eliquis) 2.5 mg Take 1 tablet (2.5 mg total) by mouth 2 (two) times a day for 9 days Do not start before December 30, 2024. 18 tablet 0    FLUoxetine (PROzac) 20 mg capsule Take 20 mg by mouth daily (Patient not taking: Reported on 1/24/2025)      hydroxychloroquine (PLAQUENIL) 200 mg tablet Take 1.5 tablets (300 mg total) by mouth daily with breakfast 135 tablet 3    Icosapent Ethyl 1 g CAPS  (Patient not taking: Reported on 12/9/2024)      lidocaine (Lidoderm) 5 % Apply 1 patch topically over 12 hours daily Remove & Discard patch within 12 hours or as directed by MD (Patient not taking: Reported on 12/3/2024) 30 patch 0    omeprazole (PriLOSEC) 20 mg delayed release capsule Take 1 capsule (20 mg total) by mouth daily (Patient not taking: Reported on 12/9/2024) 30 capsule 3    ondansetron (ZOFRAN-ODT) 8 mg disintegrating tablet DISSOLVE ONE TABLET BY MOUTH EVERY 8 HOURS OR AS NEEDED FOR VOMITING.  (Patient not taking: Reported on 10/18/2024)      Repatha SureClick 140 MG/ML SOAJ INJECT 1 ML SUBCUTANEOUS EVERY 2 WEEKS 60 DAYS (Patient not taking: Reported on 1/24/2025)      Saxenda injection Inject 1.8 mg under the skin daily (Patient not taking: Reported on 1/24/2025)      sildenafil (REVATIO) 20 mg tablet Take 1 tablet (20 mg total) by mouth 2 (two) times a day (Patient not taking: Reported on 10/18/2024) 180 tablet 1    sildenafil (VIAGRA) 25 MG tablet Take 25 mg by mouth daily as needed for erectile dysfunction (Patient not taking: Reported on 9/11/2024)      simvastatin (ZOCOR) 10 mg tablet  (Patient not taking: Reported on 1/24/2025)      topiramate (TOPAMAX) 50 MG tablet 1 TABLET AT NIGHT ORALLY ONCE A DAY (NIGHTLY) 60 DAYS (Patient not taking: Reported on 1/24/2025)       No current facility-administered medications on file prior to visit.     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    Review of systems:  "ROS is negative other than that noted in the HPI.  Constitutional: Negative for fatigue and fever.   HENT: Negative for sore throat.    Respiratory: Negative for shortness of breath.    Cardiovascular: Negative for chest pain.   Gastrointestinal: Negative for abdominal pain.   Endocrine: Negative for cold intolerance and heat intolerance.   Genitourinary: Negative for flank pain.   Musculoskeletal: Negative for back pain.   Skin: Negative for rash.   Allergic/Immunologic: Negative for immunocompromised state.   Neurological: Negative for dizziness.   Psychiatric/Behavioral: Negative for agitation.      Physical Exam:    Height 5' 2\" (1.575 m), weight 67.6 kg (149 lb), last menstrual period 01/06/2025, not currently breastfeeding.    General/Constitutional: NAD, well developed, well nourished  HENT: Normocephalic, atraumatic  CV: Intact distal pulses, regular rate  Resp: No respiratory distress or labored breathing  Lymphatic: No lymphadenopathy palpated  Neuro: Alert and Oriented x 3, no focal deficits  Psych: Normal mood, normal affect, normal judgement, normal behavior  Skin: Warm, dry, no rashes, no erythema       Ankle Examination (focused):     Incision: Medial incision clean, dry, intact, well healed  Lateral incision with significant improvement in interval healing compared to prior visit, prior area of breakdown appears to be well-healing with granulation tissue and no exposed dermis. No erythema, fluctuance, or induration  I am unable to express any drainage at all from the lateral incision or wound and she is non-tender over this area.  See picture below  Mild swelling  Range of Motion: DF 5 PF 40  5/5 EHL/FHL/TA/GSC  Neurovascular status: Sensation intact to light touch in superficial peroneal / deep peronal / saphenous / sural/ tibial nerve distributions  Pulses: 2+ DP and PT pulse            STUDIES REVIEWED:  No new x-rays today    "

## 2025-01-25 LAB — BACTERIA UR CULT: NORMAL

## 2025-01-27 ENCOUNTER — APPOINTMENT (OUTPATIENT)
Dept: PHYSICAL THERAPY | Facility: CLINIC | Age: 25
End: 2025-01-27
Payer: OTHER GOVERNMENT

## 2025-01-27 ENCOUNTER — OFFICE VISIT (OUTPATIENT)
Dept: NEPHROLOGY | Facility: CLINIC | Age: 25
End: 2025-01-27
Payer: OTHER GOVERNMENT

## 2025-01-27 VITALS
SYSTOLIC BLOOD PRESSURE: 120 MMHG | WEIGHT: 147 LBS | DIASTOLIC BLOOD PRESSURE: 76 MMHG | HEIGHT: 62 IN | BODY MASS INDEX: 27.05 KG/M2

## 2025-01-27 DIAGNOSIS — I10 ESSENTIAL HYPERTENSION: ICD-10-CM

## 2025-01-27 DIAGNOSIS — R31.21 ASYMPTOMATIC MICROSCOPIC HEMATURIA: ICD-10-CM

## 2025-01-27 DIAGNOSIS — S82.842A CLOSED BIMALLEOLAR FRACTURE OF LEFT ANKLE, INITIAL ENCOUNTER: ICD-10-CM

## 2025-01-27 DIAGNOSIS — N18.1 STAGE 1 CHRONIC KIDNEY DISEASE: Primary | ICD-10-CM

## 2025-01-27 DIAGNOSIS — M32.14 LUPUS NEPHRITIS, ISN/RPS CLASS III (HCC): ICD-10-CM

## 2025-01-27 PROCEDURE — 99204 OFFICE O/P NEW MOD 45 MIN: CPT | Performed by: INTERNAL MEDICINE

## 2025-01-27 RX ORDER — PHENTERMINE HYDROCHLORIDE 15 MG/1
1 CAPSULE ORAL DAILY
COMMUNITY
Start: 2024-12-06

## 2025-01-27 RX ORDER — OLMESARTAN MEDOXOMIL 5 MG/1
1 TABLET ORAL DAILY
COMMUNITY
Start: 2025-01-20 | End: 2025-01-27

## 2025-01-27 NOTE — ASSESSMENT & PLAN NOTE
Lab Results   Component Value Date    EGFR 114 01/21/2025    EGFR 61 12/02/2024    EGFR 91 11/12/2024    CREATININE 0.73 01/21/2025    CREATININE 1.22 12/02/2024    CREATININE 0.88 11/12/2024   -UA without hematuria or proteinuria as above.  -Baseline serum creatinine 0.7-0.9.  Last serum creatinine overall stable 0.7 at baseline  -UACR 7 mg unremarkable in September 2024, UPC ratio 100 mg in January 2025.  -Overall stable from renal standpoint given normal complements, lack of hematuria/proteinuria, very stable renal function  -Repeat complements before next visit

## 2025-01-27 NOTE — ASSESSMENT & PLAN NOTE
-Blood pressure well-controlled in the office today.  -She has upper arm BP machine at home.  -Patient actively trying to become pregnant, will discontinue olmesartan 5 mg daily.  -Continue nifedipine XL 30 mg daily  -Monitor blood pressure at home and call back if remains greater than 130/80 in which case may consider further increasing nifedipine XL

## 2025-01-27 NOTE — PROGRESS NOTES
NEPHROLOGY OUTPATIENT CONSULTATION   Angela Lemus 24 y.o. female MRN: [id number]  Date: 1/27/2025  Reason for consultation:   Chief Complaint   Patient presents with    Consult     Proteinuria    Proteinuria       ASSESSMENT AND PLAN:  Assessment & Plan  Lupus nephritis, ISN/RPS class III (HCC)  Lupus nephritis diagnosed with kidney biopsy in March 2024  -Renal biopsy in March 2024 shows diffuse mesangial proliferative and focal sclerosing glomera nephritis with focal ischemic glomerular changes consistent with class III lupus nephritis, 30% I FTA  -Lupus anticoagulant was negative in March 2024  -UA in January 2025 without any hematuria or proteinuria.  Shows innumerable bacteria, patient denies any urinary complaint currently  -Patient has been on hydroxychloroquine and currently on Benlysta by rheumatology.  -Had earlier  required tapering steroids (had weight gain with this), also was on azathioprine (increased LFT)  -Continue current management as per rheumatology.  Stage 1 chronic kidney disease  Lab Results   Component Value Date    EGFR 114 01/21/2025    EGFR 61 12/02/2024    EGFR 91 11/12/2024    CREATININE 0.73 01/21/2025    CREATININE 1.22 12/02/2024    CREATININE 0.88 11/12/2024   -UA without hematuria or proteinuria as above.  -Baseline serum creatinine 0.7-0.9.  Last serum creatinine overall stable 0.7 at baseline  -UACR 7 mg unremarkable in September 2024, UPC ratio 100 mg in January 2025.  -Overall stable from renal standpoint given normal complements, lack of hematuria/proteinuria, very stable renal function  -Repeat complements before next visit  Asymptomatic microscopic hematuria  -Prior history of microscopic materia, most recent UA shows no hematuria.  No gross hematuria.  -Continue to monitor with repeat UA before next visit  Essential hypertension  -Blood pressure well-controlled in the office today.  -She has upper arm BP machine at home.  -Patient actively trying to become pregnant,  will discontinue olmesartan 5 mg daily.  -Continue nifedipine XL 30 mg daily  -Monitor blood pressure at home and call back if remains greater than 130/80 in which case may consider further increasing nifedipine XL    HISTORY OF PRESENT ILLNESS:  Angela Lemus is a 24 y.o. year old female with medical issues of SLE diagnosed in 2021 during pregnancy, history of preeclampsia, Raynaud's syndrome, IBD, Crohn's, history of bowel obstruction with ileocecal resection in 2022, depression, hypertension for last 3 years, lupus nephritis class III on kidney biopsy in March 2024, baseline creatinine 0.7-0.9 who presents for initial consultation for lupus nephritis.  Old medical records including prior lab values were reviewed from [facility].  Patient was found to have lupus during pregnancy in 2021 when she also had preeclampsia.  Since then has been having blood pressure issues requiring antihypertensive regimen.  She initially required tapering prednisone which caused weight gain.  Also had required azathioprine in the past which caused elevated LFTs.  Currently remains on hydroxychloroquine and Benlysta by rheumatology.    She denies any urinary complaint currently.  Denies any gross hematuria.  Denies any recent NSAID use.  Denies any nausea or vomiting.  She denies any obvious history of DVT, PE.  No obvious history of venous thrombosis.  She suspects that she may have had 1 miscarriage but this was never confirmed.    Her blood pressure well-controlled in the office today.  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  As patient trying to become pregnant, advised to discuss with PCP regarding continuing phentermine, Saxenda, Topamax?. She says this was prescribed for weight loss.  Denies history of smoking.  Uses alcohol occasionally.    REVIEW OF SYSTEMS:    More than 10 point review of systems were obtained and discussed in length with the patient. Complete review of systems were negative / unremarkable except  "mentioned in the HPI section.      PHYSICAL EXAM:  Vitals:    25 1338 25 1416   BP: 112/58 120/76   BP Location: Left arm    Patient Position: Sitting    Cuff Size: Adult    Weight: 66.7 kg (147 lb)    Height: 5' 2\" (1.575 m)      Body mass index is 26.89 kg/m².    Physical Exam  Vitals reviewed.   Constitutional:       Appearance: She is well-developed.   HENT:      Head: Normocephalic and atraumatic.      Right Ear: External ear normal.      Left Ear: External ear normal.   Eyes:      Conjunctiva/sclera: Conjunctivae normal.   Cardiovascular:      Comments: Has trace left lower leg edema given recent ankle fracture  Pulmonary:      Effort: Pulmonary effort is normal.      Breath sounds: Normal breath sounds. No wheezing or rales.   Abdominal:      General: Bowel sounds are normal. There is no distension.      Palpations: Abdomen is soft.      Tenderness: There is no abdominal tenderness.   Musculoskeletal:         General: No deformity.   Lymphadenopathy:      Cervical: No cervical adenopathy.   Skin:     Findings: No rash.   Neurological:      Mental Status: She is alert and oriented to person, place, and time.   Psychiatric:         Behavior: Behavior normal.         PAST MEDICAL HISTORY:  Past Medical History:   Diagnosis Date    Bowel obstruction (HCC)     Clostridium difficile infection         Crohn's disease (HCC)     Depression     Hypertension     Kidney disease, chronic, stage I (GFR over 89 ml/min)     Lupus     PTSD (post-traumatic stress disorder)     Raynaud disease        PAST SURGICAL HISTORY:  Past Surgical History:   Procedure Laterality Date    ABDOMINAL SURGERY      bowel obstruction     SECTION N/A     COLON SURGERY      IR BIOPSY KIDNEY RANDOM  3/6/2024    NE OPEN TREATMENT BIMALLEOLAR ANKLE FRACTURE Left 2024    Procedure: ORIF BIMALLEOLAR ANKLE FRACTURE;  Surgeon: Ananda Corea MD;  Location:  MAIN OR;  Service: Orthopedics    WISDOM TOOTH EXTRACTION   "       ALLERGIES:  Allergies   Allergen Reactions    Oxycodone GI Intolerance and Vomiting     Nausea/vomiting       SOCIAL HISTORY:  Social History     Substance and Sexual Activity   Alcohol Use Yes    Comment: 1 x month     Social History     Substance and Sexual Activity   Drug Use Never     Social History     Tobacco Use   Smoking Status Never   Smokeless Tobacco Never       FAMILY HISTORY:  Family History   Problem Relation Age of Onset    Lupus Mother     Diabetes Father     COPD Maternal Grandmother     Heart failure Maternal Grandmother     Substance Abuse Maternal Grandmother     Breast cancer Other        MEDICATIONS:    Current Outpatient Medications:     apixaban (Eliquis) 2.5 mg, Take 1 tablet (2.5 mg total) by mouth 2 (two) times a day for 9 days Do not start before December 30, 2024., Disp: 18 tablet, Rfl: 0    Aspirin Low Dose 81 MG chewable tablet, , Disp: , Rfl:     Belimumab (Benlysta) 200 MG/ML SOAJ, Inject 1 cc subcu weekly, Disp: 12 mL, Rfl: 1    bromocriptine (PARLODEL) 2.5 mg tablet, , Disp: , Rfl:     cephalexin (KEFLEX) 500 mg capsule, Take 1 capsule (500 mg total) by mouth every 6 (six) hours for 5 days, Disp: 20 capsule, Rfl: 0    Cyanocobalamin (VITAMIN B 12 PO), Take 500 mcg by mouth 2 (two) times a day, Disp: , Rfl:     ferrous sulfate 325 (65 Fe) mg tablet, Take 325 mg by mouth daily with breakfast, Disp: , Rfl:     FLUoxetine (PROzac) 20 mg capsule, Take 20 mg by mouth daily, Disp: , Rfl:     hydroxychloroquine (PLAQUENIL) 200 mg tablet, Take 1.5 tablets (300 mg total) by mouth daily with breakfast, Disp: 135 tablet, Rfl: 3    lidocaine (Lidoderm) 5 %, Apply 1 patch topically over 12 hours daily Remove & Discard patch within 12 hours or as directed by MD, Disp: 30 patch, Rfl: 0    NIFEdipine ER (ADALAT CC) 30 MG 24 hr tablet, Take 30 mg by mouth daily, Disp: , Rfl:     omeprazole (PriLOSEC) 20 mg delayed release capsule, Take 1 capsule (20 mg total) by mouth daily, Disp: 30 capsule,  "Rfl: 3    phentermine 15 MG capsule, Take 1 capsule by mouth in the morning, Disp: , Rfl:     Saxenda injection, Inject 1.8 mg under the skin daily, Disp: , Rfl:     sildenafil (REVATIO) 20 mg tablet, Take 1 tablet (20 mg total) by mouth 2 (two) times a day, Disp: 180 tablet, Rfl: 1    topiramate (TOPAMAX) 50 MG tablet, , Disp: , Rfl:     traZODone (DESYREL) 50 mg tablet, Take 1 tablet by mouth daily at bedtime, Disp: , Rfl:     vitamin B-12 (VITAMIN B-12) 1,000 mcg tablet, Take 2,000 mcg by mouth daily, Disp: , Rfl:     VITAMIN D, CHOLECALCIFEROL, PO, Take 5,000 Units by mouth in the morning, Disp: , Rfl:     bacitracin topical ointment 500 units/g topical ointment, Apply 1 large application topically 2 (two) times a day for 5 days (Patient not taking: Reported on 1/27/2025), Disp: 10 g, Rfl: 0    Icosapent Ethyl 1 g CAPS, , Disp: , Rfl:     naloxone (NARCAN) 4 mg/0.1 mL nasal spray, Administer 1 spray into a nostril. If no response after 2-3 minutes, give another dose in the other nostril using a new spray. (Patient not taking: Reported on 1/27/2025), Disp: 1 each, Rfl: 1    Repatha SureClick 140 MG/ML SOAJ, INJECT 1 ML SUBCUTANEOUS EVERY 2 WEEKS 60 DAYS (Patient not taking: Reported on 12/3/2024), Disp: , Rfl:     sildenafil (VIAGRA) 25 MG tablet, Take 25 mg by mouth daily as needed for erectile dysfunction (Patient not taking: Reported on 9/11/2024), Disp: , Rfl:     Lab Results:   Results for orders placed or performed in visit on 01/23/25   Urine culture    Collection Time: 01/23/25  4:32 PM    Specimen: Urine, Clean Catch   Result Value Ref Range    Urine Culture 30,000-39,000 cfu/ml    Creatinine 0.7    Portions of the record may have been created with voice recognition software. Occasional wrong word or \"sound a like\" substitutions may have occurred due to the inherent limitations of voice recognition software. Read the chart carefully and recognize, using context, where substitutions have occurred.    "

## 2025-01-28 DIAGNOSIS — Z11.1 SCREENING-PULMONARY TB: Primary | ICD-10-CM

## 2025-01-29 ENCOUNTER — TELEPHONE (OUTPATIENT)
Age: 25
End: 2025-01-29

## 2025-01-29 DIAGNOSIS — M32.9 SYSTEMIC LUPUS ERYTHEMATOSUS, UNSPECIFIED SLE TYPE, UNSPECIFIED ORGAN INVOLVEMENT STATUS (HCC): ICD-10-CM

## 2025-01-29 DIAGNOSIS — L93.0 DISCOID LUPUS: ICD-10-CM

## 2025-01-29 DIAGNOSIS — M32.14 LUPUS NEPHRITIS (HCC): ICD-10-CM

## 2025-01-29 RX ORDER — BELIMUMAB 200 MG/ML
SOLUTION SUBCUTANEOUS
Qty: 12 ML | Refills: 1 | Status: SHIPPED | OUTPATIENT
Start: 2025-01-29

## 2025-01-29 NOTE — TELEPHONE ENCOUNTER
Dr. Field Dolan     Prescription was transferred to [facility] from another pharmacy.     Please send new script to [facility]. [facility] needs original prescription.

## 2025-01-30 ENCOUNTER — APPOINTMENT (OUTPATIENT)
Dept: LAB | Facility: CLINIC | Age: 25
End: 2025-01-30
Payer: OTHER GOVERNMENT

## 2025-01-30 ENCOUNTER — APPOINTMENT (OUTPATIENT)
Dept: PHYSICAL THERAPY | Facility: CLINIC | Age: 25
End: 2025-01-30
Payer: OTHER GOVERNMENT

## 2025-01-30 DIAGNOSIS — Z11.1 SCREENING-PULMONARY TB: ICD-10-CM

## 2025-01-30 DIAGNOSIS — Z11.1 SCREENING FOR TUBERCULOSIS: ICD-10-CM

## 2025-01-30 PROCEDURE — 86480 TB TEST CELL IMMUN MEASURE: CPT

## 2025-01-30 PROCEDURE — 36415 COLL VENOUS BLD VENIPUNCTURE: CPT

## 2025-02-01 LAB
GAMMA INTERFERON BACKGROUND BLD IA-ACNC: 0.06 IU/ML
M TB IFN-G BLD-IMP: NEGATIVE
M TB IFN-G CD4+ BCKGRND COR BLD-ACNC: 0 IU/ML
M TB IFN-G CD4+ BCKGRND COR BLD-ACNC: 0.01 IU/ML
MITOGEN IGNF BCKGRD COR BLD-ACNC: 9.94 IU/ML

## 2025-02-05 ENCOUNTER — HOSPITAL ENCOUNTER (EMERGENCY)
Facility: HOSPITAL | Age: 25
Discharge: HOME | End: 2025-02-05
Attending: EMERGENCY MEDICINE
Payer: COMMERCIAL

## 2025-02-05 VITALS
TEMPERATURE: 98 F | OXYGEN SATURATION: 98 % | DIASTOLIC BLOOD PRESSURE: 86 MMHG | RESPIRATION RATE: 20 BRPM | SYSTOLIC BLOOD PRESSURE: 138 MMHG | HEART RATE: 104 BPM

## 2025-02-05 DIAGNOSIS — B37.31 VAGINAL CANDIDIASIS: ICD-10-CM

## 2025-02-05 DIAGNOSIS — N89.8 VAGINAL DISCHARGE: Primary | ICD-10-CM

## 2025-02-05 DIAGNOSIS — Z20.2 POSSIBLE EXPOSURE TO STD: ICD-10-CM

## 2025-02-05 LAB
BACTERIA UR QL AUTO: ABNORMAL /HPF
BILIRUB UR QL STRIP: NEGATIVE
CLARITY UR: CLEAR
COLOR UR: YELLOW
GLUCOSE UR STRIP-MCNC: NEGATIVE MG/DL
HGB UR QL STRIP.AUTO: NEGATIVE
HIV 1+2 AB+HIV1 P24 AG SERPL QL IA: NORMAL
HIV1 P24 AG SER QL: NORMAL
HYALINE CASTS #/AREA URNS LPF: ABNORMAL /LPF
KETONES UR STRIP-MCNC: ABNORMAL MG/DL
LEUKOCYTE ESTERASE UR QL STRIP: NEGATIVE
MUCOUS THREADS UR QL AUTO: ABNORMAL
NITRITE UR QL STRIP: NEGATIVE
NON-SQ EPI CELLS URNS QL MICRO: ABNORMAL /HPF
PH UR STRIP.AUTO: 6.5 [PH]
PROT UR STRIP-MCNC: ABNORMAL MG/DL
RBC #/AREA URNS AUTO: ABNORMAL /HPF
SP GR UR STRIP.AUTO: 1.03 (ref 1–1.03)
UROBILINOGEN UR STRIP-ACNC: 3 MG/DL
WBC #/AREA URNS AUTO: ABNORMAL /HPF

## 2025-02-05 PROCEDURE — 99284 EMERGENCY DEPT VISIT MOD MDM: CPT

## 2025-02-05 PROCEDURE — 87806 HIV AG W/HIV1&2 ANTB W/OPTIC: CPT

## 2025-02-05 PROCEDURE — 99284 EMERGENCY DEPT VISIT MOD MDM: CPT | Performed by: EMERGENCY MEDICINE

## 2025-02-05 PROCEDURE — 81514 NFCT DS BV&VAGINITIS DNA ALG: CPT

## 2025-02-05 PROCEDURE — 87491 CHLMYD TRACH DNA AMP PROBE: CPT

## 2025-02-05 PROCEDURE — 36415 COLL VENOUS BLD VENIPUNCTURE: CPT

## 2025-02-05 PROCEDURE — 86780 TREPONEMA PALLIDUM: CPT

## 2025-02-05 PROCEDURE — 81001 URINALYSIS AUTO W/SCOPE: CPT

## 2025-02-05 PROCEDURE — 87591 N.GONORRHOEAE DNA AMP PROB: CPT

## 2025-02-05 RX ORDER — FLUCONAZOLE 200 MG/1
200 TABLET ORAL ONCE
Status: COMPLETED | OUTPATIENT
Start: 2025-02-05 | End: 2025-02-05

## 2025-02-05 RX ADMIN — FLUCONAZOLE 200 MG: 200 TABLET ORAL at 19:20

## 2025-02-05 NOTE — ED PROVIDER NOTES
ED Disposition       None          Assessment & Plan   {Hyperlinks  Risk Stratification - NIHSS - HEART SCORE - Fill out sepsis note and make sure you call [phone] if severe or septic shock:[id number]}    MDM         Medications - No data to display    ED Risk Strat Scores                                              History of Present Illness   {Hyperlinks  History (Med, Surg, Fam, Social) - Current Medications - Allergies  :[id number]}    Chief Complaint   Patient presents with    Vaginal Itching     Having vaginal itching for a couple days with white discharge. Thinks she may have BV.        Past Medical History:   Diagnosis Date    Bowel obstruction (HCC)     Clostridium difficile infection         Crohn's disease (HCC)     Depression     Hypertension     Kidney disease, chronic, stage I (GFR over 89 ml/min)     Lupus     PTSD (post-traumatic stress disorder)     Raynaud disease       Past Surgical History:   Procedure Laterality Date    ABDOMINAL SURGERY      bowel obstruction     SECTION N/A     COLON SURGERY      IR BIOPSY KIDNEY RANDOM  3/6/2024    CT OPEN TREATMENT BIMALLEOLAR ANKLE FRACTURE Left 2024    Procedure: ORIF BIMALLEOLAR ANKLE FRACTURE;  Surgeon: Ananda Corea MD;  Location:  MAIN OR;  Service: Orthopedics    WISDOM TOOTH EXTRACTION        Family History   Problem Relation Age of Onset    Lupus Mother     Diabetes Father     COPD Maternal Grandmother     Heart failure Maternal Grandmother     Substance Abuse Maternal Grandmother     Breast cancer Other       Social History     Tobacco Use    Smoking status: Never    Smokeless tobacco: Never   Vaping Use    Vaping status: Never Used   Substance Use Topics    Alcohol use: Yes     Comment: 1 x month    Drug use: Never      E-Cigarette/Vaping    E-Cigarette Use Never User       E-Cigarette/Vaping Substances    Nicotine No     THC No     CBD No     Flavoring No     Other No     Unknown No       I have reviewed and agree with  the history as documented.     HPI    Review of Systems        Objective   {Hyperlinks  Historical Vitals - Historical Labs - Chart Review/Microbiology - Last Echo - Code Status  :[id number]}    ED Triage Vitals [02/05/25 1648]   Temperature Pulse Blood Pressure Respirations SpO2 Patient Position - Orthostatic VS   98 °F (36.7 °C) 104 138/86 20 98 % Sitting      Temp Source Heart Rate Source BP Location FiO2 (%) Pain Score    Oral Monitor Left arm -- No Pain      Vitals      Date and Time Temp Pulse SpO2 Resp BP Pain Score FACES Pain Rating User   02/05/25 1648 98 °F (36.7 °C) 104 98 % 20 138/86 No Pain -- ST            Physical Exam    Results Reviewed       None            No orders to display       Procedures    ED Medication and Procedure Management   Prior to Admission Medications   Prescriptions Last Dose Informant Patient Reported? Taking?   Aspirin Low Dose 81 MG chewable tablet   Yes No   Belimumab (Benlysta) 200 MG/ML SOAJ   No No   Sig: Inject 1 cc subcu weekly   Cyanocobalamin (VITAMIN B 12 PO)  Self Yes No   Sig: Take 500 mcg by mouth 2 (two) times a day   FLUoxetine (PROzac) 20 mg capsule   Yes No   Sig: Take 20 mg by mouth daily   Icosapent Ethyl 1 g CAPS  Self Yes No   Patient not taking: Reported on 12/9/2024   NIFEdipine ER (ADALAT CC) 30 MG 24 hr tablet   Yes No   Sig: Take 30 mg by mouth daily   Repatha SureClick 140 MG/ML SOAJ   Yes No   Sig: INJECT 1 ML SUBCUTANEOUS EVERY 2 WEEKS 60 DAYS   Patient not taking: Reported on 12/3/2024   Saxenda injection   Yes No   Sig: Inject 1.8 mg under the skin daily   VITAMIN D, CHOLECALCIFEROL, PO  Self Yes No   Sig: Take 5,000 Units by mouth in the morning   apixaban (Eliquis) 2.5 mg   No No   Sig: Take 1 tablet (2.5 mg total) by mouth 2 (two) times a day for 9 days Do not start before December 30, 2024.   bacitracin topical ointment 500 units/g topical ointment   No No   Sig: Apply 1 large application topically 2 (two) times a day for 5 days   Patient not  taking: Reported on 1/27/2025   bromocriptine (PARLODEL) 2.5 mg tablet  Self Yes No   ferrous sulfate 325 (65 Fe) mg tablet  Self Yes No   Sig: Take 325 mg by mouth daily with breakfast   hydroxychloroquine (PLAQUENIL) 200 mg tablet  Self No No   Sig: Take 1.5 tablets (300 mg total) by mouth daily with breakfast   lidocaine (Lidoderm) 5 %  Self No No   Sig: Apply 1 patch topically over 12 hours daily Remove & Discard patch within 12 hours or as directed by MD   naloxone (NARCAN) 4 mg/0.1 mL nasal spray   No No   Sig: Administer 1 spray into a nostril. If no response after 2-3 minutes, give another dose in the other nostril using a new spray.   Patient not taking: Reported on 1/27/2025   omeprazole (PriLOSEC) 20 mg delayed release capsule  Self No No   Sig: Take 1 capsule (20 mg total) by mouth daily   phentermine 15 MG capsule   Yes No   Sig: Take 1 capsule by mouth in the morning   sildenafil (REVATIO) 20 mg tablet   No No   Sig: Take 1 tablet (20 mg total) by mouth 2 (two) times a day   sildenafil (VIAGRA) 25 MG tablet  Self Yes No   Sig: Take 25 mg by mouth daily as needed for erectile dysfunction   Patient not taking: Reported on 9/11/2024   topiramate (TOPAMAX) 50 MG tablet   Yes No   traZODone (DESYREL) 50 mg tablet  Self Yes No   Sig: Take 1 tablet by mouth daily at bedtime   vitamin B-12 (VITAMIN B-12) 1,000 mcg tablet  Self Yes No   Sig: Take 2,000 mcg by mouth daily      Facility-Administered Medications: None     Patient's Medications   Discharge Prescriptions    No medications on file     No discharge procedures on file.  ED SEPSIS DOCUMENTATION          Mood and Affect: Mood normal.         Results Reviewed       Procedure Component Value Units Date/Time    Chlamydia/GC amplified DNA by PCR [[id number]]  (Normal) Collected: 02/05/25 1828    Lab Status: Final result Specimen: Urine, Other Updated: 02/06/25 1410     N gonorrhoeae, DNA Probe Negative     Chlamydia trachomatis, DNA Probe Negative    Narrative:      This test was performed using the FDA-approved [employer] CT/NG assay ([employer]). This test uses real-time PCR to detect Chlamydia trachomatis (CT) and Neisseria gonorrhoeae (NG). This instrument and assay have been validated by the  and performing laboratory and verified by the performing laboratory.  This test is intended as an aid in the diagnosis of chlamydial and gonococcal disease. This test has not been evaluated in patients younger than 14 years of age and is not recommended for evaluation of suspected sexual abuse. This assay is not intended to replace other exams or tests for diagnosis of urogenital infection by causative factors other than Chalmydia trachomatis (CT) and Neisseria gonorrhoeae (NG). Additional testing is recommended when the results do not correlate with clinical signs and symptoms.   Procedural Limitations  This assay has only been validated for use with male and female urine, clinician-instructed self-collected vaginal swab specimens, clinician-collected vaginal swab specimens, endocervical swab specimens collected in [employer]® PCR Media and cervical specimens collected in PreservCyt® Solution. Assay performance has not been validated for use with other collection media and/or specimen types.   Detection of C. trachomatis and N. gonorrhoeaea is dependent on the number of organisms present in the specimen. Detection may be affected by specimen collection methods, patient factors, stage of infection, infecting strains, and presence of polymerase/PCR inhibitors.   When CT is present at very high concentrations, the  detection of NG present at concentrations near the limit of detection may be impacted.  The presence of mucus in endocervical specimens may lead to false negative results.  The presence of whole blood in urine and cervical specimens collected in PreservCyt Solution may lead to false negative and/or invalid test results.   Urine testing is recommended to be performed on first catch urine samples. The effects of other collection variables have not been evaluated at this time. The effects of vaginal discharge, tampon use, douching, and other collection variables have not been evaluated at this time.   This assay has not been evaluated with patients currently being treated with antimicrobial agents active against CT or NG, or patients with a history of hysterectomy.     Molecular Vaginal Panel [[id number]]  (Abnormal) Collected: 02/05/25 1836    Lab Status: Final result Specimen: Genital from Vaginal Updated: 02/06/25 1303     Bacterial Vaginosis Negative     Candida species Positive     Candida glabrata Negative     Fiona krusei Negative     Trichomonas vaginalis Negative    Narrative:      This test has been performed using the FDA-approved BD MAX system for the qualitative detection of bacterial vaginosis markers (Lactobacillus spp., Gardnerella vaginalis, Atopobium vaginae, Bacterial Vaginosis Associated Bacteria-2, Megasphaera-1), Candida spp. (C. albicans, C. tropicalis, C. parapsilosis, C. dubliniensis), Candida glabrata, Candida krusei, and Trichomonas vaginalis in vaginal swabs from patients who are symptomatic for vaginitis/vaginosis using polymerase chain reaction (PCR) methodology.    Negative PCR results do not preclude infection and should not be used as the sole basis for treatment or other patient management decisions.    Positive PCR results are indicative of infection, but do not necessarily indicate the presence of viable organisms and do not rule out co-infection with other bacteria or  viruses.    As with all polymerase-chain reaction methodology, extremely low levels of target below the limit of detection may be detected, but results may not be reproducible.    All test results should be used as an adjunct to clinical observations and other information available to the provider.    RPR-Syphilis Screening (Total Syphilis IGG/IGM) [[id number]]  (Normal) Collected: 02/05/25 1828    Lab Status: Final result Specimen: Blood from Arm, Left Updated: 02/06/25 0942     Syphilis Total Antibody Non-reactive    Narrative:      Test performed on the [employer] system using multiplex flow immunoassay methodology.    RAPID HIV 1/2 AB-AG COMBO for 12 years old and above [[id number]]  (Normal) Collected: 02/05/25 1828    Lab Status: Final result Specimen: Blood from Arm, Left Updated: 02/05/25 2025     Rapid HIV 1 AND 2 Non-Reactive     HIV-1 P24 Ag Screen Non-Reactive    Narrative:      Negative for HIV-1 p24 Antigen.  Negative for HIV-1 and/or HIV-2 Antibody.    Urine Microscopic [[id number]]  (Abnormal) Collected: 02/05/25 1829    Lab Status: Final result Specimen: Urine, Clean Catch Updated: 02/05/25 1904     RBC, UA None Seen /hpf      WBC, UA 1-2 /hpf      Epithelial Cells Occasional /hpf      Bacteria, UA None Seen /hpf      MUCUS THREADS Moderate     Hyaline Casts, UA 0-3 /lpf     UA w Reflex to Microscopic w Reflex to Culture [[id number]]  (Abnormal) Collected: 02/05/25 1829    Lab Status: Final result Specimen: Urine, Clean Catch Updated: 02/05/25 1859     Color, UA Yellow     Clarity, UA Clear     Specific Gravity, UA 1.034     pH, UA 6.5     Leukocytes, UA Negative     Nitrite, UA Negative     Protein, UA 70 (1+) mg/dl      Glucose, UA Negative mg/dl      Ketones, UA 60 (2+) mg/dl      Urobilinogen, UA 3.0 mg/dl      Bilirubin, UA Negative     Occult Blood, UA Negative            No orders to display       Procedures    ED Medication and Procedure Management   Prior to Admission Medications    Prescriptions Last Dose Informant Patient Reported? Taking?   Aspirin Low Dose 81 MG chewable tablet   Yes No   Belimumab (Benlysta) 200 MG/ML SOAJ   No No   Sig: Inject 1 cc subcu weekly   Cyanocobalamin (VITAMIN B 12 PO)  Self Yes No   Sig: Take 500 mcg by mouth 2 (two) times a day   FLUoxetine (PROzac) 20 mg capsule   Yes No   Sig: Take 20 mg by mouth daily   Icosapent Ethyl 1 g CAPS  Self Yes No   Patient not taking: Reported on 12/9/2024   NIFEdipine ER (ADALAT CC) 30 MG 24 hr tablet   Yes No   Sig: Take 30 mg by mouth daily   Repatha SureClick 140 MG/ML SOAJ   Yes No   Sig: INJECT 1 ML SUBCUTANEOUS EVERY 2 WEEKS 60 DAYS   Patient not taking: Reported on 12/3/2024   Saxenda injection   Yes No   Sig: Inject 1.8 mg under the skin daily   VITAMIN D, CHOLECALCIFEROL, PO  Self Yes No   Sig: Take 5,000 Units by mouth in the morning   apixaban (Eliquis) 2.5 mg   No No   Sig: Take 1 tablet (2.5 mg total) by mouth 2 (two) times a day for 9 days Do not start before December 30, 2024.   bacitracin topical ointment 500 units/g topical ointment   No No   Sig: Apply 1 large application topically 2 (two) times a day for 5 days   Patient not taking: Reported on 1/27/2025   bromocriptine (PARLODEL) 2.5 mg tablet  Self Yes No   ferrous sulfate 325 (65 Fe) mg tablet  Self Yes No   Sig: Take 325 mg by mouth daily with breakfast   hydroxychloroquine (PLAQUENIL) 200 mg tablet  Self No No   Sig: Take 1.5 tablets (300 mg total) by mouth daily with breakfast   lidocaine (Lidoderm) 5 %  Self No No   Sig: Apply 1 patch topically over 12 hours daily Remove & Discard patch within 12 hours or as directed by MD   naloxone (NARCAN) 4 mg/0.1 mL nasal spray   No No   Sig: Administer 1 spray into a nostril. If no response after 2-3 minutes, give another dose in the other nostril using a new spray.   Patient not taking: Reported on 1/27/2025   omeprazole (PriLOSEC) 20 mg delayed release capsule  Self No No   Sig: Take 1 capsule (20 mg total) by  mouth daily   phentermine 15 MG capsule   Yes No   Sig: Take 1 capsule by mouth in the morning   sildenafil (REVATIO) 20 mg tablet   No No   Sig: Take 1 tablet (20 mg total) by mouth 2 (two) times a day   sildenafil (VIAGRA) 25 MG tablet  Self Yes No   Sig: Take 25 mg by mouth daily as needed for erectile dysfunction   Patient not taking: Reported on 9/11/2024   topiramate (TOPAMAX) 50 MG tablet   Yes No   traZODone (DESYREL) 50 mg tablet  Self Yes No   Sig: Take 1 tablet by mouth daily at bedtime   vitamin B-12 (VITAMIN B-12) 1,000 mcg tablet  Self Yes No   Sig: Take 2,000 mcg by mouth daily      Facility-Administered Medications: None     Discharge Medication List as of 2/5/2025  6:37 PM        CONTINUE these medications which have NOT CHANGED    Details   apixaban (Eliquis) 2.5 mg Take 1 tablet (2.5 mg total) by mouth 2 (two) times a day for 9 days Do not start before December 30, 2024., Starting Mon 12/30/2024, Until Mon 1/27/2025, Normal      Aspirin Low Dose 81 MG chewable tablet Historical Med      bacitracin topical ointment 500 units/g topical ointment Apply 1 large application topically 2 (two) times a day for 5 days, Starting Mon 1/20/2025, Until Sat 1/25/2025, Normal      Belimumab (Benlysta) 200 MG/ML SOAJ Inject 1 cc subcu weekly, Normal      bromocriptine (PARLODEL) 2.5 mg tablet Historical Med      !! Cyanocobalamin (VITAMIN B 12 PO) Take 500 mcg by mouth 2 (two) times a day, Historical Med      ferrous sulfate 325 (65 Fe) mg tablet Take 325 mg by mouth daily with breakfast, Historical Med      FLUoxetine (PROzac) 20 mg capsule Take 20 mg by mouth daily, Starting Wed 9/11/2024, Historical Med      hydroxychloroquine (PLAQUENIL) 200 mg tablet Take 1.5 tablets (300 mg total) by mouth daily with breakfast, Starting Fri 2/23/2024, Until Mon 1/27/2025, Normal      Icosapent Ethyl 1 g CAPS Historical Med      lidocaine (Lidoderm) 5 % Apply 1 patch topically over 12 hours daily Remove & Discard patch within  12 hours or as directed by MD, Starting Fri 3/22/2024, Normal      naloxone (NARCAN) 4 mg/0.1 mL nasal spray Administer 1 spray into a nostril. If no response after 2-3 minutes, give another dose in the other nostril using a new spray., Normal      NIFEdipine ER (ADALAT CC) 30 MG 24 hr tablet Take 30 mg by mouth daily, Starting Tue 9/17/2024, Historical Med      omeprazole (PriLOSEC) 20 mg delayed release capsule Take 1 capsule (20 mg total) by mouth daily, Starting Thu 8/24/2023, Normal      phentermine 15 MG capsule Take 1 capsule by mouth in the morning, Starting Fri 12/6/2024, Historical Med      Repatha SureClick 140 MG/ML SOAJ INJECT 1 ML SUBCUTANEOUS EVERY 2 WEEKS 60 DAYS, Historical Med      Saxenda injection Inject 1.8 mg under the skin daily, Starting Fri 9/13/2024, Historical Med      sildenafil (REVATIO) 20 mg tablet Take 1 tablet (20 mg total) by mouth 2 (two) times a day, Starting Tue 8/13/2024, Normal      sildenafil (VIAGRA) 25 MG tablet Take 25 mg by mouth daily as needed for erectile dysfunction, Historical Med      topiramate (TOPAMAX) 50 MG tablet Historical Med      traZODone (DESYREL) 50 mg tablet Take 1 tablet by mouth daily at bedtime, Starting Tue 8/29/2023, Historical Med      !! vitamin B-12 (VITAMIN B-12) 1,000 mcg tablet Take 2,000 mcg by mouth daily, Starting Wed 9/13/2023, Historical Med      VITAMIN D, CHOLECALCIFEROL, PO Take 5,000 Units by mouth in the morning, Historical Med       !! - Potential duplicate medications found. Please discuss with provider.        No discharge procedures on file.  ED SEPSIS DOCUMENTATION   Time reflects when diagnosis was documented in both MDM as applicable and the Disposition within this note       Time User Action Codes Description Comment    2/5/2025  6:36 PM Quinton Alas Add [N89.8] Vaginal discharge     2/5/2025  6:36 PM Quinton Alas [Z20.2] Possible exposure to STD     2/6/2025  1:23 PM Nisreen Aragon [B37.31] Vaginal candidiasis                   Quinton Alas MD  02/10/25 2013

## 2025-02-05 NOTE — DISCHARGE INSTRUCTIONS
Please take the prescribed Diflucan here.  Please follow-up with your OB/GYN.  If any of your results are positive we will call you to inform you.  Please also check your MyChart for any new resulting labs.

## 2025-02-06 LAB
C GLABRATA DNA VAG QL NAA+PROBE: NEGATIVE
C KRUSEI DNA VAG QL NAA+PROBE: NEGATIVE
C TRACH DNA SPEC QL NAA+PROBE: NEGATIVE
CANDIDA SP 6 PNL VAG NAA+PROBE: POSITIVE
N GONORRHOEA DNA SPEC QL NAA+PROBE: NEGATIVE
T VAGINALIS DNA VAG QL NAA+PROBE: NEGATIVE
TREPONEMA PALLIDUM IGG+IGM AB [PRESENCE] IN SERUM OR PLASMA BY IMMUNOASSAY: NORMAL
VAGINOSIS/ITIS DNA PNL VAG PROBE+SIG AMP: NEGATIVE

## 2025-02-06 RX ORDER — FLUCONAZOLE 150 MG/1
150 TABLET ORAL DAILY
Qty: 2 TABLET | Refills: 0 | Status: SHIPPED | OUTPATIENT
Start: 2025-02-06 | End: 2025-02-08

## 2025-02-06 NOTE — ED ATTENDING ATTESTATION
2/5/2025  I, Collin Nolan MD, saw and evaluated the patient. I have discussed the patient with the resident/non-physician practitioner and agree with the resident's/non-physician practitioner's findings, Plan of Care, and MDM as documented in the resident's/non-physician practitioner's note, except where noted. All available labs and Radiology studies were reviewed.  I was present for key portions of any procedure(s) performed by the resident/non-physician practitioner and I was immediately available to provide assistance.       At this point I agree with the current assessment done in the Emergency Department.  I have conducted an independent evaluation of this patient a history and physical is as follows:    24-year-old woman presenting with vaginal itching and white discharge.  No abdominal pain, nausea or vomiting.  No urinary symptoms.  Requesting STI testing.  Well-appearing in no acute distress.  Will send STI testing, give dose of fluconazole here.    ED Course         Critical Care Time  Procedures

## 2025-02-11 ENCOUNTER — OFFICE VISIT (OUTPATIENT)
Dept: OBGYN CLINIC | Facility: CLINIC | Age: 25
End: 2025-02-11

## 2025-02-11 VITALS — WEIGHT: 147 LBS | HEIGHT: 62 IN | BODY MASS INDEX: 27.05 KG/M2

## 2025-02-11 DIAGNOSIS — S82.842D CLOSED BIMALLEOLAR FRACTURE OF LEFT ANKLE WITH ROUTINE HEALING, SUBSEQUENT ENCOUNTER: Primary | ICD-10-CM

## 2025-02-11 PROCEDURE — 99024 POSTOP FOLLOW-UP VISIT: CPT | Performed by: STUDENT IN AN ORGANIZED HEALTH CARE EDUCATION/TRAINING PROGRAM

## 2025-02-11 NOTE — LETTER
February 11, 2025     Patient: Angela Lemus  YOB: 2000  Date of Visit: 2/11/2025      To Whom it May Concern:    Angela Lemus is under my professional care. Angela was seen in my office on 2/11/2025. Angela may return to work on 2/12/25 with light duty. She will be seen again in 4 weeks and we will evaluate her clearance for return to full duty at that time .    If you have any questions or concerns, please don't hesitate to call.         Sincerely,          Ananda Corea MD        CC: No Recipients

## 2025-02-11 NOTE — PROGRESS NOTES
Ortho Sports Medicine Follow-Up Ankle Visit    Assesment:  24 y.o. female 8 weeks and 6 days days s/p L ankle ORIF, with post-operative course notable for lateral wound dehiscence, improving at this time. No obvious evidence of infection at this time.    Plan:    Angela has had significant improvement in her lateral wound breakdown compared to her prior visit and the wound is mostly healed now.  No concern for infection given that the wound is mostly healed over with no drainage, no erythema, induration, fluctuance, or tenderness to palpation over this area.  Given that the wound has mostly healed over now, I did discuss that I would be okay with her returning to work at this point however on light duty.  She also began physical therapy and was given a prescription for physical therapy for range of motion and strengthening.  She will follow-up in 4 weeks with repeat left ankle x-rays.  We discussed wound care as well.       Follow Up:  1 week, no x-rays      Chief Complaint   Patient presents with    Left Ankle - Post-op       History of Present Illness:    Angela is a 24F who presents for follow up after Open Reduction W/ Internal Fixation (orif) Ankle - Left on 12/11/24, now 8 weeks and 6 days status post surgery.  She continues to report significant improvement in her lateral wound breakdown and states it is mostly healed over.  She is not currently taking any antibiotics.  No drainage.  No fevers or chills.  No issues and she is pleased with the progress.  She continues to be off her Benlysta.  She has been weightbearing as tolerated and has not yet started formal physical therapy.  No numbness or tingling.  She is interested in returning to work.        Past Medical, Social and Family History:  Past Medical History:   Diagnosis Date    Bowel obstruction (HCC)     Clostridium difficile infection     2021    Crohn's disease (HCC)     Depression     Hypertension     Kidney disease, chronic, stage I (GFR over 89  ml/min)     Lupus     PTSD (post-traumatic stress disorder)     Raynaud disease      Past Surgical History:   Procedure Laterality Date    ABDOMINAL SURGERY      bowel obstruction     SECTION N/A     COLON SURGERY      IR BIOPSY KIDNEY RANDOM  3/6/2024    TX OPEN TREATMENT BIMALLEOLAR ANKLE FRACTURE Left 2024    Procedure: ORIF BIMALLEOLAR ANKLE FRACTURE;  Surgeon: Ananda Corea MD;  Location:  MAIN OR;  Service: Orthopedics    WISDOM TOOTH EXTRACTION       Allergies   Allergen Reactions    Oxycodone GI Intolerance and Vomiting     Nausea/vomiting     Current Outpatient Medications on File Prior to Visit   Medication Sig Dispense Refill    Aspirin Low Dose 81 MG chewable tablet       Belimumab (Benlysta) 200 MG/ML SOAJ Inject 1 cc subcu weekly 12 mL 1    bromocriptine (PARLODEL) 2.5 mg tablet       Cyanocobalamin (VITAMIN B 12 PO) Take 500 mcg by mouth 2 (two) times a day      ferrous sulfate 325 (65 Fe) mg tablet Take 325 mg by mouth daily with breakfast      FLUoxetine (PROzac) 20 mg capsule Take 20 mg by mouth daily      Icosapent Ethyl 1 g CAPS       lidocaine (Lidoderm) 5 % Apply 1 patch topically over 12 hours daily Remove & Discard patch within 12 hours or as directed by MD 30 patch 0    NIFEdipine ER (ADALAT CC) 30 MG 24 hr tablet Take 30 mg by mouth daily      omeprazole (PriLOSEC) 20 mg delayed release capsule Take 1 capsule (20 mg total) by mouth daily 30 capsule 3    phentermine 15 MG capsule Take 1 capsule by mouth in the morning      Saxenda injection Inject 1.8 mg under the skin daily      sildenafil (REVATIO) 20 mg tablet Take 1 tablet (20 mg total) by mouth 2 (two) times a day 180 tablet 1    topiramate (TOPAMAX) 50 MG tablet       traZODone (DESYREL) 50 mg tablet Take 1 tablet by mouth daily at bedtime      vitamin B-12 (VITAMIN B-12) 1,000 mcg tablet Take 2,000 mcg by mouth daily      VITAMIN D, CHOLECALCIFEROL, PO Take 5,000 Units by mouth in the morning      apixaban  (Eliquis) 2.5 mg Take 1 tablet (2.5 mg total) by mouth 2 (two) times a day for 9 days Do not start before December 30, 2024. 18 tablet 0    bacitracin topical ointment 500 units/g topical ointment Apply 1 large application topically 2 (two) times a day for 5 days (Patient not taking: Reported on 1/27/2025) 10 g 0    hydroxychloroquine (PLAQUENIL) 200 mg tablet Take 1.5 tablets (300 mg total) by mouth daily with breakfast 135 tablet 3    naloxone (NARCAN) 4 mg/0.1 mL nasal spray Administer 1 spray into a nostril. If no response after 2-3 minutes, give another dose in the other nostril using a new spray. (Patient not taking: Reported on 2/11/2025) 1 each 1    Repatha SureClick 140 MG/ML SOAJ INJECT 1 ML SUBCUTANEOUS EVERY 2 WEEKS 60 DAYS (Patient not taking: Reported on 2/11/2025)      sildenafil (VIAGRA) 25 MG tablet Take 25 mg by mouth daily as needed for erectile dysfunction (Patient not taking: Reported on 9/11/2024)       No current facility-administered medications on file prior to visit.     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    Review of systems: ROS is negative other than that noted in the HPI.  Constitutional: Negative for fatigue and fever.   HENT: Negative for sore throat.    Respiratory: Negative for shortness of breath.    Cardiovascular: Negative for chest pain.   Gastrointestinal: Negative for abdominal pain.   Endocrine: Negative for cold intolerance and heat intolerance.   Genitourinary: Negative for flank pain.   Musculoskeletal: Negative for back pain.   Skin: Negative for rash.   Allergic/Immunologic: Negative for immunocompromised state.   Neurological: Negative for dizziness.   Psychiatric/Behavioral: Negative for agitation.      Physical Exam:    Height 5' 2\" (1.575 m), weight 66.7 kg (147 lb), last menstrual period 01/25/2025, not currently breastfeeding.    General/Constitutional: NAD, well developed, well nourished  HENT: Normocephalic, atraumatic  CV: Intact distal pulses, regular rate  Resp: No respiratory distress or labored breathing  Lymphatic: No lymphadenopathy palpated  Neuro: Alert and Oriented x 3, no focal deficits  Psych: Normal mood, normal affect, normal judgement, normal behavior  Skin: Warm, dry, no rashes, no erythema       Ankle Examination (focused):     Incision: Medial incision clean, dry, intact, well healed  Lateral incision mostly healed over with scab in place, no drainage at all, no erythema, fluctuance, or induration  I am unable to express any drainage at all from the lateral incision or wound and she is non-tender over this area.  See picture below  Mild swelling  Range of Motion: DF 10 PF 40  5/5 EHL/FHL/TA/GSC  Neurovascular status: Sensation intact to light touch in superficial peroneal / deep peronal / saphenous / sural/ tibial nerve distributions  Pulses: 2+ DP and PT pulse            STUDIES REVIEWED:  No new x-rays today    "

## 2025-02-17 DIAGNOSIS — I10 PRIMARY HYPERTENSION: Primary | ICD-10-CM

## 2025-02-17 RX ORDER — OLMESARTAN MEDOXOMIL 5 MG/1
5 TABLET ORAL DAILY
Qty: 30 TABLET | Refills: 6 | Status: SHIPPED | OUTPATIENT
Start: 2025-02-17

## 2025-02-17 RX ORDER — NIFEDIPINE 30 MG
30 TABLET, EXTENDED RELEASE ORAL DAILY
Qty: 30 TABLET | Refills: 6 | Status: SHIPPED | OUTPATIENT
Start: 2025-02-17

## 2025-02-18 ENCOUNTER — TELEPHONE (OUTPATIENT)
Age: 25
End: 2025-02-18

## 2025-02-18 NOTE — TELEPHONE ENCOUNTER
Pt called in to get  medical license number , Patient stated  completed a form for her but  Medical License number is incorrect please advise . Patient disconnected from the line why I had her on hold

## 2025-02-18 NOTE — TELEPHONE ENCOUNTER
Dr. Field Dolan Delivery    Unable to reach patient to schedule delivery, calls going directly to [employer]. Confirmed correct phone number.  ___________________    Called and MyChart Message sent to patient to please call.

## 2025-02-21 ENCOUNTER — APPOINTMENT (OUTPATIENT)
Dept: URGENT CARE | Facility: MEDICAL CENTER | Age: 25
End: 2025-02-21

## 2025-02-28 NOTE — TELEPHONE ENCOUNTER
Called and left a message for the patient to call back so I can give her Dr. Berry medical license number for her form.

## (undated) DEVICE — PADDING CAST 4 IN  COTTON STRL

## (undated) DEVICE — EXOFIN PRECISION PEN HIGH VISCOSITY TOPICAL SKIN ADHESIVE: Brand: EXOFIN PRECISION PEN, 1G

## (undated) DEVICE — SPONGE SCRUB 4 PCT CHLORHEXIDINE

## (undated) DEVICE — GLOVE SRG BIOGEL 7.5

## (undated) DEVICE — COBAN 4 IN STERILE

## (undated) DEVICE — 3M™ STERI-DRAPE™ U-DRAPE 1015: Brand: STERI-DRAPE™

## (undated) DEVICE — BANDAGE, ESMARK LF STR 6"X9' (20/CS): Brand: CYPRESS

## (undated) DEVICE — THREADED GUIDE WIRE

## (undated) DEVICE — ANTIBACTERIAL UNDYED BRAIDED (POLYGLACTIN 910), SYNTHETIC ABSORBABLE SUTURE: Brand: COATED VICRYL

## (undated) DEVICE — GLOVE SRG BIOGEL 7

## (undated) DEVICE — SPONGE LAP 18 X 18 IN

## (undated) DEVICE — SUT STRATAFIX SPIRAL MONOCRYL PLUS 3-0 PS-2 45CM SXMP1B107

## (undated) DEVICE — PAD GROUNDING DUAL ADULT

## (undated) DEVICE — STERILE BETHLEHEM PLASTIC HAND: Brand: CARDINAL HEALTH

## (undated) DEVICE — TOWEL SET X-RAY

## (undated) DEVICE — SUT ETHILON 3-0 PS-1 18 IN 1663H

## (undated) DEVICE — GLOVE INDICATOR PI UNDERGLOVE SZ 8 BLUE

## (undated) DEVICE — U-DRAPE: Brand: CONVERTORS

## (undated) DEVICE — CHLORAPREP HI-LITE 26ML ORANGE

## (undated) DEVICE — DRAPE C-ARMOUR

## (undated) DEVICE — TUBING SUCTION 5MM X 12 FT

## (undated) DEVICE — 10FR FRAZIER SUCTION HANDLE: Brand: CARDINAL HEALTH

## (undated) DEVICE — CAST PLASTER 4 IN ROLL FAST SET

## (undated) DEVICE — NON-STERILE REUSABLE TOURNIQUET CUFF SINGLE BLADDER, DUAL PORT AND QUICK CONNECT CONNECTOR: Brand: COLOR CUFF

## (undated) DEVICE — GLOVE INDICATOR PI UNDERGLOVE SZ 7.5 BLUE

## (undated) DEVICE — ABDOMINAL PAD: Brand: DERMACEA

## (undated) DEVICE — MEDI-VAC YANKAUER SUCTION HANDLE W/STRAIGHT TIP & CONTROL VENT: Brand: CARDINAL HEALTH

## (undated) DEVICE — PAD CAST 4 IN COTTON NON STERILE

## (undated) DEVICE — INTENDED FOR TISSUE SEPARATION, AND OTHER PROCEDURES THAT REQUIRE A SHARP SURGICAL BLADE TO PUNCTURE OR CUT.: Brand: BARD-PARKER ® CARBON RIB-BACK BLADES

## (undated) DEVICE — DRAPE SHEET THREE QUARTER

## (undated) DEVICE — CANNULATED DRILL

## (undated) DEVICE — NEPTUNE E-SEP SMOKE EVACUATION PENCIL, COATED, 70MM BLADE, PUSH BUTTON SWITCH: Brand: NEPTUNE E-SEP

## (undated) DEVICE — STRETCH BANDAGE: Brand: CURITY

## (undated) DEVICE — PAD CAST 6 IN COTTON NON STERILE

## (undated) DEVICE — C-ARM: Brand: UNBRANDED

## (undated) DEVICE — ACE WRAP 4 IN UNSTERILE

## (undated) DEVICE — INTENDED FOR TISSUE SEPARATION, AND OTHER PROCEDURES THAT REQUIRE A SHARP SURGICAL BLADE TO PUNCTURE OR CUT.: Brand: BARD-PARKER SAFETY BLADES SIZE 10, STERILE

## (undated) DEVICE — CURITY NON-ADHERENT STRIPS: Brand: CURITY

## (undated) DEVICE — 3M™ IOBAN™ 2 ANTIMICROBIAL INCISE DRAPE 6650EZ: Brand: IOBAN™ 2

## (undated) DEVICE — ACE WRAP 6 IN STERILE